# Patient Record
Sex: FEMALE | Race: OTHER | HISPANIC OR LATINO
[De-identification: names, ages, dates, MRNs, and addresses within clinical notes are randomized per-mention and may not be internally consistent; named-entity substitution may affect disease eponyms.]

---

## 2017-01-18 ENCOUNTER — APPOINTMENT (OUTPATIENT)
Dept: INTERNAL MEDICINE | Facility: CLINIC | Age: 67
End: 2017-01-18

## 2017-01-24 ENCOUNTER — APPOINTMENT (OUTPATIENT)
Dept: PULMONOLOGY | Facility: CLINIC | Age: 67
End: 2017-01-24

## 2017-01-25 ENCOUNTER — APPOINTMENT (OUTPATIENT)
Dept: PULMONOLOGY | Facility: CLINIC | Age: 67
End: 2017-01-25

## 2017-01-31 ENCOUNTER — APPOINTMENT (OUTPATIENT)
Dept: INTERNAL MEDICINE | Facility: CLINIC | Age: 67
End: 2017-01-31

## 2017-02-14 ENCOUNTER — LABORATORY RESULT (OUTPATIENT)
Age: 67
End: 2017-02-14

## 2017-02-14 ENCOUNTER — APPOINTMENT (OUTPATIENT)
Dept: INTERNAL MEDICINE | Facility: CLINIC | Age: 67
End: 2017-02-14

## 2017-02-14 VITALS
TEMPERATURE: 97.8 F | DIASTOLIC BLOOD PRESSURE: 80 MMHG | HEART RATE: 79 BPM | OXYGEN SATURATION: 96 % | BODY MASS INDEX: 37.09 KG/M2 | HEIGHT: 59 IN | WEIGHT: 184 LBS | SYSTOLIC BLOOD PRESSURE: 120 MMHG

## 2017-02-15 ENCOUNTER — OTHER (OUTPATIENT)
Age: 67
End: 2017-02-15

## 2017-02-15 LAB — HBA1C MFR BLD HPLC: 7 %

## 2017-02-23 ENCOUNTER — APPOINTMENT (OUTPATIENT)
Dept: INTERNAL MEDICINE | Facility: CLINIC | Age: 67
End: 2017-02-23

## 2017-02-23 VITALS
SYSTOLIC BLOOD PRESSURE: 110 MMHG | HEIGHT: 59 IN | HEART RATE: 98 BPM | TEMPERATURE: 98.7 F | DIASTOLIC BLOOD PRESSURE: 50 MMHG | OXYGEN SATURATION: 98 %

## 2017-03-06 ENCOUNTER — APPOINTMENT (OUTPATIENT)
Dept: PULMONOLOGY | Facility: CLINIC | Age: 67
End: 2017-03-06

## 2017-03-19 LAB — HEMOCCULT STL QL IA: NEGATIVE

## 2017-03-24 ENCOUNTER — APPOINTMENT (OUTPATIENT)
Dept: VASCULAR SURGERY | Facility: CLINIC | Age: 67
End: 2017-03-24

## 2017-03-24 VITALS
HEART RATE: 89 BPM | OXYGEN SATURATION: 94 % | SYSTOLIC BLOOD PRESSURE: 113 MMHG | BODY MASS INDEX: 37.5 KG/M2 | HEIGHT: 59 IN | DIASTOLIC BLOOD PRESSURE: 72 MMHG | WEIGHT: 186 LBS

## 2017-03-29 ENCOUNTER — APPOINTMENT (OUTPATIENT)
Dept: INTERNAL MEDICINE | Facility: CLINIC | Age: 67
End: 2017-03-29

## 2017-03-29 VITALS
DIASTOLIC BLOOD PRESSURE: 70 MMHG | HEART RATE: 89 BPM | TEMPERATURE: 98.4 F | SYSTOLIC BLOOD PRESSURE: 110 MMHG | BODY MASS INDEX: 37.5 KG/M2 | HEIGHT: 59 IN | OXYGEN SATURATION: 95 % | WEIGHT: 186 LBS

## 2017-04-06 ENCOUNTER — APPOINTMENT (OUTPATIENT)
Dept: INTERNAL MEDICINE | Facility: CLINIC | Age: 67
End: 2017-04-06

## 2017-04-20 ENCOUNTER — OTHER (OUTPATIENT)
Age: 67
End: 2017-04-20

## 2017-04-27 ENCOUNTER — APPOINTMENT (OUTPATIENT)
Dept: INTERNAL MEDICINE | Facility: CLINIC | Age: 67
End: 2017-04-27

## 2017-04-27 VITALS
HEART RATE: 94 BPM | HEIGHT: 59 IN | WEIGHT: 178 LBS | OXYGEN SATURATION: 97 % | BODY MASS INDEX: 35.88 KG/M2 | SYSTOLIC BLOOD PRESSURE: 130 MMHG | DIASTOLIC BLOOD PRESSURE: 70 MMHG | TEMPERATURE: 98.2 F

## 2017-04-27 DIAGNOSIS — M19.90 UNSPECIFIED OSTEOARTHRITIS, UNSPECIFIED SITE: ICD-10-CM

## 2017-05-07 ENCOUNTER — FORM ENCOUNTER (OUTPATIENT)
Age: 67
End: 2017-05-07

## 2017-05-08 ENCOUNTER — OUTPATIENT (OUTPATIENT)
Dept: OUTPATIENT SERVICES | Facility: HOSPITAL | Age: 67
LOS: 1 days | End: 2017-05-08
Payer: MEDICARE

## 2017-05-08 PROCEDURE — 77080 DXA BONE DENSITY AXIAL: CPT | Mod: 26

## 2017-05-08 PROCEDURE — 77080 DXA BONE DENSITY AXIAL: CPT

## 2017-06-05 ENCOUNTER — APPOINTMENT (OUTPATIENT)
Dept: PULMONOLOGY | Facility: CLINIC | Age: 67
End: 2017-06-05

## 2017-06-07 ENCOUNTER — APPOINTMENT (OUTPATIENT)
Dept: INTERNAL MEDICINE | Facility: CLINIC | Age: 67
End: 2017-06-07

## 2017-06-07 VITALS
DIASTOLIC BLOOD PRESSURE: 70 MMHG | BODY MASS INDEX: 36.76 KG/M2 | OXYGEN SATURATION: 96 % | HEART RATE: 88 BPM | SYSTOLIC BLOOD PRESSURE: 130 MMHG | TEMPERATURE: 98.7 F | WEIGHT: 182 LBS

## 2017-06-09 ENCOUNTER — RX RENEWAL (OUTPATIENT)
Age: 67
End: 2017-06-09

## 2017-06-15 ENCOUNTER — MEDICATION RENEWAL (OUTPATIENT)
Age: 67
End: 2017-06-15

## 2017-06-15 LAB
CREAT SPEC-SCNC: 212 MG/DL
HBA1C MFR BLD HPLC: 6.6 %
MICROALBUMIN 24H UR DL<=1MG/L-MCNC: 4.6 MG/DL
MICROALBUMIN/CREAT 24H UR-RTO: 22

## 2017-06-23 ENCOUNTER — OUTPATIENT (OUTPATIENT)
Dept: OUTPATIENT SERVICES | Facility: HOSPITAL | Age: 67
LOS: 1 days | End: 2017-06-23
Payer: MEDICARE

## 2017-06-23 PROCEDURE — 71250 CT THORAX DX C-: CPT | Mod: 26

## 2017-06-23 PROCEDURE — 71250 CT THORAX DX C-: CPT

## 2017-07-26 ENCOUNTER — APPOINTMENT (OUTPATIENT)
Dept: INTERNAL MEDICINE | Facility: CLINIC | Age: 67
End: 2017-07-26

## 2017-08-31 ENCOUNTER — OTHER (OUTPATIENT)
Age: 67
End: 2017-08-31

## 2017-09-13 ENCOUNTER — APPOINTMENT (OUTPATIENT)
Dept: INTERNAL MEDICINE | Facility: CLINIC | Age: 67
End: 2017-09-13
Payer: MEDICARE

## 2017-09-13 VITALS
TEMPERATURE: 97.9 F | DIASTOLIC BLOOD PRESSURE: 70 MMHG | SYSTOLIC BLOOD PRESSURE: 130 MMHG | HEART RATE: 73 BPM | BODY MASS INDEX: 35.55 KG/M2 | WEIGHT: 176 LBS | OXYGEN SATURATION: 98 %

## 2017-09-13 PROCEDURE — 99214 OFFICE O/P EST MOD 30 MIN: CPT | Mod: 25,GC

## 2017-09-13 PROCEDURE — 36415 COLL VENOUS BLD VENIPUNCTURE: CPT

## 2017-10-02 LAB
ACE BLD-CCNC: 27 U/L
CCP AB SER IA-ACNC: <8 UNITS
CRP SERPL-MCNC: 1.6 MG/DL
ERYTHROCYTE [SEDIMENTATION RATE] IN BLOOD BY WESTERGREN METHOD: 41 MM/HR
RF+CCP IGG SER-IMP: NEGATIVE
RHEUMATOID FACT SER QL: 27 IU/ML

## 2017-10-10 ENCOUNTER — APPOINTMENT (OUTPATIENT)
Dept: PULMONOLOGY | Facility: CLINIC | Age: 67
End: 2017-10-10
Payer: MEDICARE

## 2017-10-10 PROCEDURE — 99214 OFFICE O/P EST MOD 30 MIN: CPT | Mod: 25

## 2017-10-16 ENCOUNTER — APPOINTMENT (OUTPATIENT)
Dept: PULMONOLOGY | Facility: CLINIC | Age: 67
End: 2017-10-16

## 2017-11-02 ENCOUNTER — APPOINTMENT (OUTPATIENT)
Dept: INTERNAL MEDICINE | Facility: CLINIC | Age: 67
End: 2017-11-02
Payer: MEDICARE

## 2017-11-02 ENCOUNTER — EMERGENCY (EMERGENCY)
Facility: HOSPITAL | Age: 67
LOS: 1 days | Discharge: HOME | End: 2017-11-02
Attending: EMERGENCY MEDICINE | Admitting: EMERGENCY MEDICINE
Payer: MEDICARE

## 2017-11-02 VITALS
DIASTOLIC BLOOD PRESSURE: 81 MMHG | OXYGEN SATURATION: 93 % | TEMPERATURE: 97 F | HEART RATE: 80 BPM | SYSTOLIC BLOOD PRESSURE: 159 MMHG | RESPIRATION RATE: 24 BRPM

## 2017-11-02 VITALS
SYSTOLIC BLOOD PRESSURE: 132 MMHG | OXYGEN SATURATION: 96 % | DIASTOLIC BLOOD PRESSURE: 78 MMHG | RESPIRATION RATE: 20 BRPM | HEART RATE: 84 BPM

## 2017-11-02 VITALS
WEIGHT: 170 LBS | DIASTOLIC BLOOD PRESSURE: 65 MMHG | BODY MASS INDEX: 34.27 KG/M2 | OXYGEN SATURATION: 97 % | HEART RATE: 92 BPM | TEMPERATURE: 97.8 F | HEIGHT: 59 IN | SYSTOLIC BLOOD PRESSURE: 101 MMHG

## 2017-11-02 LAB
ALBUMIN SERPL ELPH-MCNC: 4.3 G/DL — SIGNIFICANT CHANGE UP (ref 3.3–5)
ALP SERPL-CCNC: 81 U/L — SIGNIFICANT CHANGE UP (ref 40–120)
ALT FLD-CCNC: 14 U/L — SIGNIFICANT CHANGE UP (ref 10–45)
ANION GAP SERPL CALC-SCNC: 13 MMOL/L — SIGNIFICANT CHANGE UP (ref 5–17)
AST SERPL-CCNC: 17 U/L — SIGNIFICANT CHANGE UP (ref 10–40)
BASOPHILS NFR BLD AUTO: 0.3 % — SIGNIFICANT CHANGE UP (ref 0–2)
BILIRUB SERPL-MCNC: <0.2 MG/DL — SIGNIFICANT CHANGE UP (ref 0.2–1.2)
BUN SERPL-MCNC: 26 MG/DL — HIGH (ref 7–23)
CALCIUM SERPL-MCNC: 10.3 MG/DL — SIGNIFICANT CHANGE UP (ref 8.4–10.5)
CHLORIDE SERPL-SCNC: 100 MMOL/L — SIGNIFICANT CHANGE UP (ref 96–108)
CO2 SERPL-SCNC: 28 MMOL/L — SIGNIFICANT CHANGE UP (ref 22–31)
CREAT SERPL-MCNC: 0.78 MG/DL — SIGNIFICANT CHANGE UP (ref 0.5–1.3)
EOSINOPHIL NFR BLD AUTO: 1.4 % — SIGNIFICANT CHANGE UP (ref 0–6)
GLUCOSE SERPL-MCNC: 120 MG/DL — HIGH (ref 70–99)
HCT VFR BLD CALC: 38.5 % — SIGNIFICANT CHANGE UP (ref 34.5–45)
HGB BLD-MCNC: 12 G/DL — SIGNIFICANT CHANGE UP (ref 11.5–15.5)
LYMPHOCYTES # BLD AUTO: 13.8 % — SIGNIFICANT CHANGE UP (ref 13–44)
MCHC RBC-ENTMCNC: 28.4 PG — SIGNIFICANT CHANGE UP (ref 27–34)
MCHC RBC-ENTMCNC: 31.2 G/DL — LOW (ref 32–36)
MCV RBC AUTO: 91.2 FL — SIGNIFICANT CHANGE UP (ref 80–100)
MONOCYTES NFR BLD AUTO: 8.8 % — SIGNIFICANT CHANGE UP (ref 2–14)
NEUTROPHILS NFR BLD AUTO: 75.7 % — SIGNIFICANT CHANGE UP (ref 43–77)
PLATELET # BLD AUTO: 392 K/UL — SIGNIFICANT CHANGE UP (ref 150–400)
POTASSIUM SERPL-MCNC: 4.3 MMOL/L — SIGNIFICANT CHANGE UP (ref 3.5–5.3)
POTASSIUM SERPL-SCNC: 4.3 MMOL/L — SIGNIFICANT CHANGE UP (ref 3.5–5.3)
PROT SERPL-MCNC: 7.6 G/DL — SIGNIFICANT CHANGE UP (ref 6–8.3)
RBC # BLD: 4.22 M/UL — SIGNIFICANT CHANGE UP (ref 3.8–5.2)
RBC # FLD: 16 % — SIGNIFICANT CHANGE UP (ref 10.3–16.9)
SODIUM SERPL-SCNC: 141 MMOL/L — SIGNIFICANT CHANGE UP (ref 135–145)
WBC # BLD: 14.8 K/UL — HIGH (ref 3.8–10.5)
WBC # FLD AUTO: 14.8 K/UL — HIGH (ref 3.8–10.5)

## 2017-11-02 PROCEDURE — 80053 COMPREHEN METABOLIC PANEL: CPT

## 2017-11-02 PROCEDURE — 93971 EXTREMITY STUDY: CPT | Mod: 26,RT

## 2017-11-02 PROCEDURE — 99214 OFFICE O/P EST MOD 30 MIN: CPT | Mod: GC

## 2017-11-02 PROCEDURE — 99284 EMERGENCY DEPT VISIT MOD MDM: CPT | Mod: 25

## 2017-11-02 PROCEDURE — 85025 COMPLETE CBC W/AUTO DIFF WBC: CPT

## 2017-11-02 PROCEDURE — 99285 EMERGENCY DEPT VISIT HI MDM: CPT

## 2017-11-02 PROCEDURE — 96374 THER/PROPH/DIAG INJ IV PUSH: CPT

## 2017-11-02 PROCEDURE — 96375 TX/PRO/DX INJ NEW DRUG ADDON: CPT

## 2017-11-02 PROCEDURE — 36415 COLL VENOUS BLD VENIPUNCTURE: CPT

## 2017-11-02 PROCEDURE — 93971 EXTREMITY STUDY: CPT

## 2017-11-02 RX ORDER — FLUCONAZOLE 100 MG/1
100 TABLET ORAL
Qty: 30 | Refills: 0 | Status: DISCONTINUED | COMMUNITY
Start: 2017-09-13 | End: 2017-11-02

## 2017-11-02 RX ORDER — ONDANSETRON 8 MG/1
4 TABLET, FILM COATED ORAL ONCE
Qty: 0 | Refills: 0 | Status: COMPLETED | OUTPATIENT
Start: 2017-11-02 | End: 2017-11-02

## 2017-11-02 RX ORDER — SODIUM CHLORIDE 9 MG/ML
1000 INJECTION INTRAMUSCULAR; INTRAVENOUS; SUBCUTANEOUS ONCE
Qty: 0 | Refills: 0 | Status: COMPLETED | OUTPATIENT
Start: 2017-11-02 | End: 2017-11-02

## 2017-11-02 RX ORDER — KETOROLAC TROMETHAMINE 30 MG/ML
15 SYRINGE (ML) INJECTION ONCE
Qty: 0 | Refills: 0 | Status: DISCONTINUED | OUTPATIENT
Start: 2017-11-02 | End: 2017-11-02

## 2017-11-02 RX ORDER — CLOTRIMAZOLE AND BETAMETHASONE DIPROPIONATE 10; .5 MG/G; MG/G
1-0.05 CREAM TOPICAL TWICE DAILY
Qty: 45 | Refills: 0 | Status: DISCONTINUED | COMMUNITY
Start: 2017-03-29 | End: 2017-11-02

## 2017-11-02 RX ORDER — IBUPROFEN 200 MG
1 TABLET ORAL
Qty: 30 | Refills: 0 | OUTPATIENT
Start: 2017-11-02

## 2017-11-02 RX ORDER — FAMOTIDINE 10 MG/ML
20 INJECTION INTRAVENOUS ONCE
Qty: 0 | Refills: 0 | Status: COMPLETED | OUTPATIENT
Start: 2017-11-02 | End: 2017-11-02

## 2017-11-02 RX ADMIN — Medication 15 MILLIGRAM(S): at 17:06

## 2017-11-02 RX ADMIN — Medication 15 MILLIGRAM(S): at 17:07

## 2017-11-02 RX ADMIN — FAMOTIDINE 20 MILLIGRAM(S): 10 INJECTION INTRAVENOUS at 17:07

## 2017-11-02 RX ADMIN — ONDANSETRON 4 MILLIGRAM(S): 8 TABLET, FILM COATED ORAL at 17:06

## 2017-11-02 RX ADMIN — SODIUM CHLORIDE 2000 MILLILITER(S): 9 INJECTION INTRAMUSCULAR; INTRAVENOUS; SUBCUTANEOUS at 17:03

## 2017-11-02 NOTE — ED PROVIDER NOTE - MUSCULOSKELETAL, MLM
Spine appears normal, range of motion is not limited, mild tenderness right mid thigh laterally, normal appearance. pedal pulse 2+.  no visible swelling

## 2017-11-02 NOTE — ED PROVIDER NOTE - PMH
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  Depressive disorder  Depression  Disease of trachea and bronchus  Tracheomalacia  Emphysema  COPD (chronic obstructive pulmonary disease) with emphysema  Epilepsy  No longer on medications.  Essential hypertension  Hypertension  Hemorrhoids  Hemorrhoids  Hyperlipidemia  Hyperlipidemia  Peptic ulcer  2/2 H. pylori (s/p amoxicillin, clarithromycin, omeprazole)  Tobacco use disorder  Tobacco abuse  Transient cerebral ischemia  TIA (transient ischemic attack)  Type 2 diabetes mellitus  Diabetes

## 2017-11-02 NOTE — ED ADULT NURSE NOTE - CHPI ED SYMPTOMS NEG
no burning urination/no dysuria/no chills/no abdominal distension/no blood in stool/no hematuria/no diarrhea/no fever

## 2017-11-02 NOTE — ED ADULT TRIAGE NOTE - CHIEF COMPLAINT QUOTE
Patient was at MD's Office and had one bout of vomiting and is nauseous, complaining of abdominal pain.

## 2017-11-02 NOTE — ED PROVIDER NOTE - OBJECTIVE STATEMENT
here from clinic with episode of nausea/vomiting.  States she was being seeing for 3 weeks of leg pain.  It was very hot and stuffy in office.  She thinks she overheated and also says she ate a hamburger that may have been bad.  Got hot/ nauseous then vomited.  Mild epigastric abdominal pain.  Denies headache, chest pain, sob, fever/chills.  Now feeling a little better.

## 2017-11-02 NOTE — ED PROVIDER NOTE - MEDICAL DECISION MAKING DETAILS
nausea/vomiting today.  suspect overheating/ possible food poisoning.  abd soft.  labs with mild leukocytosis.  symptoms resolved with pepcid/zofran.    also with leg pain x a few weeks.  normal appearance.  dvt study neg.  suspect musculoskeletal.  plan nsaids/ pmd f/u

## 2017-11-09 DIAGNOSIS — I10 ESSENTIAL (PRIMARY) HYPERTENSION: ICD-10-CM

## 2017-11-09 DIAGNOSIS — J44.9 CHRONIC OBSTRUCTIVE PULMONARY DISEASE, UNSPECIFIED: ICD-10-CM

## 2017-11-09 DIAGNOSIS — Z79.51 LONG TERM (CURRENT) USE OF INHALED STEROIDS: ICD-10-CM

## 2017-11-09 DIAGNOSIS — E11.9 TYPE 2 DIABETES MELLITUS WITHOUT COMPLICATIONS: ICD-10-CM

## 2017-11-09 DIAGNOSIS — Z79.82 LONG TERM (CURRENT) USE OF ASPIRIN: ICD-10-CM

## 2017-11-09 DIAGNOSIS — Z79.899 OTHER LONG TERM (CURRENT) DRUG THERAPY: ICD-10-CM

## 2017-11-09 DIAGNOSIS — E78.5 HYPERLIPIDEMIA, UNSPECIFIED: ICD-10-CM

## 2017-11-09 DIAGNOSIS — R10.13 EPIGASTRIC PAIN: ICD-10-CM

## 2017-11-09 DIAGNOSIS — R11.2 NAUSEA WITH VOMITING, UNSPECIFIED: ICD-10-CM

## 2017-11-09 DIAGNOSIS — M79.604 PAIN IN RIGHT LEG: ICD-10-CM

## 2017-11-09 DIAGNOSIS — F17.200 NICOTINE DEPENDENCE, UNSPECIFIED, UNCOMPLICATED: ICD-10-CM

## 2017-12-27 ENCOUNTER — RX RENEWAL (OUTPATIENT)
Age: 67
End: 2017-12-27

## 2018-01-05 ENCOUNTER — APPOINTMENT (OUTPATIENT)
Dept: PULMONOLOGY | Facility: CLINIC | Age: 68
End: 2018-01-05

## 2018-01-09 ENCOUNTER — CHART COPY (OUTPATIENT)
Age: 68
End: 2018-01-09

## 2018-01-09 ENCOUNTER — OUTPATIENT (OUTPATIENT)
Dept: OUTPATIENT SERVICES | Facility: HOSPITAL | Age: 68
LOS: 1 days | End: 2018-01-09
Payer: MEDICARE

## 2018-01-09 ENCOUNTER — APPOINTMENT (OUTPATIENT)
Dept: INTERNAL MEDICINE | Facility: CLINIC | Age: 68
End: 2018-01-09

## 2018-01-09 PROCEDURE — 73110 X-RAY EXAM OF WRIST: CPT | Mod: 26,50

## 2018-01-09 PROCEDURE — 73110 X-RAY EXAM OF WRIST: CPT

## 2018-01-09 PROCEDURE — 73552 X-RAY EXAM OF FEMUR 2/>: CPT | Mod: 26,RT

## 2018-01-09 PROCEDURE — 73552 X-RAY EXAM OF FEMUR 2/>: CPT

## 2018-01-12 ENCOUNTER — APPOINTMENT (OUTPATIENT)
Dept: INTERNAL MEDICINE | Facility: CLINIC | Age: 68
End: 2018-01-12
Payer: MEDICARE

## 2018-01-12 VITALS
OXYGEN SATURATION: 92 % | TEMPERATURE: 98.4 F | DIASTOLIC BLOOD PRESSURE: 79 MMHG | SYSTOLIC BLOOD PRESSURE: 133 MMHG | WEIGHT: 168 LBS | HEART RATE: 84 BPM | BODY MASS INDEX: 33.93 KG/M2

## 2018-01-12 PROCEDURE — 36415 COLL VENOUS BLD VENIPUNCTURE: CPT

## 2018-01-12 PROCEDURE — 99214 OFFICE O/P EST MOD 30 MIN: CPT | Mod: 25,GC

## 2018-01-19 ENCOUNTER — OTHER (OUTPATIENT)
Age: 68
End: 2018-01-19

## 2018-01-19 LAB
ALBUMIN SERPL ELPH-MCNC: 4 G/DL
ALP BLD-CCNC: 87 U/L
ALT SERPL-CCNC: 10 U/L
ANION GAP SERPL CALC-SCNC: 17 MMOL/L
AST SERPL-CCNC: 17 U/L
BASOPHILS # BLD AUTO: 0.06 K/UL
BASOPHILS NFR BLD AUTO: 0.6 %
BILIRUB SERPL-MCNC: 0.2 MG/DL
BUN SERPL-MCNC: 14 MG/DL
CALCIUM SERPL-MCNC: 10 MG/DL
CHLORIDE SERPL-SCNC: 101 MMOL/L
CHOLEST SERPL-MCNC: 185 MG/DL
CHOLEST/HDLC SERPL: 4.6 RATIO
CO2 SERPL-SCNC: 26 MMOL/L
CREAT SERPL-MCNC: 0.71 MG/DL
EOSINOPHIL # BLD AUTO: 0.28 K/UL
EOSINOPHIL NFR BLD AUTO: 2.7 %
GLUCOSE SERPL-MCNC: 154 MG/DL
HBA1C MFR BLD HPLC: 6.5 %
HCT VFR BLD CALC: 39.6 %
HDLC SERPL-MCNC: 40 MG/DL
HGB BLD-MCNC: 12.1 G/DL
IMM GRANULOCYTES NFR BLD AUTO: 0.8 %
LDLC SERPL CALC-MCNC: 102 MG/DL
LYMPHOCYTES # BLD AUTO: 2.31 K/UL
LYMPHOCYTES NFR BLD AUTO: 22.2 %
MAN DIFF?: NORMAL
MCHC RBC-ENTMCNC: 28.4 PG
MCHC RBC-ENTMCNC: 30.6 GM/DL
MCV RBC AUTO: 93 FL
MONOCYTES # BLD AUTO: 0.79 K/UL
MONOCYTES NFR BLD AUTO: 7.6 %
NEUTROPHILS # BLD AUTO: 6.89 K/UL
NEUTROPHILS NFR BLD AUTO: 66.1 %
PLATELET # BLD AUTO: 413 K/UL
POTASSIUM SERPL-SCNC: 4.8 MMOL/L
PROT SERPL-MCNC: 7.1 G/DL
RBC # BLD: 4.26 M/UL
RBC # FLD: 15.5 %
SODIUM SERPL-SCNC: 144 MMOL/L
TRIGL SERPL-MCNC: 213 MG/DL
WBC # FLD AUTO: 10.41 K/UL

## 2018-01-23 ENCOUNTER — FORM ENCOUNTER (OUTPATIENT)
Age: 68
End: 2018-01-23

## 2018-01-24 ENCOUNTER — APPOINTMENT (OUTPATIENT)
Dept: ULTRASOUND IMAGING | Facility: HOSPITAL | Age: 68
End: 2018-01-24

## 2018-01-24 ENCOUNTER — OUTPATIENT (OUTPATIENT)
Dept: OUTPATIENT SERVICES | Facility: HOSPITAL | Age: 68
LOS: 1 days | End: 2018-01-24
Payer: MEDICARE

## 2018-01-24 PROCEDURE — 76700 US EXAM ABDOM COMPLETE: CPT | Mod: 26

## 2018-01-24 PROCEDURE — 76700 US EXAM ABDOM COMPLETE: CPT

## 2018-01-26 ENCOUNTER — RX RENEWAL (OUTPATIENT)
Age: 68
End: 2018-01-26

## 2018-02-01 ENCOUNTER — APPOINTMENT (OUTPATIENT)
Dept: RHEUMATOLOGY | Facility: CLINIC | Age: 68
End: 2018-02-01
Payer: MEDICARE

## 2018-02-01 VITALS
SYSTOLIC BLOOD PRESSURE: 158 MMHG | HEIGHT: 59 IN | BODY MASS INDEX: 32.46 KG/M2 | DIASTOLIC BLOOD PRESSURE: 84 MMHG | WEIGHT: 161 LBS | OXYGEN SATURATION: 92 % | HEART RATE: 92 BPM

## 2018-02-01 DIAGNOSIS — M25.532 PAIN IN RIGHT WRIST: ICD-10-CM

## 2018-02-01 DIAGNOSIS — M25.531 PAIN IN RIGHT WRIST: ICD-10-CM

## 2018-02-01 DIAGNOSIS — G89.29 PAIN IN RIGHT WRIST: ICD-10-CM

## 2018-02-01 PROCEDURE — 36415 COLL VENOUS BLD VENIPUNCTURE: CPT

## 2018-02-01 PROCEDURE — 99205 OFFICE O/P NEW HI 60 MIN: CPT | Mod: 25

## 2018-02-07 LAB
ALBUMIN MFR SERPL ELPH: 51.2 %
ALBUMIN SERPL-MCNC: 3.6 G/DL
ALBUMIN/GLOB SERPL: 1.1 RATIO
ALBUPE: 40.3 %
ALPHA1 GLOB MFR SERPL ELPH: 6.5 %
ALPHA1 GLOB SERPL ELPH-MCNC: 0.5 G/DL
ALPHA1UPE: 10.5 %
ALPHA2 GLOB MFR SERPL ELPH: 14.5 %
ALPHA2 GLOB SERPL ELPH-MCNC: 1 G/DL
ALPHA2UPE: 14.3 %
B-GLOBULIN MFR SERPL ELPH: 13.8 %
B-GLOBULIN SERPL ELPH-MCNC: 1 G/DL
BETAUPE: 13.8 %
CALCIUM SERPL-MCNC: 9.5 MG/DL
CREAT 24H UR-MCNC: NORMAL G/24 H
CREATININE UR (MAYO): 224 MG/DL
FERRITIN SERPL-MCNC: 181 NG/ML
GAMMA GLOB FLD ELPH-MCNC: 1 G/DL
GAMMA GLOB MFR SERPL ELPH: 14 %
GAMMAUPE: 21.1 %
HBV CORE IGG+IGM SER QL: NONREACTIVE
HBV SURFACE AB SER QL: NONREACTIVE
HBV SURFACE AG SER QL: NONREACTIVE
HCV AB SER QL: NONREACTIVE
HCV S/CO RATIO: 0.05 S/CO
IGA 24H UR QL IFE: NORMAL
INTERPRETATION SERPL IEP-IMP: NORMAL
KAPPA LC 24H UR QL: NORMAL
LDH SERPL-CCNC: 267 U/L
MAGNESIUM SERPL-MCNC: 1.9 MG/DL
PARATHYROID HORMONE INTACT: 31 PG/ML
PROT PATTERN 24H UR ELPH-IMP: NORMAL
PROT SERPL-MCNC: 7 G/DL
PROT SERPL-MCNC: 7 G/DL
PROT UR-MCNC: 88 MG/DL
PROT UR-MCNC: 88 MG/DL
SPECIMEN VOL 24H UR: NORMAL ML
TSH SERPL-ACNC: 1.44 UIU/ML
URATE SERPL-MCNC: 3.5 MG/DL

## 2018-02-15 ENCOUNTER — APPOINTMENT (OUTPATIENT)
Dept: RHEUMATOLOGY | Facility: CLINIC | Age: 68
End: 2018-02-15
Payer: MEDICARE

## 2018-02-15 VITALS
SYSTOLIC BLOOD PRESSURE: 116 MMHG | HEART RATE: 86 BPM | OXYGEN SATURATION: 94 % | BODY MASS INDEX: 33.12 KG/M2 | DIASTOLIC BLOOD PRESSURE: 64 MMHG | WEIGHT: 164 LBS

## 2018-02-15 DIAGNOSIS — R70.0 ELEVATED ERYTHROCYTE SEDIMENTATION RATE: ICD-10-CM

## 2018-02-15 PROCEDURE — 36415 COLL VENOUS BLD VENIPUNCTURE: CPT

## 2018-02-15 PROCEDURE — 99214 OFFICE O/P EST MOD 30 MIN: CPT | Mod: 25

## 2018-02-21 ENCOUNTER — APPOINTMENT (OUTPATIENT)
Dept: PULMONOLOGY | Facility: CLINIC | Age: 68
End: 2018-02-21

## 2018-02-21 LAB
CRP SERPL-MCNC: 1 MG/DL
ENA SS-A AB SER IA-ACNC: <0.2 AL
ENA SS-B AB SER IA-ACNC: <0.2 AL
ERYTHROCYTE [SEDIMENTATION RATE] IN BLOOD BY WESTERGREN METHOD: 48 MM/HR
IGA SER QL IEP: 332 MG/DL
IGG SER QL IEP: 1110 MG/DL
IGM SER QL IEP: 131 MG/DL

## 2018-02-22 ENCOUNTER — APPOINTMENT (OUTPATIENT)
Dept: RHEUMATOLOGY | Facility: CLINIC | Age: 68
End: 2018-02-22

## 2018-03-05 ENCOUNTER — APPOINTMENT (OUTPATIENT)
Dept: RHEUMATOLOGY | Facility: CLINIC | Age: 68
End: 2018-03-05

## 2018-03-15 ENCOUNTER — APPOINTMENT (OUTPATIENT)
Dept: RHEUMATOLOGY | Facility: CLINIC | Age: 68
End: 2018-03-15

## 2018-03-23 ENCOUNTER — RX RENEWAL (OUTPATIENT)
Age: 68
End: 2018-03-23

## 2018-03-23 DIAGNOSIS — R32 UNSPECIFIED URINARY INCONTINENCE: ICD-10-CM

## 2018-03-26 ENCOUNTER — APPOINTMENT (OUTPATIENT)
Dept: INTERNAL MEDICINE | Facility: CLINIC | Age: 68
End: 2018-03-26

## 2018-03-29 ENCOUNTER — RX RENEWAL (OUTPATIENT)
Age: 68
End: 2018-03-29

## 2018-03-30 ENCOUNTER — RX RENEWAL (OUTPATIENT)
Age: 68
End: 2018-03-30

## 2018-04-25 ENCOUNTER — RX RENEWAL (OUTPATIENT)
Age: 68
End: 2018-04-25

## 2018-04-27 ENCOUNTER — APPOINTMENT (OUTPATIENT)
Dept: INTERNAL MEDICINE | Facility: CLINIC | Age: 68
End: 2018-04-27
Payer: MEDICARE

## 2018-04-27 VITALS
HEART RATE: 82 BPM | OXYGEN SATURATION: 94 % | WEIGHT: 170 LBS | BODY MASS INDEX: 34.34 KG/M2 | DIASTOLIC BLOOD PRESSURE: 80 MMHG | TEMPERATURE: 98.1 F | SYSTOLIC BLOOD PRESSURE: 157 MMHG

## 2018-04-27 DIAGNOSIS — M19.90 UNSPECIFIED OSTEOARTHRITIS, UNSPECIFIED SITE: ICD-10-CM

## 2018-04-27 PROCEDURE — 99213 OFFICE O/P EST LOW 20 MIN: CPT | Mod: GE

## 2018-04-28 PROBLEM — M19.90 INFLAMMATORY ARTHRITIS: Status: ACTIVE | Noted: 2018-02-15

## 2018-04-30 LAB
ANION GAP SERPL CALC-SCNC: 14 MMOL/L
BASOPHILS # BLD AUTO: 0.05 K/UL
BASOPHILS NFR BLD AUTO: 0.6 %
BUN SERPL-MCNC: 17 MG/DL
CALCIUM SERPL-MCNC: 9.8 MG/DL
CHLORIDE SERPL-SCNC: 101 MMOL/L
CO2 SERPL-SCNC: 26 MMOL/L
CREAT SERPL-MCNC: 0.68 MG/DL
EOSINOPHIL # BLD AUTO: 0.12 K/UL
EOSINOPHIL NFR BLD AUTO: 1.5 %
GLUCOSE SERPL-MCNC: 97 MG/DL
HBA1C MFR BLD HPLC: 6.8 %
HCT VFR BLD CALC: 39.7 %
HGB BLD-MCNC: 12.1 G/DL
IMM GRANULOCYTES NFR BLD AUTO: 1.3 %
LYMPHOCYTES # BLD AUTO: 2.84 K/UL
LYMPHOCYTES NFR BLD AUTO: 36.4 %
MAN DIFF?: NORMAL
MCHC RBC-ENTMCNC: 28.7 PG
MCHC RBC-ENTMCNC: 30.5 GM/DL
MCV RBC AUTO: 94.3 FL
MONOCYTES # BLD AUTO: 0.64 K/UL
MONOCYTES NFR BLD AUTO: 8.2 %
NEUTROPHILS # BLD AUTO: 4.05 K/UL
NEUTROPHILS NFR BLD AUTO: 52 %
PLATELET # BLD AUTO: 370 K/UL
POTASSIUM SERPL-SCNC: 4.7 MMOL/L
RBC # BLD: 4.21 M/UL
RBC # FLD: 15.1 %
SODIUM SERPL-SCNC: 141 MMOL/L
WBC # FLD AUTO: 7.8 K/UL

## 2018-05-04 ENCOUNTER — APPOINTMENT (OUTPATIENT)
Dept: PULMONOLOGY | Facility: CLINIC | Age: 68
End: 2018-05-04
Payer: MEDICARE

## 2018-05-04 PROCEDURE — 99214 OFFICE O/P EST MOD 30 MIN: CPT | Mod: 25

## 2018-05-11 ENCOUNTER — OUTPATIENT (OUTPATIENT)
Dept: OUTPATIENT SERVICES | Facility: HOSPITAL | Age: 68
LOS: 1 days | End: 2018-05-11
Payer: MEDICARE

## 2018-05-11 DIAGNOSIS — R06.00 DYSPNEA, UNSPECIFIED: ICD-10-CM

## 2018-05-11 PROCEDURE — 93306 TTE W/DOPPLER COMPLETE: CPT | Mod: 26

## 2018-05-11 PROCEDURE — 93306 TTE W/DOPPLER COMPLETE: CPT

## 2018-05-14 ENCOUNTER — APPOINTMENT (OUTPATIENT)
Dept: OPHTHALMOLOGY | Facility: CLINIC | Age: 68
End: 2018-05-14
Payer: MEDICARE

## 2018-05-14 DIAGNOSIS — H25.13 AGE-RELATED NUCLEAR CATARACT, BILATERAL: ICD-10-CM

## 2018-05-14 DIAGNOSIS — H54.7 UNSPECIFIED VISUAL LOSS: ICD-10-CM

## 2018-05-14 PROCEDURE — 92004 COMPRE OPH EXAM NEW PT 1/>: CPT

## 2018-05-23 ENCOUNTER — APPOINTMENT (OUTPATIENT)
Dept: OPHTHALMOLOGY | Facility: CLINIC | Age: 68
End: 2018-05-23
Payer: MEDICARE

## 2018-05-23 DIAGNOSIS — H47.20 UNSPECIFIED OPTIC ATROPHY: ICD-10-CM

## 2018-05-23 PROCEDURE — 92083 EXTENDED VISUAL FIELD XM: CPT

## 2018-05-23 PROCEDURE — 92133 CPTRZD OPH DX IMG PST SGM ON: CPT

## 2018-05-23 PROCEDURE — 92014 COMPRE OPH EXAM EST PT 1/>: CPT

## 2018-06-04 ENCOUNTER — RX RENEWAL (OUTPATIENT)
Age: 68
End: 2018-06-04

## 2018-06-08 ENCOUNTER — OUTPATIENT (OUTPATIENT)
Dept: OUTPATIENT SERVICES | Facility: HOSPITAL | Age: 68
LOS: 1 days | End: 2018-06-08
Payer: MEDICARE

## 2018-06-08 ENCOUNTER — APPOINTMENT (OUTPATIENT)
Dept: MRI IMAGING | Facility: HOSPITAL | Age: 68
End: 2018-06-08
Payer: MEDICARE

## 2018-06-08 PROCEDURE — 70543 MRI ORBT/FAC/NCK W/O &W/DYE: CPT

## 2018-06-08 PROCEDURE — A9585: CPT

## 2018-06-08 PROCEDURE — 70543 MRI ORBT/FAC/NCK W/O &W/DYE: CPT | Mod: 26

## 2018-07-09 ENCOUNTER — RX RENEWAL (OUTPATIENT)
Age: 68
End: 2018-07-09

## 2018-08-03 ENCOUNTER — APPOINTMENT (OUTPATIENT)
Dept: PULMONOLOGY | Facility: CLINIC | Age: 68
End: 2018-08-03

## 2018-08-15 ENCOUNTER — APPOINTMENT (OUTPATIENT)
Dept: INTERNAL MEDICINE | Facility: CLINIC | Age: 68
End: 2018-08-15
Payer: MEDICARE

## 2018-08-15 VITALS
OXYGEN SATURATION: 97 % | HEIGHT: 59 IN | SYSTOLIC BLOOD PRESSURE: 177 MMHG | DIASTOLIC BLOOD PRESSURE: 75 MMHG | TEMPERATURE: 97.8 F | HEART RATE: 62 BPM | BODY MASS INDEX: 33.47 KG/M2 | WEIGHT: 166 LBS

## 2018-08-15 PROCEDURE — 99214 OFFICE O/P EST MOD 30 MIN: CPT | Mod: GC

## 2018-09-03 ENCOUNTER — EMERGENCY (EMERGENCY)
Facility: HOSPITAL | Age: 68
LOS: 1 days | Discharge: HOME | End: 2018-09-03
Attending: EMERGENCY MEDICINE | Admitting: EMERGENCY MEDICINE
Payer: MEDICARE

## 2018-09-03 VITALS
DIASTOLIC BLOOD PRESSURE: 58 MMHG | TEMPERATURE: 99 F | WEIGHT: 259.93 LBS | HEART RATE: 84 BPM | SYSTOLIC BLOOD PRESSURE: 110 MMHG | RESPIRATION RATE: 17 BRPM | OXYGEN SATURATION: 98 %

## 2018-09-03 VITALS
SYSTOLIC BLOOD PRESSURE: 155 MMHG | RESPIRATION RATE: 18 BRPM | DIASTOLIC BLOOD PRESSURE: 76 MMHG | TEMPERATURE: 98 F | HEART RATE: 79 BPM | OXYGEN SATURATION: 98 %

## 2018-09-03 PROCEDURE — 99283 EMERGENCY DEPT VISIT LOW MDM: CPT

## 2018-09-03 RX ORDER — ACETAMINOPHEN 500 MG
325 TABLET ORAL ONCE
Qty: 0 | Refills: 0 | Status: DISCONTINUED | OUTPATIENT
Start: 2018-09-03 | End: 2018-09-07

## 2018-09-03 RX ORDER — ACETAMINOPHEN 500 MG
650 TABLET ORAL ONCE
Qty: 0 | Refills: 0 | Status: COMPLETED | OUTPATIENT
Start: 2018-09-03 | End: 2018-09-03

## 2018-09-03 RX ORDER — IBUPROFEN 200 MG
600 TABLET ORAL ONCE
Qty: 0 | Refills: 0 | Status: COMPLETED | OUTPATIENT
Start: 2018-09-03 | End: 2018-09-03

## 2018-09-03 RX ADMIN — Medication 600 MILLIGRAM(S): at 20:42

## 2018-09-03 RX ADMIN — Medication 650 MILLIGRAM(S): at 20:42

## 2018-09-03 NOTE — ED PROVIDER NOTE - MEDICAL DECISION MAKING DETAILS
67F with multiple medical problems s/p fall from rocking chair no complaints other than whole body pain for which she has had for 1 week, wants tylenol and motrin, declines any other pain medications. No joint pain no bony pain no deformity, from of joints, no c-s spine ttp no head injury no neck injury pt is ambulatory with cane at baseline, remains ambulatory will dc home.

## 2018-09-03 NOTE — ED ADULT NURSE NOTE - CHPI ED NUR SYMPTOMS NEG
no fever/no tingling/no abrasion/no weakness/no confusion/no vomiting/no bleeding/no loss of consciousness/no numbness/no deformity

## 2018-09-03 NOTE — ED PROVIDER NOTE - OBJECTIVE STATEMENT
67F with multiple medical problems home O2 dependent presenting s/p fall from rocking chair onto knees and wrists pt states she walks with cane at baseline, states that her pain started even before her fall from her rocking chair today, states "my whole body hurts" "this is my chronic pain". Denies deformity denies headache/neck injury. Denies head pain LOC .

## 2018-09-03 NOTE — ED ADULT NURSE NOTE - NSIMPLEMENTINTERV_GEN_ALL_ED
Implemented All Fall Risk Interventions:  [place] to call system. Call bell, personal items and telephone within reach. Instruct patient to call for assistance. Room bathroom lighting operational. Non-slip footwear when patient is off stretcher. Physically safe environment: no spills, clutter or unnecessary equipment. Stretcher in lowest position, wheels locked, appropriate side rails in place. Provide visual cue, wrist band, yellow gown, etc. Monitor gait and stability. Monitor for mental status changes and reorient to person, place, and time. Review medications for side effects contributing to fall risk. Reinforce activity limits and safety measures with patient and family.

## 2018-09-03 NOTE — ED PROVIDER NOTE - MUSCULOSKELETAL, MLM
Spine appears normal, range of motion is not limited, no muscle or joint tenderness no c-s spine ttp, no clavicular ttp, no shoulder/elbow/wrist deformity or ttp from of all joints, no knee bruising, from of knees/hips bl pt ambulates with cane

## 2018-09-03 NOTE — ED ADULT TRIAGE NOTE - CHIEF COMPLAINT QUOTE
biba c/o bilateral knee pain and left hand/wrist pain.  Patient was sitting in a rocking chair and fell forward.  Denies hitting head / loc / neck/back pain

## 2018-09-03 NOTE — ED PROVIDER NOTE - CONSTITUTIONAL, MLM
normal... awake, alert, oriented to person, place, time/situation and in no apparent distress. pt appears sob chronically

## 2018-09-03 NOTE — ED ADULT NURSE NOTE - OBJECTIVE STATEMENT
Pt states she was on a rocking chair and fell of and hit her knees. Pt states she has chronic joint pain but states the pain became work because she fell on her knees.

## 2018-09-07 DIAGNOSIS — M25.562 PAIN IN LEFT KNEE: ICD-10-CM

## 2018-09-07 DIAGNOSIS — M25.532 PAIN IN LEFT WRIST: ICD-10-CM

## 2018-09-07 DIAGNOSIS — I10 ESSENTIAL (PRIMARY) HYPERTENSION: ICD-10-CM
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
## 2018-09-07 DIAGNOSIS — M25.561 PAIN IN RIGHT KNEE: ICD-10-CM

## 2018-09-07 DIAGNOSIS — J44.9 CHRONIC OBSTRUCTIVE PULMONARY DISEASE, UNSPECIFIED: ICD-10-CM
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
## 2018-09-07 DIAGNOSIS — E11.9 TYPE 2 DIABETES MELLITUS WITHOUT COMPLICATIONS: ICD-10-CM

## 2018-09-07 DIAGNOSIS — E78.5 HYPERLIPIDEMIA, UNSPECIFIED: ICD-10-CM

## 2018-09-07 DIAGNOSIS — M25.531 PAIN IN RIGHT WRIST: ICD-10-CM
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
## 2018-10-10 ENCOUNTER — APPOINTMENT (OUTPATIENT)
Dept: INTERNAL MEDICINE | Facility: CLINIC | Age: 68
End: 2018-10-10
Payer: MEDICARE

## 2018-10-10 ENCOUNTER — MED ADMIN CHARGE (OUTPATIENT)
Age: 68
End: 2018-10-10

## 2018-10-10 VITALS
OXYGEN SATURATION: 95 % | BODY MASS INDEX: 33.06 KG/M2 | HEIGHT: 59 IN | DIASTOLIC BLOOD PRESSURE: 63 MMHG | SYSTOLIC BLOOD PRESSURE: 154 MMHG | TEMPERATURE: 98 F | HEART RATE: 89 BPM | WEIGHT: 164 LBS

## 2018-10-10 DIAGNOSIS — F17.200 NICOTINE DEPENDENCE, UNSPECIFIED, UNCOMPLICATED: ICD-10-CM

## 2018-10-10 PROCEDURE — 36415 COLL VENOUS BLD VENIPUNCTURE: CPT

## 2018-10-10 PROCEDURE — 93000 ELECTROCARDIOGRAM COMPLETE: CPT

## 2018-10-10 PROCEDURE — 99214 OFFICE O/P EST MOD 30 MIN: CPT | Mod: 25,GC

## 2018-10-10 PROCEDURE — G0008: CPT

## 2018-10-10 PROCEDURE — 90662 IIV NO PRSV INCREASED AG IM: CPT

## 2018-10-11 ENCOUNTER — RESULT REVIEW (OUTPATIENT)
Age: 68
End: 2018-10-11

## 2018-10-11 LAB
ALBUMIN SERPL ELPH-MCNC: 4.5 G/DL
ALP BLD-CCNC: 88 U/L
ALT SERPL-CCNC: 18 U/L
ANION GAP SERPL CALC-SCNC: 15 MMOL/L
AST SERPL-CCNC: 23 U/L
BILIRUB SERPL-MCNC: 0.3 MG/DL
BUN SERPL-MCNC: 14 MG/DL
CALCIUM SERPL-MCNC: 10.5 MG/DL
CHLORIDE SERPL-SCNC: 102 MMOL/L
CO2 SERPL-SCNC: 26 MMOL/L
CREAT SERPL-MCNC: 0.68 MG/DL
GLUCOSE SERPL-MCNC: 113 MG/DL
HBA1C MFR BLD HPLC: 6.7 %
POTASSIUM SERPL-SCNC: 4.5 MMOL/L
PROT SERPL-MCNC: 7.3 G/DL
SODIUM SERPL-SCNC: 143 MMOL/L

## 2018-10-16 ENCOUNTER — APPOINTMENT (OUTPATIENT)
Dept: INTERNAL MEDICINE | Facility: CLINIC | Age: 68
End: 2018-10-16

## 2018-11-19 ENCOUNTER — APPOINTMENT (OUTPATIENT)
Dept: PULMONOLOGY | Facility: CLINIC | Age: 68
End: 2018-11-19
Payer: MEDICARE

## 2018-11-19 VITALS
OXYGEN SATURATION: 95 % | WEIGHT: 160 LBS | HEART RATE: 88 BPM | SYSTOLIC BLOOD PRESSURE: 130 MMHG | BODY MASS INDEX: 32.25 KG/M2 | HEIGHT: 59 IN | DIASTOLIC BLOOD PRESSURE: 85 MMHG | TEMPERATURE: 98.7 F

## 2018-11-19 PROCEDURE — 99214 OFFICE O/P EST MOD 30 MIN: CPT

## 2018-11-27 ENCOUNTER — RX RENEWAL (OUTPATIENT)
Age: 68
End: 2018-11-27

## 2018-11-30 ENCOUNTER — APPOINTMENT (OUTPATIENT)
Dept: INTERNAL MEDICINE | Facility: CLINIC | Age: 68
End: 2018-11-30

## 2018-12-05 ENCOUNTER — OUTPATIENT (OUTPATIENT)
Dept: OUTPATIENT SERVICES | Facility: HOSPITAL | Age: 68
LOS: 1 days | End: 2018-12-05
Payer: MEDICARE

## 2018-12-05 ENCOUNTER — APPOINTMENT (OUTPATIENT)
Dept: CT IMAGING | Facility: HOSPITAL | Age: 68
End: 2018-12-05

## 2018-12-05 PROCEDURE — 71250 CT THORAX DX C-: CPT | Mod: 26

## 2018-12-05 PROCEDURE — 71250 CT THORAX DX C-: CPT

## 2018-12-28 ENCOUNTER — APPOINTMENT (OUTPATIENT)
Dept: PULMONOLOGY | Facility: CLINIC | Age: 68
End: 2018-12-28

## 2019-01-04 ENCOUNTER — EMERGENCY (EMERGENCY)
Facility: HOSPITAL | Age: 69
LOS: 1 days | Discharge: HOME | End: 2019-01-04
Attending: EMERGENCY MEDICINE | Admitting: EMERGENCY MEDICINE
Payer: MEDICARE

## 2019-01-04 VITALS
TEMPERATURE: 98 F | OXYGEN SATURATION: 97 % | SYSTOLIC BLOOD PRESSURE: 130 MMHG | HEIGHT: 59 IN | RESPIRATION RATE: 24 BRPM | HEART RATE: 107 BPM | DIASTOLIC BLOOD PRESSURE: 64 MMHG | WEIGHT: 156.09 LBS

## 2019-01-04 VITALS
SYSTOLIC BLOOD PRESSURE: 146 MMHG | TEMPERATURE: 98 F | HEART RATE: 87 BPM | OXYGEN SATURATION: 96 % | DIASTOLIC BLOOD PRESSURE: 66 MMHG | RESPIRATION RATE: 20 BRPM

## 2019-01-04 LAB
ALBUMIN SERPL ELPH-MCNC: 4 G/DL — SIGNIFICANT CHANGE UP (ref 3.3–5)
ALP SERPL-CCNC: 93 U/L — SIGNIFICANT CHANGE UP (ref 40–120)
ALT FLD-CCNC: 14 U/L — SIGNIFICANT CHANGE UP (ref 10–45)
ANION GAP SERPL CALC-SCNC: 15 MMOL/L — SIGNIFICANT CHANGE UP (ref 5–17)
ANISOCYTOSIS BLD QL: SLIGHT — SIGNIFICANT CHANGE UP
APTT BLD: 30.1 SEC — SIGNIFICANT CHANGE UP (ref 27.5–36.3)
AST SERPL-CCNC: 16 U/L — SIGNIFICANT CHANGE UP (ref 10–40)
BASE EXCESS BLDV CALC-SCNC: 1.8 MMOL/L — SIGNIFICANT CHANGE UP
BILIRUB SERPL-MCNC: 0.5 MG/DL — SIGNIFICANT CHANGE UP (ref 0.2–1.2)
BUN SERPL-MCNC: 12 MG/DL — SIGNIFICANT CHANGE UP (ref 7–23)
CA-I SERPL-SCNC: 1.2 MMOL/L — SIGNIFICANT CHANGE UP (ref 1.12–1.3)
CALCIUM SERPL-MCNC: 9.7 MG/DL — SIGNIFICANT CHANGE UP (ref 8.4–10.5)
CHLORIDE SERPL-SCNC: 99 MMOL/L — SIGNIFICANT CHANGE UP (ref 96–108)
CK MB CFR SERPL CALC: 2.4 NG/ML — SIGNIFICANT CHANGE UP (ref 0–6.7)
CK SERPL-CCNC: 55 U/L — SIGNIFICANT CHANGE UP (ref 25–170)
CO2 SERPL-SCNC: 23 MMOL/L — SIGNIFICANT CHANGE UP (ref 22–31)
CREAT SERPL-MCNC: 0.54 MG/DL — SIGNIFICANT CHANGE UP (ref 0.5–1.3)
GAS PNL BLDV: 134 MMOL/L — LOW (ref 138–146)
GAS PNL BLDV: SIGNIFICANT CHANGE UP
GLUCOSE SERPL-MCNC: 170 MG/DL — HIGH (ref 70–99)
HCO3 BLDV-SCNC: 26 MMOL/L — SIGNIFICANT CHANGE UP (ref 20–27)
HCT VFR BLD CALC: 38.8 % — SIGNIFICANT CHANGE UP (ref 34.5–45)
HGB BLD-MCNC: 12.5 G/DL — SIGNIFICANT CHANGE UP (ref 11.5–15.5)
INR BLD: 1.12 — SIGNIFICANT CHANGE UP (ref 0.88–1.16)
LYMPHOCYTES # BLD AUTO: 7 % — LOW (ref 13–44)
MANUAL DIF COMMENT BLD-IMP: SIGNIFICANT CHANGE UP
MANUAL SMEAR VERIFICATION: SIGNIFICANT CHANGE UP
MCHC RBC-ENTMCNC: 30.3 PG — SIGNIFICANT CHANGE UP (ref 27–34)
MCHC RBC-ENTMCNC: 32.2 G/DL — SIGNIFICANT CHANGE UP (ref 32–36)
MCV RBC AUTO: 93.9 FL — SIGNIFICANT CHANGE UP (ref 80–100)
MONOCYTES NFR BLD AUTO: 7 % — SIGNIFICANT CHANGE UP (ref 2–14)
NEUTROPHILS NFR BLD AUTO: 75 % — SIGNIFICANT CHANGE UP (ref 43–77)
NEUTS BAND # BLD: 11 % — SIGNIFICANT CHANGE UP
NT-PROBNP SERPL-SCNC: 406 PG/ML — HIGH (ref 0–300)
PCO2 BLDV: 38 MMHG — LOW (ref 41–51)
PH BLDV: 7.45 — HIGH (ref 7.32–7.43)
PLAT MORPH BLD: NORMAL — SIGNIFICANT CHANGE UP
PLATELET # BLD AUTO: 308 K/UL — SIGNIFICANT CHANGE UP (ref 150–400)
PO2 BLDV: 54 MMHG — SIGNIFICANT CHANGE UP
POLYCHROMASIA BLD QL SMEAR: SLIGHT — SIGNIFICANT CHANGE UP
POTASSIUM BLDV-SCNC: 3.4 MMOL/L — LOW (ref 3.5–4.9)
POTASSIUM SERPL-MCNC: 3.6 MMOL/L — SIGNIFICANT CHANGE UP (ref 3.5–5.3)
POTASSIUM SERPL-SCNC: 3.6 MMOL/L — SIGNIFICANT CHANGE UP (ref 3.5–5.3)
PROT SERPL-MCNC: 7.3 G/DL — SIGNIFICANT CHANGE UP (ref 6–8.3)
PROTHROM AB SERPL-ACNC: 12.7 SEC — SIGNIFICANT CHANGE UP (ref 10–12.9)
RAPID RVP RESULT: SIGNIFICANT CHANGE UP
RBC # BLD: 4.13 M/UL — SIGNIFICANT CHANGE UP (ref 3.8–5.2)
RBC # FLD: 14.5 % — SIGNIFICANT CHANGE UP (ref 10.3–16.9)
RBC BLD AUTO: ABNORMAL
SAO2 % BLDV: 90 % — SIGNIFICANT CHANGE UP
SODIUM SERPL-SCNC: 137 MMOL/L — SIGNIFICANT CHANGE UP (ref 135–145)
TROPONIN T SERPL-MCNC: <0.01 NG/ML — SIGNIFICANT CHANGE UP (ref 0–0.01)
WBC # BLD: 16.2 K/UL — HIGH (ref 3.8–10.5)
WBC # FLD AUTO: 16.2 K/UL — HIGH (ref 3.8–10.5)

## 2019-01-04 PROCEDURE — 84484 ASSAY OF TROPONIN QUANT: CPT

## 2019-01-04 PROCEDURE — 36415 COLL VENOUS BLD VENIPUNCTURE: CPT

## 2019-01-04 PROCEDURE — 85610 PROTHROMBIN TIME: CPT

## 2019-01-04 PROCEDURE — 85730 THROMBOPLASTIN TIME PARTIAL: CPT

## 2019-01-04 PROCEDURE — 85025 COMPLETE CBC W/AUTO DIFF WBC: CPT

## 2019-01-04 PROCEDURE — 71045 X-RAY EXAM CHEST 1 VIEW: CPT

## 2019-01-04 PROCEDURE — 87633 RESP VIRUS 12-25 TARGETS: CPT

## 2019-01-04 PROCEDURE — 87486 CHLMYD PNEUM DNA AMP PROBE: CPT

## 2019-01-04 PROCEDURE — 80053 COMPREHEN METABOLIC PANEL: CPT

## 2019-01-04 PROCEDURE — 99285 EMERGENCY DEPT VISIT HI MDM: CPT | Mod: 25

## 2019-01-04 PROCEDURE — 71046 X-RAY EXAM CHEST 2 VIEWS: CPT

## 2019-01-04 PROCEDURE — 87798 DETECT AGENT NOS DNA AMP: CPT

## 2019-01-04 PROCEDURE — 84132 ASSAY OF SERUM POTASSIUM: CPT

## 2019-01-04 PROCEDURE — 71045 X-RAY EXAM CHEST 1 VIEW: CPT | Mod: 26

## 2019-01-04 PROCEDURE — 82803 BLOOD GASES ANY COMBINATION: CPT

## 2019-01-04 PROCEDURE — 82553 CREATINE MB FRACTION: CPT

## 2019-01-04 PROCEDURE — 83880 ASSAY OF NATRIURETIC PEPTIDE: CPT

## 2019-01-04 PROCEDURE — 84295 ASSAY OF SERUM SODIUM: CPT

## 2019-01-04 PROCEDURE — 71046 X-RAY EXAM CHEST 2 VIEWS: CPT | Mod: 26

## 2019-01-04 PROCEDURE — 82550 ASSAY OF CK (CPK): CPT

## 2019-01-04 PROCEDURE — 94640 AIRWAY INHALATION TREATMENT: CPT

## 2019-01-04 PROCEDURE — 87581 M.PNEUMON DNA AMP PROBE: CPT

## 2019-01-04 PROCEDURE — 82330 ASSAY OF CALCIUM: CPT

## 2019-01-04 PROCEDURE — 99284 EMERGENCY DEPT VISIT MOD MDM: CPT | Mod: 25

## 2019-01-04 RX ORDER — SODIUM CHLORIDE 9 MG/ML
500 INJECTION INTRAMUSCULAR; INTRAVENOUS; SUBCUTANEOUS ONCE
Qty: 0 | Refills: 0 | Status: COMPLETED | OUTPATIENT
Start: 2019-01-04 | End: 2019-01-04

## 2019-01-04 RX ORDER — ALBUTEROL 90 UG/1
2.5 AEROSOL, METERED ORAL ONCE
Qty: 0 | Refills: 0 | Status: COMPLETED | OUTPATIENT
Start: 2019-01-04 | End: 2019-01-04

## 2019-01-04 RX ORDER — SODIUM CHLORIDE 9 MG/ML
1000 INJECTION INTRAMUSCULAR; INTRAVENOUS; SUBCUTANEOUS
Qty: 0 | Refills: 0 | Status: DISCONTINUED | OUTPATIENT
Start: 2019-01-04 | End: 2019-01-08

## 2019-01-04 RX ORDER — IPRATROPIUM/ALBUTEROL SULFATE 18-103MCG
3 AEROSOL WITH ADAPTER (GRAM) INHALATION ONCE
Qty: 0 | Refills: 0 | Status: COMPLETED | OUTPATIENT
Start: 2019-01-04 | End: 2019-01-04

## 2019-01-04 RX ADMIN — ALBUTEROL 2.5 MILLIGRAM(S): 90 AEROSOL, METERED ORAL at 16:37

## 2019-01-04 RX ADMIN — SODIUM CHLORIDE 1000 MILLILITER(S): 9 INJECTION INTRAMUSCULAR; INTRAVENOUS; SUBCUTANEOUS at 14:10

## 2019-01-04 RX ADMIN — Medication 500 MILLIGRAM(S): at 16:13

## 2019-01-04 RX ADMIN — SODIUM CHLORIDE 125 MILLILITER(S): 9 INJECTION INTRAMUSCULAR; INTRAVENOUS; SUBCUTANEOUS at 15:07

## 2019-01-04 RX ADMIN — Medication 3 MILLILITER(S): at 14:10

## 2019-01-04 RX ADMIN — Medication 100 MILLIGRAM(S): at 16:37

## 2019-01-04 NOTE — ED PROVIDER NOTE - DIAGNOSTIC INTERPRETATION
ER Physician:  Rosalinda Director  CHEST XRAY INTERPRETATION: lungs clear, heart shadow normal, bony structures intact

## 2019-01-04 NOTE — ED ADULT NURSE NOTE - OBJECTIVE STATEMENT
69 y/o F c/o SOB & chest and right lower back discomfort, with productive cough +brown sputum since 1/1. Pt reports subj chills at home, is afrebrile in ER. Pt has hx of COPD with supplemental O2 2L 24hrs/day. Hx DM, HLD, HTN, stroke, denies blood thinners, +aspirin. Pt wheezing and diminished bilaterally to auscultation. #18g placed LAC labs & VBG sent. RR 24.

## 2019-01-04 NOTE — ED PROVIDER NOTE - MEDICAL DECISION MAKING DETAILS
Pt c/o uri sx, cough x several days and sob w/o sig wheezing on exam.  ?viral uri and copd exacerbation, ? pna.  No sig concern for acs, chf.  EKG w/o stemi.  Plan neb, cxr, rvp, labs, reassess.  Discuss w Dr Trevizo.

## 2019-01-04 NOTE — ED PROVIDER NOTE - OBJECTIVE STATEMENT
67 yo female h/o copd (home o2, chronic steroids, last admit last yr), htn, dm, hld, pvd c/o cough w white sputum, sob x 4 days w nasal congestion, no fever, cp, palpitations.  Pt recently quit smoking.  No sick contacts, travel, le pain/swelling.

## 2019-01-04 NOTE — ED PROVIDER NOTE - NSFOLLOWUPINSTRUCTIONS_ED_ALL_ED_FT
Cough  COPD    Please continue your normal medications.  Use your inhalers or nebulizer.  Return for increased pain, difficulty breathing, fever, any other concerns.  Take Tessalon Perles 1 tab every 8 hours as needed for cough.     Chronic Obstructive Pulmonary Disease    Chronic obstructive pulmonary disease (COPD) is a lung condition in which airflow from the lungs is limited. Causes include smoking, secondhand smoke exposure, genetics, or recurrent infections. Take all medicines (inhaled or pills) as directed by your health care provider. Avoid exposure to irritants such as smoke, chemicals, and fumes that aggravate your breathing.    If you are a smoker, the most important thing that you can do is stop smoking. Continuing to smoke will cause further lung damage and breathing trouble. Ask your health care provider for help with quitting smoking.    SEEK IMMEDIATE MEDICAL CARE IF YOU HAVE ANY OF THE FOLLOWING SYMPTOMS: shortness of breath at rest or when talking, bluish discoloration of lips, skin, fever, worsening cough, unexplained chest pain, or lightheadedness/dizziness.

## 2019-01-04 NOTE — ED PROVIDER NOTE - CONSTITUTIONAL, MLM
normal... Well appearing, mildly obese, awake, alert, oriented to person, place, time/situation and in no apparent distress. no resp distress

## 2019-01-04 NOTE — ED ADULT NURSE NOTE - NSIMPLEMENTINTERV_GEN_ALL_ED
Implemented All Universal Safety Interventions:  [place] to call system. Call bell, personal items and telephone within reach. Instruct patient to call for assistance. Room bathroom lighting operational. Non-slip footwear when patient is off stretcher. Physically safe environment: no spills, clutter or unnecessary equipment. Stretcher in lowest position, wheels locked, appropriate side rails in place.

## 2019-01-04 NOTE — ED ADULT NURSE NOTE - CHPI ED NUR SYMPTOMS POS
CHEST CONGESTION/DYSPNEA ON EXERTION/SHORTNESS OF BREATH/COUGH/DIFFICULTY BREATHING/CHEST PAIN/WHEEZING

## 2019-01-04 NOTE — ED ADULT NURSE NOTE - CAS EDN DISCHARGE ASSESSMENT
Awake/Dressing clean and dry/Symptoms improved/No adverse reaction to first time med in ED/Patient baseline mental status/Alert and oriented to person, place and time

## 2019-01-04 NOTE — ED ADULT NURSE NOTE - NSSISCREENINGQ5_ED_A_ED
Pt began having sx's yesterday afternoon sib diagnosed with flu-advised will send in Tamiflu to [facility] in [place];   Mom will also need a note excusing from school-Mom confirmed address and will print out and mail to [facility] No

## 2019-01-04 NOTE — ED PROVIDER NOTE - CARE PROVIDER_API CALL
Trinity Trevizo), Critical Care Medicine; Pulmonary Disease  [address] [place] [address]  Phone: [phone]  Fax: [phone]

## 2019-01-04 NOTE — ED ADULT NURSE NOTE - NS ED NURSE DC INFO COMPLEXITY
Moderate: Comprehensive teaching/Returned Demonstration/Verbalized Understanding/Patient asked questions

## 2019-01-04 NOTE — ED ADULT NURSE NOTE - SPUTUM COLOR
brown
AAOx3, follows complex commands, CNII-XII grossly intact; EOM full, no nystagmus, tongue midline  Decreased sensation to light touch on left UE and LE;   Strength: left UE diffusely atrophied with deformity of fingers- (hyperextended)  handgrip 5/5 on R,  biceps/triceps 5/5 extension/flexion  on R, 4/5 L elbow extension, hip flexion 5/5 b/l LE, leg flexion/extension 5/5 b/l, plantar/dorsiflexion 5/5 b/l; Reflexes: 1+ RUE/RLE, 2+ LUE/LLE;  no clonus, no cogwheel rigidity, FTN intact on R, unable to perform on L due to weakness

## 2019-01-04 NOTE — ED PROVIDER NOTE - PROGRESS NOTE DETAILS
Labs, rvp, cxr nl.  Pt discussed w Dr Trevizo - he will come to ed after 5p to eval pt. Pt c/o cough after receiving naproxen; lung w/o sig wheezing but will give neb for poss bronchospasm and try tessalon perle. Brad reyes and cleared for dc by Dr Trevizo.

## 2019-01-07 ENCOUNTER — INPATIENT (INPATIENT)
Facility: HOSPITAL | Age: 69
LOS: 2 days | Discharge: HOME HEALTH | DRG: 190 | End: 2019-01-10
Payer: MEDICARE

## 2019-01-07 VITALS
SYSTOLIC BLOOD PRESSURE: 100 MMHG | OXYGEN SATURATION: 94 % | WEIGHT: 156.97 LBS | DIASTOLIC BLOOD PRESSURE: 51 MMHG | RESPIRATION RATE: 22 BRPM | TEMPERATURE: 100 F | HEART RATE: 108 BPM

## 2019-01-07 DIAGNOSIS — Z29.9 ENCOUNTER FOR PROPHYLACTIC MEASURES, UNSPECIFIED: ICD-10-CM

## 2019-01-07 DIAGNOSIS — Z91.89 OTHER SPECIFIED PERSONAL RISK FACTORS, NOT ELSEWHERE CLASSIFIED: ICD-10-CM

## 2019-01-07 DIAGNOSIS — E11.9 TYPE 2 DIABETES MELLITUS WITHOUT COMPLICATIONS: ICD-10-CM

## 2019-01-07 DIAGNOSIS — J44.1 CHRONIC OBSTRUCTIVE PULMONARY DISEASE WITH (ACUTE) EXACERBATION: ICD-10-CM

## 2019-01-07 DIAGNOSIS — E78.5 HYPERLIPIDEMIA, UNSPECIFIED: ICD-10-CM

## 2019-01-07 DIAGNOSIS — F17.200 NICOTINE DEPENDENCE, UNSPECIFIED, UNCOMPLICATED: ICD-10-CM

## 2019-01-07 LAB
ALBUMIN SERPL ELPH-MCNC: 3.2 G/DL — LOW (ref 3.3–5)
ALP SERPL-CCNC: 100 U/L — SIGNIFICANT CHANGE UP (ref 40–120)
ALT FLD-CCNC: 22 U/L — SIGNIFICANT CHANGE UP (ref 10–45)
ANION GAP SERPL CALC-SCNC: 13 MMOL/L — SIGNIFICANT CHANGE UP (ref 5–17)
AST SERPL-CCNC: 26 U/L — SIGNIFICANT CHANGE UP (ref 10–40)
BASOPHILS NFR BLD AUTO: 0.4 % — SIGNIFICANT CHANGE UP (ref 0–2)
BILIRUB SERPL-MCNC: 0.2 MG/DL — SIGNIFICANT CHANGE UP (ref 0.2–1.2)
BUN SERPL-MCNC: 11 MG/DL — SIGNIFICANT CHANGE UP (ref 7–23)
CALCIUM SERPL-MCNC: 9.6 MG/DL — SIGNIFICANT CHANGE UP (ref 8.4–10.5)
CHLORIDE SERPL-SCNC: 97 MMOL/L — SIGNIFICANT CHANGE UP (ref 96–108)
CO2 SERPL-SCNC: 27 MMOL/L — SIGNIFICANT CHANGE UP (ref 22–31)
CREAT SERPL-MCNC: 0.67 MG/DL — SIGNIFICANT CHANGE UP (ref 0.5–1.3)
EOSINOPHIL NFR BLD AUTO: 1.3 % — SIGNIFICANT CHANGE UP (ref 0–6)
GLUCOSE SERPL-MCNC: 119 MG/DL — HIGH (ref 70–99)
HCT VFR BLD CALC: 36.5 % — SIGNIFICANT CHANGE UP (ref 34.5–45)
HGB BLD-MCNC: 11.7 G/DL — SIGNIFICANT CHANGE UP (ref 11.5–15.5)
LYMPHOCYTES # BLD AUTO: 25.3 % — SIGNIFICANT CHANGE UP (ref 13–44)
MCHC RBC-ENTMCNC: 30 PG — SIGNIFICANT CHANGE UP (ref 27–34)
MCHC RBC-ENTMCNC: 32.1 G/DL — SIGNIFICANT CHANGE UP (ref 32–36)
MCV RBC AUTO: 93.6 FL — SIGNIFICANT CHANGE UP (ref 80–100)
MONOCYTES NFR BLD AUTO: 18.3 % — HIGH (ref 2–14)
NEUTROPHILS NFR BLD AUTO: 54.7 % — SIGNIFICANT CHANGE UP (ref 43–77)
PLATELET # BLD AUTO: 403 K/UL — HIGH (ref 150–400)
POTASSIUM SERPL-MCNC: 4.2 MMOL/L — SIGNIFICANT CHANGE UP (ref 3.5–5.3)
POTASSIUM SERPL-SCNC: 4.2 MMOL/L — SIGNIFICANT CHANGE UP (ref 3.5–5.3)
PROT SERPL-MCNC: 7 G/DL — SIGNIFICANT CHANGE UP (ref 6–8.3)
RAPID RVP RESULT: SIGNIFICANT CHANGE UP
RBC # BLD: 3.9 M/UL — SIGNIFICANT CHANGE UP (ref 3.8–5.2)
RBC # FLD: 14.1 % — SIGNIFICANT CHANGE UP (ref 10.3–16.9)
SODIUM SERPL-SCNC: 137 MMOL/L — SIGNIFICANT CHANGE UP (ref 135–145)
WBC # BLD: 9.8 K/UL — SIGNIFICANT CHANGE UP (ref 3.8–10.5)
WBC # FLD AUTO: 9.8 K/UL — SIGNIFICANT CHANGE UP (ref 3.8–10.5)

## 2019-01-07 PROCEDURE — 71045 X-RAY EXAM CHEST 1 VIEW: CPT | Mod: 26

## 2019-01-07 PROCEDURE — 99285 EMERGENCY DEPT VISIT HI MDM: CPT

## 2019-01-07 RX ORDER — IPRATROPIUM/ALBUTEROL SULFATE 18-103MCG
3 AEROSOL WITH ADAPTER (GRAM) INHALATION ONCE
Qty: 0 | Refills: 0 | Status: COMPLETED | OUTPATIENT
Start: 2019-01-07 | End: 2019-01-07

## 2019-01-07 RX ORDER — AZITHROMYCIN 500 MG/1
500 TABLET, FILM COATED ORAL ONCE
Qty: 0 | Refills: 0 | Status: COMPLETED | OUTPATIENT
Start: 2019-01-07 | End: 2019-01-07

## 2019-01-07 RX ORDER — ATORVASTATIN CALCIUM 80 MG/1
40 TABLET, FILM COATED ORAL AT BEDTIME
Qty: 0 | Refills: 0 | Status: DISCONTINUED | OUTPATIENT
Start: 2019-01-07 | End: 2019-01-10

## 2019-01-07 RX ORDER — NICOTINE POLACRILEX 2 MG
1 GUM BUCCAL DAILY
Qty: 0 | Refills: 0 | Status: DISCONTINUED | OUTPATIENT
Start: 2019-01-07 | End: 2019-01-10

## 2019-01-07 RX ORDER — ACETAMINOPHEN 500 MG
975 TABLET ORAL ONCE
Qty: 0 | Refills: 0 | Status: COMPLETED | OUTPATIENT
Start: 2019-01-07 | End: 2019-01-07

## 2019-01-07 RX ORDER — IPRATROPIUM/ALBUTEROL SULFATE 18-103MCG
3 AEROSOL WITH ADAPTER (GRAM) INHALATION EVERY 4 HOURS
Qty: 0 | Refills: 0 | Status: DISCONTINUED | OUTPATIENT
Start: 2019-01-07 | End: 2019-01-10

## 2019-01-07 RX ORDER — SODIUM CHLORIDE 9 MG/ML
1000 INJECTION INTRAMUSCULAR; INTRAVENOUS; SUBCUTANEOUS ONCE
Qty: 0 | Refills: 0 | Status: COMPLETED | OUTPATIENT
Start: 2019-01-07 | End: 2019-01-07

## 2019-01-07 RX ORDER — CEFTRIAXONE 500 MG/1
1 INJECTION, POWDER, FOR SOLUTION INTRAMUSCULAR; INTRAVENOUS ONCE
Qty: 0 | Refills: 0 | Status: COMPLETED | OUTPATIENT
Start: 2019-01-07 | End: 2019-01-07

## 2019-01-07 RX ADMIN — SODIUM CHLORIDE 1000 MILLILITER(S): 9 INJECTION INTRAMUSCULAR; INTRAVENOUS; SUBCUTANEOUS at 21:09

## 2019-01-07 RX ADMIN — AZITHROMYCIN 255 MILLIGRAM(S): 500 TABLET, FILM COATED ORAL at 22:49

## 2019-01-07 RX ADMIN — Medication 975 MILLIGRAM(S): at 21:08

## 2019-01-07 RX ADMIN — CEFTRIAXONE 100 GRAM(S): 500 INJECTION, POWDER, FOR SOLUTION INTRAMUSCULAR; INTRAVENOUS at 22:20

## 2019-01-07 RX ADMIN — Medication 3 MILLILITER(S): at 21:43

## 2019-01-07 RX ADMIN — Medication 125 MILLIGRAM(S): at 21:42

## 2019-01-07 RX ADMIN — Medication 3 MILLILITER(S): at 22:21

## 2019-01-07 NOTE — H&P ADULT - PROBLEM SELECTOR PROBLEM 7
Transition of care performed with sharing of clinical summary Depression, unspecified depression type

## 2019-01-07 NOTE — H&P ADULT - NSHPPHYSICALEXAM_GEN_ALL_CORE
.  VITAL SIGNS:  T(C): 37.9 (01-07-19 @ 19:48), Max: 37.9 (01-07-19 @ 19:48)  T(F): 100.3 (01-07-19 @ 19:48), Max: 100.3 (01-07-19 @ 19:48)  HR: 108 (01-07-19 @ 19:48) (108 - 108)  BP: 100/51 (01-07-19 @ 19:48) (100/51 - 100/51)  BP(mean): --  RR: 22 (01-07-19 @ 19:48) (22 - 22)  SpO2: 94% (01-07-19 @ 19:48) (94% - 94%)  Wt(kg): --    PHYSICAL EXAM:    Constitutional: WDWN resting comfortably in bed; NAD  Head: NC/AT  Eyes: PERRL, EOMI, anicteric sclera  ENT: no nasal discharge; uvula midline, no oropharyngeal erythema or exudates; MMM  Neck: supple; no JVD or thyromegaly  Respiratory: CTA B/L; no W/R/R, no retractions  Cardiac: +S1/S2; RRR; no M/R/G; PMI non-displaced  Gastrointestinal: abdomen soft, NT/ND; no rebound or guarding; +BSx4  Genitourinary: normal external genitalia  Back: spine midline, no bony tenderness or step-offs; no CVAT B/L  Extremities: WWP, no clubbing or cyanosis; no peripheral edema  Musculoskeletal: NROM x4; no joint swelling, tenderness or erythema  Vascular: 2+ radial, femoral, DP/PT pulses B/L  Dermatologic: skin warm, dry and intact; no rashes, wounds, or scars  Lymphatic: no submandibular or cervical LAD  Neurologic: AAOx3; CNII-XII grossly intact; no focal deficits  Psychiatric: affect and characteristics of appearance, verbalizations, behaviors are appropriate VITAL SIGNS:  T(C): 37.9 (01-07-19 @ 19:48), Max: 37.9 (01-07-19 @ 19:48)  T(F): 100.3 (01-07-19 @ 19:48), Max: 100.3 (01-07-19 @ 19:48)  HR: 108 (01-07-19 @ 19:48) (108 - 108)  BP: 100/51 (01-07-19 @ 19:48) (100/51 - 100/51)  BP(mean): --  RR: 22 (01-07-19 @ 19:48) (22 - 22)  SpO2: 94% (01-07-19 @ 19:48) (94% - 94%)  Wt(kg): --    PHYSICAL EXAM:    Constitutional: WDWN resting comfortably in bed; NAD  Head: NC/AT  Eyes: PERRL, EOMI, anicteric sclera  ENT: no nasal discharge; uvula midline, no oropharyngeal erythema or exudates; MMM  Neck: supple; no JVD or thyromegaly  Respiratory: CTA B/L; no W/R/R, no retractions  Cardiac: +S1/S2; RRR; no M/R/G; PMI non-displaced  Gastrointestinal: abdomen soft, NT/ND; no rebound or guarding; +BSx4  Genitourinary: normal external genitalia  Back: spine midline, no bony tenderness or step-offs; no CVAT B/L  Extremities: WWP, no clubbing or cyanosis; no peripheral edema  Musculoskeletal: NROM x4; no joint swelling, tenderness or erythema  Vascular: 2+ radial, femoral, DP/PT pulses B/L  Dermatologic: skin warm, dry and intact; no rashes, wounds, or scars  Lymphatic: no submandibular or cervical LAD  Neurologic: AAOx3; CNII-XII grossly intact; no focal deficits  Psychiatric: affect and characteristics of appearance, verbalizations, behaviors are appropriate VITAL SIGNS:  T(C): 37.9 (01-07-19 @ 19:48), Max: 37.9 (01-07-19 @ 19:48)  T(F): 100.3 (01-07-19 @ 19:48), Max: 100.3 (01-07-19 @ 19:48)  HR: 108 (01-07-19 @ 19:48) (108 - 108)  BP: 100/51 (01-07-19 @ 19:48) (100/51 - 100/51)  BP(mean): --  RR: 22 (01-07-19 @ 19:48) (22 - 22)  SpO2: 94% (01-07-19 @ 19:48) (94% - 94%)  Wt(kg): --    PHYSICAL EXAM:    Constitutional: WDWN resting comfortably in bed; NAD  HEENT: NC/AT, PERRL, EOMI, anicteric sclera, Dry mucosa, no JVD  Respiratory: no wheezing, but decreased breath sounds b/l  Cardiac: +S1/S2; RRR; no M/R/G  Gastrointestinal: abdomen soft, NT/ND; no rebound or guarding; +BSx4  Genitourinary: no ceron  Extremities: no peripheral edema  Neurologic: AAOx3; CNII-XII grossly intact; no focal deficits

## 2019-01-07 NOTE — ED PROVIDER NOTE - OBJECTIVE STATEMENT
69 y/o f with PMH of COPD, asthma on home O2 2 L, HTN, HLD, depression former smoker presents to ED with fever, chills, cough, congestion.  Pt states cough now productive of brown sputum and wheezing has worsened.  Pt seen here on 1/4 and had workup including rvp and cxr which were negative.  Treated with nebs and steroids and symptoms improved and discharge home after discussing case with Dr. Trevizo.  Pt returns today stating she is not improving and cough and wheezing have worsening.  Also continues to complain of fever.

## 2019-01-07 NOTE — ED ADULT NURSE NOTE - NSIMPLEMENTINTERV_GEN_ALL_ED
Implemented All Universal Safety Interventions:  [place] to call system. Call bell, personal items and telephone within reach. Instruct patient to call for assistance. Room bathroom lighting operational. Non-slip footwear when patient is off stretcher. Physically safe environment: no spills, clutter or unnecessary equipment. Stretcher in lowest position, wheels locked, appropriate side rails in place.

## 2019-01-07 NOTE — H&P ADULT - HISTORY OF PRESENT ILLNESS
69 y/o female with h/o COPD on home O2 2L NC, DM, HTN  She denies any sick contacts or recent travel.  Pt uses 2L oxygen during the day with CPAP at night.    Patient is on chronic steroid treatment and takes 5mg per day and increased to 15mg on her own.     Pt states cough now productive of brown sputum and wheezing has worsened.  Pt seen here on 1/4 and had workup including rvp and cxr which were negative.  Treated with nebs and steroids and symptoms improved and discharge home after discussing case with Dr. Trevizo.  Pt returns today stating she is not improving and cough and wheezing have worsening.  Also continues to complain of fever.    In the ED, T100.3, P108, 100/51, R22, 94% on supplemental oxygen. RVP negative. No infiltrates on CXR. Received Tylenol 975, azithromycin 500, ceftriaxone 1g, solumedrol 125, NS 1L and Duoneb x2. 67 y/o female with h/o COPD on home O2 2L NC, DM, HTN, Depression for cough presenting with continued increasing sputum production for the past 8 days. Reports having brownish/greenish sputum, persisent cough and wheezing which has progressively worsened. Pt has hx of smoking 1 pack of cigarettes per day for 45 years. Quit smoking on New Years Pinky and went back to smoking. No sick contacts or recent travel. Pt requires 2L oxygen during the day with CPAP at night. Pt is on chronic steroid treatment and takes prednisone 5mg per day. No documentation of flu vaccination in [employer]. Off note, patient had recent ED visit on 1/4/18 for cough and was discharged after symptoms improved with nebulizers.     In the ED, T100.3, P108, 100/51, R22, 94% on supplemental oxygen. RVP negative. No infiltrates on CXR. Received Tylenol 975, azithromycin 500, ceftriaxone 1g, solumedrol 125, NS 1L and Duoneb x2.      ROS: Reports fever, chills, nausea and SOB. No vomiting, constipation or diarrhea.

## 2019-01-07 NOTE — ED PROVIDER NOTE - MEDICAL DECISION MAKING DETAILS
Pt with COPD, asthma presenting with productive cough with brown sputum and worsening wheezing, pe - mild wheeze bilaterally with productive cough, repeat labs and xray and rvp done - no obvious infiltrate on xray but pt clinically may have atypical PNA with fever and productive cough, treated with nebs, steroids, antibiotics and discussed with Santhosh who will admit patient.

## 2019-01-07 NOTE — H&P ADULT - PROBLEM SELECTOR PLAN 7
1) PCP Contacted on Admission: (Y/N) --> Yes. Name & Phone #: Dr. Trevizo  2) Date of Contact with PCP:  3) PCP Contacted at Discharge: (Y/N)  4) Summary of Handoff Given to PCP:   5) Post-Discharge Appointment Date and Location: -c/w Zoloft 50mg qd (home med)

## 2019-01-07 NOTE — H&P ADULT - PROBLEM SELECTOR PLAN 4
-takes metformin 500bid at home  -f/u A1C  -c/w ISS -takes lisinopril 20qd and HCTZ 25mg qd at home  -will hold BP meds given normotension

## 2019-01-07 NOTE — H&P ADULT - PROBLEM SELECTOR PLAN 6
1) PCP Contacted on Admission: (Y/N) --> Name & Phone #:  2) Date of Contact with PCP:  3) PCP Contacted at Discharge: (Y/N)  4) Summary of Handoff Given to PCP:   5) Post-Discharge Appointment Date and Location: DVT: SCD  GI: omeprazole 20  F: NS at 100cc  E: replete as needed  N: DASH -c/w Lipitor 40qd ([facility])

## 2019-01-07 NOTE — H&P ADULT - PROBLEM SELECTOR PLAN 3
-takes lisinopril 20qd and HCTZ 25mg qd at home  -will hold BP meds given normotension -Has hx of lung nodules, largest nodule 5 mm per outpatient records  -need repeat CT chest in 6 months

## 2019-01-07 NOTE — ED ADULT NURSE NOTE - OBJECTIVE STATEMENT
Patient is a 69yo F, BIBA, PMHx of COPD on 2L NC O2 at baseline, tachycardic, slightly febrile, complaining of productive cough with "dark beige" sputum, body aches, dizziness, fevers, chills, abdominal pain and chest pain.  Patient denies any nausea, vomiting, diarrhea or any other complaints at this time.  Patient state she was here last week for same complaints.

## 2019-01-07 NOTE — H&P ADULT - PROBLEM SELECTOR PROBLEM 6
Transition of care performed with sharing of clinical summary Prophylactic measure Hyperlipidemia, unspecified hyperlipidemia type

## 2019-01-07 NOTE — ED ADULT TRIAGE NOTE - OTHER COMPLAINTS
taryna from home c/o of flu like symptoms, body aches, productive cough x 1 week, reports symptoms getting worse  and fevers today

## 2019-01-07 NOTE — H&P ADULT - PROBLEM SELECTOR PLAN 1
67 y/o female with h/o COPD on home O2 2L NC, DM, HTN, Depression for cough presenting with continued increasing sputum production for the past 8 days. 67 y/o female with h/o COPD on home O2 2L NC, DM, HTN, Depression for cough presenting with continued increasing sputum production for the past 8 days.  -Per outpatient record, recent CAT score 33, GOLD category B, takes Advair BID, Ventolin/Albuterol prn and prednisone 5 mg bid  -s/p solumedrol 125 in the ED  -will start prednisone 40qd and duoneb q4  -f/u COPD bundle -Per outpatient record, recent CAT score 33, GOLD category B, takes Advair BID, Ventolin/Albuterol prn and prednisone 5 mg bid  -Pt is oxygen supplement 2L and nightly CPAP at home  -s/p solumedrol 125 in the ED  -will start prednisone 40qd and duoneb q4  -c/w CPAP at night-time  -f/u COPD bundle -likely 2/2 viral URI. Low suspicion for CAP, will hold of antibiotics.  -Per outpatient record, recent CAT score 33, GOLD category B, takes Advair BID, Ventolin/Albuterol prn and prednisone 5 mg bid  -Pt is oxygen supplement 2L and nightly CPAP at home  -s/p solumedrol 125 in the ED  -will start prednisone 40qd and duoneb q4  -c/w CPAP at night-time  -f/u COPD bundle

## 2019-01-07 NOTE — H&P ADULT - PROBLEM SELECTOR PLAN 5
DVT:   GI:   F:  E: replete as needed  N: -c/w Lipitor 40qd -takes metformin 500bid at home  -f/u A1C  -c/w ISS

## 2019-01-08 ENCOUNTER — RX RENEWAL (OUTPATIENT)
Age: 69
End: 2019-01-08

## 2019-01-08 DIAGNOSIS — R09.81 NASAL CONGESTION: ICD-10-CM

## 2019-01-08 DIAGNOSIS — Z79.899 OTHER LONG TERM (CURRENT) DRUG THERAPY: ICD-10-CM

## 2019-01-08 DIAGNOSIS — R06.02 SHORTNESS OF BREATH: ICD-10-CM

## 2019-01-08 DIAGNOSIS — Z79.1 LONG TERM (CURRENT) USE OF NON-STEROIDAL ANTI-INFLAMMATORIES (NSAID): ICD-10-CM

## 2019-01-08 DIAGNOSIS — Z79.82 LONG TERM (CURRENT) USE OF ASPIRIN: ICD-10-CM

## 2019-01-08 DIAGNOSIS — F32.9 MAJOR DEPRESSIVE DISORDER, SINGLE EPISODE, UNSPECIFIED: ICD-10-CM

## 2019-01-08 DIAGNOSIS — R91.1 SOLITARY PULMONARY NODULE: ICD-10-CM

## 2019-01-08 DIAGNOSIS — E11.638 TYPE 2 DIABETES MELLITUS WITH OTHER ORAL COMPLICATIONS: ICD-10-CM

## 2019-01-08 DIAGNOSIS — J18.9 PNEUMONIA, UNSPECIFIED ORGANISM: ICD-10-CM

## 2019-01-08 DIAGNOSIS — J96.21 ACUTE AND CHRONIC RESPIRATORY FAILURE WITH HYPOXIA: ICD-10-CM

## 2019-01-08 DIAGNOSIS — R05 COUGH: ICD-10-CM

## 2019-01-08 DIAGNOSIS — E11.9 TYPE 2 DIABETES MELLITUS WITHOUT COMPLICATIONS: ICD-10-CM

## 2019-01-08 DIAGNOSIS — J44.9 CHRONIC OBSTRUCTIVE PULMONARY DISEASE, UNSPECIFIED: ICD-10-CM

## 2019-01-08 DIAGNOSIS — I10 ESSENTIAL (PRIMARY) HYPERTENSION: ICD-10-CM

## 2019-01-08 DIAGNOSIS — Z87.891 PERSONAL HISTORY OF NICOTINE DEPENDENCE: ICD-10-CM

## 2019-01-08 LAB
ANION GAP SERPL CALC-SCNC: 18 MMOL/L — HIGH (ref 5–17)
BUN SERPL-MCNC: 11 MG/DL — SIGNIFICANT CHANGE UP (ref 7–23)
CALCIUM SERPL-MCNC: 9.4 MG/DL — SIGNIFICANT CHANGE UP (ref 8.4–10.5)
CHLORIDE SERPL-SCNC: 95 MMOL/L — LOW (ref 96–108)
CO2 SERPL-SCNC: 25 MMOL/L — SIGNIFICANT CHANGE UP (ref 22–31)
CREAT SERPL-MCNC: 0.6 MG/DL — SIGNIFICANT CHANGE UP (ref 0.5–1.3)
GLUCOSE BLDC GLUCOMTR-MCNC: 154 MG/DL — HIGH (ref 70–99)
GLUCOSE BLDC GLUCOMTR-MCNC: 156 MG/DL — HIGH (ref 70–99)
GLUCOSE BLDC GLUCOMTR-MCNC: 229 MG/DL — HIGH (ref 70–99)
GLUCOSE BLDC GLUCOMTR-MCNC: 295 MG/DL — HIGH (ref 70–99)
GLUCOSE SERPL-MCNC: 321 MG/DL — HIGH (ref 70–99)
HBA1C BLD-MCNC: 6.3 % — HIGH (ref 4–5.6)
HCT VFR BLD CALC: 35.8 % — SIGNIFICANT CHANGE UP (ref 34.5–45)
HGB BLD-MCNC: 11.2 G/DL — LOW (ref 11.5–15.5)
MAGNESIUM SERPL-MCNC: 2 MG/DL — SIGNIFICANT CHANGE UP (ref 1.6–2.6)
MCHC RBC-ENTMCNC: 29.5 PG — SIGNIFICANT CHANGE UP (ref 27–34)
MCHC RBC-ENTMCNC: 31.3 G/DL — LOW (ref 32–36)
MCV RBC AUTO: 94.2 FL — SIGNIFICANT CHANGE UP (ref 80–100)
PLATELET # BLD AUTO: 376 K/UL — SIGNIFICANT CHANGE UP (ref 150–400)
POTASSIUM SERPL-MCNC: 4.3 MMOL/L — SIGNIFICANT CHANGE UP (ref 3.5–5.3)
POTASSIUM SERPL-SCNC: 4.3 MMOL/L — SIGNIFICANT CHANGE UP (ref 3.5–5.3)
RBC # BLD: 3.8 M/UL — SIGNIFICANT CHANGE UP (ref 3.8–5.2)
RBC # FLD: 14.3 % — SIGNIFICANT CHANGE UP (ref 10.3–16.9)
SODIUM SERPL-SCNC: 138 MMOL/L — SIGNIFICANT CHANGE UP (ref 135–145)
WBC # BLD: 7 K/UL — SIGNIFICANT CHANGE UP (ref 3.8–10.5)
WBC # FLD AUTO: 7 K/UL — SIGNIFICANT CHANGE UP (ref 3.8–10.5)

## 2019-01-08 PROCEDURE — 99232 SBSQ HOSP IP/OBS MODERATE 35: CPT

## 2019-01-08 PROCEDURE — 99222 1ST HOSP IP/OBS MODERATE 55: CPT | Mod: GC

## 2019-01-08 PROCEDURE — 94010 BREATHING CAPACITY TEST: CPT | Mod: 26

## 2019-01-08 RX ORDER — IBUPROFEN 200 MG
400 TABLET ORAL ONCE
Qty: 0 | Refills: 0 | Status: COMPLETED | OUTPATIENT
Start: 2019-01-08 | End: 2019-01-08

## 2019-01-08 RX ORDER — INFLUENZA VIRUS VACCINE 15; 15; 15; 15 UG/.5ML; UG/.5ML; UG/.5ML; UG/.5ML
0.5 SUSPENSION INTRAMUSCULAR ONCE
Qty: 0 | Refills: 0 | Status: COMPLETED | OUTPATIENT
Start: 2019-01-08 | End: 2019-01-08

## 2019-01-08 RX ORDER — PANTOPRAZOLE SODIUM 20 MG/1
40 TABLET, DELAYED RELEASE ORAL
Qty: 0 | Refills: 0 | Status: DISCONTINUED | OUTPATIENT
Start: 2019-01-08 | End: 2019-01-10

## 2019-01-08 RX ORDER — SODIUM CHLORIDE 9 MG/ML
1000 INJECTION INTRAMUSCULAR; INTRAVENOUS; SUBCUTANEOUS
Qty: 0 | Refills: 0 | Status: DISCONTINUED | OUTPATIENT
Start: 2019-01-08 | End: 2019-01-08

## 2019-01-08 RX ORDER — INSULIN LISPRO 100/ML
VIAL (ML) SUBCUTANEOUS
Qty: 0 | Refills: 0 | Status: DISCONTINUED | OUTPATIENT
Start: 2019-01-08 | End: 2019-01-10

## 2019-01-08 RX ORDER — NICOTINE POLACRILEX 2 MG
4 GUM BUCCAL
Qty: 0 | Refills: 0 | Status: DISCONTINUED | OUTPATIENT
Start: 2019-01-08 | End: 2019-01-10

## 2019-01-08 RX ORDER — INSULIN GLARGINE 100 [IU]/ML
10 INJECTION, SOLUTION SUBCUTANEOUS AT BEDTIME
Qty: 0 | Refills: 0 | Status: DISCONTINUED | OUTPATIENT
Start: 2019-01-08 | End: 2019-01-10

## 2019-01-08 RX ORDER — SERTRALINE 25 MG/1
50 TABLET, FILM COATED ORAL DAILY
Qty: 0 | Refills: 0 | Status: DISCONTINUED | OUTPATIENT
Start: 2019-01-08 | End: 2019-01-10

## 2019-01-08 RX ORDER — AZITHROMYCIN 500 MG/1
250 TABLET, FILM COATED ORAL DAILY
Qty: 0 | Refills: 0 | Status: DISCONTINUED | OUTPATIENT
Start: 2019-01-08 | End: 2019-01-10

## 2019-01-08 RX ORDER — CEFTRIAXONE 500 MG/1
1 INJECTION, POWDER, FOR SOLUTION INTRAMUSCULAR; INTRAVENOUS EVERY 24 HOURS
Qty: 0 | Refills: 0 | Status: DISCONTINUED | OUTPATIENT
Start: 2019-01-08 | End: 2019-01-10

## 2019-01-08 RX ADMIN — ATORVASTATIN CALCIUM 40 MILLIGRAM(S): 80 TABLET, FILM COATED ORAL at 21:27

## 2019-01-08 RX ADMIN — Medication 4 MILLIGRAM(S): at 18:07

## 2019-01-08 RX ADMIN — Medication 2 MILLIGRAM(S): at 05:09

## 2019-01-08 RX ADMIN — Medication 1 PATCH: at 18:06

## 2019-01-08 RX ADMIN — SODIUM CHLORIDE 100 MILLILITER(S): 9 INJECTION INTRAMUSCULAR; INTRAVENOUS; SUBCUTANEOUS at 01:30

## 2019-01-08 RX ADMIN — Medication 3 MILLILITER(S): at 05:45

## 2019-01-08 RX ADMIN — PANTOPRAZOLE SODIUM 40 MILLIGRAM(S): 20 TABLET, DELAYED RELEASE ORAL at 05:44

## 2019-01-08 RX ADMIN — CEFTRIAXONE 100 GRAM(S): 500 INJECTION, POWDER, FOR SOLUTION INTRAMUSCULAR; INTRAVENOUS at 21:26

## 2019-01-08 RX ADMIN — Medication 2: at 17:35

## 2019-01-08 RX ADMIN — Medication 3 MILLILITER(S): at 01:25

## 2019-01-08 RX ADMIN — INSULIN GLARGINE 10 UNIT(S): 100 INJECTION, SOLUTION SUBCUTANEOUS at 21:27

## 2019-01-08 RX ADMIN — Medication 3 MILLILITER(S): at 21:26

## 2019-01-08 RX ADMIN — Medication 40 MILLIGRAM(S): at 05:44

## 2019-01-08 RX ADMIN — Medication 600 MILLIGRAM(S): at 21:26

## 2019-01-08 RX ADMIN — Medication 975 MILLIGRAM(S): at 00:36

## 2019-01-08 RX ADMIN — SODIUM CHLORIDE 100 MILLILITER(S): 9 INJECTION INTRAMUSCULAR; INTRAVENOUS; SUBCUTANEOUS at 08:50

## 2019-01-08 RX ADMIN — SERTRALINE 50 MILLIGRAM(S): 25 TABLET, FILM COATED ORAL at 11:42

## 2019-01-08 RX ADMIN — Medication 6: at 08:49

## 2019-01-08 RX ADMIN — Medication 600 MILLIGRAM(S): at 05:10

## 2019-01-08 RX ADMIN — Medication 3 MILLILITER(S): at 08:50

## 2019-01-08 RX ADMIN — Medication 3 MILLILITER(S): at 17:15

## 2019-01-08 RX ADMIN — Medication 400 MILLIGRAM(S): at 18:07

## 2019-01-08 RX ADMIN — Medication 3 MILLILITER(S): at 12:53

## 2019-01-08 RX ADMIN — Medication 400 MILLIGRAM(S): at 19:07

## 2019-01-08 RX ADMIN — Medication 2: at 21:27

## 2019-01-08 RX ADMIN — AZITHROMYCIN 250 MILLIGRAM(S): 500 TABLET, FILM COATED ORAL at 11:42

## 2019-01-08 RX ADMIN — Medication 4: at 12:52

## 2019-01-08 NOTE — PHYSICAL THERAPY INITIAL EVALUATION ADULT - GENERAL OBSERVATIONS, REHAB EVAL
Patient received semi-supine no acute distress +2L O2 NC, home aide present at bedside, cleared for PT by JOAQUÍN Lawson (covering), agreeable to PT. patient left as found all lines intact +call duncan, JOAQUÍN Lawson aware. FIM 0

## 2019-01-08 NOTE — H&P ADULT - NSHPLABSRESULTS_GEN_ALL_CORE
LABS:                         11.7   9.8   )-----------( 403      ( 07 Jan 2019 21:06 )             36.5     01-07    137  |  97  |  11  ----------------------------<  119<H>  4.2   |  27  |  0.67    Ca    9.6      07 Jan 2019 21:06    TPro  7.0  /  Alb  3.2<L>  /  TBili  0.2  /  DBili  x   /  AST  26  /  ALT  22  /  AlkPhos  100  01-07                  RADIOLOGY, EKG & ADDITIONAL TESTS: Reviewed.
.  LABS:                         11.2   7.0   )-----------( 376      ( 08 Jan 2019 06:10 )             35.8     01-07    137  |  97  |  11  ----------------------------<  119<H>  4.2   |  27  |  0.67    Ca    9.6      07 Jan 2019 21:06    TPro  7.0  /  Alb  3.2<L>  /  TBili  0.2  /  DBili  x   /  AST  26  /  ALT  22  /  AlkPhos  100  01-07                  RADIOLOGY, EKG & ADDITIONAL TESTS: Reviewed.

## 2019-01-08 NOTE — H&P ADULT - PROBLEM SELECTOR PLAN 9
1) PCP Contacted on Admission: (Y/N) --> Yes. Name & Phone #: Dr. Trevizo  2) Date of Contact with PCP:  3) PCP Contacted at Discharge: (Y/N)  4) Summary of Handoff Given to PCP:   5) Post-Discharge Appointment Date and Location:
1) PCP Contacted on Admission: (Y/N) --> Yes. Name & Phone #: Dr. Trevizo  2) Date of Contact with PCP:  3) PCP Contacted at Discharge: (Y/N)  4) Summary of Handoff Given to PCP:   5) Post-Discharge Appointment Date and Location:

## 2019-01-08 NOTE — PHYSICAL THERAPY INITIAL EVALUATION ADULT - DIAGNOSIS, PT EVAL
6B: impaired aerobic capacity/endurance associated with deconditioning; 5A: Primary prevention/risk reduction for loss of balance and falling

## 2019-01-08 NOTE — PROGRESS NOTE ADULT - SUBJECTIVE AND OBJECTIVE BOX
INTERVAL HPI/OVERNIGHT EVENTS:  History reviewed and patient examined; During visit patient with a persistent cough; Also shortness of breath      MEDICATIONS  (STANDING):  ALBUTerol/ipratropium for Nebulization 3 milliLiter(s) Nebulizer every 4 hours  atorvastatin 40 milliGRAM(s) Oral at bedtime  azithromycin   Tablet 250 milliGRAM(s) Oral daily  cefTRIAXone   IVPB 1 Gram(s) IV Intermittent every 24 hours  influenza  Vaccine (HIGH DOSE) 0.5 milliLiter(s) IntraMuscular once  insulin lispro (HumaLOG) corrective regimen sliding scale   SubCutaneous Before meals and at bedtime  nicotine - 21 mG/24Hr(s) Patch 1 patch Transdermal daily  pantoprazole    Tablet 40 milliGRAM(s) Oral before breakfast  predniSONE   Tablet 40 milliGRAM(s) Oral every 24 hours  sertraline 50 milliGRAM(s) Oral daily  sodium chloride 0.9%. 1000 milliLiter(s) (100 mL/Hr) IV Continuous <Continuous>    MEDICATIONS  (PRN):  guaiFENesin  milliGRAM(s) Oral every 12 hours PRN Cough  nicotine  Polacrilex Gum 4 milliGRAM(s) Oral every 2 hours PRN Smoking cessation      Allergies    No Known Allergies    Intolerances        Vital Signs Last 24 Hrs  T(C): 36.7 (08 Jan 2019 08:35), Max: 37.9 (07 Jan 2019 19:48)  T(F): 98 (08 Jan 2019 08:35), Max: 100.3 (07 Jan 2019 19:48)  HR: 97 (08 Jan 2019 08:35) (84 - 108)  BP: 120/56 (08 Jan 2019 08:35) (100/51 - 141/60)  BP(mean): --  RR: 18 (08 Jan 2019 08:35) (17 - 22)  SpO2: 94% (08 Jan 2019 08:35) (94% - 98%)          Constitutional:  Awake    Eyes: JUN    ENMT: Negative    Neck: Supple    Back:  no tenderness     Respiratory:  rhonchi     Cardiovascular: S1 S2    Gastrointestinal: soft    Genitourinary:    Extremities:  no edema      Vascular:    Neurological: intact    Skin:    Lymph Nodes:            LABS:                        11.2   7.0   )-----------( 376      ( 08 Jan 2019 06:10 )             35.8     01-08    138  |  95<L>  |  11  ----------------------------<  321<H>  4.3   |  25  |  0.60    Ca    9.4      08 Jan 2019 06:10  Mg     2.0     01-08    TPro  7.0  /  Alb  3.2<L>  /  TBili  0.2  /  DBili  x   /  AST  26  /  ALT  22  /  AlkPhos  100  01-07          RADIOLOGY & ADDITIONAL TESTS:

## 2019-01-08 NOTE — H&P ADULT - NSHPPHYSICALEXAM_GEN_ALL_CORE
.  VITAL SIGNS:  T(C): 36.6 (01-08-19 @ 05:17), Max: 37.9 (01-07-19 @ 19:48)  T(F): 97.9 (01-08-19 @ 05:17), Max: 100.3 (01-07-19 @ 19:48)  HR: 84 (01-08-19 @ 05:17) (84 - 108)  BP: 134/78 (01-08-19 @ 05:17) (100/51 - 141/60)  BP(mean): --  RR: 19 (01-08-19 @ 05:17) (17 - 22)  SpO2: 98% (01-08-19 @ 05:17) (94% - 98%)  Wt(kg): --    PHYSICAL EXAM:  Constitutional: WDWN resting comfortably in bed; NAD  HEENT: NC/AT, PERRL, EOMI, anicteric sclera, Dry mucosa, no JVD  Respiratory: no wheezing, but decreased breath sounds b/l  Cardiac: +S1/S2; RRR; no M/R/G  Gastrointestinal: abdomen soft, NT/ND; no rebound or guarding; +BSx4  Genitourinary: no ceron  Extremities: no peripheral edema  Neurologic: AAOx3; CNII-XII grossly intact; no focal deficits

## 2019-01-08 NOTE — PROGRESS NOTE ADULT - SUBJECTIVE AND OBJECTIVE BOX
Interval Events:  Patient seen and examined at bedside. Patient is well known to the pulmonary service. She has a known diagnosis of severe COPD on home O2. Patient reports that she is only using her Advair and albuterol prn. She is also on chronic PO prednisone 5mg. Patient says that she quit smoking a week ago at the turn of the year and since then she has been feeling very well. She reports that she has been having productive green yellow sputum without any fevers or chills. She received her pneumonia vaccines and her flu shot this year. Denies hemoptysis. Reports compliance with her inhalers.    MEDICATIONS:  Pulmonary:  ALBUTerol/ipratropium for Nebulization 3 milliLiter(s) Nebulizer every 4 hours  guaiFENesin  milliGRAM(s) Oral every 12 hours PRN    Antimicrobials:  azithromycin   Tablet 250 milliGRAM(s) Oral daily  cefTRIAXone   IVPB 1 Gram(s) IV Intermittent every 24 hours    Anticoagulants:    Cardiac:    Endocrine:  atorvastatin 40 milliGRAM(s) Oral at bedtime  insulin lispro (HumaLOG) corrective regimen sliding scale   SubCutaneous Before meals and at bedtime  predniSONE   Tablet 40 milliGRAM(s) Oral every 24 hours    Allergies    No Known Allergies    Intolerances        Vital Signs Last 24 Hrs  T(C): 36.7 (08 Jan 2019 08:35), Max: 37.9 (07 Jan 2019 19:48)  T(F): 98 (08 Jan 2019 08:35), Max: 100.3 (07 Jan 2019 19:48)  HR: 97 (08 Jan 2019 08:35) (84 - 108)  BP: 120/56 (08 Jan 2019 08:35) (100/51 - 141/60)  BP(mean): --  RR: 18 (08 Jan 2019 08:35) (17 - 22)  SpO2: 94% (08 Jan 2019 08:35) (94% - 98%)        Physical Exam:  General: NAD, AAO x3, Obese  HEENT: No icterus,. Moist mucous membranes  Neck: No JVD noted. Supple, no meningismus  Cardio: S1, S2 noted, RRR. No murmurs, rubs or gallops  Resp: No wheezing heard. Clear lung sounds.   Abdo: Soft, NT, bowel sounds present. No organomegaly  Extremities: No edema noted. Pulses present b/l  Neuro: AAO x3, grossly normal motor strength.  Skin: Dry, no rashes    LABS:      CBC Full  -  ( 08 Jan 2019 06:10 )  WBC Count : 7.0 K/uL  Hemoglobin : 11.2 g/dL  Hematocrit : 35.8 %  Platelet Count - Automated : 376 K/uL  Mean Cell Volume : 94.2 fL  Mean Cell Hemoglobin : 29.5 pg  Mean Cell Hemoglobin Concentration : 31.3 g/dL  Auto Neutrophil # : x  Auto Lymphocyte # : x  Auto Monocyte # : x  Auto Eosinophil # : x  Auto Basophil # : x  Auto Neutrophil % : x  Auto Lymphocyte % : x  Auto Monocyte % : x  Auto Eosinophil % : x  Auto Basophil % : x    01-08    138  |  95<L>  |  11  ----------------------------<  321<H>  4.3   |  25  |  0.60    Ca    9.4      08 Jan 2019 06:10  Mg     2.0     01-08    TPro  7.0  /  Alb  3.2<L>  /  TBili  0.2  /  DBili  x   /  AST  26  /  ALT  22  /  AlkPhos  100  01-07                      RADIOLOGY & ADDITIONAL STUDIES (The following images were personally reviewed):

## 2019-01-08 NOTE — H&P ADULT - PROBLEM SELECTOR PLAN 8
DVT: SCD  GI: omeprazole 20  F: NS at 100cc  E: replete as needed  N: DASH.
DVT: SCD  GI: omeprazole 20  F: NS at 100cc  E: replete as needed  N: DASH

## 2019-01-08 NOTE — H&P ADULT - FAMILY HISTORY
Mother  Still living? Unknown  Family history of ovarian cancer, Age at diagnosis: Age Unknown  Family history of diabetes mellitus, Age at diagnosis: Age Unknown     Sibling  Still living? Unknown  Family history of ovarian cancer, Age at diagnosis: Age Unknown  Family history of colon cancer, Age at diagnosis: Age Unknown     Father  Still living? Unknown  Family history of diabetes mellitus, Age at diagnosis: Age Unknown
Mother  Still living? Unknown  Family history of ovarian cancer, Age at diagnosis: Age Unknown  Family history of diabetes mellitus, Age at diagnosis: Age Unknown     Sibling  Still living? Unknown  Family history of ovarian cancer, Age at diagnosis: Age Unknown  Family history of colon cancer, Age at diagnosis: Age Unknown     Father  Still living? Unknown  Family history of diabetes mellitus, Age at diagnosis: Age Unknown

## 2019-01-08 NOTE — PROGRESS NOTE ADULT - PROBLEM SELECTOR PLAN 2
-Has hx of lung nodules, largest nodule 5 mm per outpatient records  -need repeat CT chest in 6 months. Has hx of lung nodules, largest nodule 5 mm per outpatient records  -will need repeat CT chest in 6 months

## 2019-01-08 NOTE — PHYSICAL THERAPY INITIAL EVALUATION ADULT - MANUAL MUSCLE TESTING RESULTS, REHAB EVAL
LLE >4/5 throughout, RLE hip flexion, knee extension, and knee flexion 3+/5, dorsiflexion 3-/5, plantarflexion 4/5

## 2019-01-08 NOTE — PROGRESS NOTE ADULT - ASSESSMENT
69 y/o female with h/o COPD on home O2 2L NC, DM, HTN, Depression for cough presenting with continued increasing sputum production for COPD exacerbation.

## 2019-01-08 NOTE — H&P ADULT - PROBLEM SELECTOR PLAN 1
-likely 2/2 viral URI. Low suspicion for CAP, will hold of antibiotics.  -Per outpatient record, recent CAT score 33, GOLD category B, takes Advair BID, Ventolin/Albuterol prn and prednisone 5 mg bid  -Pt is oxygen supplement 2L and nightly CPAP at home  -s/p solumedrol 125 in the ED  -will start prednisone 40qd and duoneb q4  -c/w CPAP at night-time  -f/u COPD bundle.

## 2019-01-08 NOTE — PHYSICAL THERAPY INITIAL EVALUATION ADULT - ADDITIONAL COMMENTS
patient reports she uses straight cane for indoor ambulation and rollator for outdoor ambulation, has home aide 7 days/wk for 40 hrs/wk total (~5-6 hrs per day)

## 2019-01-08 NOTE — PROGRESS NOTE ADULT - PROBLEM SELECTOR PLAN 9
1) PCP Contacted on Admission: (Y/N) --> Yes. Name & Phone #: Dr. Trevizo  2) Date of Contact with PCP:  3) PCP Contacted at Discharge: (Y/N)  4) Summary of Handoff Given to PCP:   5) Post-Discharge Appointment Date and Location:

## 2019-01-08 NOTE — PROGRESS NOTE ADULT - PROBLEM SELECTOR PLAN 1
Likely due to COPD exacerbation from CAP.  - Patient with elevated WBC, low grade temp and productive sputum consistent with a PNA.  - Patient has a CAT score of 37, 2 exacerbations this year. GOLD D.  - Currently on LABA/ICS as outpatient and chronic prednisone  - 55pack year smoking, Immunizations uptodate, not 30 day readmission.  - On 2L NC at home    Recommend:  - Agree with CAP coverage for now  - Continue with 40mg PO prednisone for 5 days. Will need a gradual taper back to her home dose on DC  - Do not start Symbicort for now. Patient will be part of COPD clinical trial and will be randomized to LAMA/LABA vs triple therapy  - o2 as needed  - OOB and ambulate  - PT BID  - bedside spirometry  - duonebs q4hrs.  - We will continue to follow.

## 2019-01-08 NOTE — PHYSICAL THERAPY INITIAL EVALUATION ADULT - PERTINENT HX OF CURRENT PROBLEM, REHAB EVAL
67 y/o female with h/o COPD on home O2 2L NC, DM, HTN, Depression for cough presenting with continued increasing sputum production for the past 8 days. Reports having brownish/greenish sputum, persisent cough and wheezing which has progressively worsened. Pt has hx of smoking 1 pack of cigarettes per day for 45 years.

## 2019-01-08 NOTE — H&P ADULT - PMH
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  Depressive disorder  Depression  Disease of trachea and bronchus  Tracheomalacia  Emphysema  COPD (chronic obstructive pulmonary disease) with emphysema  Epilepsy  No longer on medications.  Essential hypertension  Hypertension  Hemorrhoids  Hemorrhoids  Hyperlipidemia  Hyperlipidemia  Peptic ulcer  2/2 H. pylori (s/p amoxicillin, clarithromycin, omeprazole)  Tobacco use disorder  Tobacco abuse  Transient cerebral ischemia  TIA (transient ischemic attack)  Type 2 diabetes mellitus  Diabetes
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  Depressive disorder  Depression  Disease of trachea and bronchus  Tracheomalacia  Emphysema  COPD (chronic obstructive pulmonary disease) with emphysema  Epilepsy  No longer on medications.  Essential hypertension  Hypertension  Hemorrhoids  Hemorrhoids  Hyperlipidemia  Hyperlipidemia  Peptic ulcer  2/2 H. pylori (s/p amoxicillin, clarithromycin, omeprazole)  Tobacco use disorder  Tobacco abuse  Transient cerebral ischemia  TIA (transient ischemic attack)  Type 2 diabetes mellitus  Diabetes

## 2019-01-08 NOTE — PHYSICAL THERAPY INITIAL EVALUATION ADULT - CRITERIA FOR SKILLED THERAPEUTIC INTERVENTIONS
impairments found/therapy frequency/functional limitations in following categories/anticipated discharge recommendation

## 2019-01-08 NOTE — H&P ADULT - HISTORY OF PRESENT ILLNESS
69 y/o female with h/o COPD on home O2 2L NC, DM, HTN, Depression for cough presenting with continued increasing sputum production for the past 8 days. Reports having brownish/greenish sputum, persisent cough and wheezing which has progressively worsened. Pt has hx of smoking 1 pack of cigarettes per day for 45 years. Quit smoking on New Years Pinky and went back to smoking. No sick contacts or recent travel. Pt requires 2L oxygen during the day with CPAP at night. Pt is on chronic steroid treatment and takes prednisone 5mg per day. No documentation of flu vaccination in [employer]. Off note, patient had recent ED visit on 1/4/18 for cough and was discharged after symptoms improved with nebulizers.     In the ED, T100.3, P108, 100/51, R22, 94% on supplemental oxygen. RVP negative. No infiltrates on CXR. Received Tylenol 975, azithromycin 500, ceftriaxone 1g, solumedrol 125, NS 1L and Duoneb x2.      ROS: Reports fever, chills, nausea and SOB. No vomiting, constipation or diarrhea.

## 2019-01-08 NOTE — PROGRESS NOTE ADULT - PROBLEM SELECTOR PLAN 8
DVT: SCD  GI: omeprazole 20  F: NS at 100cc  E: replete as needed  N: DASH. DVT: SCD  GI: omeprazole 20  F: none  E: replete as needed  N: DASH.

## 2019-01-08 NOTE — PROGRESS NOTE ADULT - PROBLEM SELECTOR PLAN 6
: -takes metformin 500bid at home  -f/u A1C  -c/w ISS. Pt with history of DM, takes metformin 500bid at home. A1c 6.3  -c/w ISS  - started lantus 10units qhs

## 2019-01-08 NOTE — H&P ADULT - PROBLEM SELECTOR PLAN 3
has hx of smoking, 1pack per day for 45-years  -initially quit smoking on Dec 31, 2018, but   -will continue with Nicotine patch 21 mcg daily and 4 mg of Nicroette gum q2 hrs PRN per outpatient regimen.

## 2019-01-08 NOTE — H&P ADULT - PROBLEM SELECTOR PLAN 2
-Has hx of lung nodules, largest nodule 5 mm per outpatient records  -need repeat CT chest in 6 months.
-has hx of smoking, 1pack per day for 45-years  -initially quit smoking on Dec 31, 2018, but   -will continue with Nicotine patch 21 mcg daily and 4 mg of Nicroette gum q2 hrs PRN per outpatient regimen

## 2019-01-08 NOTE — PROGRESS NOTE ADULT - SUBJECTIVE AND OBJECTIVE BOX
OVERNIGHT EVENTS: NAEO    SUBJECTIVE / INTERVAL HPI: Patient seen and examined at bedside. States the shortness of breath is improving, but continues to report productive cough. Denies any fevers, chills, chest pain, n/v/d, or any other complaints at this time. She states the symptoms started at New years Pinky when she stopped smoking, she does not intend to start again.     VITAL SIGNS:  Vital Signs Last 24 Hrs  T(C): 36.7 (08 Jan 2019 08:35), Max: 37.9 (07 Jan 2019 19:48)  T(F): 98 (08 Jan 2019 08:35), Max: 100.3 (07 Jan 2019 19:48)  HR: 97 (08 Jan 2019 08:35) (84 - 108)  BP: 120/56 (08 Jan 2019 08:35) (100/51 - 141/60)  BP(mean): --  RR: 18 (08 Jan 2019 08:35) (17 - 22)  SpO2: 94% (08 Jan 2019 08:35) (94% - 98%)    PHYSICAL EXAM:    General: WDWN, saturating well on 2L supplemental oxygen, no acute distress   HEENT: NC/AT; PERRL, anicteric sclera; MMM  Neck: supple  Cardiovascular: +S1/S2, RRR  Respiratory: CTA B/L; no W/R/R  Gastrointestinal: soft, NT/ND; +BSx4  Extremities: WWP; no edema, clubbing or cyanosis  Vascular: 2+ radial, DP/PT pulses B/L  Neurological: AAOx3; no focal deficits    MEDICATIONS:  MEDICATIONS  (STANDING):  ALBUTerol/ipratropium for Nebulization 3 milliLiter(s) Nebulizer every 4 hours  atorvastatin 40 milliGRAM(s) Oral at bedtime  azithromycin   Tablet 250 milliGRAM(s) Oral daily  cefTRIAXone   IVPB 1 Gram(s) IV Intermittent every 24 hours  influenza  Vaccine (HIGH DOSE) 0.5 milliLiter(s) IntraMuscular once  insulin lispro (HumaLOG) corrective regimen sliding scale   SubCutaneous Before meals and at bedtime  nicotine - 21 mG/24Hr(s) Patch 1 patch Transdermal daily  pantoprazole    Tablet 40 milliGRAM(s) Oral before breakfast  predniSONE   Tablet 40 milliGRAM(s) Oral every 24 hours  sertraline 50 milliGRAM(s) Oral daily  sodium chloride 0.9%. 1000 milliLiter(s) (100 mL/Hr) IV Continuous <Continuous>    MEDICATIONS  (PRN):  guaiFENesin  milliGRAM(s) Oral every 12 hours PRN Cough  nicotine  Polacrilex Gum 4 milliGRAM(s) Oral every 2 hours PRN Smoking cessation      ALLERGIES:  Allergies    No Known Allergies    Intolerances        LABS:                        11.2   7.0   )-----------( 376      ( 08 Jan 2019 06:10 )             35.8     01-08    138  |  95<L>  |  11  ----------------------------<  321<H>  4.3   |  25  |  0.60    Ca    9.4      08 Jan 2019 06:10  Mg     2.0     01-08    TPro  7.0  /  Alb  3.2<L>  /  TBili  0.2  /  DBili  x   /  AST  26  /  ALT  22  /  AlkPhos  100  01-07        CAPILLARY BLOOD GLUCOSE      POCT Blood Glucose.: 295 mg/dL (08 Jan 2019 08:17)      RADIOLOGY & ADDITIONAL TESTS: Reviewed.

## 2019-01-08 NOTE — H&P ADULT - ASSESSMENT
67 y/o female with h/o COPD on home O2 2L NC, DM, HTN, Depression for cough presenting with continued increasing sputum production for COPD exacerbation.

## 2019-01-08 NOTE — PROGRESS NOTE ADULT - PROBLEM SELECTOR PLAN 1
Pt with chronic COPD on home 2L NC. likely 2/2 viral URI. Per outpatient record, recent CAT score 33, GOLD category B, takes Advair BID, Ventolin/Albuterol prn and prednisone 5 mg bid. s/p solumedrol 125 in the ED  - continue prednisone 40qd for a total of 5 days  - continue duoneb q4  - c/w CPAP at night-time  - f/u COPD bundle. Pt with chronic COPD on home 2L NC. likely 2/2 viral URI. Per outpatient record, recent CAT score 33, GOLD category B, takes Advair BID, Ventolin/Albuterol prn and prednisone 5 mg bid. s/p solumedrol 125 in the ED  - pulm following, enrolled in COPD trial, randomized LAMA/LABA vs. triple   - continue prednisone 40qd for a total of 5 days  - continue duoneb q4  - c/w CPAP at night

## 2019-01-08 NOTE — H&P ADULT - NSHPSOCIALHISTORY_GEN_ALL_CORE
Pt has hx of smoking 1 pack of cigarettes per day for 45 years. No EtOH or recreational drug use.
Pt has hx of smoking 1 pack of cigarettes per day for 45 years. No EtOH or recreational drug use.

## 2019-01-09 LAB
GLUCOSE BLDC GLUCOMTR-MCNC: 143 MG/DL — HIGH (ref 70–99)
GLUCOSE BLDC GLUCOMTR-MCNC: 173 MG/DL — HIGH (ref 70–99)
GLUCOSE BLDC GLUCOMTR-MCNC: 219 MG/DL — HIGH (ref 70–99)
GLUCOSE BLDC GLUCOMTR-MCNC: 226 MG/DL — HIGH (ref 70–99)

## 2019-01-09 PROCEDURE — 99232 SBSQ HOSP IP/OBS MODERATE 35: CPT | Mod: GC

## 2019-01-09 RX ORDER — TIOTROPIUM BROMIDE AND OLODATEROL 3.124; 2.736 UG/1; UG/1
2 SPRAY, METERED RESPIRATORY (INHALATION) DAILY
Qty: 0 | Refills: 0 | Status: DISCONTINUED | OUTPATIENT
Start: 2019-01-09 | End: 2019-01-10

## 2019-01-09 RX ADMIN — TIOTROPIUM BROMIDE AND OLODATEROL 2 PUFF(S): 3.124; 2.736 SPRAY, METERED RESPIRATORY (INHALATION) at 17:10

## 2019-01-09 RX ADMIN — ATORVASTATIN CALCIUM 40 MILLIGRAM(S): 80 TABLET, FILM COATED ORAL at 21:36

## 2019-01-09 RX ADMIN — Medication 3 MILLILITER(S): at 12:22

## 2019-01-09 RX ADMIN — CEFTRIAXONE 100 GRAM(S): 500 INJECTION, POWDER, FOR SOLUTION INTRAMUSCULAR; INTRAVENOUS at 21:37

## 2019-01-09 RX ADMIN — Medication 4: at 22:12

## 2019-01-09 RX ADMIN — Medication 2: at 17:32

## 2019-01-09 RX ADMIN — Medication 1 PATCH: at 19:32

## 2019-01-09 RX ADMIN — Medication 4: at 12:22

## 2019-01-09 RX ADMIN — Medication 1 PATCH: at 05:59

## 2019-01-09 RX ADMIN — INSULIN GLARGINE 10 UNIT(S): 100 INJECTION, SOLUTION SUBCUTANEOUS at 22:12

## 2019-01-09 RX ADMIN — Medication 3 MILLILITER(S): at 06:00

## 2019-01-09 RX ADMIN — Medication 3 MILLILITER(S): at 21:28

## 2019-01-09 RX ADMIN — SERTRALINE 50 MILLIGRAM(S): 25 TABLET, FILM COATED ORAL at 11:25

## 2019-01-09 RX ADMIN — Medication 1 PATCH: at 17:32

## 2019-01-09 RX ADMIN — Medication 1 PATCH: at 17:10

## 2019-01-09 RX ADMIN — Medication 3 MILLILITER(S): at 09:49

## 2019-01-09 RX ADMIN — AZITHROMYCIN 250 MILLIGRAM(S): 500 TABLET, FILM COATED ORAL at 11:25

## 2019-01-09 RX ADMIN — Medication 40 MILLIGRAM(S): at 06:00

## 2019-01-09 RX ADMIN — Medication 3 MILLILITER(S): at 17:10

## 2019-01-09 RX ADMIN — Medication 3 MILLILITER(S): at 01:53

## 2019-01-09 RX ADMIN — PANTOPRAZOLE SODIUM 40 MILLIGRAM(S): 20 TABLET, DELAYED RELEASE ORAL at 06:00

## 2019-01-09 NOTE — PROGRESS NOTE ADULT - SUBJECTIVE AND OBJECTIVE BOX
INTERVAL HPI/OVERNIGHT EVENTS:  Interim reviewed; Looks much better; Respiratory status stable;       MEDICATIONS  (STANDING):  ALBUTerol/ipratropium for Nebulization 3 milliLiter(s) Nebulizer every 4 hours  atorvastatin 40 milliGRAM(s) Oral at bedtime  azithromycin   Tablet 250 milliGRAM(s) Oral daily  cefTRIAXone   IVPB 1 Gram(s) IV Intermittent every 24 hours  insulin glargine Injectable (LANTUS) 10 Unit(s) SubCutaneous at bedtime  insulin lispro (HumaLOG) corrective regimen sliding scale   SubCutaneous Before meals and at bedtime  nicotine - 21 mG/24Hr(s) Patch 1 patch Transdermal daily  pantoprazole    Tablet 40 milliGRAM(s) Oral before breakfast  predniSONE   Tablet 40 milliGRAM(s) Oral every 24 hours  sertraline 50 milliGRAM(s) Oral daily    MEDICATIONS  (PRN):  guaiFENesin  milliGRAM(s) Oral every 12 hours PRN Cough  nicotine  Polacrilex Gum 4 milliGRAM(s) Oral every 2 hours PRN Smoking cessation      Allergies    No Known Allergies    Intolerances        Vital Signs Last 24 Hrs  T(C): 36.5 (09 Jan 2019 08:27), Max: 37 (08 Jan 2019 20:46)  T(F): 97.7 (09 Jan 2019 08:27), Max: 98.6 (08 Jan 2019 20:46)  HR: 93 (09 Jan 2019 09:39) (83 - 99)  BP: 155/76 (09 Jan 2019 08:27) (149/88 - 157/78)  BP(mean): --  RR: 17 (09 Jan 2019 09:39) (17 - 20)  SpO2: 94% (09 Jan 2019 09:39) (93% - 97%)          Constitutional: Awake and alert    Eyes: JUN    ENMT: Negative    Neck: Supple    Back:  no tenderness     Respiratory:  clear no wheezes    Cardiovascular: S1 S2    Gastrointestinal: soft    Genitourinary:    Extremities: no edema    Vascular:    Neurological:    Skin:    Lymph Nodes:            LABS:                        11.2   7.0   )-----------( 376      ( 08 Jan 2019 06:10 )             35.8     01-08    138  |  95<L>  |  11  ----------------------------<  321<H>  4.3   |  25  |  0.60    Ca    9.4      08 Jan 2019 06:10  Mg     2.0     01-08    TPro  7.0  /  Alb  3.2<L>  /  TBili  0.2  /  DBili  x   /  AST  26  /  ALT  22  /  AlkPhos  100  01-07          RADIOLOGY & ADDITIONAL TESTS:

## 2019-01-09 NOTE — PROGRESS NOTE ADULT - PROBLEM SELECTOR PLAN 2
-Has hx of lung nodules, largest nodule 5 mm per outpatient records  -need repeat CT chest in 6 months.

## 2019-01-09 NOTE — CONSULT NOTE ADULT - ASSESSMENT
67 y/o female with h/o COPD on home O2 2L NC, DM, HTN, Depression for cough presenting with continued increasing sputum production for COPD exacerbation.      Problem/Plan - 1:  ·  Problem: COPD exacerbation.  Plan: Pt with chronic COPD on home 2L NC. likely 2/2 viral URI. Per outpatient record, recent CAT score 33, GOLD category B, takes Advair BID, Ventolin/Albuterol prn and prednisone 5 mg bid. s/p solumedrol 125 in the ED  - pulm following, enrolled in COPD trial, randomized LAMA/LABA vs. triple   - continue prednisone 40qd for a total of 5 days  - continue duoneb q4  - c/w CPAP at night.

## 2019-01-09 NOTE — PROGRESS NOTE ADULT - SUBJECTIVE AND OBJECTIVE BOX
ISELA BROWN 68y Female    Interval HPI:  BRIDGET overnight. Patient reports improved breathing hoagn w/ CPAP. Denies SOB, cp, pleurisy.     Patient seen and examined at bedside:    T(C): 36.5 (01-09-19 @ 08:27), Max: 37 (01-08-19 @ 20:46)  HR: 93 (01-09-19 @ 09:39) (83 - 99)  BP: 155/76 (01-09-19 @ 08:27) (149/88 - 157/78)  RR: 17 (01-09-19 @ 09:39) (17 - 20)  SpO2: 94% (01-09-19 @ 09:39) (93% - 97%)    Review of systems negative except as mentioned above    ALBUTerol/ipratropium for Nebulization 3 milliLiter(s) Nebulizer every 4 hours  atorvastatin 40 milliGRAM(s) Oral at bedtime  azithromycin   Tablet 250 milliGRAM(s) Oral daily  cefTRIAXone   IVPB 1 Gram(s) IV Intermittent every 24 hours  guaiFENesin  milliGRAM(s) Oral every 12 hours PRN  insulin glargine Injectable (LANTUS) 10 Unit(s) SubCutaneous at bedtime  insulin lispro (HumaLOG) corrective regimen sliding scale   SubCutaneous Before meals and at bedtime  nicotine  Polacrilex Gum 4 milliGRAM(s) Oral every 2 hours PRN  nicotine - 21 mG/24Hr(s) Patch 1 patch Transdermal daily  pantoprazole    Tablet 40 milliGRAM(s) Oral before breakfast  predniSONE   Tablet 40 milliGRAM(s) Oral every 24 hours  sertraline 50 milliGRAM(s) Oral daily      Physical Exam:    GENERAL:  male, nad, lying in bed  HEENT: NC/AT, MMM, PERRL  NECK: Supple, no JVD, no LAD  RESPIRATORY: CTAB, good effort and depth, symmetric chest expansion  CV: S1S2 appreciated, RRR, no m/r/g  GI: Soft, NT/ND, normal active bowel sounds  EXT: WWP, no cyanosis, no edema  Vasc: 2+ pulses in DP/PT and radial artery B/L  MSK: normal bulk and tone, nrom    Labs:                        11.2   7.0   )-----------( 376      ( 08 Jan 2019 06:10 )             35.8     01-08    138  |  95<L>  |  11  ----------------------------<  321<H>  4.3   |  25  |  0.60    Ca    9.4      08 Jan 2019 06:10  Mg     2.0     01-08    TPro  7.0  /  Alb  3.2<L>  /  TBili  0.2  /  DBili  x   /  AST  26  /  ALT  22  /  AlkPhos  100  01-07          CAPILLARY BLOOD GLUCOSE      POCT Blood Glucose.: 226 mg/dL (09 Jan 2019 11:58)  POCT Blood Glucose.: 143 mg/dL (09 Jan 2019 08:17)  POCT Blood Glucose.: 154 mg/dL (08 Jan 2019 21:18)  POCT Blood Glucose.: 156 mg/dL (08 Jan 2019 17:28)            Imaging:

## 2019-01-09 NOTE — CONSULT NOTE ADULT - SUBJECTIVE AND OBJECTIVE BOX
Patient is a 68y old  Female who presents with a chief complaint of COPD exacerbation (08 Jan 2019 12:43)       HPI:  67 y/o female with h/o COPD on home O2 2L NC, DM, HTN, Depression for cough presenting with continued increasing sputum production for the past 8 days. Reports having brownish/greenish sputum, persisent cough and wheezing which has progressively worsened. Pt has hx of smoking 1 pack of cigarettes per day for 45 years. Quit smoking on New Years Pinky and went back to smoking. No sick contacts or recent travel. Pt requires 2L oxygen during the day with CPAP at night. Pt is on chronic steroid treatment and takes prednisone 5mg per day. No documentation of flu vaccination in [employer]. Off note, patient had recent ED visit on 1/4/18 for cough and was discharged after symptoms improved with nebulizers.     In the ED, T100.3, P108, 100/51, R22, 94% on supplemental oxygen. RVP negative. No infiltrates on CXR. Received Tylenol 975, azithromycin 500, ceftriaxone 1g, solumedrol 125, NS 1L and Duoneb x2.      ROS: Reports fever, chills, nausea and SOB. No vomiting, constipation or diarrhea. 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 pylori (s/p amoxicillin, clarithromycin, omeprazole)  Disease of trachea and bronchus: Tracheomalacia  Epilepsy: No longer on medications.  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  Hemorrhoids: Hemorrhoids  Other postprocedural status: S/P hernia repair      MEDICATIONS  (STANDING):  ALBUTerol/ipratropium for Nebulization 3 milliLiter(s) Nebulizer every 4 hours  atorvastatin 40 milliGRAM(s) Oral at bedtime  azithromycin   Tablet 250 milliGRAM(s) Oral daily  cefTRIAXone   IVPB 1 Gram(s) IV Intermittent every 24 hours  influenza  Vaccine (HIGH DOSE) 0.5 milliLiter(s) IntraMuscular once  insulin glargine Injectable (LANTUS) 10 Unit(s) SubCutaneous at bedtime  insulin lispro (HumaLOG) corrective regimen sliding scale   SubCutaneous Before meals and at bedtime  nicotine - 21 mG/24Hr(s) Patch 1 patch Transdermal daily  pantoprazole    Tablet 40 milliGRAM(s) Oral before breakfast  predniSONE   Tablet 40 milliGRAM(s) Oral every 24 hours  sertraline 50 milliGRAM(s) Oral daily    MEDICATIONS  (PRN):  guaiFENesin  milliGRAM(s) Oral every 12 hours PRN Cough  nicotine  Polacrilex Gum 4 milliGRAM(s) Oral every 2 hours PRN Smoking cessation      Social History:  lives alone in an elevator accessible apartment building with ramp, has home care 6 hrs x 7 days/week    Functional Level Prior to Admission: states she is ADL independent, walks with a cane at home and a rollator in the community    FAMILY HISTORY:  Family history of diabetes mellitus (Mother, Father)  Family history of colon cancer (Sibling): sister  Family history of ovarian cancer (Mother, Sibling)      CBC Full  -  ( 08 Jan 2019 06:10 )  WBC Count : 7.0 K/uL  Hemoglobin : 11.2 g/dL  Hematocrit : 35.8 %  Platelet Count - Automated : 376 K/uL  Mean Cell Volume : 94.2 fL  Mean Cell Hemoglobin : 29.5 pg  Mean Cell Hemoglobin Concentration : 31.3 g/dL  Auto Neutrophil # : x  Auto Lymphocyte # : x  Auto Monocyte # : x  Auto Eosinophil # : x  Auto Basophil # : x  Auto Neutrophil % : x  Auto Lymphocyte % : x  Auto Monocyte % : x  Auto Eosinophil % : x  Auto Basophil % : x      01-08    138  |  95<L>  |  11  ----------------------------<  321<H>  4.3   |  25  |  0.60    Ca    9.4      08 Jan 2019 06:10  Mg     2.0     01-08    TPro  7.0  /  Alb  3.2<L>  /  TBili  0.2  /  DBili  x   /  AST  26  /  ALT  22  /  AlkPhos  100  01-07            Radiology:      < from: Xray Chest 1 View- PORTABLE-Urgent (01.07.19 @ 21:06) >    EXAM:  XR CHEST PORTABLE URGENT 1V                          PROCEDURE DATE:  01/07/2019          INTERPRETATION:  Portable chest    History: Cough    Mild hypoinflation. No infiltrate or pleural effusion. Aortic arch   vascular calcification. Similar appearance to prior exam 1/4/19. No   pneumothorax or bone abnormality.    Impression:    No acute infiltrate.            Vital Signs Last 24 Hrs  T(C): 36.5 (09 Jan 2019 08:27), Max: 37 (08 Jan 2019 20:46)  T(F): 97.7 (09 Jan 2019 08:27), Max: 98.6 (08 Jan 2019 20:46)  HR: 94 (09 Jan 2019 08:27) (83 - 99)  BP: 155/76 (09 Jan 2019 08:27) (149/88 - 157/78)  BP(mean): --  RR: 18 (09 Jan 2019 08:27) (18 - 20)  SpO2: 93% (09 Jan 2019 08:27) (93% - 97%)    REVIEW OF SYSTEMS:    CONSTITUTIONAL: No fever, weight loss, or fatigue  EYES: No eye pain, visual disturbances, or discharge  ENMT:  No difficulty hearing, tinnitus, vertigo; No sinus or throat pain  NECK: No pain or stiffness  BREASTS: No pain, masses, or nipple discharge  RESPIRATORY:  shortness of breath/ cough with green sputum, improved since admission  CARDIOVASCULAR: No chest pain, palpitations, dizziness, or leg swelling  GASTROINTESTINAL: No abdominal or epigastric pain. No nausea, vomiting, or hematemesis; No diarrhea or constipation. No melena or hematochezia.  GENITOURINARY: No dysuria, frequency, hematuria, or incontinence  NEUROLOGICAL: No headaches, memory loss, loss of strength, numbness, or tremors  SKIN: No itching, burning, rashes, or lesions   LYMPH NODES: No enlarged glands  ENDOCRINE: No heat or cold intolerance; No hair loss  MUSCULOSKELETAL: No joint pain or swelling; No muscle, back, or extremity pain  PSYCHIATRIC: No depression, anxiety, mood swings, or difficulty sleeping  HEME/LYMPH: No easy bruising, or bleeding gums  ALLERGY AND IMMUNOLOGIC: No hives or eczema  VASCULAR: no swelling, erythema      Physical Exam: 67 yo  woman lying in semi Rhoades's position, c/o feeling tired but better    Head: normocephalic, atraumatic    Eyes: PERRLA, EOMI, no nystagmus, sclera anicteric    ENT: nasal discharge, uvula midline, no oropharyngeal erythema/exudate    Neck: supple, negative JVD, negative carotid bruits, no thyromegaly    Chest: CTA bilaterally, neg wheeze, rhonchi, rales, crackles, egophany    Cardiovascular: regular rate and rhythm, neg murmurs/rubs/gallops    Abdomen: soft, non distended, non tender, negative rebound/guarding, normal bowel sounds, neg hepatosplenomegaly    Extremities: WWP, neg cyanosis/clubbing/edema, negative calf tenderness to palpation, negative Reshma's sign    :     Neurologic Exam:    Alert and oriented to person, place, date/year, speech fluent w/o dysarthria, recent and remote memory intact, repetition intact, comprehension intact,     Cranial Nerves:     II:                       pupils equal, round and reactive to light, visual fields intact   III/ IV/VI:            extraocular movements intact, neg nystagmus, ptosis  V:                       facial sensation intact, V1-3 normal  VII:                     face symmetric, no droop, normal eye closure and smile  VIII:                    hearing intact to finger rub bilaterally  IX/ X:                 soft palate rise symmetrical  XI:                      head turning, shoulder shrug normal  XII:                     tongue midline    Motor Exam:    Upper Extremities:                                                          Right:     5/5 /intrinsics                                        Left:      5/5 /intrinsics                                                           5/5 deltoid                                                              5/5 deltoid                                                           5/5 biceps                                                               5/5 biceps                                                           5/5 triceps                                                              5/5 triceps                                                           5/5 wrist extensors-flexors                                       5/5 wrist extensors-flexors                                                            negative pronator drift                                           negative pronator drift                                                                                                                         Lower Extremities:             Right:         4-/5 hip flexors/adductors/abductors          Left:       4-/5 hip flexors/adductors/abductors                                                              5/5 quadriceps/hamstrings                                   5/5 quadriceps/hamstrings                                                              5/5 df/pf/invertors/evertors                                  5/5 df/pf/invertors/evertors                                                       Sensory:    intact to LT/PP in all UE/LE dermatomes    DTR:           = biceps/     triceps/     brachioradialis                      = patella/   medial hamstring/ankle                      neg clonus                      neg Babinski                      neg Hoffmans    Finger to Nose:  wnl    Heel to Shin:       wnl    Rapid Alternating movements:   wnl    Joint Position Sense:  intact    Romberg:  not tested    Tandem Walking:  not tested    Gait:  not tested        PM&R Impression:      1) deconditioned  2) no focal weakness  3) COPD exacerbation    Recommendations:    1) Physical therapy focusing on therapeutic exercises, bed mobility/transfer out of bed evaluation, progressive ambulation with assistive devices prn.    2) Anticipated Disposition Plan/Recs:  pending functional progress

## 2019-01-10 ENCOUNTER — TRANSCRIPTION ENCOUNTER (OUTPATIENT)
Age: 69
End: 2019-01-10

## 2019-01-10 VITALS — HEART RATE: 88 BPM | RESPIRATION RATE: 17 BRPM | OXYGEN SATURATION: 95 %

## 2019-01-10 LAB
GLUCOSE BLDC GLUCOMTR-MCNC: 142 MG/DL — HIGH (ref 70–99)
GLUCOSE BLDC GLUCOMTR-MCNC: 232 MG/DL — HIGH (ref 70–99)

## 2019-01-10 PROCEDURE — 82962 GLUCOSE BLOOD TEST: CPT

## 2019-01-10 PROCEDURE — 99232 SBSQ HOSP IP/OBS MODERATE 35: CPT | Mod: GC

## 2019-01-10 PROCEDURE — 87486 CHLMYD PNEUM DNA AMP PROBE: CPT

## 2019-01-10 PROCEDURE — 71045 X-RAY EXAM CHEST 1 VIEW: CPT

## 2019-01-10 PROCEDURE — 36415 COLL VENOUS BLD VENIPUNCTURE: CPT

## 2019-01-10 PROCEDURE — 87040 BLOOD CULTURE FOR BACTERIA: CPT

## 2019-01-10 PROCEDURE — 80053 COMPREHEN METABOLIC PANEL: CPT

## 2019-01-10 PROCEDURE — 87633 RESP VIRUS 12-25 TARGETS: CPT

## 2019-01-10 PROCEDURE — 80048 BASIC METABOLIC PNL TOTAL CA: CPT

## 2019-01-10 PROCEDURE — 97161 PT EVAL LOW COMPLEX 20 MIN: CPT

## 2019-01-10 PROCEDURE — 83036 HEMOGLOBIN GLYCOSYLATED A1C: CPT

## 2019-01-10 PROCEDURE — 96374 THER/PROPH/DIAG INJ IV PUSH: CPT

## 2019-01-10 PROCEDURE — 97110 THERAPEUTIC EXERCISES: CPT

## 2019-01-10 PROCEDURE — 85025 COMPLETE CBC W/AUTO DIFF WBC: CPT

## 2019-01-10 PROCEDURE — 94660 CPAP INITIATION&MGMT: CPT

## 2019-01-10 PROCEDURE — 97116 GAIT TRAINING THERAPY: CPT

## 2019-01-10 PROCEDURE — 87798 DETECT AGENT NOS DNA AMP: CPT

## 2019-01-10 PROCEDURE — 83735 ASSAY OF MAGNESIUM: CPT

## 2019-01-10 PROCEDURE — 94150 VITAL CAPACITY TEST: CPT

## 2019-01-10 PROCEDURE — 87581 M.PNEUMON DNA AMP PROBE: CPT

## 2019-01-10 PROCEDURE — 85027 COMPLETE CBC AUTOMATED: CPT

## 2019-01-10 PROCEDURE — 94640 AIRWAY INHALATION TREATMENT: CPT

## 2019-01-10 PROCEDURE — 99285 EMERGENCY DEPT VISIT HI MDM: CPT | Mod: 25

## 2019-01-10 RX ORDER — FLUTICASONE PROPIONATE AND SALMETEROL 50; 500 UG/1; UG/1
500-50 POWDER RESPIRATORY (INHALATION)
Qty: 60 | Refills: 0 | Status: DISCONTINUED | COMMUNITY
Start: 2017-10-11 | End: 2019-01-10

## 2019-01-10 RX ORDER — BENZOCAINE AND MENTHOL 5; 1 G/100ML; G/100ML
1 LIQUID ORAL ONCE
Qty: 0 | Refills: 0 | Status: DISCONTINUED | OUTPATIENT
Start: 2019-01-10 | End: 2019-01-10

## 2019-01-10 RX ORDER — CEFPODOXIME PROXETIL 100 MG
1 TABLET ORAL
Qty: 8 | Refills: 0
Start: 2019-01-10 | End: 2019-01-13

## 2019-01-10 RX ORDER — TIOTROPIUM BROMIDE AND OLODATEROL 3.124; 2.736 UG/1; UG/1
2 SPRAY, METERED RESPIRATORY (INHALATION)
Qty: 1 | Refills: 0
Start: 2019-01-10 | End: 2019-02-08

## 2019-01-10 RX ORDER — ALBUTEROL 90 UG/1
1 AEROSOL, METERED ORAL
Qty: 1 | Refills: 0
Start: 2019-01-10 | End: 2019-02-08

## 2019-01-10 RX ORDER — AZITHROMYCIN 500 MG/1
1 TABLET, FILM COATED ORAL
Qty: 4 | Refills: 0 | OUTPATIENT
Start: 2019-01-10

## 2019-01-10 RX ORDER — BENZOCAINE AND MENTHOL 5; 1 G/100ML; G/100ML
1 LIQUID ORAL ONCE
Qty: 0 | Refills: 0 | Status: COMPLETED | OUTPATIENT
Start: 2019-01-10 | End: 2019-01-10

## 2019-01-10 RX ORDER — AZITHROMYCIN 500 MG/1
1 TABLET, FILM COATED ORAL
Qty: 4 | Refills: 0
Start: 2019-01-10 | End: 2019-01-13

## 2019-01-10 RX ADMIN — Medication 3 MILLILITER(S): at 09:26

## 2019-01-10 RX ADMIN — SERTRALINE 50 MILLIGRAM(S): 25 TABLET, FILM COATED ORAL at 11:53

## 2019-01-10 RX ADMIN — PANTOPRAZOLE SODIUM 40 MILLIGRAM(S): 20 TABLET, DELAYED RELEASE ORAL at 05:32

## 2019-01-10 RX ADMIN — BENZOCAINE AND MENTHOL 1 LOZENGE: 5; 1 LIQUID ORAL at 10:17

## 2019-01-10 RX ADMIN — Medication 3 MILLILITER(S): at 05:31

## 2019-01-10 RX ADMIN — AZITHROMYCIN 250 MILLIGRAM(S): 500 TABLET, FILM COATED ORAL at 11:53

## 2019-01-10 RX ADMIN — Medication 4: at 13:02

## 2019-01-10 RX ADMIN — Medication 3 MILLILITER(S): at 13:02

## 2019-01-10 RX ADMIN — Medication 3 MILLILITER(S): at 01:00

## 2019-01-10 RX ADMIN — TIOTROPIUM BROMIDE AND OLODATEROL 2 PUFF(S): 3.124; 2.736 SPRAY, METERED RESPIRATORY (INHALATION) at 13:00

## 2019-01-10 RX ADMIN — Medication 40 MILLIGRAM(S): at 05:32

## 2019-01-10 RX ADMIN — Medication 1 PATCH: at 08:22

## 2019-01-10 NOTE — PROGRESS NOTE ADULT - PROBLEM SELECTOR PROBLEM 5
COPD exacerbation
Essential (primary) hypertension

## 2019-01-10 NOTE — PROGRESS NOTE ADULT - PROBLEM SELECTOR PLAN 5
Has persistent cough;  Agree with antibiotics;  Nebulizer treatments; Prednisone
-takes lisinopril 20qd and HCTZ 25mg qd at home  -will hold BP meds given normotension.

## 2019-01-10 NOTE — PROGRESS NOTE ADULT - PROBLEM SELECTOR PLAN 3
BP stable off meds'
has hx of smoking, 1pack per day for 45-years  -initially quit smoking on Dec 31, 2018, but   -will continue with Nicotine patch 21 mcg daily and 4 mg of Nicroette gum q2 hrs PRN per outpatient regimen.

## 2019-01-10 NOTE — DISCHARGE NOTE ADULT - MEDICATION SUMMARY - MEDICATIONS TO CHANGE
I will SWITCH the dose or number of times a day I take the medications listed below when I get home from the hospital:    Deltasone 5 mg oral tablet  -- 1 tab(s) by mouth once a day

## 2019-01-10 NOTE — PROGRESS NOTE ADULT - SUBJECTIVE AND OBJECTIVE BOX
INTERVAL HPI/OVERNIGHT EVENTS:    SUBJECTIVE: Patient seen and examined at bedside.    OBJECTIVE:    VITAL SIGNS:  ICU Vital Signs Last 24 Hrs  T(C): 37 (10 Peterson 2019 04:59), Max: 37 (10 Peterson 2019 04:59)  T(F): 98.6 (10 Peterson 2019 04:59), Max: 98.6 (10 Peterson 2019 04:59)  HR: 88 (10 Peterson 2019 04:59) (78 - 97)  BP: 158/77 (10 Peterson 2019 04:59) (145/83 - 158/77)  BP(mean): --  ABP: --  ABP(mean): --  RR: 22 (10 Peterson 2019 04:59) (17 - 28)  SpO2: 96% (10 Peterson 2019 04:59) (93% - 98%)        01-09 @ 07:01  -  01-10 @ 06:00  --------------------------------------------------------  IN: 50 mL / OUT: 0 mL / NET: 50 mL      CAPILLARY BLOOD GLUCOSE      POCT Blood Glucose.: 219 mg/dL (09 Jan 2019 22:06)      PHYSICAL EXAM:    General: NAD  HEENT: NC/AT; PERRL, clear conjunctiva  Neck: supple  Respiratory: lungs CTA b/l, no wheezes or rhonchi  Cardiovascular: +S1/S2; RRR, no murmurs, rubs, or gallops  Abdomen: soft, non-tender, non-distended; +BS in all 4 quadrants  Extremities: WWP, 2+ peripheral pulses b/l; no LE edema  Skin: normal color and turgor; no rash  Neurological: AOx3, no focal deficits noted    MEDICATIONS:  MEDICATIONS  (STANDING):  ALBUTerol/ipratropium for Nebulization 3 milliLiter(s) Nebulizer every 4 hours  atorvastatin 40 milliGRAM(s) Oral at bedtime  azithromycin   Tablet 250 milliGRAM(s) Oral daily  cefTRIAXone   IVPB 1 Gram(s) IV Intermittent every 24 hours  insulin glargine Injectable (LANTUS) 10 Unit(s) SubCutaneous at bedtime  insulin lispro (HumaLOG) corrective regimen sliding scale   SubCutaneous Before meals and at bedtime  nicotine - 21 mG/24Hr(s) Patch 1 patch Transdermal daily  pantoprazole    Tablet 40 milliGRAM(s) Oral before breakfast  predniSONE   Tablet 40 milliGRAM(s) Oral every 24 hours  sertraline 50 milliGRAM(s) Oral daily  tiotropium 2.5 MICROgram(s)/olodaterol 2.5 MICROgram(s) (STIOLTO) Inhaler 2 Puff(s) Inhalation daily    MEDICATIONS  (PRN):  guaiFENesin  milliGRAM(s) Oral every 12 hours PRN Cough  nicotine  Polacrilex Gum 4 milliGRAM(s) Oral every 2 hours PRN Smoking cessation      ALLERGIES:  Allergies    No Known Allergies    Intolerances        LABS:                        11.2   7.0   )-----------( 376      ( 08 Jan 2019 06:10 )             35.8     01-08    138  |  95<L>  |  11  ----------------------------<  321<H>  4.3   |  25  |  0.60    Ca    9.4      08 Jan 2019 06:10  Mg     2.0     01-08            RADIOLOGY & ADDITIONAL TESTS:

## 2019-01-10 NOTE — DISCHARGE NOTE ADULT - MEDICATION SUMMARY - MEDICATIONS TO TAKE
I will START or STAY ON the medications listed below when I get home from the hospital:    predniSONE 20 mg oral tablet  -- 2 tab(s) by mouth every 24 hours for 5 days  1 tab by mouth every 24 hours fo 5 days  -- Indication: For COPD exacerbation    aspirin 81 mg oral tablet  -- 1 tab(s) by mouth once a day  -- Indication: For Heart    lisinopril 20 mg oral tablet  -- 1 tab(s) by mouth once a day  -- Indication: For Hypertension    Zoloft 50 mg oral tablet  -- 1 tab(s) by mouth once a day  -- Indication: For Depression, unspecified depression type    Lipitor 40 mg oral tablet  -- 1 tab(s) by mouth once a day (at bedtime)  -- Indication: For Heart    albuterol CFC free 90 mcg/inh inhalation aerosol  -- 2 puff(s) inhaled 4 times a day, As Needed - for bronchospasm  -- Indication: For COPD exacerbation    Advair Diskus 100 mcg-50 mcg inhalation powder  -- 1 puff(s) inhaled 2 times a day  -- Indication: For COPD exacerbation    tiotropium-olodaterol 2.5 mcg-2.5 mcg/inh inhalation aerosol  -- 2 puff(s) inhaled once a day   -- Indication: For COPD exacerbation    ProAir HFA 90 mcg/inh inhalation aerosol  -- 1 puff(s) inhaled 2 times a day x 30 days   -- For inhalation only.  It is very important that you take or use this exactly as directed.  Do not skip doses or discontinue unless directed by your doctor.  Obtain medical advice before taking any non-prescription drugs as some may affect the action of this medication.  Shake well before use.    -- Indication: For COPD exacerbation    cefpodoxime 200 mg oral tablet  -- 1 tab(s) by mouth 2 times a day   -- Finish all this medication unless otherwise directed by prescriber.  Take with food or milk.    -- Indication: For COPD exacerbation    hydroCHLOROthiazide 25 mg oral tablet  -- 1 tab(s) by mouth once a day  -- Indication: For Hypertension    azithromycin 250 mg oral tablet  -- 1 tab(s) by mouth once a day  -- Indication: For COPD exacerbation    omeprazole 20 mg oral delayed release capsule  -- 1 cap(s) by mouth once a day  -- Indication: For GERD    nicotine 21 mg/24 hr transdermal film, extended release  -- 1  by transdermal patch once a day  -- Indication: For Nicotine

## 2019-01-10 NOTE — PROGRESS NOTE ADULT - SUBJECTIVE AND OBJECTIVE BOX
INTERVAL HPI/OVERNIGHT EVENTS:  Feels better and wants to go home; Will send home today with follow up with Dr. Trevizo next week; Would place on steroid taper with discharge      MEDICATIONS  (STANDING):  ALBUTerol/ipratropium for Nebulization 3 milliLiter(s) Nebulizer every 4 hours  atorvastatin 40 milliGRAM(s) Oral at bedtime  azithromycin   Tablet 250 milliGRAM(s) Oral daily  cefTRIAXone   IVPB 1 Gram(s) IV Intermittent every 24 hours  insulin glargine Injectable (LANTUS) 10 Unit(s) SubCutaneous at bedtime  insulin lispro (HumaLOG) corrective regimen sliding scale   SubCutaneous Before meals and at bedtime  nicotine - 21 mG/24Hr(s) Patch 1 patch Transdermal daily  pantoprazole    Tablet 40 milliGRAM(s) Oral before breakfast  predniSONE   Tablet 40 milliGRAM(s) Oral every 24 hours  sertraline 50 milliGRAM(s) Oral daily  tiotropium 2.5 MICROgram(s)/olodaterol 2.5 MICROgram(s) (STIOLTO) Inhaler 2 Puff(s) Inhalation daily    MEDICATIONS  (PRN):  guaiFENesin  milliGRAM(s) Oral every 12 hours PRN Cough  nicotine  Polacrilex Gum 4 milliGRAM(s) Oral every 2 hours PRN Smoking cessation      Allergies    No Known Allergies    Intolerances        Vital Signs Last 24 Hrs  T(C): 36.8 (10 Peterson 2019 08:44), Max: 37 (10 Peterson 2019 04:59)  T(F): 98.3 (10 Peterson 2019 08:44), Max: 98.6 (10 Peterson 2019 04:59)  HR: 88 (10 Peterson 2019 09:17) (78 - 97)  BP: 156/70 (10 Peterson 2019 08:44) (145/83 - 158/77)  BP(mean): --  RR: 17 (10 Peterson 2019 09:17) (17 - 28)  SpO2: 95% (10 Peterson 2019 09:17) (94% - 98%)          Constitutional:  Awake    Eyes: JUN    ENMT: Negative    Neck: Supple    Back:  no tenderness     Respiratory:  clear no wheezes    Cardiovascular: S1 S2    Gastrointestinal:  soft    Genitourinary:    Extremities: no edema    Vascular:    Neurological:    Skin:    Lymph Nodes:            01-09 @ 07:01  -  01-10 @ 07:00  --------------------------------------------------------  IN: 50 mL / OUT: 0 mL / NET: 50 mL      LABS:                RADIOLOGY & ADDITIONAL TESTS:

## 2019-01-10 NOTE — DISCHARGE NOTE ADULT - HOSPITAL COURSE
69 y/o female with h/o COPD on home O2 2L NC, DM, HTN, Depression for cough presenting with continued increasing sputum production admitted for COPD exacerbation. Her RVP was negative and there was low suspicion of bacterial pneumonia but she was treated with antibiotics. For COPD she was started on steroids and inhalers and her wheezing and respiratory status improved. She was followed by the pulmonary team. She is improved for discharge on a steroid taper, inhalers, and 4 more days of antibiotics. She is to follow up with Dr. Trevizo for further management(Office will contact patient to establish appointment).

## 2019-01-10 NOTE — PROGRESS NOTE ADULT - PROBLEM SELECTOR PROBLEM 6
Pneumonia
Type 2 diabetes mellitus with other oral complication

## 2019-01-10 NOTE — PROGRESS NOTE ADULT - PROBLEM SELECTOR PROBLEM 2
Depression, unspecified depression type
Lung nodule

## 2019-01-10 NOTE — DISCHARGE NOTE ADULT - SECONDARY DIAGNOSIS.
Depression, unspecified depression type Essential hypertension Type 2 diabetes mellitus Hyperlipidemia, unspecified hyperlipidemia type Lung nodule Tobacco use disorder

## 2019-01-10 NOTE — PROGRESS NOTE ADULT - PROBLEM SELECTOR PROBLEM 4
Tobacco use disorder
Depression, unspecified depression type

## 2019-01-10 NOTE — PROGRESS NOTE ADULT - PROBLEM SELECTOR PROBLEM 1
Type 2 diabetes mellitus with other oral complication
Acute on chronic respiratory failure with hypoxia and hypercapnia
COPD exacerbation

## 2019-01-10 NOTE — DISCHARGE NOTE ADULT - CARE PROVIDER_API CALL
Trinity Trevizo), Critical Care Medicine; Pulmonary Disease  [address] [place] [address]  Phone: [phone]  Fax: [phone]

## 2019-01-10 NOTE — PROGRESS NOTE ADULT - PROBLEM SELECTOR PROBLEM 3
Essential (primary) hypertension
Tobacco use disorder

## 2019-01-10 NOTE — DISCHARGE NOTE ADULT - CARE PLAN
Principal Discharge DX:	COPD exacerbation  Goal:	Improve respiratory status and avoid exacerbation  Assessment and plan of treatment:	You came into the hospital and were found to have a COPD exacerbation likely due to a viral illness. You were treated with antibiotics, steroids, nebulizer treatments, and an inhaler per Dr. Trevizo's COPD study. Your respiratory status improved to a point that the doctors felt safe for you to go home. Please continue with your home medications. Please continue with the new inhaler given to you for the study. Additionally, please complete your steroid taper as written below. Dr. Trevizo's office will call you for a follow-up appointment which you should go to within the next 1-2 weeks.  Secondary Diagnosis:	Lung nodule  Assessment and plan of treatment:	You have a known history of lung nodules and will need a repeat chest CT in 6 months. This can be scheduled when you go to your appointment with Dr. Trevizo.  Secondary Diagnosis:	Tobacco use disorder  Assessment and plan of treatment:	Please continue with nicotine patches. Smoking can worsening your COPD so quitting will lead to less COPD exacerbations.  Secondary Diagnosis:	Depression, unspecified depression type  Assessment and plan of treatment:	Please continue your home dose of Zoloft.  Secondary Diagnosis:	Essential hypertension  Assessment and plan of treatment:	Please continue your home blood pressure medications when you return home and follow-up with your primary care physician about this condition.  Secondary Diagnosis:	Type 2 diabetes mellitus  Assessment and plan of treatment:	Please continue your home dose of Metformin when you are discharged. you were found to have an HbA1c of 6.3 on this condition, which is a marker of good blood sugar control. Please follow-up with your primary care physician for continued care of this condition.  Secondary Diagnosis:	Hyperlipidemia, unspecified hyperlipidemia type  Assessment and plan of treatment:	Please continue your home dose of Lipitor for control of this condition. Please follow-up with your primary care provider regarding this condition. Principal Discharge DX:	COPD exacerbation  Goal:	Improve respiratory status and avoid exacerbation  Assessment and plan of treatment:	You came into the hospital and were found to have a COPD exacerbation likely due to a viral/bacterial illness. You were treated with antibiotics, steroids, nebulizer treatments, and an inhaler per Dr. Trevizo's COPD study. Your respiratory status improved to a point that the doctors felt safe for you to go home. Please continue with your home medications. Please continue with the new inhaler given to you for the study. Additionally, please complete your steroid taper as written below. Dr. Trevizo's office will call you for a follow-up appointment which you should go to within the next 1-2 weeks.  Secondary Diagnosis:	Lung nodule  Assessment and plan of treatment:	You have a known history of lung nodules and will need a repeat chest CT in 6 months. This can be scheduled when you go to your appointment with Dr. Trevizo.  Secondary Diagnosis:	Tobacco use disorder  Assessment and plan of treatment:	Please continue with nicotine patches. Smoking can worsening your COPD so quitting will lead to less COPD exacerbations.  Secondary Diagnosis:	Depression, unspecified depression type  Assessment and plan of treatment:	Please continue your home dose of Zoloft.  Secondary Diagnosis:	Essential hypertension  Assessment and plan of treatment:	Please continue your home blood pressure medications when you return home and follow-up with your primary care physician about this condition.  Secondary Diagnosis:	Type 2 diabetes mellitus  Assessment and plan of treatment:	Please continue your home dose of Metformin when you are discharged. you were found to have an HbA1c of 6.3 on this condition, which is a marker of good blood sugar control. Please follow-up with your primary care physician for continued care of this condition.  Secondary Diagnosis:	Hyperlipidemia, unspecified hyperlipidemia type  Assessment and plan of treatment:	Please continue your home dose of Lipitor for control of this condition. Please follow-up with your primary care provider regarding this condition.

## 2019-01-10 NOTE — DISCHARGE NOTE ADULT - PLAN OF CARE
Please continue with nicotine patches. Smoking can worsening your COPD so quitting will lead to less COPD exacerbations. Please continue your home dose of Zoloft. Please continue your home blood pressure medications when you return home and follow-up with your primary care physician about this condition. Please continue your home dose of Metformin when you are discharged. you were found to have an HbA1c of 6.3 on this condition, which is a marker of good blood sugar control. Please follow-up with your primary care physician for continued care of this condition. Please continue your home dose of Lipitor for control of this condition. Please follow-up with your primary care provider regarding this condition. Improve respiratory status and avoid exacerbation You came into the hospital and were found to have a COPD exacerbation likely due to a viral illness. You were treated with antibiotics, steroids, nebulizer treatments, and an inhaler per Dr. Trevizo's COPD study. Your respiratory status improved to a point that the doctors felt safe for you to go home. Please continue with your home medications. Please continue with the new inhaler given to you for the study. Additionally, please complete your steroid taper as written below. Dr. Tervizo's office will call you for a follow-up appointment which you should go to within the next 1-2 weeks. You have a known history of lung nodules and will need a repeat chest CT in 6 months. This can be scheduled when you go to your appointment with Dr. Trevizo. You came into the hospital and were found to have a COPD exacerbation likely due to a viral/bacterial illness. You were treated with antibiotics, steroids, nebulizer treatments, and an inhaler per Dr. Trevizo's COPD study. Your respiratory status improved to a point that the doctors felt safe for you to go home. Please continue with your home medications. Please continue with the new inhaler given to you for the study. Additionally, please complete your steroid taper as written below. Dr. Trevizo's office will call you for a follow-up appointment which you should go to within the next 1-2 weeks.

## 2019-01-10 NOTE — DISCHARGE NOTE ADULT - PATIENT PORTAL LINK FT
You can access the [facility] Patient Portal, offered by [facility], by registering with the following website: [email or web address]

## 2019-01-10 NOTE — PROGRESS NOTE ADULT - PROBLEM SELECTOR PLAN 4
On a patch;
-c/w Zoloft 50mg qd (home med).

## 2019-01-10 NOTE — DISCHARGE NOTE ADULT - ADDITIONAL INSTRUCTIONS
Please follow-up with Dr. Trevizo within 1-2 weeks. We spoke with his  and she will call to schedule the best time for you. please do not miss this appointment.

## 2019-01-13 LAB
CULTURE RESULTS: SIGNIFICANT CHANGE UP
CULTURE RESULTS: SIGNIFICANT CHANGE UP
SPECIMEN SOURCE: SIGNIFICANT CHANGE UP
SPECIMEN SOURCE: SIGNIFICANT CHANGE UP

## 2019-01-15 DIAGNOSIS — J18.9 PNEUMONIA, UNSPECIFIED ORGANISM: ICD-10-CM

## 2019-01-15 DIAGNOSIS — F32.9 MAJOR DEPRESSIVE DISORDER, SINGLE EPISODE, UNSPECIFIED: ICD-10-CM

## 2019-01-15 DIAGNOSIS — Z99.81 DEPENDENCE ON SUPPLEMENTAL OXYGEN: ICD-10-CM

## 2019-01-15 DIAGNOSIS — J44.0 CHRONIC OBSTRUCTIVE PULMONARY DISEASE WITH (ACUTE) LOWER RESPIRATORY INFECTION: ICD-10-CM

## 2019-01-15 DIAGNOSIS — R91.1 SOLITARY PULMONARY NODULE: ICD-10-CM

## 2019-01-15 DIAGNOSIS — J44.1 CHRONIC OBSTRUCTIVE PULMONARY DISEASE WITH (ACUTE) EXACERBATION: ICD-10-CM

## 2019-01-15 DIAGNOSIS — I10 ESSENTIAL (PRIMARY) HYPERTENSION: ICD-10-CM

## 2019-01-15 DIAGNOSIS — E78.5 HYPERLIPIDEMIA, UNSPECIFIED: ICD-10-CM

## 2019-01-15 DIAGNOSIS — Z72.0 TOBACCO USE: ICD-10-CM

## 2019-01-15 DIAGNOSIS — K21.9 GASTRO-ESOPHAGEAL REFLUX DISEASE WITHOUT ESOPHAGITIS: ICD-10-CM

## 2019-01-15 DIAGNOSIS — J96.01 ACUTE RESPIRATORY FAILURE WITH HYPOXIA: ICD-10-CM

## 2019-01-15 DIAGNOSIS — E11.638 TYPE 2 DIABETES MELLITUS WITH OTHER ORAL COMPLICATIONS: ICD-10-CM

## 2019-01-15 DIAGNOSIS — J96.02 ACUTE RESPIRATORY FAILURE WITH HYPERCAPNIA: ICD-10-CM

## 2019-01-22 ENCOUNTER — APPOINTMENT (OUTPATIENT)
Dept: INTERNAL MEDICINE | Facility: CLINIC | Age: 69
End: 2019-01-22
Payer: MEDICARE

## 2019-01-22 VITALS
SYSTOLIC BLOOD PRESSURE: 118 MMHG | OXYGEN SATURATION: 98 % | HEART RATE: 78 BPM | TEMPERATURE: 98.4 F | DIASTOLIC BLOOD PRESSURE: 77 MMHG

## 2019-01-22 DIAGNOSIS — Z86.39 PERSONAL HISTORY OF OTHER ENDOCRINE, NUTRITIONAL AND METABOLIC DISEASE: ICD-10-CM

## 2019-01-22 DIAGNOSIS — Z82.49 FAMILY HISTORY OF ISCHEMIC HEART DISEASE AND OTHER DISEASES OF THE CIRCULATORY SYSTEM: ICD-10-CM

## 2019-01-22 PROCEDURE — 99214 OFFICE O/P EST MOD 30 MIN: CPT | Mod: GC

## 2019-01-31 ENCOUNTER — APPOINTMENT (OUTPATIENT)
Dept: PULMONOLOGY | Facility: CLINIC | Age: 69
End: 2019-01-31

## 2019-02-01 ENCOUNTER — APPOINTMENT (OUTPATIENT)
Dept: INTERNAL MEDICINE | Facility: CLINIC | Age: 69
End: 2019-02-01

## 2019-02-08 ENCOUNTER — APPOINTMENT (OUTPATIENT)
Dept: PULMONOLOGY | Facility: CLINIC | Age: 69
End: 2019-02-08
Payer: MEDICARE

## 2019-02-08 ENCOUNTER — OUTPATIENT (OUTPATIENT)
Dept: OUTPATIENT SERVICES | Facility: HOSPITAL | Age: 69
LOS: 1 days | End: 2019-02-08
Payer: MEDICARE

## 2019-02-08 VITALS
HEART RATE: 99 BPM | SYSTOLIC BLOOD PRESSURE: 120 MMHG | BODY MASS INDEX: 32.25 KG/M2 | WEIGHT: 160 LBS | TEMPERATURE: 98.3 F | HEIGHT: 59 IN | OXYGEN SATURATION: 93 % | DIASTOLIC BLOOD PRESSURE: 70 MMHG

## 2019-02-08 PROCEDURE — 71046 X-RAY EXAM CHEST 2 VIEWS: CPT

## 2019-02-08 PROCEDURE — 99214 OFFICE O/P EST MOD 30 MIN: CPT

## 2019-02-08 PROCEDURE — 71046 X-RAY EXAM CHEST 2 VIEWS: CPT | Mod: 26

## 2019-02-11 NOTE — HISTORY OF PRESENT ILLNESS
[Difficulty Breathing During Exertion] : stable dyspnea on exertion [Feelings Of Weakness On Exertion] : stable exercise intolerance [Cough] : stable coughing [Wheezing] : stable wheezing [Regional Soft Tissue Swelling Both Lower Extremities] : denies lower extremity edema [Chest Pain Or Discomfort] : denies chest pain [Fever] : denies fever [Oxygen] : the patient uses supplemental oxygen [Current] : is a current smoker [FreeTextEntry1] : 68 year old female with PMH COPD, diabetes, HTN, HLD, current smoker 1 pack a day, started at 13 years of age presents today for a follow up. \par \par She stopped smoking for a while and she went back to smoking yesterday due to family stress.  She had 3 cigarettes yesterday.  She states that without nicotine she has a bad tremor.  \par \par She is taking 20 mg prednisone daily since out of the hospital in January.  She checks her blood sugar 130s.  Denies any high blood sugar 200s. \par \par She has tried to quit with the nicotine patch and the gum, which caused GI upset.  She tried the patch a month ago and says she is not willing to go group for smoking cessation.  She using the nicotine patch at night due to curve her nicotine craving and prevent waking up at night.  \par \par She is using the stiolto daily and needed the Ventolin 2 times a day.  Using nebulizer twice a day. Has oxygen at home 3 liters via nasal continuously throughout the day. Using CPAP at night. SOB with exertion (1 flight of stairs and 1/2 block on flat surface). Coughing same and wheezing stable.  She has had some improvement with the steroids. \par \par She denies any hospitalization since last visit 5/4/2018.   Last hospital stay for COPD exacerbation 12/1/16. \par \par CT chest 6/24/17- mediastinal/hilar lymphadenopathy and multiple micronodules largest 0.5cm. \par Last PFT July 2016- moderate obstruction with no significant response to bronchodilator

## 2019-02-11 NOTE — REVIEW OF SYSTEMS
[As Noted in HPI] : as noted in HPI [Cough] : cough [Sputum] : sputum  [Dyspnea] : dyspnea [Chest Tightness] : chest tightness [Depression] : depression [Negative] : Neurologic [Hemoptysis] : no hemoptysis [Pleuritic Pain] : no pleuritic pain [Frequent URIs] : no frequent upper respiratory infections [Wheezing] : no wheezing [FreeTextEntry2] : poor vision in left eye

## 2019-02-11 NOTE — ASSESSMENT
[FreeTextEntry1] : COPD\par - CAT score 32\par - GOLD category B\par - Repeat PFT\par - Continue on Stiolto\par - Pt is to tamper off steriods 10 mg for 3 days then 5 mg for 3 days then off.\par - Home Pulmonary Rehab Referral. Discussed this in-depth with patient and the various advantages of pulmonary rehab including increasing functional capacity. \par \par Pulmonary micronodules\par - Repeat CT chest, largest nodule 5 mm\par \par Cough\par - Stable.\par \par Smoking\par - Patient will begin using Nicotine patch 21 mcg daily and we have prescribed 4 mg of Nicroette gum to used as needed 1-2 hours throughout the day for cravings. Spent 15 minutes on smoking cessation education, including stress management & triggers, short-term and long-term health consequences of smoking, and health benefits of quitting. \par - pt started on chantix and education provided on chantix print out. \par

## 2019-03-11 ENCOUNTER — MEDICATION RENEWAL (OUTPATIENT)
Age: 69
End: 2019-03-11

## 2019-03-15 ENCOUNTER — APPOINTMENT (OUTPATIENT)
Dept: PULMONOLOGY | Facility: CLINIC | Age: 69
End: 2019-03-15

## 2019-04-12 ENCOUNTER — APPOINTMENT (OUTPATIENT)
Dept: INTERNAL MEDICINE | Facility: CLINIC | Age: 69
End: 2019-04-12

## 2019-04-25 ENCOUNTER — APPOINTMENT (OUTPATIENT)
Dept: INTERNAL MEDICINE | Facility: CLINIC | Age: 69
End: 2019-04-25
Payer: MEDICARE

## 2019-04-25 ENCOUNTER — LABORATORY RESULT (OUTPATIENT)
Age: 69
End: 2019-04-25

## 2019-04-25 VITALS
WEIGHT: 160 LBS | DIASTOLIC BLOOD PRESSURE: 67 MMHG | BODY MASS INDEX: 32.25 KG/M2 | TEMPERATURE: 98 F | HEIGHT: 59 IN | SYSTOLIC BLOOD PRESSURE: 129 MMHG | HEART RATE: 84 BPM | OXYGEN SATURATION: 95 %

## 2019-04-25 PROCEDURE — 36415 COLL VENOUS BLD VENIPUNCTURE: CPT

## 2019-04-25 PROCEDURE — 99214 OFFICE O/P EST MOD 30 MIN: CPT | Mod: GC,25

## 2019-04-29 LAB
ANION GAP SERPL CALC-SCNC: 12 MMOL/L
APPEARANCE: CLEAR
BILIRUBIN URINE: NEGATIVE
BLOOD URINE: NEGATIVE
BUN SERPL-MCNC: 22 MG/DL
CALCIUM SERPL-MCNC: 10.3 MG/DL
CHLORIDE SERPL-SCNC: 103 MMOL/L
CHOLEST SERPL-MCNC: 160 MG/DL
CHOLEST/HDLC SERPL: 3.3 RATIO
CO2 SERPL-SCNC: 27 MMOL/L
COLOR: YELLOW
CREAT SERPL-MCNC: 0.74 MG/DL
ESTIMATED AVERAGE GLUCOSE: 131 MG/DL
GLUCOSE QUALITATIVE U: NEGATIVE
GLUCOSE SERPL-MCNC: 160 MG/DL
HBA1C MFR BLD HPLC: 6.2 %
HDLC SERPL-MCNC: 49 MG/DL
KETONES URINE: NEGATIVE
LDLC SERPL CALC-MCNC: 74 MG/DL
LEUKOCYTE ESTERASE URINE: NEGATIVE
NITRITE URINE: POSITIVE
PH URINE: 6
POTASSIUM SERPL-SCNC: 4.4 MMOL/L
PROTEIN URINE: NORMAL
SODIUM SERPL-SCNC: 142 MMOL/L
SPECIFIC GRAVITY URINE: 1.02
TRIGL SERPL-MCNC: 185 MG/DL
UROBILINOGEN URINE: NORMAL

## 2019-05-03 ENCOUNTER — APPOINTMENT (OUTPATIENT)
Dept: PULMONOLOGY | Facility: CLINIC | Age: 69
End: 2019-05-03

## 2019-05-21 ENCOUNTER — APPOINTMENT (OUTPATIENT)
Dept: INTERNAL MEDICINE | Facility: CLINIC | Age: 69
End: 2019-05-21

## 2019-06-11 ENCOUNTER — RX RENEWAL (OUTPATIENT)
Age: 69
End: 2019-06-11

## 2019-06-18 ENCOUNTER — APPOINTMENT (OUTPATIENT)
Dept: INTERNAL MEDICINE | Facility: CLINIC | Age: 69
End: 2019-06-18
Payer: MEDICARE

## 2019-06-18 ENCOUNTER — RX RENEWAL (OUTPATIENT)
Age: 69
End: 2019-06-18

## 2019-06-18 VITALS
OXYGEN SATURATION: 95 % | WEIGHT: 158 LBS | SYSTOLIC BLOOD PRESSURE: 170 MMHG | DIASTOLIC BLOOD PRESSURE: 78 MMHG | HEIGHT: 59 IN | HEART RATE: 77 BPM | TEMPERATURE: 98.4 F | BODY MASS INDEX: 31.85 KG/M2

## 2019-06-18 VITALS — DIASTOLIC BLOOD PRESSURE: 71 MMHG | HEART RATE: 87 BPM | SYSTOLIC BLOOD PRESSURE: 131 MMHG

## 2019-06-18 PROCEDURE — 99214 OFFICE O/P EST MOD 30 MIN: CPT | Mod: GC

## 2019-07-19 ENCOUNTER — APPOINTMENT (OUTPATIENT)
Dept: PULMONOLOGY | Facility: CLINIC | Age: 69
End: 2019-07-19
Payer: MEDICARE

## 2019-07-19 VITALS
DIASTOLIC BLOOD PRESSURE: 80 MMHG | BODY MASS INDEX: 32.05 KG/M2 | TEMPERATURE: 98.8 F | HEART RATE: 91 BPM | HEIGHT: 59 IN | SYSTOLIC BLOOD PRESSURE: 120 MMHG | OXYGEN SATURATION: 95 % | WEIGHT: 159 LBS | RESPIRATION RATE: 12 BRPM

## 2019-07-19 DIAGNOSIS — Z99.89 OBSTRUCTIVE SLEEP APNEA (ADULT) (PEDIATRIC): ICD-10-CM

## 2019-07-19 DIAGNOSIS — G47.33 OBSTRUCTIVE SLEEP APNEA (ADULT) (PEDIATRIC): ICD-10-CM

## 2019-07-19 PROCEDURE — 94060 EVALUATION OF WHEEZING: CPT

## 2019-07-19 PROCEDURE — 99214 OFFICE O/P EST MOD 30 MIN: CPT | Mod: 25

## 2019-07-19 NOTE — PROCEDURE
[FreeTextEntry1] : Spirometry moderate obstructive lung disease with normal response to bronchodilators.

## 2019-07-19 NOTE — HISTORY OF PRESENT ILLNESS
[Difficulty Breathing During Exertion] : stable dyspnea on exertion [Feelings Of Weakness On Exertion] : stable exercise intolerance [Cough] : stable coughing [Wheezing] : stable wheezing [Regional Soft Tissue Swelling Both Lower Extremities] : denies lower extremity edema [Chest Pain Or Discomfort] : denies chest pain [Fever] : denies fever [Oxygen] : the patient uses supplemental oxygen [4  -  Somewhat severe] : 4, somewhat severe [Class II - Mild Symptoms and Slight Limitations] : II [More Frequent Use Needed Recently] : Patient reports recent increase in frequency of [___ Times a Day] : [unfilled] time(s) a day [Current] : is a current smoker [Obstructive Sleep Apnea] : obstructive sleep apnea [Snoring] : snoring [Witnessed Apneas] : witnessed sleep apnea [Frequent Nocturnal Awakening] : frequent nocturnal awakening [Unintentional Sleep While Inactive] : unintentional sleep while inactive [Awakes Unrefreshed] : awakening unrefreshed [Awakes with Headache] : headache upon awakening [Awakening With Dry Mouth] : awakening with dry mouth [Good Control] : peak flow has been poor [Recent  Weight Gain] : no recent weight gain [FreeTextEntry1] : 68 year old female with PMH COPD, diabetes, HTN, HLD, current smoker 1 pack a day, started at 13 years of age presents today for a follow up. \par \par She is still smoking 5 cig a day  \par \par She is taking 20 mg prednisone daily.  She checks her blood sugar 130s.  Denies any high blood sugar 200s. \par \par Her breathing is better since last visit. She did not have to go to the emergency room. She did not have to have an urgent visit. She still now prednisone and wants to come down on it. She was taking Stiolto bu the last time she went for refill, they wanted lots of money and since the last month she is on Advair.  She is using the albuterol at least twice a day

## 2019-07-23 ENCOUNTER — OUTPATIENT (OUTPATIENT)
Dept: OUTPATIENT SERVICES | Facility: HOSPITAL | Age: 69
LOS: 1 days | End: 2019-07-23
Payer: MEDICARE

## 2019-07-23 ENCOUNTER — APPOINTMENT (OUTPATIENT)
Dept: SLEEP CENTER | Facility: HOSPITAL | Age: 69
End: 2019-07-23

## 2019-07-23 DIAGNOSIS — G47.33 OBSTRUCTIVE SLEEP APNEA (ADULT) (PEDIATRIC): ICD-10-CM

## 2019-07-23 PROCEDURE — 95811 POLYSOM 6/>YRS CPAP 4/> PARM: CPT

## 2019-07-23 PROCEDURE — 95811 POLYSOM 6/>YRS CPAP 4/> PARM: CPT | Mod: 26

## 2019-07-24 ENCOUNTER — RX RENEWAL (OUTPATIENT)
Age: 69
End: 2019-07-24

## 2019-08-04 ENCOUNTER — FORM ENCOUNTER (OUTPATIENT)
Age: 69
End: 2019-08-04

## 2019-08-04 DIAGNOSIS — Z87.898 PERSONAL HISTORY OF OTHER SPECIFIED CONDITIONS: ICD-10-CM

## 2019-08-05 ENCOUNTER — APPOINTMENT (OUTPATIENT)
Dept: CT IMAGING | Facility: HOSPITAL | Age: 69
End: 2019-08-05
Payer: MEDICARE

## 2019-08-05 ENCOUNTER — OUTPATIENT (OUTPATIENT)
Dept: OUTPATIENT SERVICES | Facility: HOSPITAL | Age: 69
LOS: 1 days | End: 2019-08-05
Payer: MEDICARE

## 2019-08-05 PROCEDURE — 71250 CT THORAX DX C-: CPT

## 2019-08-05 PROCEDURE — 71250 CT THORAX DX C-: CPT | Mod: 26

## 2019-08-06 ENCOUNTER — FORM ENCOUNTER (OUTPATIENT)
Age: 69
End: 2019-08-06

## 2019-08-11 PROBLEM — Z87.898 HISTORY OF MULTIPLE PULMONARY NODULES: Status: RESOLVED | Noted: 2018-11-19 | Resolved: 2019-08-11

## 2019-08-12 ENCOUNTER — APPOINTMENT (OUTPATIENT)
Dept: PULMONOLOGY | Facility: CLINIC | Age: 69
End: 2019-08-12
Payer: MEDICARE

## 2019-08-12 VITALS
BODY MASS INDEX: 32.46 KG/M2 | HEIGHT: 59 IN | SYSTOLIC BLOOD PRESSURE: 140 MMHG | HEART RATE: 105 BPM | DIASTOLIC BLOOD PRESSURE: 72 MMHG | TEMPERATURE: 97.9 F | WEIGHT: 161 LBS

## 2019-08-12 DIAGNOSIS — F17.200 NICOTINE DEPENDENCE, UNSPECIFIED, UNCOMPLICATED: ICD-10-CM

## 2019-08-12 PROCEDURE — 99215 OFFICE O/P EST HI 40 MIN: CPT

## 2019-08-12 NOTE — PHYSICAL EXAM
[General Appearance - Well Developed] : well developed [Well Groomed] : well groomed [Normal Appearance] : normal appearance [General Appearance - Well Nourished] : well nourished [No Deformities] : no deformities [General Appearance - In No Acute Distress] : no acute distress [Normal Conjunctiva] : the conjunctiva exhibited no abnormalities [Normal Oropharynx] : normal oropharynx [Eyelids - No Xanthelasma] : the eyelids demonstrated no xanthelasmas [Neck Appearance] : the appearance of the neck was normal [Jugular Venous Distention Increased] : there was no jugular-venous distention [Neck Cervical Mass (___cm)] : no neck mass was observed [Thyroid Nodule] : there were no palpable thyroid nodules [Thyroid Diffuse Enlargement] : the thyroid was not enlarged [Heart Rate And Rhythm] : heart rate and rhythm were normal [Heart Sounds] : normal S1 and S2 [Murmurs] : no murmurs present [Respiration, Rhythm And Depth] : normal respiratory rhythm and effort [Exaggerated Use Of Accessory Muscles For Inspiration] : no accessory muscle use [Auscultation Breath Sounds / Voice Sounds] : lungs were clear to auscultation bilaterally [Cyanosis, Localized] : no localized cyanosis [Nail Clubbing] : no clubbing of the fingernails [Petechial Hemorrhages (___cm)] : no petechial hemorrhages [] : no ischemic changes [Deep Tendon Reflexes (DTR)] : deep tendon reflexes were 2+ and symmetric [Sensation] : the sensory exam was normal to light touch and pinprick [No Focal Deficits] : no focal deficits

## 2019-08-13 NOTE — ASSESSMENT
[FreeTextEntry1] : COPD\par - CAT score 23 improved from 25 \par - GOLD category B\par - Repeat PFT revealed moderate OLD\par - Continue on Stiolto and given one month supply.  Pt demonstrated the correct inhaler use.\par - pt is on 5 mg of prednisone daily.  \par - Home Pulmonary Rehab Referral last visit. Discussed this in-depth with patient and the various advantages of pulmonary rehab including increasing functional capacity. \par - Continue on 5 mg of prednisone daily.\par - Follow up in one month\par \par Pulmonary micronodules\par - Repeat CT chest without any change to the nodules 3 mm to 4 mm.  Repeat in 6 months.  Pt is current smoker and high risk for CA.\par \par Cough\par - Stable.\par \par Smoking\par - Patient will begin using Nicotine patch 21 mcg daily and we have prescribed 4 mg of Nicroette gum to used as needed 1-2 hours throughout the day for cravings. smoking cessation education, including stress management & triggers, short-term and long-term health consequences of smoking, and health benefits of quitting. \par -Pt does not want to start chantix.\par \par JONATHAN\par \par Reviewed the sleep study ordered CPAP.  Discussed JONATHAN and its effect on her breathing.  She is to wear the mask at least 4 hours a night.

## 2019-08-13 NOTE — HISTORY OF PRESENT ILLNESS
[Difficulty Breathing During Exertion] : stable dyspnea on exertion [Feelings Of Weakness On Exertion] : stable exercise intolerance [Cough] : stable coughing [Regional Soft Tissue Swelling Both Lower Extremities] : denies lower extremity edema [Wheezing] : stable wheezing [Fever] : denies fever [Chest Pain Or Discomfort] : denies chest pain [Oxygen] : the patient uses supplemental oxygen [4  -  Somewhat severe] : 4, somewhat severe [Class II - Mild Symptoms and Slight Limitations] : II [More Frequent Use Needed Recently] : Patient reports recent increase in frequency of [___ Times a Day] : [unfilled] time(s) a day [Obstructive Sleep Apnea] : obstructive sleep apnea [Snoring] : snoring [Witnessed Apneas] : witnessed sleep apnea [Frequent Nocturnal Awakening] : frequent nocturnal awakening [Unintentional Sleep While Inactive] : unintentional sleep while inactive [Awakes Unrefreshed] : awakening unrefreshed [Awakes with Headache] : headache upon awakening [Awakening With Dry Mouth] : awakening with dry mouth [Stable] : are stable [Good Control] : peak flow has been poor [Recent  Weight Gain] : no recent weight gain [FreeTextEntry1] : 68 year old female with PMH COPD, diabetes, HTN, HLD, current smoker 1 pack a day, started at 13 years of age presents today for a follow up. \par \par She is still smoking approx 5 cig a day  \par \par She is taking 5 mg prednisone daily.  She checks her blood sugar 120 - 130s. \par \par Her breathing is stable. She did not have to go to the emergency room. She did not have to have an urgent visit. She is taking 5  mg of prednisone.  She was taking Stiolto and rarely using the ventolin She is using the albuterol at least twice a day.  Her elevator was not working and she had to walk up 6 flights of stairs.

## 2019-08-19 ENCOUNTER — APPOINTMENT (OUTPATIENT)
Dept: PULMONOLOGY | Facility: CLINIC | Age: 69
End: 2019-08-19

## 2019-09-10 ENCOUNTER — MEDICATION RENEWAL (OUTPATIENT)
Age: 69
End: 2019-09-10

## 2019-09-10 ENCOUNTER — RX RENEWAL (OUTPATIENT)
Age: 69
End: 2019-09-10

## 2019-09-23 ENCOUNTER — APPOINTMENT (OUTPATIENT)
Dept: PULMONOLOGY | Facility: CLINIC | Age: 69
End: 2019-09-23
Payer: MEDICARE

## 2019-09-23 VITALS
TEMPERATURE: 98 F | DIASTOLIC BLOOD PRESSURE: 78 MMHG | SYSTOLIC BLOOD PRESSURE: 138 MMHG | BODY MASS INDEX: 32.7 KG/M2 | WEIGHT: 162.2 LBS | OXYGEN SATURATION: 95 % | HEIGHT: 59 IN | HEART RATE: 96 BPM

## 2019-09-23 DIAGNOSIS — R09.02 HYPOXEMIA: ICD-10-CM

## 2019-09-23 PROCEDURE — 90662 IIV NO PRSV INCREASED AG IM: CPT

## 2019-09-23 PROCEDURE — 99214 OFFICE O/P EST MOD 30 MIN: CPT | Mod: 25

## 2019-09-23 PROCEDURE — G0008: CPT

## 2019-09-23 NOTE — HISTORY OF PRESENT ILLNESS
[Stable] : are stable [Difficulty Breathing During Exertion] : stable dyspnea on exertion [Feelings Of Weakness On Exertion] : stable exercise intolerance [Cough] : stable coughing [Wheezing] : stable wheezing [Regional Soft Tissue Swelling Both Lower Extremities] : denies lower extremity edema [Fever] : denies fever [Chest Pain Or Discomfort] : denies chest pain [Oxygen] : the patient uses supplemental oxygen [4  -  Somewhat severe] : 4, somewhat severe [Class II - Mild Symptoms and Slight Limitations] : II [___ Times a Day] : [unfilled] time(s) a day [More Frequent Use Needed Recently] : Patient reports recent increase in frequency of [Obstructive Sleep Apnea] : obstructive sleep apnea [Snoring] : snoring [Witnessed Apneas] : witnessed sleep apnea [Frequent Nocturnal Awakening] : frequent nocturnal awakening [Unintentional Sleep While Inactive] : unintentional sleep while inactive [Awakes Unrefreshed] : awakening unrefreshed [Awakes with Headache] : headache upon awakening [Awakening With Dry Mouth] : awakening with dry mouth [Good Control] : peak flow has been poor [Recent  Weight Gain] : no recent weight gain [FreeTextEntry1] : 68 year old female with PMH COPD, diabetes, HTN, HLD, current smoker 1 pack a day, started at 13 years of age presents today for a follow up. \par \par She is still smoking approx 2.5 cig a day. She was having stress with her family.  She is also using the patches during the day  and not smoking with patch on.  States the patch is helping her cut down and the gum was not helping. \par \par She is taking 5 mg prednisone daily since Aug.  It has been helping her breathing.  Her right ankle is in pain that starts in the back and having trouble walking.  She is having sciatica.  She checks her blood sugar 120 - 130s. \par \par Her breathing is stable. She did not have to go to the emergency room. She did not have to have an urgent visit. She is taking 5  mg of prednisone.  She was taking Stiolto and rarely using the ventolin She is using the albuterol once in the morning.  Her elevator was not working and she had to walk up 6 flights of stairs.  Denies fevers, worsening cough or wheezing. \par \par She got a CPAP machine a week ago and using it nightly and tolerating.  She is using  the full face mask and likes her new mask.\par \par CAT score 19.

## 2019-09-23 NOTE — ASSESSMENT
[FreeTextEntry1] : COPD\par - CAT score 19 improved from 23\par - GOLD category B\par - Repeat PFT revealed moderate OLD\par - Continue on Stiolto and given one month supply.  Pt demonstrated the correct inhaler use.\par - pt to uses prednisone 5 mg every other day for a month and then follow up in one month\par - Home Pulmonary Rehab Referral last visit. Discussed this in-depth with patient and the various advantages of pulmonary rehab including increasing functional capacity. \par - Follow up CT scan in Feb 2020 \par - Pt provided number for psych and neurology. \par - JONATHAN pt is compliant with CPAP machine \par - Follow up in one month\par \par Pulmonary micronodules\par - Repeat CT chest without any change to the nodules 3 mm to 4 mm.  Repeat in 6 months.  Pt is current smoker and high risk for CA.\par \par Cough\par - Stable.\par \par Smoking\par - Patient will begin using Nicotine patch 21 mcg daily and we have prescribed 4 mg of Nicroette gum to used as needed 1-2 hours throughout the day for cravings. smoking cessation education, including stress management & triggers, short-term and long-term health consequences of smoking, and health benefits of quitting. \par -Pt does not want to start chantix.\par \par JONATHAN\par \par Reviewed the sleep study ordered CPAP.  Discussed JONATHAN and its effect on her breathing.  She is to wear the mask at least 4 hours a night.

## 2019-09-23 NOTE — PHYSICAL EXAM
[General Appearance - Well Developed] : well developed [Normal Appearance] : normal appearance [Well Groomed] : well groomed [General Appearance - Well Nourished] : well nourished [No Deformities] : no deformities [General Appearance - In No Acute Distress] : no acute distress [Normal Conjunctiva] : the conjunctiva exhibited no abnormalities [Eyelids - No Xanthelasma] : the eyelids demonstrated no xanthelasmas [Normal Oropharynx] : normal oropharynx [Neck Appearance] : the appearance of the neck was normal [Neck Cervical Mass (___cm)] : no neck mass was observed [Jugular Venous Distention Increased] : there was no jugular-venous distention [Thyroid Diffuse Enlargement] : the thyroid was not enlarged [Thyroid Nodule] : there were no palpable thyroid nodules [Heart Rate And Rhythm] : heart rate and rhythm were normal [Heart Sounds] : normal S1 and S2 [Murmurs] : no murmurs present [Respiration, Rhythm And Depth] : normal respiratory rhythm and effort [Auscultation Breath Sounds / Voice Sounds] : lungs were clear to auscultation bilaterally [Exaggerated Use Of Accessory Muscles For Inspiration] : no accessory muscle use [Nail Clubbing] : no clubbing of the fingernails [Cyanosis, Localized] : no localized cyanosis [Petechial Hemorrhages (___cm)] : no petechial hemorrhages [] : no ischemic changes [Deep Tendon Reflexes (DTR)] : deep tendon reflexes were 2+ and symmetric [Sensation] : the sensory exam was normal to light touch and pinprick [No Focal Deficits] : no focal deficits

## 2019-09-24 ENCOUNTER — MED ADMIN CHARGE (OUTPATIENT)
Age: 69
End: 2019-09-24

## 2019-12-03 ENCOUNTER — RX RENEWAL (OUTPATIENT)
Age: 69
End: 2019-12-03

## 2019-12-03 ENCOUNTER — MEDICATION RENEWAL (OUTPATIENT)
Age: 69
End: 2019-12-03

## 2019-12-06 ENCOUNTER — APPOINTMENT (OUTPATIENT)
Dept: INTERNAL MEDICINE | Facility: CLINIC | Age: 69
End: 2019-12-06
Payer: MEDICARE

## 2019-12-06 VITALS
SYSTOLIC BLOOD PRESSURE: 149 MMHG | WEIGHT: 160 LBS | DIASTOLIC BLOOD PRESSURE: 76 MMHG | TEMPERATURE: 97.3 F | BODY MASS INDEX: 32.25 KG/M2 | HEIGHT: 59 IN | OXYGEN SATURATION: 97 % | HEART RATE: 86 BPM

## 2019-12-06 PROCEDURE — 99214 OFFICE O/P EST MOD 30 MIN: CPT

## 2019-12-06 RX ORDER — PHENOL 1.4 %
600 AEROSOL, SPRAY (ML) MUCOUS MEMBRANE DAILY
Qty: 30 | Refills: 5 | Status: DISCONTINUED | COMMUNITY
Start: 2017-02-14 | End: 2019-12-06

## 2019-12-06 RX ORDER — GABAPENTIN 300 MG/1
300 CAPSULE ORAL
Qty: 30 | Refills: 2 | Status: DISCONTINUED | COMMUNITY
Start: 2017-06-07 | End: 2019-12-06

## 2019-12-06 RX ORDER — CLOTRIMAZOLE 10 MG/ML
1 SOLUTION TOPICAL
Qty: 30 | Refills: 0 | Status: DISCONTINUED | COMMUNITY
Start: 2018-01-11 | End: 2019-12-06

## 2019-12-13 ENCOUNTER — APPOINTMENT (OUTPATIENT)
Dept: CT IMAGING | Facility: HOSPITAL | Age: 69
End: 2019-12-13

## 2020-01-13 ENCOUNTER — APPOINTMENT (OUTPATIENT)
Dept: PULMONOLOGY | Facility: CLINIC | Age: 70
End: 2020-01-13
Payer: MEDICARE

## 2020-01-13 VITALS
HEART RATE: 90 BPM | DIASTOLIC BLOOD PRESSURE: 80 MMHG | TEMPERATURE: 97.1 F | OXYGEN SATURATION: 95 % | RESPIRATION RATE: 12 BRPM | SYSTOLIC BLOOD PRESSURE: 120 MMHG

## 2020-01-13 DIAGNOSIS — R49.0 DYSPHONIA: ICD-10-CM

## 2020-01-13 PROCEDURE — 99214 OFFICE O/P EST MOD 30 MIN: CPT

## 2020-01-13 NOTE — HISTORY OF PRESENT ILLNESS
[Difficulty Breathing During Exertion] : stable dyspnea on exertion [Feelings Of Weakness On Exertion] : stable exercise intolerance [Cough] : stable coughing [Regional Soft Tissue Swelling Both Lower Extremities] : denies lower extremity edema [Fever] : denies fever [Chest Pain Or Discomfort] : denies chest pain [Oxygen] : the patient uses supplemental oxygen [Class II - Mild Symptoms and Slight Limitations] : II [More Frequent Use Needed Recently] : Patient reports recent increase in frequency of [Obstructive Sleep Apnea] : obstructive sleep apnea [Snoring] : snoring [Frequent Nocturnal Awakening] : frequent nocturnal awakening [Wheezing] : resolved wheezing [24 hrs] : 24 hours/day [Oxygen Rate  ___ lpm] : Oxygen rate is [unfilled] lpm [2  -  Slight] : 2, slight [___ Times a Day] : [unfilled] time(s) a day [Current] : is a current smoker [Cigarettes ___ /Day] : reports smoking [unfilled] packs of cigarettes per day [None] : None [Adherent] : the patient is adherent with ~his/her~ medication regimen [Goals--Doing Well] : the patient is doing well with ~his/her~ goals [Daytime Somnolence] : daytime somnolence [ESS: ___] : ESS score [unfilled] [To Bed: ___] : ~he/she~ goes to bed at [unfilled] [Arises: ___] : arises at [unfilled] [Wt Gain ___ Lbs] : no recent weight gain [Side Effects] : ~He/She~ denies medication side effects [Good Control] : peak flow has been poor [Unintentional Sleep While Inactive] : no unintentional sleep while inactive [Witnessed Apneas] : no witnessed sleep apnea [Awakes Unrefreshed] : does not awake unrefreshed [Awakening With Dry Mouth] : no dry mouth upon awakening [Awakes with Headache] : no headache upon awakening [FreeTextEntry1] : 68 year old female with PMH COPD, diabetes, HTN, HLD, current smoker 1 pack a day, started at 13 years of age presents today for a follow up. \par \par She is still smoking approx 6 cig a day.  She is off her medication for 2 weeks.  She uses the neb once a day at night.  She is walking with no difficulty except she is complaining of pain in the calfs for the last two days.  her breathing is about the same.  She is going out  [Recent  Weight Gain] : no recent weight gain

## 2020-01-13 NOTE — PHYSICAL EXAM
[General Appearance - Well Developed] : well developed [Normal Appearance] : normal appearance [Well Groomed] : well groomed [General Appearance - Well Nourished] : well nourished [General Appearance - In No Acute Distress] : no acute distress [No Deformities] : no deformities [Eyelids - No Xanthelasma] : the eyelids demonstrated no xanthelasmas [Normal Conjunctiva] : the conjunctiva exhibited no abnormalities [Normal Oropharynx] : normal oropharynx [Neck Cervical Mass (___cm)] : no neck mass was observed [Neck Appearance] : the appearance of the neck was normal [Jugular Venous Distention Increased] : there was no jugular-venous distention [Thyroid Diffuse Enlargement] : the thyroid was not enlarged [Thyroid Nodule] : there were no palpable thyroid nodules [Heart Rate And Rhythm] : heart rate and rhythm were normal [Heart Sounds] : normal S1 and S2 [Murmurs] : no murmurs present [Respiration, Rhythm And Depth] : normal respiratory rhythm and effort [Exaggerated Use Of Accessory Muscles For Inspiration] : no accessory muscle use [Auscultation Breath Sounds / Voice Sounds] : lungs were clear to auscultation bilaterally [Nail Clubbing] : no clubbing of the fingernails [Petechial Hemorrhages (___cm)] : no petechial hemorrhages [Cyanosis, Localized] : no localized cyanosis [Deep Tendon Reflexes (DTR)] : deep tendon reflexes were 2+ and symmetric [Sensation] : the sensory exam was normal to light touch and pinprick [No Focal Deficits] : no focal deficits [Gait - Sufficient For Exercise Testing] : the gait was sufficient for exercise testing [Skin Color & Pigmentation] : normal skin color and pigmentation [Abnormal Walk] : normal gait [No Venous Stasis] : no venous stasis [] : no rash [Skin Lesions] : no skin lesions [No Skin Ulcers] : no skin ulcer [No Xanthoma] : no  xanthoma was observed [Oriented To Time, Place, And Person] : oriented to person, place, and time [Impaired Insight] : insight and judgment were intact [Affect] : the affect was normal

## 2020-01-13 NOTE — ASSESSMENT
[FreeTextEntry1] : COPD\par - CAT score 16 improved from 19\par - GOLD category B\par - Repeat PFT revealed moderate OLD\par - Continue on Stiolto.  Pt demonstrated the correct inhaler use.\par - pt is off steroids for 2 weeks and to hold on systemic steroids for now as she is stable\par - Home Pulmonary Rehab Referral last visit. Discussed this in-depth with patient and the various advantages of pulmonary rehab including increasing functional capacity. \par - Follow up CT scan in Feb 2020 \par - she is up to date with immunization\par - She did not require neither hospitalization, urgent care visits, nor ER visits since last visit\par - JONATHAN pt is compliant with CPAP machine  with [place] scale of 4.  She is compliant with the cpap mask and tolerated well.  As per her son, she is using it at night.  \par - Follow up in one month\par \par Pulmonary micronodules\par - Repeat CT chest without any change to the nodules 3 mm to 4 mm.  Repeat in 6 months.  Pt is current smoker and high risk for CA.\par \par Cough\par - Stable.\par \par Smoking\par - I discussed with her smoke cesation\par \par JONATHAN\par \par as above\par \par Calf pain\par \par venous doppler to rule out DVT

## 2020-01-21 ENCOUNTER — APPOINTMENT (OUTPATIENT)
Dept: INTERNAL MEDICINE | Facility: CLINIC | Age: 70
End: 2020-01-21

## 2020-01-30 ENCOUNTER — APPOINTMENT (OUTPATIENT)
Dept: INTERNAL MEDICINE | Facility: CLINIC | Age: 70
End: 2020-01-30
Payer: MEDICARE

## 2020-01-30 VITALS
TEMPERATURE: 98 F | OXYGEN SATURATION: 98 % | WEIGHT: 152 LBS | HEART RATE: 97 BPM | DIASTOLIC BLOOD PRESSURE: 87 MMHG | HEIGHT: 59 IN | BODY MASS INDEX: 30.64 KG/M2 | SYSTOLIC BLOOD PRESSURE: 147 MMHG

## 2020-01-30 DIAGNOSIS — M25.50 PAIN IN UNSPECIFIED JOINT: ICD-10-CM

## 2020-01-30 DIAGNOSIS — R11.10 VOMITING, UNSPECIFIED: ICD-10-CM

## 2020-01-30 DIAGNOSIS — Z87.09 PERSONAL HISTORY OF OTHER DISEASES OF THE RESPIRATORY SYSTEM: ICD-10-CM

## 2020-01-30 DIAGNOSIS — J44.1 CHRONIC OBSTRUCTIVE PULMONARY DISEASE WITH (ACUTE) EXACERBATION: ICD-10-CM

## 2020-01-30 DIAGNOSIS — J44.9 CHRONIC OBSTRUCTIVE PULMONARY DISEASE, UNSPECIFIED: ICD-10-CM

## 2020-01-30 DIAGNOSIS — M25.551 PAIN IN RIGHT HIP: ICD-10-CM

## 2020-01-30 DIAGNOSIS — Z87.01 PERSONAL HISTORY OF PNEUMONIA (RECURRENT): ICD-10-CM

## 2020-01-30 DIAGNOSIS — J06.0 ACUTE LARYNGOPHARYNGITIS: ICD-10-CM

## 2020-01-30 DIAGNOSIS — N30.00 ACUTE CYSTITIS W/OUT HEMATURIA: ICD-10-CM

## 2020-01-30 PROCEDURE — 99214 OFFICE O/P EST MOD 30 MIN: CPT | Mod: GC

## 2020-01-30 RX ORDER — SERTRALINE HYDROCHLORIDE 50 MG/1
50 TABLET, FILM COATED ORAL DAILY
Qty: 30 | Refills: 2 | Status: DISCONTINUED | COMMUNITY
Start: 2018-08-15 | End: 2020-01-30

## 2020-01-30 RX ORDER — HYDROXYCHLOROQUINE SULFATE 200 MG/1
200 TABLET, FILM COATED ORAL DAILY
Qty: 30 | Refills: 5 | Status: DISCONTINUED | COMMUNITY
Start: 2018-02-15 | End: 2020-01-30

## 2020-01-30 RX ORDER — MULTIVIT-MIN/FOLIC/VIT K/LYCOP 400-300MCG
25 MCG TABLET ORAL
Qty: 30 | Refills: 2 | Status: DISCONTINUED | COMMUNITY
Start: 2017-06-09 | End: 2020-01-30

## 2020-01-30 RX ORDER — NICOTINE POLACRILEX 4 MG/1
4 LOZENGE ORAL
Qty: 1 | Refills: 0 | Status: DISCONTINUED | COMMUNITY
Start: 2018-11-19 | End: 2020-01-30

## 2020-01-30 RX ORDER — GUAIFENESIN 600 MG/1
600 TABLET, EXTENDED RELEASE ORAL TWICE DAILY
Qty: 120 | Refills: 3 | Status: DISCONTINUED | COMMUNITY
Start: 2018-11-19 | End: 2020-01-30

## 2020-01-30 RX ORDER — MELATONIN 5 MG
5 CAPSULE ORAL
Qty: 30 | Refills: 2 | Status: DISCONTINUED | COMMUNITY
Start: 2019-12-06 | End: 2020-01-30

## 2020-01-30 RX ORDER — MONTELUKAST 10 MG/1
10 TABLET, FILM COATED ORAL DAILY
Qty: 90 | Refills: 1 | Status: DISCONTINUED | COMMUNITY
Start: 2018-11-19 | End: 2020-01-30

## 2020-01-30 RX ORDER — IBUPROFEN 600 MG/1
600 TABLET, FILM COATED ORAL
Qty: 30 | Refills: 0 | Status: DISCONTINUED | COMMUNITY
Start: 2017-11-03 | End: 2020-01-30

## 2020-01-30 RX ORDER — NICOTINE 21 MG/24HR
14 PATCH, TRANSDERMAL 24 HOURS TRANSDERMAL DAILY
Qty: 30 | Refills: 2 | Status: DISCONTINUED | COMMUNITY
Start: 2018-08-15 | End: 2020-01-30

## 2020-01-30 RX ORDER — VARENICLINE TARTRATE 0.5 (11)-1
0.5 MG X 11 & KIT ORAL
Qty: 1 | Refills: 0 | Status: DISCONTINUED | COMMUNITY
Start: 2019-02-08 | End: 2020-01-30

## 2020-01-30 RX ORDER — ALBUTEROL SULFATE 90 UG/1
108 (90 BASE) AEROSOL, METERED RESPIRATORY (INHALATION)
Qty: 3 | Refills: 3 | Status: DISCONTINUED | COMMUNITY
Start: 2018-11-19 | End: 2020-01-30

## 2020-01-30 RX ORDER — NICOTINE POLACRILEX 2 MG
2 GUM BUCCAL
Qty: 720 | Refills: 2 | Status: DISCONTINUED | COMMUNITY
Start: 2018-08-15 | End: 2020-01-30

## 2020-03-23 ENCOUNTER — APPOINTMENT (OUTPATIENT)
Dept: INTERNAL MEDICINE | Facility: CLINIC | Age: 70
End: 2020-03-23

## 2020-05-11 ENCOUNTER — RX RENEWAL (OUTPATIENT)
Age: 70
End: 2020-05-11

## 2020-06-01 ENCOUNTER — APPOINTMENT (OUTPATIENT)
Dept: PULMONOLOGY | Facility: CLINIC | Age: 70
End: 2020-06-01

## 2020-07-08 ENCOUNTER — INPATIENT (INPATIENT)
Facility: HOSPITAL | Age: 70
LOS: 0 days | Discharge: LEFT BEFORE DISCHARGE | DRG: 305 | End: 2020-07-09
Payer: MEDICARE

## 2020-07-08 VITALS
WEIGHT: 149.91 LBS | HEART RATE: 95 BPM | RESPIRATION RATE: 24 BRPM | OXYGEN SATURATION: 97 % | TEMPERATURE: 98 F | DIASTOLIC BLOOD PRESSURE: 76 MMHG | SYSTOLIC BLOOD PRESSURE: 180 MMHG

## 2020-07-08 LAB
ALBUMIN SERPL ELPH-MCNC: 4.1 G/DL — SIGNIFICANT CHANGE UP (ref 3.3–5)
ALP SERPL-CCNC: 89 U/L — SIGNIFICANT CHANGE UP (ref 40–120)
ALT FLD-CCNC: 15 U/L — SIGNIFICANT CHANGE UP (ref 10–45)
ANION GAP SERPL CALC-SCNC: 11 MMOL/L — SIGNIFICANT CHANGE UP (ref 5–17)
AST SERPL-CCNC: 18 U/L — SIGNIFICANT CHANGE UP (ref 10–40)
BILIRUB SERPL-MCNC: <0.2 MG/DL — SIGNIFICANT CHANGE UP (ref 0.2–1.2)
BUN SERPL-MCNC: 19 MG/DL — SIGNIFICANT CHANGE UP (ref 7–23)
CALCIUM SERPL-MCNC: 9.6 MG/DL — SIGNIFICANT CHANGE UP (ref 8.4–10.5)
CHLORIDE SERPL-SCNC: 101 MMOL/L — SIGNIFICANT CHANGE UP (ref 96–108)
CO2 SERPL-SCNC: 25 MMOL/L — SIGNIFICANT CHANGE UP (ref 22–31)
CREAT SERPL-MCNC: 0.63 MG/DL — SIGNIFICANT CHANGE UP (ref 0.5–1.3)
ETHANOL SERPL-MCNC: <10 MG/DL — SIGNIFICANT CHANGE UP (ref 0–10)
GLUCOSE SERPL-MCNC: 105 MG/DL — HIGH (ref 70–99)
HCT VFR BLD CALC: 38.7 % — SIGNIFICANT CHANGE UP (ref 34.5–45)
HGB BLD-MCNC: 12.1 G/DL — SIGNIFICANT CHANGE UP (ref 11.5–15.5)
LACTATE SERPL-SCNC: 1.4 MMOL/L — SIGNIFICANT CHANGE UP (ref 0.5–2)
MCHC RBC-ENTMCNC: 29.6 PG — SIGNIFICANT CHANGE UP (ref 27–34)
MCHC RBC-ENTMCNC: 31.3 GM/DL — LOW (ref 32–36)
MCV RBC AUTO: 94.6 FL — SIGNIFICANT CHANGE UP (ref 80–100)
NRBC # BLD: 0 /100 WBCS — SIGNIFICANT CHANGE UP (ref 0–0)
PLATELET # BLD AUTO: 401 K/UL — HIGH (ref 150–400)
POTASSIUM SERPL-MCNC: 4.3 MMOL/L — SIGNIFICANT CHANGE UP (ref 3.5–5.3)
POTASSIUM SERPL-SCNC: 4.3 MMOL/L — SIGNIFICANT CHANGE UP (ref 3.5–5.3)
PROT SERPL-MCNC: 7.3 G/DL — SIGNIFICANT CHANGE UP (ref 6–8.3)
RBC # BLD: 4.09 M/UL — SIGNIFICANT CHANGE UP (ref 3.8–5.2)
RBC # FLD: 14.7 % — HIGH (ref 10.3–14.5)
SODIUM SERPL-SCNC: 137 MMOL/L — SIGNIFICANT CHANGE UP (ref 135–145)
TROPONIN T SERPL-MCNC: <0.01 NG/ML — SIGNIFICANT CHANGE UP (ref 0–0.01)
WBC # BLD: 10.37 K/UL — SIGNIFICANT CHANGE UP (ref 3.8–10.5)
WBC # FLD AUTO: 10.37 K/UL — SIGNIFICANT CHANGE UP (ref 3.8–10.5)

## 2020-07-08 PROCEDURE — 99285 EMERGENCY DEPT VISIT HI MDM: CPT

## 2020-07-08 PROCEDURE — 70496 CT ANGIOGRAPHY HEAD: CPT | Mod: 26

## 2020-07-08 PROCEDURE — 0042T: CPT

## 2020-07-08 PROCEDURE — 70498 CT ANGIOGRAPHY NECK: CPT | Mod: 26

## 2020-07-08 PROCEDURE — 93010 ELECTROCARDIOGRAM REPORT: CPT

## 2020-07-08 PROCEDURE — 72125 CT NECK SPINE W/O DYE: CPT | Mod: 26

## 2020-07-08 PROCEDURE — 71045 X-RAY EXAM CHEST 1 VIEW: CPT | Mod: 26

## 2020-07-08 RX ORDER — ONDANSETRON 8 MG/1
4 TABLET, FILM COATED ORAL ONCE
Refills: 0 | Status: COMPLETED | OUTPATIENT
Start: 2020-07-08 | End: 2020-07-08

## 2020-07-08 RX ORDER — MECLIZINE HCL 12.5 MG
25 TABLET ORAL ONCE
Refills: 0 | Status: COMPLETED | OUTPATIENT
Start: 2020-07-08 | End: 2020-07-08

## 2020-07-08 RX ORDER — ACETAMINOPHEN 500 MG
650 TABLET ORAL ONCE
Refills: 0 | Status: COMPLETED | OUTPATIENT
Start: 2020-07-08 | End: 2020-07-08

## 2020-07-08 RX ADMIN — ONDANSETRON 4 MILLIGRAM(S): 8 TABLET, FILM COATED ORAL at 21:40

## 2020-07-08 RX ADMIN — Medication 25 MILLIGRAM(S): at 22:08

## 2020-07-08 RX ADMIN — Medication 650 MILLIGRAM(S): at 23:44

## 2020-07-08 NOTE — ED PROVIDER NOTE - CLINICAL SUMMARY MEDICAL DECISION MAKING FREE TEXT BOX
69F pmh COPD, DM, HTN, remote stroke per pt, depression p/w sudden onset dizziness, room-spinning, while standing in kitchen, resulting in fall, striking her head. No prior episodes, states vertigo is still present. Sx worse w/ laying flat. Also notes cp, retrosternal, mild, 8/10 this am, now 3-4/10, but denies hx of MI in her, w/ assoc mild sob. Exam noted for nystagmus, htn, mild tachypnea, no focal weakness. Concern dizziness, likely peripheral vs central, consider acs, arrythmia. 69F pmh COPD, DM, HTN, remote stroke per pt, depression p/w sudden onset dizziness, room-spinning, while standing in kitchen, resulting in fall, striking her head. No prior episodes, states vertigo is still present. Sx worse w/ laying flat. Also notes cp, retrosternal, mild, 8/10 this am, now 3-4/10, but denies hx of MI in her, w/ assoc mild sob. Exam noted for nystagmus, htn, mild tachypnea, no focal weakness. Concern dizziness, likely peripheral vs central, consider acs, arrythmia. Stroke w/u neg. admitted regional

## 2020-07-08 NOTE — ED ADULT NURSE NOTE - CHPI ED NUR SYMPTOMS NEG
no nausea/no weakness/no change in level of consciousness/no numbness/no confusion/no loss of consciousness/no fever/no vomiting

## 2020-07-08 NOTE — ED ADULT NURSE NOTE - OBJECTIVE STATEMENT
pt is 69y female, here for complaints of dizziness that started at 3pm today, pt also reports some changes in vision pt is 69y female, here for complaints of dizziness that started at 3pm today, pt also reports some changes in vision, pt also reports she fell down at home and hit the LT side of her head, denies any LOC but reports this precipitated her dizziness, on assessment pt is A&Ox3, continues to reports marked dizziness and changes in vision, nystagmus noted on assessment, no facial droop, slurred speech or arm drift, pupil equal and reactive, equal strengths bilat

## 2020-07-08 NOTE — ED PROVIDER NOTE - PHYSICAL EXAMINATION
VITAL SIGNS: I have reviewed nursing notes and confirm.  CONSTITUTIONAL: Well-developed; well-nourished; in no acute distress.  SKIN: Skin is warm and dry, no acute rash.  HEAD: Normocephalic; atraumatic.  EYES:  EOM intact; conjunctiva and sclera clear.  ENT: No nasal discharge; airway clear.  NECK: Supple; Voluntary FROM  CARD: No rubs appreciated, Regular rate and rhythm.  RESP: No wheezes, no rales. mild dyspnea, no respiratory distress  ABD: Soft; non-distended; non-tender; no rebound or guarding  EXT: Normal ROM. No cyanosis or edema.  NEURO: Alert, oriented. PERRL. 5/5 bue/ble,  equal & strong, no pronator drift, +nystagmus at L gaze, SILT & symmetric, face symmetric, minimal balance instability.   PSYCH: Cooperative, appropriate.

## 2020-07-08 NOTE — ED PROVIDER NOTE - PROGRESS NOTE DETAILS
stroke code activated @ sudden onset persistent dizziness, hx of stroke, htn, Oscar: d/w radiology attending, concern for R MCA calcifications in addition to prelim findings.  Neuro PA notified, will admit. Pt seen by neurology, cleared for admission to [facility] for likely peripheral vertigo.  Recommend improved BP control, trial of diazepam/meclizine.

## 2020-07-08 NOTE — ED PROVIDER NOTE - OBJECTIVE STATEMENT
69F pmh COPD, DM, remote stroke 69F pmh COPD, DM, HTN, remote stroke per pt, depression p/w sudden onset dizziness, room-spinning, while standing in kitchen, resulting in fall, striking her head. No prior episodes, states vertigo is still present. Sx worse w/ laying flat. Also notes cp, retrosternal, mild, 8/10 this am, now 3-4/10, but denies hx of MI in her, w/ assoc mild sob.   No hx fo PE or MI in pt, +family hx MI & stroke.  No f/c, no cough, no abd pain, no n/v/d.

## 2020-07-08 NOTE — ED ADULT TRIAGE NOTE - CHIEF COMPLAINT QUOTE
pt. c/o dizziness, shortness of breath, pt. states she felt dizzy for 10 minutes at home and fell, spent about 10 mins on the floor unable to get up. reports hitting her head, no skin breakdown or discoloration noted, no focal deficits, pt. ambulates with little assistance.

## 2020-07-09 ENCOUNTER — TRANSCRIPTION ENCOUNTER (OUTPATIENT)
Age: 70
End: 2020-07-09

## 2020-07-09 VITALS
SYSTOLIC BLOOD PRESSURE: 140 MMHG | RESPIRATION RATE: 19 BRPM | HEART RATE: 76 BPM | TEMPERATURE: 98 F | OXYGEN SATURATION: 95 % | DIASTOLIC BLOOD PRESSURE: 78 MMHG

## 2020-07-09 DIAGNOSIS — E78.5 HYPERLIPIDEMIA, UNSPECIFIED: ICD-10-CM

## 2020-07-09 DIAGNOSIS — Z29.9 ENCOUNTER FOR PROPHYLACTIC MEASURES, UNSPECIFIED: ICD-10-CM

## 2020-07-09 DIAGNOSIS — J44.9 CHRONIC OBSTRUCTIVE PULMONARY DISEASE, UNSPECIFIED: ICD-10-CM

## 2020-07-09 DIAGNOSIS — E11.9 TYPE 2 DIABETES MELLITUS WITHOUT COMPLICATIONS: ICD-10-CM

## 2020-07-09 DIAGNOSIS — R42 DIZZINESS AND GIDDINESS: ICD-10-CM

## 2020-07-09 DIAGNOSIS — I65.21 OCCLUSION AND STENOSIS OF RIGHT CAROTID ARTERY: ICD-10-CM

## 2020-07-09 DIAGNOSIS — I16.0 HYPERTENSIVE URGENCY: ICD-10-CM

## 2020-07-09 DIAGNOSIS — I10 ESSENTIAL (PRIMARY) HYPERTENSION: ICD-10-CM

## 2020-07-09 LAB
A1C WITH ESTIMATED AVERAGE GLUCOSE RESULT: 6.4 % — HIGH (ref 4–5.6)
ALBUMIN SERPL ELPH-MCNC: 3.8 G/DL — SIGNIFICANT CHANGE UP (ref 3.3–5)
ALP SERPL-CCNC: 73 U/L — SIGNIFICANT CHANGE UP (ref 40–120)
ALT FLD-CCNC: 13 U/L — SIGNIFICANT CHANGE UP (ref 10–45)
ANION GAP SERPL CALC-SCNC: 10 MMOL/L — SIGNIFICANT CHANGE UP (ref 5–17)
AST SERPL-CCNC: 15 U/L — SIGNIFICANT CHANGE UP (ref 10–40)
BASOPHILS # BLD AUTO: 0.08 K/UL — SIGNIFICANT CHANGE UP (ref 0–0.2)
BASOPHILS NFR BLD AUTO: 0.7 % — SIGNIFICANT CHANGE UP (ref 0–2)
BILIRUB SERPL-MCNC: <0.2 MG/DL — SIGNIFICANT CHANGE UP (ref 0.2–1.2)
BUN SERPL-MCNC: 17 MG/DL — SIGNIFICANT CHANGE UP (ref 7–23)
CALCIUM SERPL-MCNC: 9.8 MG/DL — SIGNIFICANT CHANGE UP (ref 8.4–10.5)
CHLORIDE SERPL-SCNC: 101 MMOL/L — SIGNIFICANT CHANGE UP (ref 96–108)
CO2 SERPL-SCNC: 28 MMOL/L — SIGNIFICANT CHANGE UP (ref 22–31)
CREAT SERPL-MCNC: 0.8 MG/DL — SIGNIFICANT CHANGE UP (ref 0.5–1.3)
EOSINOPHIL # BLD AUTO: 0.23 K/UL — SIGNIFICANT CHANGE UP (ref 0–0.5)
EOSINOPHIL NFR BLD AUTO: 2 % — SIGNIFICANT CHANGE UP (ref 0–6)
ESTIMATED AVERAGE GLUCOSE: 137 MG/DL — HIGH (ref 68–114)
GLUCOSE BLDC GLUCOMTR-MCNC: 111 MG/DL — HIGH (ref 70–99)
GLUCOSE BLDC GLUCOMTR-MCNC: 119 MG/DL — HIGH (ref 70–99)
GLUCOSE SERPL-MCNC: 113 MG/DL — HIGH (ref 70–99)
HCT VFR BLD CALC: 38.1 % — SIGNIFICANT CHANGE UP (ref 34.5–45)
HGB BLD-MCNC: 11.7 G/DL — SIGNIFICANT CHANGE UP (ref 11.5–15.5)
IMM GRANULOCYTES NFR BLD AUTO: 0.6 % — SIGNIFICANT CHANGE UP (ref 0–1.5)
LYMPHOCYTES # BLD AUTO: 2.45 K/UL — SIGNIFICANT CHANGE UP (ref 1–3.3)
LYMPHOCYTES # BLD AUTO: 21.2 % — SIGNIFICANT CHANGE UP (ref 13–44)
MAGNESIUM SERPL-MCNC: 2.2 MG/DL — SIGNIFICANT CHANGE UP (ref 1.6–2.6)
MCHC RBC-ENTMCNC: 29.4 PG — SIGNIFICANT CHANGE UP (ref 27–34)
MCHC RBC-ENTMCNC: 30.7 GM/DL — LOW (ref 32–36)
MCV RBC AUTO: 95.7 FL — SIGNIFICANT CHANGE UP (ref 80–100)
MONOCYTES # BLD AUTO: 0.98 K/UL — HIGH (ref 0–0.9)
MONOCYTES NFR BLD AUTO: 8.5 % — SIGNIFICANT CHANGE UP (ref 2–14)
NEUTROPHILS # BLD AUTO: 7.76 K/UL — HIGH (ref 1.8–7.4)
NEUTROPHILS NFR BLD AUTO: 67 % — SIGNIFICANT CHANGE UP (ref 43–77)
NRBC # BLD: 0 /100 WBCS — SIGNIFICANT CHANGE UP (ref 0–0)
PHOSPHATE SERPL-MCNC: 3.8 MG/DL — SIGNIFICANT CHANGE UP (ref 2.5–4.5)
PLATELET # BLD AUTO: 361 K/UL — SIGNIFICANT CHANGE UP (ref 150–400)
POTASSIUM SERPL-MCNC: 4.3 MMOL/L — SIGNIFICANT CHANGE UP (ref 3.5–5.3)
POTASSIUM SERPL-SCNC: 4.3 MMOL/L — SIGNIFICANT CHANGE UP (ref 3.5–5.3)
PROT SERPL-MCNC: 6.6 G/DL — SIGNIFICANT CHANGE UP (ref 6–8.3)
RBC # BLD: 3.98 M/UL — SIGNIFICANT CHANGE UP (ref 3.8–5.2)
RBC # FLD: 14.7 % — HIGH (ref 10.3–14.5)
SARS-COV-2 RNA SPEC QL NAA+PROBE: SIGNIFICANT CHANGE UP
SODIUM SERPL-SCNC: 139 MMOL/L — SIGNIFICANT CHANGE UP (ref 135–145)
WBC # BLD: 11.57 K/UL — HIGH (ref 3.8–10.5)
WBC # FLD AUTO: 11.57 K/UL — HIGH (ref 3.8–10.5)

## 2020-07-09 PROCEDURE — 94640 AIRWAY INHALATION TREATMENT: CPT

## 2020-07-09 PROCEDURE — 0042T: CPT

## 2020-07-09 PROCEDURE — 80053 COMPREHEN METABOLIC PANEL: CPT

## 2020-07-09 PROCEDURE — 85025 COMPLETE CBC W/AUTO DIFF WBC: CPT

## 2020-07-09 PROCEDURE — 99285 EMERGENCY DEPT VISIT HI MDM: CPT | Mod: 25

## 2020-07-09 PROCEDURE — 70450 CT HEAD/BRAIN W/O DYE: CPT

## 2020-07-09 PROCEDURE — U0003: CPT

## 2020-07-09 PROCEDURE — 85730 THROMBOPLASTIN TIME PARTIAL: CPT

## 2020-07-09 PROCEDURE — 82962 GLUCOSE BLOOD TEST: CPT

## 2020-07-09 PROCEDURE — 85610 PROTHROMBIN TIME: CPT

## 2020-07-09 PROCEDURE — 93005 ELECTROCARDIOGRAM TRACING: CPT | Mod: 76

## 2020-07-09 PROCEDURE — 85027 COMPLETE CBC AUTOMATED: CPT

## 2020-07-09 PROCEDURE — 96374 THER/PROPH/DIAG INJ IV PUSH: CPT | Mod: XU

## 2020-07-09 PROCEDURE — 36415 COLL VENOUS BLD VENIPUNCTURE: CPT

## 2020-07-09 PROCEDURE — 97161 PT EVAL LOW COMPLEX 20 MIN: CPT

## 2020-07-09 PROCEDURE — 70498 CT ANGIOGRAPHY NECK: CPT

## 2020-07-09 PROCEDURE — 83036 HEMOGLOBIN GLYCOSYLATED A1C: CPT

## 2020-07-09 PROCEDURE — 80307 DRUG TEST PRSMV CHEM ANLYZR: CPT

## 2020-07-09 PROCEDURE — 72125 CT NECK SPINE W/O DYE: CPT

## 2020-07-09 PROCEDURE — 70496 CT ANGIOGRAPHY HEAD: CPT

## 2020-07-09 PROCEDURE — 71045 X-RAY EXAM CHEST 1 VIEW: CPT

## 2020-07-09 PROCEDURE — 84484 ASSAY OF TROPONIN QUANT: CPT

## 2020-07-09 PROCEDURE — 83605 ASSAY OF LACTIC ACID: CPT

## 2020-07-09 PROCEDURE — 99239 HOSP IP/OBS DSCHRG MGMT >30: CPT | Mod: GC

## 2020-07-09 PROCEDURE — 83735 ASSAY OF MAGNESIUM: CPT

## 2020-07-09 PROCEDURE — 84100 ASSAY OF PHOSPHORUS: CPT

## 2020-07-09 RX ORDER — IBUPROFEN 200 MG
200 TABLET ORAL ONCE
Refills: 0 | Status: COMPLETED | OUTPATIENT
Start: 2020-07-09 | End: 2020-07-09

## 2020-07-09 RX ORDER — ASPIRIN/CALCIUM CARB/MAGNESIUM 324 MG
81 TABLET ORAL DAILY
Refills: 0 | Status: DISCONTINUED | OUTPATIENT
Start: 2020-07-09 | End: 2020-07-09

## 2020-07-09 RX ORDER — HYDROCHLOROTHIAZIDE 25 MG
25 TABLET ORAL DAILY
Refills: 0 | Status: DISCONTINUED | OUTPATIENT
Start: 2020-07-09 | End: 2020-07-09

## 2020-07-09 RX ORDER — DEXTROSE 50 % IN WATER 50 %
15 SYRINGE (ML) INTRAVENOUS ONCE
Refills: 0 | Status: DISCONTINUED | OUTPATIENT
Start: 2020-07-09 | End: 2020-07-09

## 2020-07-09 RX ORDER — INSULIN LISPRO 100/ML
VIAL (ML) SUBCUTANEOUS EVERY 6 HOURS
Refills: 0 | Status: DISCONTINUED | OUTPATIENT
Start: 2020-07-09 | End: 2020-07-09

## 2020-07-09 RX ORDER — DEXTROSE 50 % IN WATER 50 %
12.5 SYRINGE (ML) INTRAVENOUS ONCE
Refills: 0 | Status: DISCONTINUED | OUTPATIENT
Start: 2020-07-09 | End: 2020-07-09

## 2020-07-09 RX ORDER — SERTRALINE 25 MG/1
50 TABLET, FILM COATED ORAL DAILY
Refills: 0 | Status: DISCONTINUED | OUTPATIENT
Start: 2020-07-09 | End: 2020-07-09

## 2020-07-09 RX ORDER — FLUTICASONE PROPIONATE AND SALMETEROL 50; 250 UG/1; UG/1
1 POWDER ORAL; RESPIRATORY (INHALATION)
Qty: 0 | Refills: 0 | DISCHARGE

## 2020-07-09 RX ORDER — TIOTROPIUM BROMIDE AND OLODATEROL 3.124; 2.736 UG/1; UG/1
2 SPRAY, METERED RESPIRATORY (INHALATION) DAILY
Refills: 0 | Status: DISCONTINUED | OUTPATIENT
Start: 2020-07-09 | End: 2020-07-09

## 2020-07-09 RX ORDER — MONTELUKAST 4 MG/1
10 TABLET, CHEWABLE ORAL DAILY
Refills: 0 | Status: DISCONTINUED | OUTPATIENT
Start: 2020-07-09 | End: 2020-07-09

## 2020-07-09 RX ORDER — IPRATROPIUM/ALBUTEROL SULFATE 18-103MCG
3 AEROSOL WITH ADAPTER (GRAM) INHALATION EVERY 6 HOURS
Refills: 0 | Status: DISCONTINUED | OUTPATIENT
Start: 2020-07-09 | End: 2020-07-09

## 2020-07-09 RX ORDER — DEXTROSE 50 % IN WATER 50 %
25 SYRINGE (ML) INTRAVENOUS ONCE
Refills: 0 | Status: DISCONTINUED | OUTPATIENT
Start: 2020-07-09 | End: 2020-07-09

## 2020-07-09 RX ORDER — ATORVASTATIN CALCIUM 80 MG/1
40 TABLET, FILM COATED ORAL AT BEDTIME
Refills: 0 | Status: DISCONTINUED | OUTPATIENT
Start: 2020-07-09 | End: 2020-07-09

## 2020-07-09 RX ORDER — LABETALOL HCL 100 MG
10 TABLET ORAL ONCE
Refills: 0 | Status: COMPLETED | OUTPATIENT
Start: 2020-07-09 | End: 2020-07-09

## 2020-07-09 RX ORDER — SODIUM CHLORIDE 9 MG/ML
1000 INJECTION, SOLUTION INTRAVENOUS
Refills: 0 | Status: DISCONTINUED | OUTPATIENT
Start: 2020-07-09 | End: 2020-07-09

## 2020-07-09 RX ORDER — ENOXAPARIN SODIUM 100 MG/ML
40 INJECTION SUBCUTANEOUS EVERY 24 HOURS
Refills: 0 | Status: DISCONTINUED | OUTPATIENT
Start: 2020-07-09 | End: 2020-07-09

## 2020-07-09 RX ORDER — LISINOPRIL 2.5 MG/1
1 TABLET ORAL
Qty: 0 | Refills: 0 | DISCHARGE

## 2020-07-09 RX ORDER — GLUCAGON INJECTION, SOLUTION 0.5 MG/.1ML
1 INJECTION, SOLUTION SUBCUTANEOUS ONCE
Refills: 0 | Status: DISCONTINUED | OUTPATIENT
Start: 2020-07-09 | End: 2020-07-09

## 2020-07-09 RX ORDER — LISINOPRIL 2.5 MG/1
20 TABLET ORAL DAILY
Refills: 0 | Status: DISCONTINUED | OUTPATIENT
Start: 2020-07-09 | End: 2020-07-09

## 2020-07-09 RX ADMIN — MONTELUKAST 10 MILLIGRAM(S): 4 TABLET, CHEWABLE ORAL at 11:25

## 2020-07-09 RX ADMIN — Medication 10 MILLIGRAM(S): at 00:46

## 2020-07-09 RX ADMIN — LISINOPRIL 20 MILLIGRAM(S): 2.5 TABLET ORAL at 05:52

## 2020-07-09 RX ADMIN — Medication 200 MILLIGRAM(S): at 11:25

## 2020-07-09 RX ADMIN — Medication 3 MILLILITER(S): at 11:26

## 2020-07-09 RX ADMIN — Medication 25 MILLIGRAM(S): at 05:52

## 2020-07-09 RX ADMIN — SERTRALINE 50 MILLIGRAM(S): 25 TABLET, FILM COATED ORAL at 11:25

## 2020-07-09 RX ADMIN — Medication 81 MILLIGRAM(S): at 11:25

## 2020-07-09 RX ADMIN — Medication 3 MILLILITER(S): at 05:52

## 2020-07-09 RX ADMIN — TIOTROPIUM BROMIDE AND OLODATEROL 2 PUFF(S): 3.124; 2.736 SPRAY, METERED RESPIRATORY (INHALATION) at 11:24

## 2020-07-09 NOTE — PATIENT PROFILE ADULT - DISASTER - NSTOBACCOQUITATTEMPT_GEN_A_CORE_SD
I used to take the patches and the gum before but Its not easy to quit/nicotine replacement therapy/quit on own

## 2020-07-09 NOTE — H&P ADULT - PROBLEM SELECTOR PLAN 6
history of hyperlipidemia  - continue home Lipitor 40mg daily history of hypertension  - continue with home lisinopril 20mg daily and hydrochlorothiazide 25mg daily

## 2020-07-09 NOTE — CONSULT NOTE ADULT - CONSULT REQUESTED BY NAME
ED Xenograft Text: The defect edges were debeveled with a #15 scalpel blade.  Given the location of the defect, shape of the defect and the proximity to free margins a xenograft was deemed most appropriate.  The graft was then trimmed to fit the size of the defect.  The graft was then placed in the primary defect and oriented appropriately.

## 2020-07-09 NOTE — H&P ADULT - NSHPPHYSICALEXAM_GEN_ALL_CORE
Vital Signs Last 12 Hrs  T(F): 97.5 (07-08-20 @ 23:59), Max: 98 (07-08-20 @ 20:41)  HR: 60 (07-09-20 @ 00:52) (60 - 95)  BP: 165/70 (07-09-20 @ 00:52) (134/98 - 198/79)  BP(mean): --  RR: 22 (07-09-20 @ 00:52) (20 - 24)  SpO2: 100% (07-09-20 @ 00:52) (96% - 100%)    PHYSICAL EXAM:  Constitutional: NAD, comfortable in bed.  HEENT: NC/AT, PERRLA, EOMI, no conjunctival pallor or scleral icterus, MMM  Neck: Supple, no JVD  Respiratory: CTA B/L. No w/r/r.   Cardiovascular: RRR, normal S1 and S2, no m/r/g.   Gastrointestinal: +BS, soft NTND, no guarding or rebound tenderness, no palpable masses   Extremities: wwp; no cyanosis, clubbing or edema.   Vascular: Pulses equal and strong throughout.   Neurological: AAOx3, no CN deficits, strength and sensation intact throughout.   Skin: No gross skin abnormalities or rashes Vital Signs Last 12 Hrs  T(F): 97.5 (07-08-20 @ 23:59), Max: 98 (07-08-20 @ 20:41)  HR: 60 (07-09-20 @ 00:52) (60 - 95)  BP: 165/70 (07-09-20 @ 00:52) (134/98 - 198/79)  BP(mean): --  RR: 22 (07-09-20 @ 00:52) (20 - 24)  SpO2: 100% (07-09-20 @ 00:52) (96% - 100%)    PHYSICAL EXAM:  Constitutional: NAD, comfortable in bed, breathing well on 3L NC  HEENT: NC/AT, PERRLA, EOMI, no conjunctival pallor or scleral icterus, dry mucus membranes  Neck: Supple, no JVD  Respiratory: CTA B/L. No w/r/r.   Cardiovascular: RRR, normal S1 and S2, no m/r/g.   Gastrointestinal: +BS, soft NTND, no guarding or rebound tenderness, no palpable masses   Extremities: varicose veins in lower extremities bilaterally, wwp; no cyanosis, clubbing or edema.   Vascular: Pulses equal and strong throughout.   Neurological: AAOx3, no CN deficits, strength and sensation intact throughout.   Skin: No gross skin abnormalities or rashes

## 2020-07-09 NOTE — H&P ADULT - PROBLEM SELECTOR PLAN 5
history of hypertension  - continue with home lisinopril 20mg daily and hydrochlorothiazide 25mg daily history of diabetes, on Metformin 500mg BID  - mISS

## 2020-07-09 NOTE — DISCHARGE NOTE PROVIDER - NSDCFUSCHEDAPPT_GEN_ALL_CORE_FT
ISELA BROWN ; 07/17/2020 ; NPP Med [facility] [address]  ISELA BROWN ; 07/27/2020 ; NPP Neuro [address]

## 2020-07-09 NOTE — PROGRESS NOTE ADULT - SUBJECTIVE AND OBJECTIVE BOX
O/N Events: Patient came to [facility] ED last night  Subjective/ROS: Patient seen and examined at bedside. In no acute distress this morning. States that she does not feel dizzy and denies any headaches, weakness, blurry vision, chest pain, SOB, abdominal pain, dysuria, hematochezia, lower extremity swelling.  12pt ROS otherwise negative.    VITALS  Vital Signs Last 24 Hrs  T(C): 36.4 (09 Jul 2020 15:44), Max: 36.7 (08 Jul 2020 20:41)  T(F): 97.5 (09 Jul 2020 15:44), Max: 98 (08 Jul 2020 20:41)  HR: 76 (09 Jul 2020 15:44) (60 - 95)  BP: 140/78 (09 Jul 2020 15:44) (134/98 - 198/79)  BP(mean): --  RR: 19 (09 Jul 2020 15:44) (17 - 24)  SpO2: 95% (09 Jul 2020 15:44) (95% - 100%)    CAPILLARY BLOOD GLUCOSE      POCT Blood Glucose.: 111 mg/dL (09 Jul 2020 11:51)  POCT Blood Glucose.: 119 mg/dL (09 Jul 2020 06:02)  POCT Blood Glucose.: 106 mg/dL (08 Jul 2020 20:47)      PHYSICAL EXAM  General: A&Ox3, NAD, sitting upright in bed with 3L NC  HEENT: NC/AT, PERRL/ EOMI, no scleral icterus, MMM  Neck: Supple; no JVD  Respiratory: diminished breath sounds throughout, no wheezing/rales/rhonchi  Cardiovascular: Regular rhythm/rate; +S1 +S2  Gastrointestinal: Soft, NTND, normoactive BS, no rebound, no guarding  Extremities: No cyanosis, no clubbing, no edema, pulses equal  Neurological:  CNII-XII grossly intact, no obvious focal deficits, follows commands    MEDICATIONS  (STANDING):  albuterol/ipratropium for Nebulization 3 milliLiter(s) Nebulizer every 6 hours  aspirin  chewable 81 milliGRAM(s) Oral daily  atorvastatin 40 milliGRAM(s) Oral at bedtime  dextrose 5%. 1000 milliLiter(s) (50 mL/Hr) IV Continuous <Continuous>  dextrose 50% Injectable 12.5 Gram(s) IV Push once  dextrose 50% Injectable 25 Gram(s) IV Push once  dextrose 50% Injectable 25 Gram(s) IV Push once  enoxaparin Injectable 40 milliGRAM(s) SubCutaneous every 24 hours  hydrochlorothiazide 25 milliGRAM(s) Oral daily  insulin lispro (HumaLOG) corrective regimen sliding scale   SubCutaneous every 6 hours  lisinopril 20 milliGRAM(s) Oral daily  montelukast 10 milliGRAM(s) Oral daily  sertraline 50 milliGRAM(s) Oral daily  tiotropium 2.5 MICROgram(s)/olodaterol 2.5 MICROgram(s) (STIOLTO) Inhaler 2 Puff(s) Inhalation daily    MEDICATIONS  (PRN):  dextrose 40% Gel 15 Gram(s) Oral once PRN Blood Glucose LESS THAN 70 milliGRAM(s)/deciliter  glucagon  Injectable 1 milliGRAM(s) IntraMuscular once PRN Glucose LESS THAN 70 milligrams/deciliter      No Known Allergies      LABS                        11.7   11.57 )-----------( 361      ( 09 Jul 2020 10:47 )             38.1     07-09    139  |  101  |  17  ----------------------------<  113<H>  4.3   |  28  |  0.80    Ca    9.8      09 Jul 2020 10:47  Phos  3.8     07-09  Mg     2.2     07-09    TPro  6.6  /  Alb  3.8  /  TBili  <0.2  /  DBili  x   /  AST  15  /  ALT  13  /  AlkPhos  73  07-09    PT/INR - ( 08 Jul 2020 20:53 )   PT: 10.8 sec;   INR: 0.90          PTT - ( 08 Jul 2020 20:53 )  PTT:29.6 sec    CARDIAC MARKERS ( 08 Jul 2020 20:52 )  x     / <0.01 ng/mL / x     / x     / x            IMAGING/EKG/ETC  EKG:  Xray:  CT:  MRI:

## 2020-07-09 NOTE — H&P ADULT - HISTORY OF PRESENT ILLNESS
70 yo Female w/ PMHx HTN, HLD, DM, COPD, smoking hx, depression, remote TIA's, coming in with sudden onset room-spinning dizziness. According to pt, she woke up this morning feeling dizzy while she was making her coffee, had to lay down for a little. She got up and went to the kitchen around 3 pm, the dizziness was much worse, she wasn't able to reach for anything in time and she fell and hit her head. Did not notice leaning to one side or the other. Also noted chest pain this morning 8/10 now 3-4/10. Pt brought to [facility] by EMS accompanied by daughter-in-law. Pt very dizzy, vomited x1.  /76, . On eval, pt with horizontal nystagmus, generalized blurry vision, otherwise nonfocal neurologic exam. Denies numbness or weakness.     ED Course:  Vitals: T 98F HR 95 /76 RR 24 spO2 97% RA  Labs: cbc and cmp unremarkable, negative lactate, no troponins  Stroke code called for dizziness. CTH noncontrast negative for acute infarct or acute hemorrhage, shows chronic small vessel disease. CT C-spine with no acute fracture or deformity. CTP normal. CTA head/neck with moderate to severe R ICA stenosis 2/2 calcified atherosclerotic plaque, possible calcific emboli in R MCA, no large vessel occlusion.  Received Labetalol IV 10mg x1, Meclizine 25mg PO x1, Tylenol 650mg, Zofran IV 4mg 69F, former smoker, PMH HTN, HLD, DM, COPD, depression, remote TIA's, coming in with sudden onset room-spinning dizziness and collapse. She said she woke up this morning feeling fine but got up and went to the kitchen around 3pm and suddenly start to feel as if the room is spinning around her. She wasn't able to reach for anything in time and she fell and hit her head. She does not remember losing consciousness. She measured her sugars after the fall and said it was in the 200s. She also says that recently her blood pressure has been running high in the 170s. She takes her medications as prescribed and as directed. She said she never experienced these symptoms before. For the past few days she says she has been sleeping less and only gets a few hours of sleep a night. She also says she was eating less as well and unsure of the cause. Of note, she noticed pressure like chest pain this morning before the fall, 8/10 in severity which has now resolved. She denies any sick contacts, recent travel, or recent change in medications. Currently, she denies any fevers, chills, chest pain, shortness of breath, abdominal pain, n/v/d/c, edema.    ED Course:  Vitals: T 98F HR 95 /76 RR 24 spO2 97% RA  Labs: cbc and cmp unremarkable, negative lactate, no troponins, EKG: NSR no acute ischemic changes  Stroke code called for dizziness. CTH noncontrast negative for acute infarct or acute hemorrhage, shows chronic small vessel disease. CT C-spine with no acute fracture or deformity. CTP normal. CTA head/neck with moderate to severe R ICA stenosis 2/2 calcified atherosclerotic plaque, possible calcific emboli in R MCA, no large vessel occlusion.   Received Labetalol IV 10mg x1, Meclizine 25mg PO x1, Tylenol 650mg, Zofran IV 4mg 69F, former smoker, PMH HTN, HLD, DM, COPD, depression, remote TIA's, coming in with sudden onset room-spinning dizziness and collapse. She said she woke up this morning feeling fine but got up and went to the kitchen around 3pm and suddenly start to feel as if the room is spinning around her. She wasn't able to reach for anything in time and she fell and hit her head. She does not remember losing consciousness. She measured her sugars after the fall and said it was in the 200s. She also says that recently her blood pressure has been running high in the 170s. She takes her medications as prescribed and as directed. She said she never experienced these symptoms before. For the past few days she says she has been sleeping less and only gets a few hours of sleep a night. She also says she was eating less as well and unsure of the cause. Of note, she noticed pressure like chest pain this morning before the fall, 8/10 in severity which has now resolved. She denies any sick contacts, recent travel, or recent change in medications. Currently, she denies any fevers, chills, chest pain, shortness of breath, abdominal pain, n/v/d/c, edema.    ED Course:  Vitals: T 98F HR 95 /76 RR 24 spO2 97% RA  Labs: cbc and cmp unremarkable, negative lactate, no troponins, EKG: NSR no acute ischemic changes, CXR showing patchy left basilar opacity   Stroke code called for dizziness. CTH noncontrast negative for acute infarct or acute hemorrhage, shows chronic small vessel disease. CT C-spine with no acute fracture or deformity. CTP normal. CTA head/neck with moderate to severe R ICA stenosis 2/2 calcified atherosclerotic plaque, possible calcific emboli in R MCA, no large vessel occlusion.   Received Labetalol IV 10mg x1, Meclizine 25mg PO x1, Tylenol 650mg, Zofran IV 4mg

## 2020-07-09 NOTE — CONSULT NOTE ADULT - ASSESSMENT
per Internal Medicine    69y yo Female w/ PMH HTN, HLD, DM, COPD, smoking hx, depression, remote TIA's, coming in with sudden onset room-spinning dizziness. Stroke code called, CTH noncontrast negative for acute infarct or acute hemorrhage, shows chronic small vessel disease. CT C-spine with no acute fracture or deformity. CTP normal. CTA head/neck with moderate to severe R ICA stenosis 2/2 calcified atherosclerotic plaque, possible calcific emboli in R MCA, no large vessel occlusion.   Pt's finding of ICA stenosis and MCA calcifications does not correlate with clinical presentation, as dizziness if central would be from posterior circulation. Furthermore, pt's description of room-spinning dizziness and nonfocal neurologic exam is more consistent with peripheral etiology. Pt's presentation also significant for elevated blood pressure to systolic 180s-190s, consider symptomatic hypertensive urgency in the differential.  - Recommend symptomatic treatment of dizziness with Meclizine 25 mg PO, 5 mg Valium, and IV Fluids  - Recommend lowering blood pressure ie with IV push of labetalol 5-10 mg  - continue home medication of ASA 81 and Lipitor 40  - recommend outpatient follow up with neurology for R ICA and MCA stenosis and atherosclerosis noted on CTA - call [phone] to make appointment

## 2020-07-09 NOTE — H&P ADULT - NSHPSOCIALHISTORY_GEN_ALL_CORE
1PPD for 45-years, quit 3 months ago, no alcohol use. Lives alone at home. Ambulates using cane/walker. Has home health aid that comes in every day.

## 2020-07-09 NOTE — H&P ADULT - NSHPLABSRESULTS_GEN_ALL_CORE
LABS:                         12.1   10.37 )-----------( 401      ( 08 Jul 2020 20:52 )             38.7     07-08    137  |  101  |  19  ----------------------------<  105<H>  4.3   |  25  |  0.63    Ca    9.6      08 Jul 2020 20:52    TPro  7.3  /  Alb  4.1  /  TBili  <0.2  /  DBili  x   /  AST  18  /  ALT  15  /  AlkPhos  89  07-08    PT/INR - ( 08 Jul 2020 20:53 )   PT: 10.8 sec;   INR: 0.90          PTT - ( 08 Jul 2020 20:53 )  PTT:29.6 sec    CARDIAC MARKERS ( 08 Jul 2020 20:52 )  x     / <0.01 ng/mL / x     / x     / x            Lactate, Blood: 1.4 mmol/L (07-08 @ 21:29)      RADIOLOGY, EKG & ADDITIONAL TESTS:

## 2020-07-09 NOTE — H&P ADULT - NSICDXFAMILYHX_GEN_ALL_CORE_FT
FAMILY HISTORY:  Father  Still living? Unknown  Family history of diabetes mellitus, Age at diagnosis: Age Unknown    Mother  Still living? Unknown  Family history of diabetes mellitus, Age at diagnosis: Age Unknown  Family history of ovarian cancer, Age at diagnosis: Age Unknown    Sibling  Still living? Unknown  Family history of colon cancer, Age at diagnosis: Age Unknown  Family history of ovarian cancer, Age at diagnosis: Age Unknown

## 2020-07-09 NOTE — H&P ADULT - PROBLEM SELECTOR PLAN 8
F: none  E: Replete K<4, Mg<2  N: DASH/TLC Diet  DVT ppx: Lovenox 40mg daily  Full Code  Dispo: [facility]

## 2020-07-09 NOTE — H&P ADULT - PROBLEM SELECTOR PLAN 2
pt's presentation also significant for elevated blood pressure to systolic 180s-190s with complaints of dizziness. Denies any headaches, tinnitus, chest pain, or shortness of breath.  - s/p Labetalol IV 10mg x1 with improvement  - continue with home lisinopril 20mg daily and hydrochlorothiazide 25mg daily

## 2020-07-09 NOTE — H&P ADULT - ATTENDING COMMENTS
Patient seen and examined with house-staff during bedside rounds.  Resident note read, including vitals, physical findings, laboratory data, and radiological reports.   Revisions included below.  Direct personal management at bed side and extensive interpretation of the data.  Plan was outlined and discussed in details with the housestaff.  Decision making of high complexity  Action taken for acute disease activity to reflect the level of care provided:  - medication reconciliation  - review laboratory data  pulmonary status stable  CT scans reviewed  neuro status   stable for dc    EKG stable

## 2020-07-09 NOTE — H&P ADULT - PROBLEM SELECTOR PLAN 7
F: none  E: Replete K<4, Mg<2  N: DASH/TLC Diet  DVT ppx: Lovenox 40mg daily  Full Code  Dispo: [facility] history of hyperlipidemia  - continue home Lipitor 40mg daily

## 2020-07-09 NOTE — DISCHARGE NOTE PROVIDER - NSDCCAREPROVSEEN_GEN_ALL_CORE_FT
ADM, User Override ADM, User Override  Patient seen and examined with house-staff during bedside rounds.  Resident note read, including vitals, physical findings, laboratory data, and radiological reports.   Revisions included below.  Direct personal management at bed side and extensive interpretation of the data.  Plan was outlined and discussed in details with the housestaff.  Decision making of high complexity  Action taken for acute disease activity to reflect the level of care provided:  - medication reconciliation  - review laboratory data

## 2020-07-09 NOTE — DISCHARGE NOTE PROVIDER - HOSPITAL COURSE
#Discharge: do not delete        Patient is a 68 yo F with past medical history of HTN, HLD, DM, COPD, smoking history, depression, remote TIAs    Presented with sudden onset dizziness, found to have hypertensive urgency and dizziness.    Problem List/Main Diagnoses (system-based):     1. Dizziness: Likely peripheral etiology given room spinning dizziness. Given Meclizine 25 mg PO    2. Hypertensive Urgency: Blood pressure 180/76 in the ED. Received one dose labetalol 10 mg IV. Continued with lisinopril 20 mg daily and HCTZ 25 mg daily.         Inpatient treatment course:     -CTH noncontrast negative for acute infarct or acute hemorrhage    -CTA head/neck with moderate to severe R ICA stenosis 2/2 calcified atherosclerotic plaque, possible calcific emboli in R MCA    -Per neurology, findings not consistent with clinical picture, likely peripheral etiology to dizziness. Recommends c/w Meclizine 25 mg PO.    -Hypertension controlled s/p IV labetaolol 10 mg x1. BP currently managed with lisinopril 20 mg daily and HCTZ 25 mg daily.            Patient left against medical advice.

## 2020-07-09 NOTE — CONSULT NOTE ADULT - SUBJECTIVE AND OBJECTIVE BOX
**STROKE CODE CONSULT NOTE**    Last known well time/Time of onset of symptoms:    HPI: 70 yo Female w/ PMHx HTN, HLD, DM, COPD, smoking hx, depression, remote TIA's, coming in with sudden onset room-spinning dizziness. According to pt, she woke up this morning feeling dizzy while she was making her coffee, had to lay down for a little. She got up and went to the kitchen around 3 pm, the dizziness was much worse, she wasn't able to reach for anything in time and she fell and hit her head. Did not notice leaning to one side or the other. Also noted chest pain this morning 8/10 now 3-4/10. Pt brought to [facility] by EMS accompanied by daughter-in-law. Stroke code called for dizziness, pt brought to CT. CTH noncontrast negative for acute infarct or acute hemorrhage, shows chronic small vessel disease.      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 pylori (s/p amoxicillin, clarithromycin, omeprazole)  Disease of trachea and bronchus: Tracheomalacia  Epilepsy: No longer on medications.  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  Hemorrhoids: Hemorrhoids  Other postprocedural status: S/P hernia repair      FAMILY HISTORY:  Family history of diabetes mellitus (Mother, Father)  Family history of colon cancer (Sibling): sister  Family history of ovarian cancer (Mother, Sibling)      SOCIAL HISTORY:  Denies smoking, drinking, or drug use    ROS:  Constitutional: No fever, weight loss or fatigue  Eyes: No eye pain, visual disturbances, or discharge  ENMT:  No difficulty hearing, tinnitus, vertigo; No sinus or throat pain  Neck: No pain or stiffness  Respiratory: No cough, wheezing, chills or hemoptysis  Cardiovascular: No chest pain, palpitations, shortness of breath, dizziness or leg swelling  Gastrointestinal: No abdominal pain. No nausea, vomiting or hematemesis; No diarrhea or constipation. No hematochezia.  Genitourinary: No dysuria, frequency, hematuria or incontinence  Neurological: As per HPI  Skin: No itching, burning, rashes or lesions   Endocrine: No heat or cold intolerance; No hair loss  Musculoskeletal: No joint pain or swelling; No muscle, back or extremity pain  Psychiatric: No depression, anxiety, mood swings or difficulty sleeping  Heme/Lymph: No easy bruising or bleeding gums    MEDICATIONS  (STANDING):  labetalol Injectable 10 milliGRAM(s) IV Push once    MEDICATIONS  (PRN):      Allergies    No Known Allergies    Intolerances        Vital Signs Last 24 Hrs  T(C): 36.4 (08 Jul 2020 23:59), Max: 36.7 (08 Jul 2020 20:41)  T(F): 97.5 (08 Jul 2020 23:59), Max: 98 (08 Jul 2020 20:41)  HR: 66 (08 Jul 2020 23:59) (66 - 95)  BP: 198/79 (08 Jul 2020 23:59) (134/98 - 198/79)  BP(mean): --  RR: 20 (08 Jul 2020 23:59) (20 - 24)  SpO2: 98% (08 Jul 2020 23:59) (96% - 100%)    PHYSICAL EXAM:  Constitutional: WDWN; NAD  Cardiovascular: RRR, no appreciable murmurs; no carotid bruits  Neurologic:  -Mental status: Awake, alert and oriented to person, place, and time. Speech is fluent with intact naming, repetition, and comprehension.  Recent and remote memory intact.  Attention/concentration intact.  No dysarthria, no aphasia.  Fund of knowledge appropriate.    -Cranial nerves:  II: Visual fields full to confrontation. Pupils PERRL  III, IV, VI: extraocular movements are intact without nystagmus  V: Facial sensation intact V1 through V3 intact bilaterally  VII: Face is symmetric with normal eye closure and smile, no facial droop.  VIII: hearing is intact to finger rub  IX, X: uvula is midline and soft palate rises symmetrically  XI: Head turning and shoulder shrug intact  XII: Tongue protrudes in the midline    -Motor:  Normal bulk and tone, strength 5/5 in bilateral upper and lower extremities.   strength 5/5.  Rapid alternating movements intact and symmetric. No pronator drift.   -Sensation: Intact to light touch, proprioception, and pinprick.  Intact pain and temperature sense. No neglect. Romberg negative.  -Coordination: No dysmetria on finger-to-nose and heel-to-shin.  No clumsiness.  -Reflexes: 2+ in upper and lower extremities, downgoing toes bilaterally  -Gait: Narrow and steady. No ataxia. Able to perform tandem and heel to toe walk.    NIHSS:  ICH:    Fingerstick Blood Glucose: CAPILLARY BLOOD GLUCOSE      POCT Blood Glucose.: 106 mg/dL (08 Jul 2020 20:47)       LABS:                        12.1   10.37 )-----------( 401      ( 08 Jul 2020 20:52 )             38.7     07-08    137  |  101  |  19  ----------------------------<  105<H>  4.3   |  25  |  0.63    Ca    9.6      08 Jul 2020 20:52    TPro  7.3  /  Alb  4.1  /  TBili  <0.2  /  DBili  x   /  AST  18  /  ALT  15  /  AlkPhos  89  07-08    PT/INR - ( 08 Jul 2020 20:53 )   PT: 10.8 sec;   INR: 0.90          PTT - ( 08 Jul 2020 20:53 )  PTT:29.6 sec  CARDIAC MARKERS ( 08 Jul 2020 20:52 )  x     / <0.01 ng/mL / x     / x     / x              RADIOLOGY & ADDITIONAL STUDIES:    IV-tPA (Y/N):                                   Bolus time:  Reason IV-tPA not given:    ASSESSMENT/PLAN:    69y yo Female w/ PMH coming in with  Pt's finding of ICA stenosis and MCA calcifications does not correlate with clinical presentation, as dizziness if central would be from posterior circulation. Furthermore, pt's description of room-spinning dizziness and nonfocal neurologic exam is more consistent with peripheral etiology. Pt's presentation also significant for elevated blood pressure to systolic 180s-190s, consider symptomatic hypertensive urgency in the differential.  - Recommend symptomatic treatment of dizziness with Meclizine 25 mg PO, 5 mg Valium, and IV Fluids  - Recommend lowering blood pressure ie with IV push of labetalol 5-10 mg  - continue home medication of ASA 81 and Lipitor 40  - recommend outpatient follow up with neurology for R ICA and MCA stenosis noted on CTA - call [phone] to make appointment  - further care per ED **STROKE CODE CONSULT NOTE**    Last known well time/Time of onset of symptoms:    HPI: 68 yo Female w/ PMHx HTN, HLD, DM, COPD, smoking hx, depression, remote TIA's, coming in with sudden onset room-spinning dizziness. According to pt, she woke up this morning feeling dizzy while she was making her coffee, had to lay down for a little. She got up and went to the kitchen around 3 pm, the dizziness was much worse, she wasn't able to reach for anything in time and she fell and hit her head. Did not notice leaning to one side or the other. Also noted chest pain this morning 8/10 now 3-4/10. Pt brought to [facility] by EMS accompanied by daughter-in-law. Stroke code called for dizziness, pt brought to CT. CTH noncontrast negative for acute infarct or acute hemorrhage, shows chronic small vessel disease. CTP normal. CTA head/neck with moderate to severe R ICA stenosis 2/2 calcified atherosclerotic plaque, possible calcific emboli in R MCA, no large vessel occlusion. Pt very dizzy, vomited x1.  /76, . On eval, pt with horizontal nystagmus, generalized blurry vision, otherwise nonfocal neurologic exam. Denies numbness or weakness.      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 pylori (s/p amoxicillin, clarithromycin, omeprazole)  Disease of trachea and bronchus: Tracheomalacia  Epilepsy: No longer on medications.  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  Hemorrhoids: Hemorrhoids  Other postprocedural status: S/P hernia repair      FAMILY HISTORY:  Family history of diabetes mellitus (Mother, Father)  Family history of colon cancer (Sibling): sister  Family history of ovarian cancer (Mother, Sibling)      SOCIAL HISTORY:  Denies smoking, drinking, or drug use    ROS:  Constitutional: No fever, weight loss or fatigue  Eyes: No eye pain, visual disturbances, or discharge  ENMT:  No difficulty hearing, tinnitus, vertigo; No sinus or throat pain  Neck: No pain or stiffness  Respiratory: No cough, wheezing, chills or hemoptysis  Cardiovascular: No chest pain, palpitations, shortness of breath, dizziness or leg swelling  Gastrointestinal: No abdominal pain. No nausea, vomiting or hematemesis; No diarrhea or constipation. No hematochezia.  Genitourinary: No dysuria, frequency, hematuria or incontinence  Neurological: As per HPI  Skin: No itching, burning, rashes or lesions   Endocrine: No heat or cold intolerance; No hair loss  Musculoskeletal: No joint pain or swelling; No muscle, back or extremity pain  Psychiatric: No depression, anxiety, mood swings or difficulty sleeping  Heme/Lymph: No easy bruising or bleeding gums    MEDICATIONS  (STANDING):  labetalol Injectable 10 milliGRAM(s) IV Push once    MEDICATIONS  (PRN):      Allergies    No Known Allergies    Intolerances        Vital Signs Last 24 Hrs  T(C): 36.4 (08 Jul 2020 23:59), Max: 36.7 (08 Jul 2020 20:41)  T(F): 97.5 (08 Jul 2020 23:59), Max: 98 (08 Jul 2020 20:41)  HR: 66 (08 Jul 2020 23:59) (66 - 95)  BP: 198/79 (08 Jul 2020 23:59) (134/98 - 198/79)  BP(mean): --  RR: 20 (08 Jul 2020 23:59) (20 - 24)  SpO2: 98% (08 Jul 2020 23:59) (96% - 100%)    PHYSICAL EXAM:  Constitutional: WDWN; NAD  Cardiovascular: RRR, no appreciable murmurs; no carotid bruits  Neurologic:  -Mental status: Awake, alert and oriented to person, place, and time. Speech is fluent with intact naming, repetition, and comprehension.  Recent and remote memory intact.  Attention/concentration intact.  No dysarthria, no aphasia.  Fund of knowledge appropriate.    -Cranial nerves:  II: Visual fields full to confrontation. Pupils PERRL  III, IV, VI: extraocular movements are intact without nystagmus  V: Facial sensation intact V1 through V3 intact bilaterally  VII: Face is symmetric with normal eye closure and smile, no facial droop.  VIII: hearing is intact to finger rub  IX, X: uvula is midline and soft palate rises symmetrically  XI: Head turning and shoulder shrug intact  XII: Tongue protrudes in the midline    -Motor:  Normal bulk and tone, strength 5/5 in bilateral upper and lower extremities.   strength 5/5.  Rapid alternating movements intact and symmetric. No pronator drift.   -Sensation: Intact to light touch, proprioception, and pinprick.  Intact pain and temperature sense. No neglect. Romberg negative.  -Coordination: No dysmetria on finger-to-nose and heel-to-shin.  No clumsiness.  -Reflexes: 2+ in upper and lower extremities, downgoing toes bilaterally  -Gait: Narrow and steady. No ataxia. Able to perform tandem and heel to toe walk.    NIHSS:  ICH:    Fingerstick Blood Glucose: CAPILLARY BLOOD GLUCOSE      POCT Blood Glucose.: 106 mg/dL (08 Jul 2020 20:47)       LABS:                        12.1   10.37 )-----------( 401      ( 08 Jul 2020 20:52 )             38.7     07-08    137  |  101  |  19  ----------------------------<  105<H>  4.3   |  25  |  0.63    Ca    9.6      08 Jul 2020 20:52    TPro  7.3  /  Alb  4.1  /  TBili  <0.2  /  DBili  x   /  AST  18  /  ALT  15  /  AlkPhos  89  07-08    PT/INR - ( 08 Jul 2020 20:53 )   PT: 10.8 sec;   INR: 0.90          PTT - ( 08 Jul 2020 20:53 )  PTT:29.6 sec  CARDIAC MARKERS ( 08 Jul 2020 20:52 )  x     / <0.01 ng/mL / x     / x     / x              RADIOLOGY & ADDITIONAL STUDIES:    IV-tPA (Y/N):                                   Bolus time:  Reason IV-tPA not given:    ASSESSMENT/PLAN:    69y yo Female w/ PMH coming in with  Pt's finding of ICA stenosis and MCA calcifications does not correlate with clinical presentation, as dizziness if central would be from posterior circulation. Furthermore, pt's description of room-spinning dizziness and nonfocal neurologic exam is more consistent with peripheral etiology. Pt's presentation also significant for elevated blood pressure to systolic 180s-190s, consider symptomatic hypertensive urgency in the differential.  - Recommend symptomatic treatment of dizziness with Meclizine 25 mg PO, 5 mg Valium, and IV Fluids  - Recommend lowering blood pressure ie with IV push of labetalol 5-10 mg  - continue home medication of ASA 81 and Lipitor 40  - recommend outpatient follow up with neurology for R ICA and MCA stenosis noted on CTA - call [phone] to make appointment  - further care per ED **STROKE CODE CONSULT NOTE**    Last known well time/Time of onset of symptoms: 15:00 7/8/2020    HPI: 70 yo Female w/ PMHx HTN, HLD, DM, COPD, smoking hx, depression, remote TIA's, coming in with sudden onset room-spinning dizziness. According to pt, she woke up this morning feeling dizzy while she was making her coffee, had to lay down for a little. She got up and went to the kitchen around 3 pm, the dizziness was much worse, she wasn't able to reach for anything in time and she fell and hit her head. Did not notice leaning to one side or the other. Also noted chest pain this morning 8/10 now 3-4/10. Pt brought to [facility] by EMS accompanied by daughter-in-law. Stroke code called for dizziness, pt brought to CT. CTH noncontrast negative for acute infarct or acute hemorrhage, shows chronic small vessel disease. CT C-spine with no acute fracture or deformity. CTP normal. CTA head/neck with moderate to severe R ICA stenosis 2/2 calcified atherosclerotic plaque, possible calcific emboli in R MCA, no large vessel occlusion. Pt very dizzy, vomited x1.  /76, . On eval, pt with horizontal nystagmus, generalized blurry vision, otherwise nonfocal neurologic exam. Denies numbness or weakness.      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 pylori (s/p amoxicillin, clarithromycin, omeprazole)  Disease of trachea and bronchus: Tracheomalacia  Epilepsy: No longer on medications.  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  Hemorrhoids: Hemorrhoids  Other postprocedural status: S/P hernia repair      FAMILY HISTORY:  Family history of diabetes mellitus (Mother, Father)  Family history of colon cancer (Sibling): sister  Family history of ovarian cancer (Mother, Sibling)      SOCIAL HISTORY:  Denies smoking, drinking, or drug use    ROS:  Constitutional: No fever, weight loss or fatigue  Eyes: No eye pain, visual disturbances, or discharge  ENMT:  No difficulty hearing, tinnitus, vertigo; No sinus or throat pain  Neck: No pain or stiffness  Respiratory: No cough, wheezing, chills or hemoptysis  Cardiovascular: No chest pain, palpitations, shortness of breath, dizziness or leg swelling  Gastrointestinal: No abdominal pain. No nausea, vomiting or hematemesis; No diarrhea or constipation. No hematochezia.  Genitourinary: No dysuria, frequency, hematuria or incontinence  Neurological: As per HPI  Skin: No itching, burning, rashes or lesions   Endocrine: No heat or cold intolerance; No hair loss  Musculoskeletal: No joint pain or swelling; No muscle, back or extremity pain  Psychiatric: No depression, anxiety, mood swings or difficulty sleeping  Heme/Lymph: No easy bruising or bleeding gums    MEDICATIONS  (STANDING):  labetalol Injectable 10 milliGRAM(s) IV Push once    MEDICATIONS  (PRN):      Allergies    No Known Allergies    Intolerances        Vital Signs Last 24 Hrs  T(C): 36.4 (08 Jul 2020 23:59), Max: 36.7 (08 Jul 2020 20:41)  T(F): 97.5 (08 Jul 2020 23:59), Max: 98 (08 Jul 2020 20:41)  HR: 66 (08 Jul 2020 23:59) (66 - 95)  BP: 198/79 (08 Jul 2020 23:59) (134/98 - 198/79)  BP(mean): --  RR: 20 (08 Jul 2020 23:59) (20 - 24)  SpO2: 98% (08 Jul 2020 23:59) (96% - 100%)    PHYSICAL EXAM:  Constitutional: WDWN; NAD  Cardiovascular: RRR,  Neurologic:  -Mental status: Awake, alert and oriented to person, place, and time. Speech is fluent with intact naming, repetition, and comprehension.  Recent and remote memory intact.  Attention/concentration intact.  No dysarthria, no aphasia.  Fund of knowledge appropriate.    -Cranial nerves:  II: Visual fields full to confrontation. Pupils PERRL  III, IV, VI: extraocular movements are intact + horizontal nystagmus when looking to left  V: Facial sensation intact V1 through V3 intact bilaterally  VII: Face is symmetric with normal eye closure and smile, no facial droop.  VIII: hearing is intact  -Motor:  Normal bulk and tone, strength 5/5 in bilateral upper and lower extremities.   strength 5/5.  No pronator drift.   -Sensation: Intact to light touch. No neglect.   -Coordination: No dysmetria on finger-to-nose and heel-to-shin.  No clumsiness.    NIHSS: 0  ICH: n/a    Fingerstick Blood Glucose: CAPILLARY BLOOD GLUCOSE      POCT Blood Glucose.: 106 mg/dL (08 Jul 2020 20:47)       LABS:                        12.1   10.37 )-----------( 401      ( 08 Jul 2020 20:52 )             38.7     07-08    137  |  101  |  19  ----------------------------<  105<H>  4.3   |  25  |  0.63    Ca    9.6      08 Jul 2020 20:52    TPro  7.3  /  Alb  4.1  /  TBili  <0.2  /  DBili  x   /  AST  18  /  ALT  15  /  AlkPhos  89  07-08    PT/INR - ( 08 Jul 2020 20:53 )   PT: 10.8 sec;   INR: 0.90          PTT - ( 08 Jul 2020 20:53 )  PTT:29.6 sec  CARDIAC MARKERS ( 08 Jul 2020 20:52 )  x     / <0.01 ng/mL / x     / x     / x        RADIOLOGY & ADDITIONAL STUDIES:  < from: CT Brain Stroke Protocol (07.08.20 @ 21:14) >  IMPRESSION:     No acute intracranial hemorrhage or large territorial infarction.    Age-appropriate volume loss with chronic small vessel ischemic disease.    < end of copied text >  < from: CT Cervical Spine No Cont (07.08.20 @ 21:15) >  IMPRESSION:   No CT evidence of cervical spine fracture or traumatic malalignment.    < end of copied text >  < from: CT Perfusion w/ Maps w/ IV Cont (07.08.20 @ 21:15) >  IMPRESSION: Normal CT perfusion study.    < end of copied text >  < from: CT Angio Head w/ IV Cont (07.08.20 @ 21:16) >  IMPRESSION: No evidence of hemodynamically significant stenosis, major vessel occlusion or intracranial aneurysm.      < end of copied text >  < from: CT Angio Neck w/ IV Cont (07.08.20 @ 21:16) >  IMPRESSION: Moderate severe stenosis of the proximal right internal carotid artery secondary to calcified atherosclerotic plaque.    I, Raj Strickland MD, have reviewed the images and the report and agree with the findings, with the following modification: Also noted are few small calcifications seen within theright MCA branch vessels in the region of the sylvian fissure, concerning for calcific emboli.    < end of copied text >    ASSESSMENT/PLAN:  69y yo Female w/ PMH HTN, HLD, DM, COPD, smoking hx, depression, remote TIA's, coming in with sudden onset room-spinning dizziness. Stroke code called, CTH noncontrast negative for acute infarct or acute hemorrhage, shows chronic small vessel disease. CT C-spine with no acute fracture or deformity. CTP normal. CTA head/neck with moderate to severe R ICA stenosis 2/2 calcified atherosclerotic plaque, possible calcific emboli in R MCA, no large vessel occlusion.   Pt's finding of ICA stenosis and MCA calcifications does not correlate with clinical presentation, as dizziness if central would be from posterior circulation. Furthermore, pt's description of room-spinning dizziness and nonfocal neurologic exam is more consistent with peripheral etiology. Pt's presentation also significant for elevated blood pressure to systolic 180s-190s, consider symptomatic hypertensive urgency in the differential.  - Recommend symptomatic treatment of dizziness with Meclizine 25 mg PO, 5 mg Valium, and IV Fluids  - Recommend lowering blood pressure ie with IV push of labetalol 5-10 mg  - continue home medication of ASA 81 and Lipitor 40  - recommend outpatient follow up with neurology for R ICA and MCA stenosis and atherosclerosis noted on CTA - call [phone] to make appointment  - further care per ED

## 2020-07-09 NOTE — H&P ADULT - NSICDXPASTMEDICALHX_GEN_ALL_CORE_FT
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    Depressive disorder Depression    Disease of trachea and bronchus Tracheomalacia    Emphysema COPD (chronic obstructive pulmonary disease) with emphysema    Epilepsy No longer on medications.    Essential hypertension Hypertension    Hemorrhoids Hemorrhoids    Hyperlipidemia Hyperlipidemia    Peptic ulcer 2/2 H. pylori (s/p amoxicillin, clarithromycin, omeprazole)    Tobacco use disorder Tobacco abuse    Transient cerebral ischemia TIA (transient ischemic attack)    Type 2 diabetes mellitus Diabetes

## 2020-07-09 NOTE — PHYSICAL THERAPY INITIAL EVALUATION ADULT - PATIENT/FAMILY/SIGNIFICANT OTHER GOALS STATEMENT, PT EVAL
69F sudden onset room-spinning dizziness and collapse. She said she woke up this morning feeling fine but got up and went to the kitchen around 3pm and suddenly start to feel as if the room is spinning around her. She wasn't able to reach for anything in time and she fell and hit her head. She does not remember losing consciousness. She measured her sugars after the fall and said it was in the 200s. She also says that recently her blood pressure has been running high in the 170s.

## 2020-07-09 NOTE — H&P ADULT - ASSESSMENT
69y yo Female w/ PMH HTN, HLD, DM, COPD, smoking hx, depression, remote TIA's, coming in with sudden onset room-spinning dizziness. Stroke code called, CTH noncontrast negative for acute infarct or acute hemorrhage, shows chronic small vessel disease. CT C-spine with no acute fracture or deformity. CTP normal. CTA head/neck with moderate to severe R ICA stenosis 2/2 calcified atherosclerotic plaque, possible calcific emboli in R MCA, no large vessel occlusion. 69F, former smoker, PMH HTN, HLD, DM, COPD, smoking hx, depression, remote TIA's, coming in with sudden onset room-spinning dizziness. Stroke code called, head and spine imaging negative for stroke. R ICA stenosis 2/2 calcified atherosclerotic plaque, possible calcific emboli in R MCA with no large vessel occlusion. Admitted for management of dizziness and hypertensive urgency.

## 2020-07-09 NOTE — DISCHARGE NOTE PROVIDER - NSDCMRMEDTOKEN_GEN_ALL_CORE_FT
Advair Diskus 100 mcg-50 mcg inhalation powder: 1 puff(s) inhaled 2 times a day  albuterol CFC free 90 mcg/inh inhalation aerosol: 2 puff(s) inhaled 4 times a day, As Needed - for bronchospasm  aspirin 81 mg oral tablet: 1 tab(s) orally once a day  hydroCHLOROthiazide 25 mg oral tablet: 1 tab(s) orally once a day  Lipitor 40 mg oral tablet: 1 tab(s) orally once a day (at bedtime)  lisinopril 20 mg oral tablet: 1 tab(s) orally once a day  nicotine 21 mg/24 hr transdermal film, extended release: 1  transdermal once a day  omeprazole 20 mg oral delayed release capsule: 1 cap(s) orally once a day  ProAir HFA 90 mcg/inh inhalation aerosol: 1 puff(s) inhaled 2 times a day x 30 days   tiotropium-olodaterol 2.5 mcg-2.5 mcg/inh inhalation aerosol: 2 puff(s) inhaled once a day   Zoloft 50 mg oral tablet: 1 tab(s) orally once a day

## 2020-07-09 NOTE — CONSULT NOTE ADULT - REASON FOR ADMISSION
"Requested Prescriptions   Pending Prescriptions Disp Refills     atenolol (TENORMIN) 50 MG tablet [Pharmacy Med Name: Atenolol Oral Tablet 50 MG]    Last Written Prescription Date:  5/8/2017  Last Fill Quantity: 90,  # refills: 3   Last office visit: 1/17/2018 with prescribing provider:  Manoj Armenta     Future Office Visit:   Next 5 appointments (look out 90 days)     May 18, 2018 10:40 AM CDT   MyChart Physical Adult with Manoj Armenta MD   [facility] ([facility])    [address] [place] [address]  [address]  [facility] [address]   [phone]                  90 tablet 2     Sig: TAKE ONE TABLET BY MOUTH ONE TIME DAILY    Beta-Blockers Protocol Passed    4/29/2018  7:02 AM       Passed - Blood pressure under 140/90 in past 12 months    BP Readings from Last 3 Encounters:   04/02/18 130/72   01/17/18 106/62   06/06/17 118/78                Passed - Patient is age 6 or older       Passed - Recent (12 mo) or future (30 days) visit within the authorizing provider's specialty    Patient had office visit in the last 12 months or has a visit in the next 30 days with authorizing provider or within the authorizing provider's specialty.  See \"Patient Info\" tab in inbasket, or \"Choose Columns\" in Meds & Orders section of the refill encounter.              " dizziness, hypertension

## 2020-07-09 NOTE — DISCHARGE NOTE PROVIDER - NSDCCPCAREPLAN_GEN_ALL_CORE_FT
PRINCIPAL DISCHARGE DIAGNOSIS  Diagnosis: Left against medical advice  Assessment and Plan of Treatment: The patient wishes to leave against medical advice.  I have discussed the risks, benefits and alternatives (including the possibility of worsening of disease, pain, permanent disability, and/or death) with the patient and his/her family (if available).  The patient voices understanding of these risks, benefits, and alternatives and still wishes to sign out against medical advice.  The patient is awake, alert, oriented  x 3 and has demonstrated capacity to refuse/direct care.  I have advised the patient that they can and should return immediately should they develop any worse/different/additional symptoms, or if they change their mind and want to continue their care.

## 2020-07-09 NOTE — H&P ADULT - PROBLEM SELECTOR PLAN 3
history of COPD (3L home O2), follows with Dr. Trevizo  - takes Advair BID, Ventolin/Albuterol prn and prednisone 5 mg BID at home  - duonebs PRN CTA head/neck with moderate to severe R ICA stenosis 2/2 calcified atherosclerotic plaque, possible calcific emboli in R MCA, no large vessel occlusion.   - neurology recommending outpatient follow up with neurology for R ICA and MCA stenosis and atherosclerosis noted on CTA   - call [phone] to make appointment

## 2020-07-09 NOTE — H&P ADULT - PROBLEM SELECTOR PLAN 4
history of diabetes, on Metformin 500mg BID  - mISS history of COPD (3L home O2), follows with Dr. Trevizo  - takes Advair BID, Ventolin/Albuterol prn and prednisone 5 mg BID at home  - duonebs PRN

## 2020-07-09 NOTE — H&P ADULT - PROBLEM SELECTOR PLAN 1
Presenting with sudden onset room-spinning dizziness. Stroke code called, CTH noncontrast negative for acute infarct or acute hemorrhage with chronic small vessel disease. CT C-spine with no acute fracture or deformity. CTP normal. CTA head/neck with moderate to severe R ICA stenosis 2/2 calcified atherosclerotic plaque, possible calcific emboli in R MCA, no large vessel occlusion.   As per neuro Pt's finding of ICA stenosis and MCA calcifications does not correlate with clinical presentation, as dizziness if central would be from posterior circulation. - Patient's etiology of dizziness more likely consistent with peripheral etiology given room-spinning dizziness and nonfocal neurologic exam   - s/p meclizine 25mg PO x1 Presenting with sudden onset room-spinning dizziness. Stroke code called, CTH noncontrast negative for acute infarct or acute hemorrhage with chronic small vessel disease. CT C-spine with no acute fracture or deformity. CTP normal. CTA head/neck with moderate to severe R ICA stenosis 2/2 calcified atherosclerotic plaque, possible calcific emboli in R MCA, no large vessel occlusion.   As per neuro Pt's finding of ICA stenosis and MCA calcifications does not correlate with clinical presentation, as dizziness if central would be from posterior circulation.  - Patient's etiology of dizziness more likely consistent with peripheral etiology given room-spinning dizziness and nonfocal neurologic exam   - s/p meclizine 25mg PO x1 Presenting with sudden onset room-spinning dizziness. Stroke code called, CTH noncontrast negative for acute infarct or acute hemorrhage with chronic small vessel disease. CT C-spine with no acute fracture or deformity. CTP normal. CTA head/neck with moderate to severe R ICA stenosis 2/2 calcified atherosclerotic plaque, possible calcific emboli in R MCA, no large vessel occlusion.   As per neuro Pt's finding of ICA stenosis and MCA calcifications does not correlate with clinical presentation, as dizziness if central would be from posterior circulation.  - Patient's etiology of dizziness more likely consistent with peripheral etiology given room-spinning dizziness and nonfocal neurologic exam   - s/p meclizine 25mg PO x1  - neuro recommending symptomatic treatment of dizziness with Meclizine 25 mg PO and valium 5mg Presenting with sudden onset room-spinning dizziness. Stroke code called, CTH noncontrast negative for acute infarct or acute hemorrhage with chronic small vessel disease. CT C-spine with no acute fracture or deformity. CTP normal. CTA head/neck with moderate to severe R ICA stenosis 2/2 calcified atherosclerotic plaque, possible calcific emboli in R MCA, no large vessel occlusion.   As per neuro Pt's finding of ICA stenosis and MCA calcifications does not correlate with clinical presentation, as dizziness if central would be from posterior circulation.  - Patient's etiology of dizziness more likely consistent with peripheral etiology given room-spinning dizziness and nonfocal neurologic exam.   - s/p meclizine 25mg PO x1  - neuro recommending symptomatic treatment of dizziness with Meclizine 25 mg PO and valium 5mg

## 2020-07-09 NOTE — CONSULT NOTE ADULT - SUBJECTIVE AND OBJECTIVE BOX
Patient is a 69y old  Female who presents with a chief complaint of dizziness, hypertension (09 Jul 2020 07:37)       HPI:  69F, former smoker, PMH HTN, HLD, DM, COPD, depression, remote TIA's, coming in with sudden onset room-spinning dizziness and collapse. She said she woke up this morning feeling fine but got up and went to the kitchen around 3pm and suddenly start to feel as if the room is spinning around her. She wasn't able to reach for anything in time and she fell and hit her head. She does not remember losing consciousness. She measured her sugars after the fall and said it was in the 200s. She also says that recently her blood pressure has been running high in the 170s. She takes her medications as prescribed and as directed. She said she never experienced these symptoms before. For the past few days she says she has been sleeping less and only gets a few hours of sleep a night. She also says she was eating less as well and unsure of the cause. Of note, she noticed pressure like chest pain this morning before the fall, 8/10 in severity which has now resolved. She denies any sick contacts, recent travel, or recent change in medications. Currently, she denies any fevers, chills, chest pain, shortness of breath, abdominal pain, n/v/d/c, edema.    ED Course:  Vitals: T 98F HR 95 /76 RR 24 spO2 97% RA  Labs: cbc and cmp unremarkable, negative lactate, no troponins, EKG: NSR no acute ischemic changes, CXR showing patchy left basilar opacity   Stroke code called for dizziness. CTH noncontrast negative for acute infarct or acute hemorrhage, shows chronic small vessel disease. CT C-spine with no acute fracture or deformity. CTP normal. CTA head/neck with moderate to severe R ICA stenosis 2/2 calcified atherosclerotic plaque, possible calcific emboli in R MCA, no large vessel occlusion.   Received Labetalol IV 10mg x1, Meclizine 25mg PO x1, Tylenol 650mg, Zofran IV 4mg (09 Jul 2020 01:42)      PAST MEDICAL & SURGICAL HISTORY:  Type 2 diabetes mellitus: Diabetes  Hyperlipidemia: Hyperlipidemia  Essential hypertension: Hypertension  Tobacco use disorder: Tobacco abuse  Emphysema: COPD (chronic obstructive pulmonary disease) with emphysema  Transient cerebral ischemia: TIA (transient ischemic attack)  Peptic ulcer: 2/2 H. pylori (s/p amoxicillin, clarithromycin, omeprazole)  Disease of trachea and bronchus: Tracheomalacia  Epilepsy: No longer on medications.  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  Hemorrhoids: Hemorrhoids  Other postprocedural status: S/P hernia repair      MEDICATIONS  (STANDING):  albuterol/ipratropium for Nebulization 3 milliLiter(s) Nebulizer every 6 hours  aspirin  chewable 81 milliGRAM(s) Oral daily  atorvastatin 40 milliGRAM(s) Oral at bedtime  dextrose 5%. 1000 milliLiter(s) (50 mL/Hr) IV Continuous <Continuous>  dextrose 50% Injectable 12.5 Gram(s) IV Push once  dextrose 50% Injectable 25 Gram(s) IV Push once  dextrose 50% Injectable 25 Gram(s) IV Push once  enoxaparin Injectable 40 milliGRAM(s) SubCutaneous every 24 hours  hydrochlorothiazide 25 milliGRAM(s) Oral daily  insulin lispro (HumaLOG) corrective regimen sliding scale   SubCutaneous every 6 hours  lisinopril 20 milliGRAM(s) Oral daily  montelukast 10 milliGRAM(s) Oral daily  sertraline 50 milliGRAM(s) Oral daily  tiotropium 2.5 MICROgram(s)/olodaterol 2.5 MICROgram(s) (STIOLTO) Inhaler 2 Puff(s) Inhalation daily    MEDICATIONS  (PRN):  dextrose 40% Gel 15 Gram(s) Oral once PRN Blood Glucose LESS THAN 70 milliGRAM(s)/deciliter  glucagon  Injectable 1 milliGRAM(s) IntraMuscular once PRN Glucose LESS THAN 70 milligrams/deciliter      FAMILY HISTORY:  Family history of diabetes mellitus (Mother, Father)  Family history of colon cancer (Sibling): sister  Family history of ovarian cancer (Mother, Sibling)      CBC Full  -  ( 08 Jul 2020 20:52 )  WBC Count : 10.37 K/uL  RBC Count : 4.09 M/uL  Hemoglobin : 12.1 g/dL  Hematocrit : 38.7 %  Platelet Count - Automated : 401 K/uL  Mean Cell Volume : 94.6 fl  Mean Cell Hemoglobin : 29.6 pg  Mean Cell Hemoglobin Concentration : 31.3 gm/dL  Auto Neutrophil # : x  Auto Lymphocyte # : x  Auto Monocyte # : x  Auto Eosinophil # : x  Auto Basophil # : x  Auto Neutrophil % : x  Auto Lymphocyte % : x  Auto Monocyte % : x  Auto Eosinophil % : x  Auto Basophil % : x      07-08    137  |  101  |  19  ----------------------------<  105<H>  4.3   |  25  |  0.63    Ca    9.6      08 Jul 2020 20:52    TPro  7.3  /  Alb  4.1  /  TBili  <0.2  /  DBili  x   /  AST  18  /  ALT  15  /  AlkPhos  89  07-08            Radiology:    < from: CT Brain Stroke Protocol (07.08.20 @ 21:14) >    EXAM:  CT BRAIN STROKE PROTOCOL                          PROCEDURE DATE:  07/08/2020          INTERPRETATION:  PROCEDURE: CT head without intravenous contrast    INDICATIONS: Dizziness and left eye nystagmus. Stroke code.    TECHNIQUE:  Serial axial images were obtained from the skull base to the vertex without the use of intravenous contrast. Coronal and sagittal reconstructions were created.    COMPARISON EXAMINATION: None.    FINDINGS:    VENTRICLES AND SULCI: There is mild age-appropriate parenchymal volume loss. No hydrocephalus.   INTRA-AXIAL: No intracranial mass, acute hemorrhage, or midline shift is present. No acute transcortical infarction. There are patchy hypodensities throughout the periventricular and subcortical white matter, likely the sequela of chronic small vessel ischemic disease.  EXTRA-AXIAL: No extra-axial fluid collection is present.   VISUALIZED SINUSES: No air-fluid levels are identified. There is nasal septal perforation.  VISUALIZED MASTOIDS: Well aerated.  CALVARIUM: No fracture.    IMPRESSION:     No acute intracranial hemorrhage or large territorial infarction.    Age-appropriate volume loss with chronic small vessel ischemic disease.        < from: CT Perfusion w/ Maps w/ IV Cont (07.08.20 @ 21:15) >  EXAM:  CT PERFUSION W MAPS IC                          PROCEDURE DATE:  07/08/2020          INTERPRETATION:  PROCEDURE: CT Perfusion with intravenous contrast.    INDICATION: Dizziness and left eye nystagmus. Stroke code.    TECHNIQUE: Following the intravenous administration of 40 ml of Optiray 350, serial axial images were obtained through the brain. The CT perfusion data set was post processed per [facility] protocol generating color maps of CBF, CBV, MTT, and Tmax.    COMPARISON: None    FINDINGS: The CT perfusion study demonstrates no perfusion abnormality. There is no mismatch volume.    IMPRESSION: Normal CT perfusion study.        < from: CT Angio Head w/ IV Cont (07.08.20 @ 21:16) >  EXAM:  CT ANGIO NECK (W)AW IC                          EXAM:  CT ANGIO BRAIN (W)AW IC                          PROCEDURE DATE:  07/08/2020          INTERPRETATION:  PROCEDURE: CTA brain with intravenous contrast.    INDICATION: Dizziness and left eye nystagmus. Stroke code.    TECHNIQUE: Multiple axial thin section were obtained through the [place] following the intravenous bolus injection of Optiray-350. Multiple 3-D reformatted images were generated from the axial cuts. Post-processing was performed on a independent workstation.    COMPARISON: None.    FINDINGS: The CTA examination of the [place] demonstrates the internal carotid arteries to be normal in caliber. There is a normal bifurcation into the A1 and M1 segments.The visualized vertebral arteries are normal in caliber. The basilar artery is normal in caliber. There is a normal bifurcation into the posterior cerebral arteries. There are no areas of stenosis, dilatation or aneurysm.    IMPRESSION: No evidence of hemodynamically significant stenosis, major vessel occlusion or intracranial aneurysm.      PROCEDURE: CTA neck with intravenous contrast.    INDICATION: Dizziness and left adnexa fragments. Stroke code.    TECHNIQUE: Multiple axial thin section were obtained through the neck following the intravenous bolus injection of Optiray-350. Multiple 3-D reformatted images were generated from the axial cuts. Post-processing was performed on a independent workstation.    COMPARISON: None.    FINDINGS: TheCTA examination demonstrates the right common carotid artery to be normal in caliber. There is moderate to severe stenosis of the proximal right internal carotid artery secondary to calcified plaque, approximately 1.3 cm in length.     The left common carotid artery is normal in caliber. There is a normal bifurcation into the left internal and external carotid arteries. There is no hemodynamically significant stenosis.     The visualized vertebral arteries are normal in caliber.    Calcified plaque aorta without significant narrowing at the origin of the great vessels.    Centrilobular emphysema in the visualized lung apices. Mediastinal and bilateral hilar lymphadenopathy noted.    IMPRESSION: Moderate severe stenosis of the proximal right internal carotid artery secondary to calcified atherosclerotic plaque.        < from: CT Cervical Spine No Cont (07.08.20 @ 21:15) >  EXAM:  CT CERVICAL SPINE                          PROCEDURE DATE:  07/08/2020          INTERPRETATION:  EXAM: CT CERVICAL SPINE WITHOUT CONTRAST    CLINICAL INFORMATION: fall    TECHNIQUE: Noncontrast axial CT images were acquired from the skull base through the upper thoracic spine. Two-dimensional sagittal coronal reformats were generated.     COMPARISON STUDY: None.    FINDINGS:     Loss of cervical lordosis which may be positional. Mild to moderate degenerative changes most significant at C5/C6.No suspicious osseous lesions or acute fractures.      No large herniated discs or significant spinal canal stenosis. Foraminal stenosis at a few levels. No prevertebral soft tissue swelling.Visualized lung apices are clear. Bilateral emphysematous changes noted.      IMPRESSION:   No CT evidence of cervical spine fracture or traumatic malalignment.                  Vital Signs Last 24 Hrs  T(C): 36.6 (09 Jul 2020 05:24), Max: 36.7 (08 Jul 2020 20:41)  T(F): 97.9 (09 Jul 2020 05:24), Max: 98 (08 Jul 2020 20:41)  HR: 65 (09 Jul 2020 05:24) (60 - 95)  BP: 139/83 (09 Jul 2020 05:24) (134/98 - 198/79)  BP(mean): --  RR: 17 (09 Jul 2020 05:24) (17 - 24)  SpO2: 99% (09 Jul 2020 05:24) (96% - 100%)    REVIEW OF SYSTEMS:    CONSTITUTIONAL: No fever, weight loss, or fatigue  EYES: No eye pain, visual disturbances, or discharge  ENMT:  No difficulty hearing, tinnitus, vertigo; No sinus or throat pain  NECK: No pain or stiffness  BREASTS: No pain, masses, or nipple discharge  RESPIRATORY: No cough, wheezing, chills or hemoptysis; No shortness of breath  CARDIOVASCULAR: No chest pain, palpitations, dizziness, or leg swelling  GASTROINTESTINAL: No abdominal or epigastric pain. No nausea, vomiting, or hematemesis; No diarrhea or constipation. No melena or hematochezia.  GENITOURINARY: No dysuria, frequency, hematuria, or incontinence  NEUROLOGICAL: dizziness  SKIN: No itching, burning, rashes, or lesions   LYMPH NODES: No enlarged glands  ENDOCRINE: No heat or cold intolerance; No hair loss  MUSCULOSKELETAL: No joint pain or swelling; No muscle, back, or extremity pain  PSYCHIATRIC: No depression, anxiety, mood swings, or difficulty sleeping  HEME/LYMPH: No easy bruising, or bleeding gums  ALLERGY AND IMMUNOLOGIC: No hives or eczema  VASCULAR: no swelling, erythema,   : no dysuria, hematuria, frequency        Physical Exam:  70 yo  woman lying in semi Rhoades's position, c/o dizziness    Head: normocephalic, atraumatic    Eyes: PERRLA, EOMI, no nystagmus, sclera anicteric    ENT: nasal discharge, uvula midline, no oropharyngeal erythema/exudate    Neck: supple, negative JVD, pos R carotid bruits, no thyromegaly    Chest: CTA bilaterally, neg wheeze/ rhonchi/ rales/ crackles/ egophany    Cardiovascular: regular rate and rhythm, neg murmurs/rubs/gallops    Abdomen: soft, non distended, non tender to palpation in all 4 quadrants, negative rebound/guarding, normal bowel sounds    Extremities: WWP, neg cyanosis/clubbing/edema, negative calf tenderness to palpation, negative Reshma's sign    Musculoskeletal:      :     Neurologic Exam:    Alert and oriented to person, place, date/year, speech fluent w/o dysarthria, recent and remote memory intact, repetition intact, comprehension intact    Cranial Nerves:     II:                       pupils equal, round and reactive to light, visual fields intact   III/ IV/VI:            extraocular movements intact, + horizontal nystagmus, neg ptosis  V:                       facial sensation intact, V1-3 normal  VII:                     face symmetric, no droop, normal eye closure and smile  VIII:                    hearing intact to finger rub bilaterally  IX/ X:                 soft palate rise symmetrical  XI:                      head turning, shoulder shrug normal  XII:                     tongue midline    Motor Exam:    Upper Extremities:     Right:   : 5/5              wrist extensors/ flexors: 5/5              biceps:  5/5              triceps: 5/5              deltoid:  5/5              pronator drift: neg    Left:    :  5/5             wrist extensors/ flexors:  5/5             biceps:  5/5             triceps:  5/5             deltoid:  5/5             pronator drift: neg      Lower Extremities:    Right:   dorsiflexors:  5/5               plantar flexors:  5/5               quadriceps:  5/5               hamstrings:  5/5               hip flexors:  5/5    Left :   dorsiflexors:  5/5              plantar flexors: 5/5              quadriceps:  5/5              hamstrings:  5/5              hip flexors:  5/5                 Sensory:    intact to LT/PP in all UE/LE dermatomes                     DTR:            = biceps/     triceps/     brachioradialis                      = patella/   medial hamstring/ankle                      neg clonus                      neg Babinski                        Finger to Nose:  wnl    Heel to Shin:  wnl    Rapid Alternating movements:  wnl    Joint Position Sense:  intact    Romberg:  not tested    Tandem Walking:  not tested    Gait:  not tested        PM&R Impression:    1) dizziness/ r/o acute stroke  2) no focal weakness    Plan:    1) Physical therapy focusing on therapeutic exercises, bed mobility/transfer out of bed evaluation, progressive ambulation with assistive devices prn.    2) Anticipated Disposition Plan/Recs:    pending functional progress

## 2020-07-09 NOTE — PHYSICAL THERAPY INITIAL EVALUATION ADULT - ADDITIONAL COMMENTS
Pt states her son is her aide, 9-3:30. Pt lives in a n elevator building, uses a cane in home, rollator in comm

## 2020-07-10 ENCOUNTER — RX RENEWAL (OUTPATIENT)
Age: 70
End: 2020-07-10

## 2020-07-13 DIAGNOSIS — I16.0 HYPERTENSIVE URGENCY: ICD-10-CM

## 2020-07-13 DIAGNOSIS — E78.5 HYPERLIPIDEMIA, UNSPECIFIED: ICD-10-CM

## 2020-07-13 DIAGNOSIS — F32.9 MAJOR DEPRESSIVE DISORDER, SINGLE EPISODE, UNSPECIFIED: ICD-10-CM

## 2020-07-13 DIAGNOSIS — Z86.73 PERSONAL HISTORY OF TRANSIENT ISCHEMIC ATTACK (TIA), AND CEREBRAL INFARCTION WITHOUT RESIDUAL DEFICITS: ICD-10-CM

## 2020-07-13 DIAGNOSIS — Z87.891 PERSONAL HISTORY OF NICOTINE DEPENDENCE: ICD-10-CM

## 2020-07-13 DIAGNOSIS — I65.21 OCCLUSION AND STENOSIS OF RIGHT CAROTID ARTERY: ICD-10-CM

## 2020-07-13 DIAGNOSIS — Z91.81 HISTORY OF FALLING: ICD-10-CM

## 2020-07-13 DIAGNOSIS — J44.9 CHRONIC OBSTRUCTIVE PULMONARY DISEASE, UNSPECIFIED: ICD-10-CM

## 2020-07-13 DIAGNOSIS — R42 DIZZINESS AND GIDDINESS: ICD-10-CM

## 2020-07-13 DIAGNOSIS — E11.9 TYPE 2 DIABETES MELLITUS WITHOUT COMPLICATIONS: ICD-10-CM

## 2020-07-17 ENCOUNTER — APPOINTMENT (OUTPATIENT)
Dept: INTERNAL MEDICINE | Facility: CLINIC | Age: 70
End: 2020-07-17

## 2020-07-20 ENCOUNTER — APPOINTMENT (OUTPATIENT)
Dept: INTERNAL MEDICINE | Facility: CLINIC | Age: 70
End: 2020-07-20

## 2020-07-24 ENCOUNTER — APPOINTMENT (OUTPATIENT)
Dept: INTERNAL MEDICINE | Facility: CLINIC | Age: 70
End: 2020-07-24
Payer: MEDICARE

## 2020-07-24 VITALS
SYSTOLIC BLOOD PRESSURE: 128 MMHG | OXYGEN SATURATION: 97 % | DIASTOLIC BLOOD PRESSURE: 70 MMHG | TEMPERATURE: 98.2 F | HEART RATE: 79 BPM

## 2020-07-24 DIAGNOSIS — R55 SYNCOPE AND COLLAPSE: ICD-10-CM

## 2020-07-24 DIAGNOSIS — R29.898 OTHER SYMPTOMS AND SIGNS INVOLVING THE MUSCULOSKELETAL SYSTEM: ICD-10-CM

## 2020-07-24 PROCEDURE — 99214 OFFICE O/P EST MOD 30 MIN: CPT | Mod: GC

## 2020-07-27 ENCOUNTER — APPOINTMENT (OUTPATIENT)
Dept: NEUROLOGY | Facility: CLINIC | Age: 70
End: 2020-07-27

## 2020-08-10 ENCOUNTER — APPOINTMENT (OUTPATIENT)
Dept: PULMONOLOGY | Facility: CLINIC | Age: 70
End: 2020-08-10

## 2020-08-27 ENCOUNTER — APPOINTMENT (OUTPATIENT)
Dept: INTERNAL MEDICINE | Facility: CLINIC | Age: 70
End: 2020-08-27

## 2020-09-30 ENCOUNTER — APPOINTMENT (OUTPATIENT)
Dept: PULMONOLOGY | Facility: CLINIC | Age: 70
End: 2020-09-30
Payer: MEDICARE

## 2020-09-30 VITALS
DIASTOLIC BLOOD PRESSURE: 78 MMHG | SYSTOLIC BLOOD PRESSURE: 120 MMHG | BODY MASS INDEX: 33.47 KG/M2 | TEMPERATURE: 97.6 F | HEIGHT: 59 IN | RESPIRATION RATE: 12 BRPM | OXYGEN SATURATION: 96 % | WEIGHT: 166 LBS | HEART RATE: 111 BPM

## 2020-09-30 DIAGNOSIS — R53.1 WEAKNESS: ICD-10-CM

## 2020-09-30 PROCEDURE — 99214 OFFICE O/P EST MOD 30 MIN: CPT

## 2020-09-30 NOTE — HISTORY OF PRESENT ILLNESS
[Difficulty Breathing During Exertion] : stable dyspnea on exertion [Feelings Of Weakness On Exertion] : stable exercise intolerance [Cough] : stable coughing [Regional Soft Tissue Swelling Both Lower Extremities] : denies lower extremity edema [Chest Pain Or Discomfort] : denies chest pain [Fever] : denies fever [Oxygen] : the patient uses supplemental oxygen [Oxygen Rate  ___ lpm] : Oxygen rate is [unfilled] lpm [24 hrs] : 24 hours/day [2  -  Slight] : 2, slight [Class II - Mild Symptoms and Slight Limitations] : II [More Frequent Use Needed Recently] : Patient reports recent increase in frequency of [___ Times a Day] : [unfilled] time(s) a day [Current] : is a current smoker [Cigarettes ___ /Day] : reports smoking [unfilled] packs of cigarettes per day [None] : None [Adherent] : the patient is adherent with ~his/her~ medication regimen [Goals--Doing Well] : the patient is doing well with ~his/her~ goals [Obstructive Sleep Apnea] : obstructive sleep apnea [Snoring] : snoring [Frequent Nocturnal Awakening] : frequent nocturnal awakening [Daytime Somnolence] : daytime somnolence [ESS: ___] : ESS score [unfilled] [To Bed: ___] : ~he/she~ goes to bed at [unfilled] [Arises: ___] : arises at [unfilled] [Wheezing] : denies wheezing [TextBox_4] : 68 year old female with PMH COPD, diabetes, HTN, HLD, current smoker 1 pack a day, started at 13 years of age presents today for a follow up. \par \par She feel a month ago and hit her head.  She called the ambulance and had a CT scan of the head.  She had something on the CT scan and she was told she needs a MRI but has not gone for it. \par \par She is SOB with coughing with flem is yellow.  She is smoking  8 - 10 cigs a day.  She feels she is training water for the last 2 weeks.  Feels she has swelling in the hands, thighs and neck.  She gets pain in her bilateral neck when she lies down.  She gets headache on the right side.\par \par She walking less than 1 block with SOB.  Denies fevers.   She using oxygen 3 L continue and used cpap/bipap at night.  She is using the Stiolto and using the albuterol nebulizer 3 times a day. She is taking the prednisone 5 mg a day and Singulair.  \par \par 7/8/2020 CXR showed patchy left basilar opacity.\par  [Wt Gain ___ Lbs] : no recent weight gain [Good Control] : peak flow has been poor [Side Effects] : ~He/She~ denies medication side effects [Witnessed Apneas] : no witnessed sleep apnea [Unintentional Sleep While Inactive] : no unintentional sleep while inactive [Awakes Unrefreshed] : does not awake unrefreshed [Awakes with Headache] : no headache upon awakening [Awakening With Dry Mouth] : no dry mouth upon awakening [Recent  Weight Gain] : no recent weight gain

## 2020-09-30 NOTE — ASSESSMENT
[FreeTextEntry1] : COPD\par - CAT score 35 last visit 19\par - GOLD category B\par - Repeat PFT 7/2019 revealed moderate OLD\par - Continue on Stiolto and albuterol as needed\par - Pt is on 5 mg of prednisone\par - Pt is to increase her exercise\par - Follow up CT scan at this time.  Awaiting auth from Aug CT scan that was ordered\par - she is up to date with immunization.  She got her flu vaccine this year\par - She did not require neither hospitalization, urgent care visits, nor ER visits since last visit\par - JONATHAN pt is compliant with CPAP machine.  She is compliant with the cpap mask and tolerated well.  As per her son, she is using it at night.  \par - Pt for repeat PFT instructed to go for COVID swab 72 hrs prior.\par - Follow up in 3 months\par \par Pulmonary micronodules\par - Repeat CT chest without any change to the nodules 3 mm to 4 mm.  Repeat CT scan at this time.  Pt is current smoker and high risk for CA.\par \par Cough\par - Stable.\par \par Smoking\par - I discussed with her smoke cessation\par \par JONATHAN\par \par as above\par \par Pt given referral to derm, neurosurgery for carotid stenosis, and PCP \par Pt given script for motorized wheelchair.

## 2020-10-13 ENCOUNTER — APPOINTMENT (OUTPATIENT)
Dept: NEUROSURGERY | Facility: CLINIC | Age: 70
End: 2020-10-13
Payer: MEDICARE

## 2020-10-13 VITALS
HEIGHT: 59 IN | WEIGHT: 146 LBS | OXYGEN SATURATION: 96 % | RESPIRATION RATE: 16 BRPM | SYSTOLIC BLOOD PRESSURE: 166 MMHG | DIASTOLIC BLOOD PRESSURE: 78 MMHG | TEMPERATURE: 98 F | BODY MASS INDEX: 29.43 KG/M2 | HEART RATE: 89 BPM

## 2020-10-13 DIAGNOSIS — Z79.02 LONG TERM (CURRENT) USE OF ANTITHROMBOTICS/ANTIPLATELETS: ICD-10-CM

## 2020-10-13 LAB — PLATELET RESPONSE ASPIRIN: 605 ARU

## 2020-10-13 PROCEDURE — 99205 OFFICE O/P NEW HI 60 MIN: CPT

## 2020-10-13 NOTE — REVIEW OF SYSTEMS
[As Noted in HPI] : as noted in HPI [Shortness Of Breath] : shortness of breath [Cough] : cough [SOB on Exertion] : shortness of breath during exertion [Negative] : Genitourinary

## 2020-10-14 NOTE — PHYSICAL EXAM
[General Appearance - Alert] : alert [General Appearance - In No Acute Distress] : in no acute distress [General Appearance - Well Nourished] : well nourished [Oriented To Time, Place, And Person] : oriented to person, place, and time [Impaired Insight] : insight and judgment were intact [Affect] : the affect was normal [Person] : oriented to person [Place] : oriented to place [Time] : oriented to time [Cranial Nerves Optic (II)] : visual acuity intact bilaterally,  pupils equal round and reactive to light [Cranial Nerves Oculomotor (III)] : extraocular motion intact [Cranial Nerves Trigeminal (V)] : facial sensation intact symmetrically [Cranial Nerves Facial (VII)] : face symmetrical [Cranial Nerves Vestibulocochlear (VIII)] : hearing was intact bilaterally [Cranial Nerves Glossopharyngeal (IX)] : tongue and palate midline [Cranial Nerves Accessory (XI - Cranial And Spinal)] : head turning and shoulder shrug symmetric [Cranial Nerves Hypoglossal (XII)] : there was no tongue deviation with protrusion [Motor Tone] : muscle tone was normal in all four extremities [Motor Strength] : muscle strength was normal in all four extremities [Abnormal Walk] : normal gait [Neck Appearance] : the appearance of the neck was normal [] : no respiratory distress [Apical Impulse] : the apical impulse was normal [Heart Rate And Rhythm] : heart rate was normal and rhythm regular [FreeTextEntry6] : 5/5 on all four extremeties but right UE and LE weaker than left. No pronator drift or facial asymmetry. [FreeTextEntry1] : MUNSON, + dry cough

## 2020-10-14 NOTE — ADDENDUM
[FreeTextEntry1] : 2020\par \par Trinity Trevizo MD\par [address]\[place] [address]\par \par Patient’s Name:  Skye Rodriges\par : 1950\par \par Dear Dr. Trevizo:\par \par We met Mrs. Rodriges in our office at [facility].  She was accompanied by her son.  As you know, she is a 69 years-old right handed woman with history of cigarette smoking, hypertension, diabetes, hyperlipidemia, seizures, COPD, palindromic rheumatism and obstructive sleep apnea who suffered a transient syncope event in 2020.  At that time she underwent neuro-imaging work up that revealed calcified stenosis at the origin of the right internal carotid artery.  There was no evidence of large ischemic stroke, hemorrhage or occlusion.\par \par Today we discussed the importance of smoking cessation.  We also discussed the findings of calcified carotid stenosis.  She appears to be asymptomatic from the stenotic lesion.  We will obtain an MRA Nova of the brain to determine if there are silent infarcts and to assess the intracranial blood flow.  She will continue aspirin and statin.  We will check platelet aggregometry testing.  She will be evaluated by the epilepsy team given the history of seizures and recent syncope.  She will return to my office after the work up is completed.  If flow is normal and there are no silent infarcts then we will continue medical management.  If there are silent infarcts and/or she develops symptoms referable to the right carotid artery then she will be a candidate for revascularization surgery.  \par \par Thank you for allowing us to participate in Mrs. Rodriges’s care.  I will keep you updated at all times.\par \par Sincerely,\par \par \par Meng Watt MD\par Chief\par Neuro-Endovascular Surgery and \par Interventional Neuroradiology \par [facility]\par [facility]\par [address]\par [address] Floor\[place] [address]\par T:  [phone]\par F:  [phone]\par \par cc:	Michael Vail MD\par 	[address]\par 	[address] Floor\[place] 	[place] [address]\par \par 	Skye Rodriges\par 	 [address]\par 	[address]\[place] 	[place] [address]\par \par \par

## 2020-10-14 NOTE — ASSESSMENT
[FreeTextEntry1] : 69 years-old right handed woman with history of cigarette smoking, hypertension, diabetes, hyperlipidemia, seizures, COPD, palindromic rheumatism and obstructive sleep apnea who suffered a transient syncope event in July 2020.  At that time she underwent neuro-imaging work up that revealed calcified stenosis at the origin of the right internal carotid artery.  There was no evidence of large ischemic stroke, hemorrhage or occlusion.\par \par Today we discussed the importance of smoking cessation.  We also discussed the findings of calcified carotid stenosis.  She appears to be asymptomatic from the stenotic lesion.  We will obtain an MRA Nova of the brain to determine if there are silent infarcts and to assess the intracranial blood flow.  She will continue aspirin and statin.  We will check platelet aggregometry testing.  She will be evaluated by the epilepsy team given the history of seizures and recent syncope.  She will return to my office after the work up is completed.  If flow is normal and there are no silent infarcts then we will continue medical management.  If there are silent infarcts and/or she develops symptoms referable to the right carotid artery then she will be a candidate for revascularization surgery.  \par \par \par \par

## 2020-10-14 NOTE — HISTORY OF PRESENT ILLNESS
[de-identified] : RIGHT-HANDED 68 y/o female current smoker (1/2 ppd) with past medical history of HTN, DM (metformin), HLD (lipitor), seizures (not on AEDs), COPD, SOB on 3L O2, JONATHAN (CPAP), palindromic rheumatism (last follow up with Dr. Dlegado 2 years ago) presenting for neuroendovascular assessment of R ICA  stenosis. She was referred by Dr. Trinity Trevizo.\par \par Patient with recent D/C from [facility] in early July 2020 for fall under unclear circumstances but no LOC was a stroke code and left against medical advice---work up not completed. CTA neck found ~moderate calcified stenosis of the right internal carotid artery without signs of acute ischemia, and report of vertigo so the fall was believed to be due to this. \par \par She reports that last seizure activity was 20 years ago. She does not take AEDs.\par \par Pt on ASA 81 mg, Atorvastatin 40 mg and is compliant with these medications.\par \par She complains of right sided pain. She denies left focal weakness or right eye vision change. \par \par \par \par \par Hanedness: RIGHT\par \par Patient Address:\par [address] [facility] [address] [facility] [address]\[place] [address]\par \par Referring MD:\par Dr. Trinity Trevizo\par [address]\[place] [address]\par \par PCP:\par Dr. Michael Main\par [address]\[place] [address]\par [phone]\par

## 2020-10-14 NOTE — DATA REVIEWED
[de-identified] : [facility]\par    [facility] Department of Radiology\par   Radiology Report\par \par \par Patient Name: KEVIN WEISS   Report Date: 08-Jul-2020 21:34.00 \par Patient ID: [id number] ([id number]), [id number] (EPI)  Accession No.: [id number] \par Patient Birth Date: 1950  Report Status: F \par Referring Physician: [id number] XOCHILT PETTIT   Reason For Study: Stroke Code  \par \par \par \par \par \par \par EXAM: CT BRAIN STROKE PROTOCOL \par \par PROCEDURE DATE: 07/08/2020 \par \par \par \par INTERPRETATION: PROCEDURE: CT head without intravenous contrast \par \par INDICATIONS: Dizziness and left eye nystagmus. Stroke code. \par \par TECHNIQUE: Serial axial images were obtained from the skull base to the \par vertex without the use of intravenous contrast. Coronal and sagittal \par reconstructions were created. \par \par COMPARISON EXAMINATION: None. \par \par FINDINGS: \par VENTRICLES AND SULCI: There is mild age-appropriate parenchymal volume loss. \par No hydrocephalus. \par INTRA-AXIAL: No intracranial mass, acute hemorrhage, or midline shift is \par present. No acute transcortical infarction. There are patchy hypodensities \par throughout the periventricular and subcortical white matter, likely the \par sequela of chronic small vessel ischemic disease. \par EXTRA-AXIAL: No extra-axial fluid collection is present. \par VISUALIZED SINUSES: No air-fluid levels are identified. There is nasal \par septal perforation. \par VISUALIZED MASTOIDS: Well aerated. \par CALVARIUM: No fracture. \par \par IMPRESSION: \par \par No acute intracranial hemorrhage or large territorial infarction. \par \par Age-appropriate volume loss with chronic small vessel ischemic disease. \par \par The above results were discussed with NERY Dao on 7/8/2020 9:02 PM. Last \par image acquired at 8:59 PM. \par \par \par \par \par \par \par \par Thank you for the opportunity to participate in the care of this patient. \par \par DELLA LIN M.D., RADIOLOGY RESIDENT \par This document has been electronically signed. \par ALEXX IYER M.D., ATTENDING RADIOLOGIST \par This document has been electronically signed. Jul 8 2020 9:34PM \par \par \par \par \par  \par  [de-identified] : [facility]\par    [facility] Department of Radiology\par   Radiology Report\par \par \par Patient Name: KEVIN WEISS   Report Date: 08-Jul-2020 23:08.00 \par Patient ID: [id number] ([id number]), [id number] (EPI)  Accession No.: [id number] \par Patient Birth Date: 1950  Report Status: F \par Referring Physician: [id number] XOCHILT PETTIT   Reason For Study: Stroke Code  \par \par \par \par \par \par \par EXAM: CT ANGIO NECK (W)AW IC \par \par EXAM: CT ANGIO BRAIN (W)AW IC \par \par PROCEDURE DATE: 07/08/2020 \par \par \par \par INTERPRETATION: PROCEDURE: CTA brain with intravenous contrast. \par \par INDICATION: Dizziness and left eye nystagmus. Stroke code. \par \par TECHNIQUE: Multiple axial thin section were obtained through the [place] of \par [place] following the intravenous bolus injection of Optiray-350. Multiple \par 3-D reformatted images were generated from the axial cuts. Post-processing \par was performed on a independent workstation. \par \par COMPARISON: None. \par \par FINDINGS: The CTA examination of the [place] of [place] demonstrates the \par internal carotid arteries to be normal in caliber. There is a normal \par bifurcation into the A1 and M1 segments. The visualized vertebral arteries \par are normal in caliber. The basilar artery is normal in caliber. There is a \par normal bifurcation into the posterior cerebral arteries. There are no areas \par of stenosis, dilatation or aneurysm. \par \par IMPRESSION: No evidence of hemodynamically significant stenosis, major \par vessel occlusion or intracranial aneurysm. \par \par \par PROCEDURE: CTA neck with intravenous contrast. \par \par INDICATION: Dizziness and left adnexa fragments. Stroke code. \par \par TECHNIQUE: Multiple axial thin section were obtained through the neck \par following the intravenous bolus injection of Optiray-350. Multiple 3-D \par reformatted images were generated from the axial cuts. Post-processing was \par performed on a independent workstation. \par \par COMPARISON: None. \par \par FINDINGS: The CTA examination demonstrates the right common carotid artery \par to be normal in caliber. There is moderate to severe stenosis of the \par proximal right internal carotid artery secondary to calcified plaque, \par approximately 1.3 cm in length. \par \par The left common carotid artery is normal in caliber. There is a normal \par bifurcation into the left internal and external carotid arteries. There is \par no hemodynamically significant stenosis. \par \par The visualized vertebral arteries are normal in caliber. \par \par Calcified plaque aorta without significant narrowing at the origin of the \par great vessels. \par \par Centrilobular emphysema in the visualized lung apices. Mediastinal and \par bilateral hilar lymphadenopathy noted. \par \par IMPRESSION: Moderate severe stenosis of the proximal right internal carotid \par artery secondary to calcified atherosclerotic plaque. \par \par I, Alexx yIer MD, have reviewed the images and the report and agree with \par the findings, with the following modification: Also noted are few small \par calcifications seen within the right MCA branch vessels in the region of the \par sylvian fissure, concerning for calcific emboli. \par \par Communication: I discussed the finding of this report with NERY Moore at \par 11:03 PM on 7/8/2020. Critical value policy of the hospital was followed. \par Read back and confirmation of receipt of this communication was performed. \par This verbal communication supplements the text report of this document. \par \par \par \par \par \par \par Thank you for the opportunity to participate in the care of this patient. \par \par DELLA LIN M.D., RADIOLOGY RESIDENT \par This document has been electronically signed. \par ALEXX IYER M.D., ATTENDING RADIOLOGIST \par This document has been electronically signed. Jul 8 2020 11:08PM  \par  [de-identified] : [facility]\par    [facility] Department of Radiology\par   Radiology Report\par \par \par Patient Name: KEVIN WEISS   Report Date: 08-Jul-2020 21:35.00 \par Patient ID: [id number] ([id number]), [id number] (EPI)  Accession No.: [id number] \par Patient Birth Date: 1950  Report Status: F \par Referring Physician: [id number] XOCHILT PETTIT   Reason For Study: Stroke Code  \par \par \par \par \par \par \par EXAM: CT PERFUSION W MAPS IC \par \par PROCEDURE DATE: 07/08/2020 \par \par \par \par INTERPRETATION: PROCEDURE: CT Perfusion with intravenous contrast. \par \par INDICATION: Dizziness and left eye nystagmus. Stroke code. \par \par TECHNIQUE: Following the intravenous administration of 40 ml of Optiray 350, \par serial axial images were obtained through the brain. The CT perfusion data \par set was post processed per [facility] protocol generating color maps \par of CBF, CBV, MTT, and Tmax. \par \par COMPARISON: None \par \par FINDINGS: The CT perfusion study demonstrates no perfusion abnormality. \par There is no mismatch volume. \par \par IMPRESSION: Normal CT perfusion study. \par \par \par \par \par \par Thank you for the opportunity to participate in the care of this patient. \par \par DELLA LIN M.D., RADIOLOGY RESIDENT \par This document has been electronically signed. \par ALEXX IYER M.D., ATTENDING RADIOLOGIST \par This document has been electronically signed. Jul 8 2020 9:35PM \par \par \par \par  \par

## 2020-10-14 NOTE — PLAN
[FreeTextEntry1] : Plan:\par 1. MRI brain to rule out silent infarcts.\par 2. MRA NOVA to assess intracranial blood flow.\par 3. Check Accumetrics level of ASA--script given to son. Will get it done today.\par 4. Refer to Dr. Jodee Tello, Epilepsy, for EEG to rule out seizures as cause of syncopal episode in July. Pt with longstanding hx of epilepsy. Currently not on AEDs--scheduled for 10/20/2020.

## 2020-10-14 NOTE — REASON FOR VISIT
[New Patient Visit] : a new patient visit [Referred By: _________] : Patient was referred by GILLIAN [Other: _____] : [unfilled] [FreeTextEntry1] : for neuroendovascular evaluation of carotid stenosis.

## 2020-10-20 ENCOUNTER — APPOINTMENT (OUTPATIENT)
Dept: PULMONOLOGY | Facility: CLINIC | Age: 70
End: 2020-10-20

## 2020-10-20 ENCOUNTER — APPOINTMENT (OUTPATIENT)
Dept: NEUROLOGY | Facility: CLINIC | Age: 70
End: 2020-10-20
Payer: MEDICARE

## 2020-10-20 VITALS
SYSTOLIC BLOOD PRESSURE: 167 MMHG | WEIGHT: 166 LBS | HEART RATE: 87 BPM | DIASTOLIC BLOOD PRESSURE: 68 MMHG | BODY MASS INDEX: 33.47 KG/M2 | OXYGEN SATURATION: 98 % | HEIGHT: 59 IN | TEMPERATURE: 95.6 F

## 2020-10-20 PROCEDURE — 99204 OFFICE O/P NEW MOD 45 MIN: CPT

## 2020-10-20 PROCEDURE — 99072 ADDL SUPL MATRL&STAF TM PHE: CPT

## 2020-10-20 NOTE — REVIEW OF SYSTEMS
[As Noted in HPI] : as noted in HPI [Negative] : Heme/Lymph [Shortness Of Breath] : shortness of breath

## 2020-10-20 NOTE — PHYSICAL EXAM
[FreeTextEntry1] : Physical Exam\par Constitutional: no apparent distress\par Psychiatric: normal affect, euthymic, alert and oriented x 3\par HEENT: moist mucous membranes, oropharynx clear\par Neck: no bruits\par Respiratory: +scattered wheezes, crackles at R base\par Cardiovascular: regular rate and rhythm, normal S1/S2, no murmurs, no edema\par GI: soft, non-tender, non-distended, bowel sounds present; no hepatosplenomegaly on palpation\par Musculoskeletal: extremities warm, well-perfused, normal range of motion\par Skin: no rashes, bruises, or lesions\par \par Neurologic Exam:\par Mental Status: awake and alert, speech fluent and prosodic with no paraphasic errors\par Cranial Nerves: I: deferred; II: pupils equal round and reactive, visual fields full to confrontation III, IV, VI: extraocular movements full with no nystagmus; VII: facial power symmetric; VIII: hearing intact to voice; IX/X: no dysarthria; XI: shoulder shrug symmetric; XII: tongue protrudes midline\par Motor: normal bulk and tone, no orbiting or pronator drift, power 5/5 to confrontation throughout including shoulder abduction, elbow flexion and extension, wrist flexion and extension, hip flexion, knee flexion and extension, no abnormal movements\par Sensation: intact to light touch in distal upper and lower extremities bilaterally\par Coordination: finger-nose-finger intact bilaterally\par Reflexes: 2+ biceps, triceps, brachioradialis, L patella, 1+ R patella\par Gait: narrow base, normal stance and stride, normal arm swing\par

## 2020-10-20 NOTE — CONSULT LETTER
[Consult Letter:] : I had the pleasure of evaluating your patient, [unfilled]. [Dear  ___] : Dear  [unfilled], [Please see my note below.] : Please see my note below. [Consult Closing:] : Thank you very much for allowing me to participate in the care of this patient.  If you have any questions, please do not hesitate to contact me. [FreeTextEntry2] : Meng Watt MD [Sincerely,] : Sincerely, [FreeTextEntry3] : Olivia Tello MD

## 2020-10-20 NOTE — ASSESSMENT
[FreeTextEntry1] : 1) Remote history of seizures: seizures have been in remission for many years and her recent fall does not sound similar to her prior episodes, so I doubt this was a seizure.  Will obtain routine and ambulatory EEG to rule out interictal epileptiform activity.  No AEDs for now; if EEG unremarkable (as I suspect it will be) she will not need AED treatment.\par \par 2) Shortness of breath -- patient uses O2 at home but did not bring to today's visit as her portable machine is not working.  Endorses shortness of breath right now.  O2 sat 91%.  Patient prefers to travel home and resume use of her home O2 rather than present to ED.\par \par RTC after EEG completed

## 2020-10-20 NOTE — HISTORY OF PRESENT ILLNESS
[FreeTextEntry1] : 69-year-old woman with COPD on 3L O2, JONATHAN on CPAP, remote history of seizures, recently diagnosed right carotid stenosis, referred by Dr. Watt for seizure evaluation due to an episode of lost consciousness in July 2020.\par \par She does not remember exactly when her seizures started but knows that she was already an adult (20s-30s).  Seizures consisted of "not knowing who people are" followed by loss of consciousness and generalized shaking.  She was treated with Dilantin.  She thinks that seizures were present for about a year.  She stopped Dilantin and seizures have not recurred since then. She also reports that she was under a lot of stress at that time.\par \par In July of this year she was at home, walking into the kitchen, and fell down.  She is unsure if she lost consciousness.  She was found by a family member and was awake and alert, not stiff or shaking, no tongue biting, no incontinence.  She was admitted to [facility] but left AMA prior to completing work up.

## 2020-10-22 ENCOUNTER — LABORATORY RESULT (OUTPATIENT)
Age: 70
End: 2020-10-22

## 2020-10-22 ENCOUNTER — APPOINTMENT (OUTPATIENT)
Dept: INTERNAL MEDICINE | Facility: CLINIC | Age: 70
End: 2020-10-22
Payer: MEDICARE

## 2020-10-22 VITALS
HEIGHT: 59 IN | WEIGHT: 165 LBS | HEART RATE: 92 BPM | TEMPERATURE: 98.3 F | DIASTOLIC BLOOD PRESSURE: 77 MMHG | OXYGEN SATURATION: 96 % | SYSTOLIC BLOOD PRESSURE: 160 MMHG | BODY MASS INDEX: 33.26 KG/M2

## 2020-10-22 DIAGNOSIS — Z87.898 PERSONAL HISTORY OF OTHER SPECIFIED CONDITIONS: ICD-10-CM

## 2020-10-22 DIAGNOSIS — Z12.11 ENCOUNTER FOR SCREENING FOR MALIGNANT NEOPLASM OF COLON: ICD-10-CM

## 2020-10-22 DIAGNOSIS — Z13.9 ENCOUNTER FOR SCREENING, UNSPECIFIED: ICD-10-CM

## 2020-10-22 DIAGNOSIS — Z71.6 TOBACCO ABUSE COUNSELING: ICD-10-CM

## 2020-10-22 DIAGNOSIS — F32.9 MAJOR DEPRESSIVE DISORDER, SINGLE EPISODE, UNSPECIFIED: Chronic | ICD-10-CM

## 2020-10-22 DIAGNOSIS — Z92.29 PERSONAL HISTORY OF OTHER DRUG THERAPY: ICD-10-CM

## 2020-10-22 DIAGNOSIS — Z86.19 PERSONAL HISTORY OF OTHER INFECTIOUS AND PARASITIC DISEASES: ICD-10-CM

## 2020-10-22 DIAGNOSIS — Z23 ENCOUNTER FOR IMMUNIZATION: ICD-10-CM

## 2020-10-22 DIAGNOSIS — Z12.73 ENCOUNTER FOR SCREENING FOR MALIGNANT NEOPLASM OF OVARY: ICD-10-CM

## 2020-10-22 DIAGNOSIS — Z12.39 ENCOUNTER FOR OTHER SCREENING FOR MALIGNANT NEOPLASM OF BREAST: ICD-10-CM

## 2020-10-22 DIAGNOSIS — Z86.79 PERSONAL HISTORY OF OTHER DISEASES OF THE CIRCULATORY SYSTEM: ICD-10-CM

## 2020-10-22 DIAGNOSIS — B49 UNSPECIFIED MYCOSIS: ICD-10-CM

## 2020-10-22 DIAGNOSIS — Z13.820 ENCOUNTER FOR SCREENING FOR OSTEOPOROSIS: ICD-10-CM

## 2020-10-22 DIAGNOSIS — K59.00 CONSTIPATION, UNSPECIFIED: ICD-10-CM

## 2020-10-22 DIAGNOSIS — L98.9 DISORDER OF THE SKIN AND SUBCUTANEOUS TISSUE, UNSPECIFIED: ICD-10-CM

## 2020-10-22 DIAGNOSIS — Z92.89 PERSONAL HISTORY OF OTHER MEDICAL TREATMENT: ICD-10-CM

## 2020-10-22 PROCEDURE — 99072 ADDL SUPL MATRL&STAF TM PHE: CPT

## 2020-10-22 PROCEDURE — 36415 COLL VENOUS BLD VENIPUNCTURE: CPT

## 2020-10-22 PROCEDURE — G0438: CPT

## 2020-10-22 PROCEDURE — 90662 IIV NO PRSV INCREASED AG IM: CPT

## 2020-10-22 PROCEDURE — G0008: CPT

## 2020-10-22 RX ORDER — NYSTATIN 100000 1/G
100000 POWDER TOPICAL TWICE DAILY
Qty: 1 | Refills: 0 | Status: DISCONTINUED | COMMUNITY
Start: 2017-03-29 | End: 2020-10-22

## 2020-10-22 RX ORDER — ACETAMINOPHEN AND CODEINE 300; 30 MG/1; MG/1
300-30 TABLET ORAL
Qty: 28 | Refills: 0 | Status: COMPLETED | COMMUNITY
Start: 2020-08-31

## 2020-10-22 NOTE — HISTORY OF PRESENT ILLNESS
[Other: _____] : [unfilled] [FreeTextEntry1] : est care [de-identified] : fall 6/2020 possible siezure\par - h/o seizure d/o\par - evaluated by neuro, awaiting EEG set up\par \par RIGHT carotid stenosis\par - follows w/ cardio\par \par COPD\par - on home O2, 3L nc\par - needs portable canasters fixed\par - uses stiolto daily w/o complications\par \par HTN/HLD\par - compliant w/ medications\par - no CP/SOB\par \par Constipation\par - chronic issues, uses stool softener prn\par \par smoker\par - uses patches "as needed"\par \par HCM\par - neesd mammo\par - if cleared by pulm should have colonoscopy, if not will get Cologuard

## 2020-10-22 NOTE — PHYSICAL EXAM
[No Acute Distress] : no acute distress [Well Nourished] : well nourished [Well Developed] : well developed [Well-Appearing] : well-appearing [Normal Sclera/Conjunctiva] : normal sclera/conjunctiva [EOMI] : extraocular movements intact [Normal Outer Ear/Nose] : the outer ears and nose were normal in appearance [No Lymphadenopathy] : no lymphadenopathy [Supple] : supple [Thyroid Normal, No Nodules] : the thyroid was normal and there were no nodules present [No Accessory Muscle Use] : no accessory muscle use [Clear to Auscultation] : lungs were clear to auscultation bilaterally [Normal Rate] : normal rate  [Regular Rhythm] : with a regular rhythm [Normal S1, S2] : normal S1 and S2 [No Murmur] : no murmur heard [No Varicosities] : no varicosities [No Edema] : there was no peripheral edema [No Palpable Aorta] : no palpable aorta [Soft] : abdomen soft [Non Tender] : non-tender [Non-distended] : non-distended [No Masses] : no abdominal mass palpated [No HSM] : no HSM [No Joint Swelling] : no joint swelling [Grossly Normal Strength/Tone] : grossly normal strength/tone [No Rash] : no rash [Coordination Grossly Intact] : coordination grossly intact [No Focal Deficits] : no focal deficits [Normal Gait] : normal gait [Normal Affect] : the affect was normal [Alert and Oriented x3] : oriented to person, place, and time [Normal Mood] : the mood was normal [Normal Insight/Judgement] : insight and judgment were intact [de-identified] : b/l crackles. R>L

## 2020-11-02 ENCOUNTER — TRANSCRIPTION ENCOUNTER (OUTPATIENT)
Age: 70
End: 2020-11-02

## 2020-11-02 DIAGNOSIS — E55.9 VITAMIN D DEFICIENCY, UNSPECIFIED: ICD-10-CM

## 2020-11-02 LAB
25(OH)D3 SERPL-MCNC: 26.6 NG/ML
ALBUMIN SERPL ELPH-MCNC: 4.7 G/DL
ALP BLD-CCNC: 91 U/L
ALT SERPL-CCNC: 13 U/L
ANION GAP SERPL CALC-SCNC: 16 MMOL/L
APPEARANCE: ABNORMAL
AST SERPL-CCNC: 19 U/L
BASOPHILS # BLD AUTO: 0.09 K/UL
BASOPHILS NFR BLD AUTO: 0.7 %
BILIRUB SERPL-MCNC: 0.2 MG/DL
BILIRUBIN URINE: NEGATIVE
BLOOD URINE: NEGATIVE
BUN SERPL-MCNC: 25 MG/DL
CALCIUM SERPL-MCNC: 10.5 MG/DL
CHLORIDE SERPL-SCNC: 98 MMOL/L
CHOLEST SERPL-MCNC: 180 MG/DL
CO2 SERPL-SCNC: 25 MMOL/L
COLOR: YELLOW
CREAT SERPL-MCNC: 0.88 MG/DL
EOSINOPHIL # BLD AUTO: 0.29 K/UL
EOSINOPHIL NFR BLD AUTO: 2.2 %
ESTIMATED AVERAGE GLUCOSE: 143 MG/DL
GLUCOSE QUALITATIVE U: NEGATIVE
GLUCOSE SERPL-MCNC: 118 MG/DL
HBA1C MFR BLD HPLC: 6.6 %
HCT VFR BLD CALC: 42.9 %
HCV AB SER QL: NONREACTIVE
HCV S/CO RATIO: 0.08 S/CO
HDLC SERPL-MCNC: 53 MG/DL
HGB BLD-MCNC: 12.8 G/DL
IMM GRANULOCYTES NFR BLD AUTO: 0.5 %
KETONES URINE: NEGATIVE
LDLC SERPL CALC-MCNC: 94 MG/DL
LEUKOCYTE ESTERASE URINE: ABNORMAL
LYMPHOCYTES # BLD AUTO: 3 K/UL
LYMPHOCYTES NFR BLD AUTO: 23.1 %
MAN DIFF?: NORMAL
MCHC RBC-ENTMCNC: 29.2 PG
MCHC RBC-ENTMCNC: 29.8 GM/DL
MCV RBC AUTO: 97.9 FL
MONOCYTES # BLD AUTO: 1.04 K/UL
MONOCYTES NFR BLD AUTO: 8 %
NEUTROPHILS # BLD AUTO: 8.51 K/UL
NEUTROPHILS NFR BLD AUTO: 65.5 %
NITRITE URINE: POSITIVE
NONHDLC SERPL-MCNC: 126 MG/DL
PH URINE: 5.5
PLATELET # BLD AUTO: 483 K/UL
POTASSIUM SERPL-SCNC: 5.1 MMOL/L
PROT SERPL-MCNC: 7.3 G/DL
PROTEIN URINE: NEGATIVE
RBC # BLD: 4.38 M/UL
RBC # FLD: 14.6 %
SODIUM SERPL-SCNC: 140 MMOL/L
SPECIFIC GRAVITY URINE: 1.02
T4 FREE SERPL-MCNC: 1.2 NG/DL
TRIGL SERPL-MCNC: 164 MG/DL
TSH SERPL-ACNC: 1.54 UIU/ML
UROBILINOGEN URINE: NORMAL
VIT B12 SERPL-MCNC: 378 PG/ML
WBC # FLD AUTO: 12.99 K/UL

## 2020-11-02 RX ORDER — UBIDECARENONE/VIT E ACET 100MG-5
50 MCG CAPSULE ORAL
Qty: 30 | Refills: 3 | Status: ACTIVE | COMMUNITY
Start: 2020-11-02 | End: 1900-01-01

## 2020-11-23 ENCOUNTER — RESULT REVIEW (OUTPATIENT)
Age: 70
End: 2020-11-23

## 2020-11-23 ENCOUNTER — APPOINTMENT (OUTPATIENT)
Dept: MRI IMAGING | Facility: HOSPITAL | Age: 70
End: 2020-11-23
Payer: MEDICARE

## 2020-11-23 ENCOUNTER — OUTPATIENT (OUTPATIENT)
Dept: OUTPATIENT SERVICES | Facility: HOSPITAL | Age: 70
LOS: 1 days | End: 2020-11-23
Payer: MEDICARE

## 2020-11-23 PROCEDURE — 70551 MRI BRAIN STEM W/O DYE: CPT | Mod: 26

## 2020-11-23 PROCEDURE — 70544 MR ANGIOGRAPHY HEAD W/O DYE: CPT

## 2020-11-23 PROCEDURE — 70544 MR ANGIOGRAPHY HEAD W/O DYE: CPT | Mod: 26,59

## 2020-11-23 PROCEDURE — 70551 MRI BRAIN STEM W/O DYE: CPT

## 2020-11-24 ENCOUNTER — APPOINTMENT (OUTPATIENT)
Dept: NEUROSURGERY | Facility: CLINIC | Age: 70
End: 2020-11-24
Payer: MEDICARE

## 2020-11-24 DIAGNOSIS — I63.9 CEREBRAL INFARCTION, UNSPECIFIED: ICD-10-CM

## 2020-11-24 PROCEDURE — 99214 OFFICE O/P EST MOD 30 MIN: CPT

## 2020-11-24 NOTE — ADDENDUM
[FreeTextEntry1] : 2020\par \par Trinity Trevizo MD\par [address]\[place] [address]\par \par Patient’s Name:  Skye Rodriges\par : 1950\par \par Dear Dr. Trevizo:\par \par We spoke with Mrs. Rodriges in tele health consultation at [facility].  She was accompanied by her son.  As you know, she is a 70 years-old right handed woman with history of cigarette smoking, hypertension, diabetes, hyperlipidemia, seizures, COPD, palindromic rheumatism and obstructive sleep apnea who suffered a transient syncope event in 2020.  At that time she underwent neuro-imaging work up that revealed calcified stenosis at the origin of the right internal carotid artery.  There was no evidence of large ischemic stroke, hemorrhage or occlusion.\par \par Today we reviewed an MRA Nova of the brain from yesterday that showed normal blood flow to bilateral cerebral hemispheres.  There is evidence of small chronic right frontal infarcts that could be related to the carotid stenosis or to any of her other comorbidities.\par \par We had obtain an Accumetrics test that showed non-responsiveness to baby aspirin, we have increased the dose of aspirin to 325 mg daily since then.  We will recheck Accumetrics.  If she is subtherapeutic again then we will switch to another antiplatelet agent.  She will continue daily statin.  Because of the severe calcification in the carotid artery she is not a good candidate for stenting.  The case will be discussed in cerebrovascular conference to assess the need and benefit from endarterectomy.  \par \par Thank you for allowing us to participate in Mrs. Rodriges’s care.  I will keep you updated at all times.\par \par Sincerely,\par \par \par Meng Watt MD\par Chief\par Neuro-Endovascular Surgery and \par Interventional Neuroradiology \par [facility]\par [facility]\par [address]\par 3rd Floor\[place] [address]\par T:  [phone]\par F:  [phone]\par \par cc:	Michael Vail MD\par 	[address]\par 	10th Floor\[place] [address]\par \par 	Skye Rodriges\par 	 [address]\par 	[address]\[place] [address]\par \par \par

## 2020-11-24 NOTE — HISTORY OF PRESENT ILLNESS
[Home] : at home, [unfilled] , at the time of the visit. [Medical Office: ([facility])___] : at the medical office located in  [Other:____] : [unfilled] [Verbal consent obtained from patient] : the patient, [unfilled] [FreeTextEntry1] : \par RIGHT-HANDED 70 y/o female current smoker (1/2 ppd) with past medical history of HTN, DM (metformin), HLD (lipitor), seizures (not on AEDs), COPD, SOB on 3L O2, JONATHAN (CPAP), palindromic rheumatism (last follow up with Dr. Delgado 2 years ago) presenting for neuroendovascular assessment of R ICA stenosis. She was referred by Dr. Trinity Trevizo.\par \par Patient with recent D/C from [facility] in early July 2020 for fall under unclear circumstances but no LOC was a stroke code and left against medical advice---work up not completed. CTA neck found ~moderate calcified stenosis of the right internal carotid artery without signs of acute ischemia, and report of vertigo so the fall was believed to be due to this. \par \par She reports that last seizure activity was 20 years ago. She does not take AEDs.\par \par Pt on ASA 81 mg, Atorvastatin 40 mg and is compliant with these medications.\par \par She complains of right sided pain. She denies left focal weakness or right eye vision change. \par \par \par \par \par Hanedness: RIGHT\par \par Patient Address:\par [address] [facility] [facility] [address]\[place] [address]\par \par Referring MD:\par Dr. Trinity Trevizo\par [address]\[place] [address]\par \par PCP:\par Dr. Michael Main\par [address]\[place] [address]\par [phone]\par \par

## 2020-11-24 NOTE — DATA REVIEWED
[de-identified] : [facility]\par    [facility] Department of Radiology\par   Radiology Report\par \par \par Patient Name: KEVIN WEISS   Report Date: 24-Nov-2020 08:23.00 \par Patient ID: [id number] ([id number]), [id number] (EPI)  Accession No.: [id number] \par Patient Birth Date: 1950  Report Status: F \par Referring Physician: [id number] AGAPITO WHITE   Reason For Study: EPISODE OF SYNCOPE IN JULY W/HX OF RT CALCIFIED CAROTID STENOSIS .R/O SILENT INF  \par \par \par \par \par \par EXAM: MR BRAIN\par \par PROCEDURE DATE: 11/23/2020\par \par \par \par INTERPRETATION: PROCEDURE: MRI Brain without contrast\par \par INDICATION: Syncope, right carotid stenosis, evaluate for infarction\par \par TECHNIQUE: Sagittal T1-w SPGR volumetric with axial and coronal reformations, axial T2 GRASE, T2-FLAIR, T2-FFE and diffusion weighted imaging of the brain is obtained.\par \par COMPARISON: CT head 7/8/2020\par \par FINDINGS: The MRI examination of the brain demonstrates the ventricles, cisternal spaces, and cortical sulci to be appropriate prominent consistent with parenchymal volume loss. There is no midline shift or extra-axial collection.\par \par There are scattered foci of T2/FLAIR hyperintensity within the periventricular subcortical white matter and basis of chronic microvascular ischemia. There is a chronic lacunar infarction in the right thalamus. There is a small chronic infarction in the right frontal lobe.. The diffusion-weighted images demonstrate no evidence of recent ischemic injury. There are preserved vascular flow-voids. There is no susceptibility related signal loss on GRE imaging.\par \par Again noted is a nasal septal defect. There is mucosal thickening.\par \par IMPRESSION:\par \par No acute infarction or intracranial hemorrhage. Chronic microvascular ischemia with chronic infarctions in the right thalamus and right frontal lobe.\par \par \par \par \par Thank you for the opportunity to participate in the care of this patient.\par \par \par ART COLLINS MD; Attending Radiologist\par This document has been electronically signed. Nov 24 2020 8:23AM \par  [de-identified] : [facility]\par    [facility] Department of Radiology\par   Radiology Report\par \par \par Patient Name: KEVIN WEISS   Report Date: 24-Nov-2020 08:22.00 \par Patient ID: [id number] ([id number]), [id number] (EPI)  Accession No.: [id number] \par Patient Birth Date: 1950  Report Status: F \par Referring Physician: [id number] AGAPITO WHITE   Reason For Study: NOVA PROTOCOL. HX OF RT CAROTID STENOSIS N SYNCOPE  \par \par \par \par \par \par EXAM: MR ANGIO BRAIN\par \par PROCEDURE DATE: 11/23/2020\par \par \par \par INTERPRETATION: PROCEDURE: MRA brain without contrast\par \par INDICATION: Syncope, right internal carotid artery stenosis\par \par TECHNIQUE: The MRA of the [place] was performed utilizing 3D TOF technique. MIP rotational series are provided. Intravascular flow quantification was performed on the intracranial vessels using gated 2-D phase contrast technique as part of Non-invasive Optimal Vessel analysis (NOVA).\par \par COMPARISON: CTA head and neck 7/8/2020\par \par FINDINGS:\par \par There is flow-related signal within the distal bilateral vertebral arteries without high-grade stenosis. There is flow-related signal within the basilar artery, bilateral superior cerebellar and bilateral posterior cerebral arteries without high-grade stenosis. The right posterior communicating artery is not visualized. The left posterior communicating artery is present.\par \par Again demonstrated is moderate stenosis of the proximal left internal carotid artery. There is flow-related signal within the distal bilateral internal carotid arteries without high-grade stenosis. There is flow-related signal within the bilateral anterior cerebral arteries without high-grade stenosis. There is flow-related signal within the proximal bilateral middle cerebral arteries without high-grade stenosis.\par \par There is no evidence of aneurysm.\par \par NOVA report is published in PACS. Reported flow rates are listed in units of mL/min:\par \par LEFT:\par \par MCA M1 107\par PCA P2 44\par \par RIGHT:\par \par MCA M1 105\par KYRA A1 75\par PCA P2 48\par \par \par BASILAR: 141\par \par \par IMPRESSION:\par \par No large vessel occlusion or high-grade stenosis of the intracranial arterial circulation. Again demonstrated is moderate stenosis of the proximal right internal carotid artery. Relatively symmetric NOVA flow rates within the bilateral ICA and MCA.\par \par \par \par \par Thank you for the opportunity to participate in the care of this patient.\par \par \par ART COLLINS MD; Attending Radiologist\par This document has been electronically signed. Nov 24 2020 8:22AM \par

## 2020-11-24 NOTE — REASON FOR VISIT
[Follow-Up: _____] : a [unfilled] follow-up visit [FreeTextEntry1] : LAST VISIT on 10/13/2020:\par +calcified stenosis at the origin of R ICA without evidence of large ischemic stroke/hemorrhage/occlusion.\par -patient asymptomatic from stenosis\par -advised to get MRI brain to assess for silent infarcts and NOVA to check intracranial blood flow\par -referred to Dr. Tello, Epilepsy, to rule out seizures given hx of seizures and recent syncope\par -check Platelet Response Aspirin\par \par TODAY'S VISIT:\par -Patient's Platelet Response ASA was subtherapeutic (605) on ASA 81 mg daily---dose increased to 325 mg daily on 10/14/2020. She also remains on statin.\par -She denies left sided weakness or acute vision change\par -Saw Epilepsy on 10/20, does not suspect seizure activities, but patient is pending routine and ambulatory EEG to rule out interictal epileptiform activity.

## 2020-11-25 ENCOUNTER — NON-APPOINTMENT (OUTPATIENT)
Age: 70
End: 2020-11-25

## 2020-11-25 LAB — PLATELET RESPONSE ASPIRIN: 433 ARU

## 2020-12-08 ENCOUNTER — APPOINTMENT (OUTPATIENT)
Dept: PULMONOLOGY | Facility: CLINIC | Age: 70
End: 2020-12-08

## 2020-12-10 ENCOUNTER — NON-APPOINTMENT (OUTPATIENT)
Age: 70
End: 2020-12-10

## 2020-12-18 ENCOUNTER — APPOINTMENT (OUTPATIENT)
Dept: NEUROSURGERY | Facility: CLINIC | Age: 70
End: 2020-12-18

## 2020-12-29 ENCOUNTER — APPOINTMENT (OUTPATIENT)
Dept: NEUROLOGY | Facility: CLINIC | Age: 70
End: 2020-12-29
Payer: MEDICARE

## 2020-12-29 PROCEDURE — 99072 ADDL SUPL MATRL&STAF TM PHE: CPT

## 2020-12-29 PROCEDURE — 95816 EEG AWAKE AND DROWSY: CPT

## 2020-12-30 ENCOUNTER — APPOINTMENT (OUTPATIENT)
Dept: NEUROLOGY | Facility: CLINIC | Age: 70
End: 2020-12-30

## 2020-12-30 PROCEDURE — 95719 EEG PHYS/QHP EA INCR W/O VID: CPT

## 2020-12-30 PROCEDURE — 99072 ADDL SUPL MATRL&STAF TM PHE: CPT

## 2020-12-30 PROCEDURE — 95700 EEG CONT REC W/VID EEG TECH: CPT

## 2020-12-30 PROCEDURE — 95708 EEG WO VID EA 12-26HR UNMNTR: CPT

## 2021-01-08 ENCOUNTER — APPOINTMENT (OUTPATIENT)
Dept: NEUROSURGERY | Facility: CLINIC | Age: 71
End: 2021-01-08
Payer: MEDICARE

## 2021-01-08 VITALS
TEMPERATURE: 97.7 F | DIASTOLIC BLOOD PRESSURE: 77 MMHG | BODY MASS INDEX: 33.26 KG/M2 | SYSTOLIC BLOOD PRESSURE: 152 MMHG | HEIGHT: 59 IN | WEIGHT: 165 LBS | RESPIRATION RATE: 18 BRPM | HEART RATE: 85 BPM | OXYGEN SATURATION: 96 %

## 2021-01-08 DIAGNOSIS — I65.29 OCCLUSION AND STENOSIS OF UNSPECIFIED CAROTID ARTERY: ICD-10-CM

## 2021-01-08 PROCEDURE — 99072 ADDL SUPL MATRL&STAF TM PHE: CPT

## 2021-01-08 PROCEDURE — 99215 OFFICE O/P EST HI 40 MIN: CPT

## 2021-01-09 PROBLEM — I65.29 CAROTID STENOSIS: Status: ACTIVE | Noted: 2020-07-24

## 2021-01-09 NOTE — ASSESSMENT
[FreeTextEntry1] : MRI brain without contrast done on 11/23/2020 reviewed by Dr. Germain with patient,  demonstrates no acute infarction but small chronic infarctions in right thalamus and right frontal lobe.\par CTA and MRA NOVA reviewed by Dr. Germain with patient and patient's daughter in law, demonstrates moderate to severe stenosis of the proximal right internal carotid artery secondary to plaque.\par \par Discussed possible treatment recommendations which include medical management/smoking cessation vs carotid endarterectomy.\par Reviewed risks, benefits and alternatives to treatment options.\par Given patient's significant comorbidities, she would be a higher risk surgical candidate. Favor medical management and smoking cessation.\par Recommend repeat MRI brain without contrast in 6 months to evaluate stability of chronic vessel ischemia (May 2021). If increased with medical management, will discuss surgical intervention.\par Educated regarding s/s stroke including focal weakness, slurred speech, facial asymmetry, changes in vision, report to ED.\par Encouraged smoking cessation - she is willing to try nicotine patches.\par Multiple questions answered to patient's satisfaction.\par \par Patient and patient's family verbalize agreement and understanding with plan of care.\par

## 2021-01-09 NOTE — PHYSICAL EXAM
[General Appearance - Alert] : alert [General Appearance - In No Acute Distress] : in no acute distress [Oriented To Time, Place, And Person] : oriented to person, place, and time [Cranial Nerves Optic (II)] : visual acuity intact bilaterally,  pupils equal round and reactive to light [Cranial Nerves Trigeminal (V)] : facial sensation intact symmetrically [Cranial Nerves Oculomotor (III)] : extraocular motion intact [Cranial Nerves Facial (VII)] : face symmetrical [Cranial Nerves Vestibulocochlear (VIII)] : hearing was intact bilaterally [Cranial Nerves Glossopharyngeal (IX)] : tongue and palate midline [Cranial Nerves Accessory (XI - Cranial And Spinal)] : head turning and shoulder shrug symmetric [Cranial Nerves Hypoglossal (XII)] : there was no tongue deviation with protrusion [Motor Tone] : muscle tone was normal in all four extremities [Motor Strength] : muscle strength was normal in all four extremities [Outer Ear] : the ears and nose were normal in appearance [Sclera] : the sclera and conjunctiva were normal [] : no respiratory distress [Abnormal Walk] : normal gait [Skin Color & Pigmentation] : normal skin color and pigmentation

## 2021-01-09 NOTE — HISTORY OF PRESENT ILLNESS
[de-identified] : 70 year old woman with past medical history current smoker (1/2 PPD),  HTN, DM, HLD, seizures, COPD (on 3L O2 at home), palindromic rheumatism referred by Dr. Meng Yun for evaluation of RIGHT ICA stenosis. \par \par She was hospitalized at [facility] in July 2020 due to fall. She began stroke workup but signed out AMA and did not complete full workup. CTA neck demonstrated moderate calcified stenosis of the right internal carotid artery without signs of acute ischemia.\par \par She has a history of seizures - reports last seizure 20 years ago. She does not take AEDs.\par \par She is on ASA 81 mg, atorvastatin 40 mg. Accumetrics demonstrated non responsiveness to baby ASA and she was advised to increased ASA to 325 mg daily.\par \par She had MRA NOVA done on 11/23/2020 which demonstrated normal blood flow to bilateral cerebral hemispheres and evidence of small chronic right frontal infarcts that could be related to carotid stenosis or other comorbidities.\par \par Due to extent of calcification in RIGHT ICA, it was determined that Ms. Rodriges was not a good candidate for stenting. \par She presents today to discuss carotid endarterectomy. She comes to the visit with her daughter-in-law, Claudia.\par \par At today's visit, she denies focal weakness, headaches, changes in vision/speech.  \par \par She does report RIGHT shoulder pain, which has been worsening over the last few weeks.\par

## 2021-01-09 NOTE — DATA REVIEWED
[de-identified] : \par  MR Head No Cont             Final\par \par No Documents Attached\par \par \par \par \par   EXAM:  MR BRAIN\par \par PROCEDURE DATE:  11/23/2020\par \par \par \par INTERPRETATION:  PROCEDURE: MRI Brain without contrast\par \par INDICATION: Syncope, right carotid stenosis, evaluate for infarction\par \par TECHNIQUE: Sagittal T1-w SPGR volumetric with axial and coronal reformations, axial T2 GRASE, T2-FLAIR, T2-FFE and diffusion weighted imaging of the brain is obtained.\par \par COMPARISON: CT head 7/8/2020\par \par FINDINGS: The MRI examination of the brain demonstrates the ventricles, cisternal spaces, and cortical sulci to be appropriate prominent consistent with parenchymal volume loss. There is no midline shift or extra-axial collection.\par \par There are scattered foci of T2/FLAIR hyperintensity within the periventricular subcortical white matter and basis of chronic microvascular ischemia. There is a chronic lacunar infarction in the right thalamus. There is a small chronic infarction in the right frontal lobe.. The diffusion-weighted images demonstrate no evidence of recent ischemic injury.  There are preserved vascular flow-voids. There is no susceptibility related signal loss on GRE imaging.\par \par Again noted is a nasal septal defect. There is mucosal thickening.\par \par IMPRESSION:\par \par No acute infarction or intracranial hemorrhage. Chronic microvascular ischemia with chronic infarctions in the right thalamus and right frontal lobe.\par \par \par \par \par Thank you for the opportunity to participate in the care of this patient.\par \par \par ART COLLINS MD; Attending Radiologist\par This document has been electronically signed. Nov 24 2020  8:23AM\par \par  \par \par  Ordered by: CHRISTOPHER ALTMAN       Collected/Examined: 23Nov2020 01:38PM       \par Verification Required       Stage: Final       \par  Performed at: [facility]       Resulted: 24Nov2020 08:26AM       Last Updated: 24Nov2020 08:27AM       Accession: [id number]        [de-identified] : \par  MR Angio Head No Cont             Final\par \par No Documents Attached\par \par \par \par \par   EXAM:  MR ANGIO BRAIN\par \par PROCEDURE DATE:  11/23/2020\par \par \par \par INTERPRETATION:  PROCEDURE: MRA brain without contrast\par \par INDICATION: Syncope, right internal carotid artery stenosis\par \par TECHNIQUE: The MRA of the [place] was performed utilizing 3D TOF technique. MIP rotational series are provided. Intravascular flow quantification was performed on the intracranial vessels using gated 2-D phase contrast technique as part of Non-invasive Optimal Vessel analysis (NOVA).\par \par COMPARISON: CTA head and neck 7/8/2020\par \par FINDINGS:\par \par There is flow-related signal within the distal bilateral vertebral arteries without high-grade stenosis. There is flow-related signal within the basilar artery, bilateral superior cerebellar and bilateral posterior cerebral arteries without high-grade stenosis. The right posterior communicating artery is not visualized. The left posterior communicating artery is present.\par \par Again demonstrated is moderate stenosis of the proximal left internal carotid artery. There is flow-related signal within the distal bilateral internal carotid arteries without high-grade stenosis. There is flow-related signal within the bilateral anterior cerebral arteries without high-grade stenosis. There is flow-related signal within the proximal bilateral middle cerebral arteries without high-grade stenosis.\par \par There is no evidence of aneurysm.\par \par NOVA report is published in PACS. Reported flow rates are listed in units of mL/min:\par \par LEFT:\par ICA           231\par MCA M1   107\par PCA P2    44\par \par RIGHT:\par ICA           225\par MCA M1   105\par KYRA A1    75\par PCA P2    48\par \par \par BASILAR:  141\par \par \par IMPRESSION:\par \par No large vessel occlusion or high-grade stenosis of the intracranial arterial circulation. Again demonstrated is moderate stenosis of the proximal right internal carotid artery. Relatively symmetric NOVA flow rates within the bilateral ICA and MCA.\par \par \par \par \par Thank you for the opportunity to participate in the care of this patient.\par \par \par ART COLLINS MD; Attending Radiologist\par This document has been electronically signed. Nov 24 2020  8:22AM\par \par  \par \par  Ordered by: CHRISTOPHER ALTMAN       Collected/Examined: 23Nov2020 04:05PM       \par Verification Required       Stage: Final       \par  Performed at: [facility]       Resulted: 24Nov2020 08:26AM       Last Updated: 24Nov2020 08:27AM       Accession: [id number]

## 2021-01-15 NOTE — ED ADULT NURSE NOTE - OBJECTIVE STATEMENT
Patient refused biba for lower abdominal cramping, dizziness and nausea while sitting at a chair at her MD office today.  Also c/o RLE pain.  Denies falling, chest pain, fever, chills.

## 2021-01-20 ENCOUNTER — TRANSCRIPTION ENCOUNTER (OUTPATIENT)
Age: 71
End: 2021-01-20

## 2021-01-20 RX ORDER — BLOOD SUGAR DIAGNOSTIC
STRIP MISCELLANEOUS
Qty: 1 | Refills: 3 | Status: ACTIVE | COMMUNITY
Start: 2020-11-03 | End: 1900-01-01

## 2021-02-13 ENCOUNTER — EMERGENCY (EMERGENCY)
Facility: HOSPITAL | Age: 71
LOS: 1 days | Discharge: HOME | End: 2021-02-13
Attending: EMERGENCY MEDICINE | Admitting: EMERGENCY MEDICINE
Payer: MEDICARE

## 2021-02-13 VITALS
HEIGHT: 61 IN | WEIGHT: 167.99 LBS | TEMPERATURE: 98 F | DIASTOLIC BLOOD PRESSURE: 54 MMHG | RESPIRATION RATE: 18 BRPM | HEART RATE: 98 BPM | SYSTOLIC BLOOD PRESSURE: 148 MMHG | OXYGEN SATURATION: 97 %

## 2021-02-13 DIAGNOSIS — R07.89 OTHER CHEST PAIN: ICD-10-CM

## 2021-02-13 DIAGNOSIS — R06.02 SHORTNESS OF BREATH: ICD-10-CM

## 2021-02-13 DIAGNOSIS — Z20.822 CONTACT WITH AND (SUSPECTED) EXPOSURE TO COVID-19: ICD-10-CM

## 2021-02-13 LAB
ALBUMIN SERPL ELPH-MCNC: 4.1 G/DL — SIGNIFICANT CHANGE UP (ref 3.3–5)
ALP SERPL-CCNC: 82 U/L — SIGNIFICANT CHANGE UP (ref 40–120)
ALT FLD-CCNC: 13 U/L — SIGNIFICANT CHANGE UP (ref 10–45)
ANION GAP SERPL CALC-SCNC: 12 MMOL/L — SIGNIFICANT CHANGE UP (ref 5–17)
AST SERPL-CCNC: 21 U/L — SIGNIFICANT CHANGE UP (ref 10–40)
BASE EXCESS BLDV CALC-SCNC: -0.2 MMOL/L — SIGNIFICANT CHANGE UP
BASOPHILS # BLD AUTO: 0.1 K/UL — SIGNIFICANT CHANGE UP (ref 0–0.2)
BASOPHILS NFR BLD AUTO: 0.8 % — SIGNIFICANT CHANGE UP (ref 0–2)
BILIRUB SERPL-MCNC: 0.3 MG/DL — SIGNIFICANT CHANGE UP (ref 0.2–1.2)
BUN SERPL-MCNC: 24 MG/DL — HIGH (ref 7–23)
CA-I SERPL-SCNC: 1.21 MMOL/L — SIGNIFICANT CHANGE UP (ref 1.12–1.3)
CALCIUM SERPL-MCNC: 10 MG/DL — SIGNIFICANT CHANGE UP (ref 8.4–10.5)
CHLORIDE SERPL-SCNC: 100 MMOL/L — SIGNIFICANT CHANGE UP (ref 96–108)
CO2 SERPL-SCNC: 25 MMOL/L — SIGNIFICANT CHANGE UP (ref 22–31)
CREAT SERPL-MCNC: 1.01 MG/DL — SIGNIFICANT CHANGE UP (ref 0.5–1.3)
CRP SERPL-MCNC: 5.59 MG/DL — HIGH (ref 0–0.4)
D DIMER BLD IA.RAPID-MCNC: 331 NG/ML DDU — HIGH
EOSINOPHIL # BLD AUTO: 0.58 K/UL — HIGH (ref 0–0.5)
EOSINOPHIL NFR BLD AUTO: 4.4 % — SIGNIFICANT CHANGE UP (ref 0–6)
FERRITIN SERPL-MCNC: 96 NG/ML — SIGNIFICANT CHANGE UP (ref 15–150)
GAS PNL BLDV: 127 MMOL/L — LOW (ref 138–146)
GAS PNL BLDV: SIGNIFICANT CHANGE UP
GLUCOSE SERPL-MCNC: 113 MG/DL — HIGH (ref 70–99)
HCO3 BLDV-SCNC: 26 MMOL/L — SIGNIFICANT CHANGE UP (ref 20–27)
HCT VFR BLD CALC: 36 % — SIGNIFICANT CHANGE UP (ref 34.5–45)
HGB BLD-MCNC: 11.4 G/DL — LOW (ref 11.5–15.5)
IMM GRANULOCYTES NFR BLD AUTO: 0.8 % — SIGNIFICANT CHANGE UP (ref 0–1.5)
LACTATE SERPL-SCNC: 2 MMOL/L — SIGNIFICANT CHANGE UP (ref 0.5–2)
LYMPHOCYTES # BLD AUTO: 17 % — SIGNIFICANT CHANGE UP (ref 13–44)
LYMPHOCYTES # BLD AUTO: 2.26 K/UL — SIGNIFICANT CHANGE UP (ref 1–3.3)
MCHC RBC-ENTMCNC: 28.9 PG — SIGNIFICANT CHANGE UP (ref 27–34)
MCHC RBC-ENTMCNC: 31.7 GM/DL — LOW (ref 32–36)
MCV RBC AUTO: 91.1 FL — SIGNIFICANT CHANGE UP (ref 80–100)
MONOCYTES # BLD AUTO: 1.3 K/UL — HIGH (ref 0–0.9)
MONOCYTES NFR BLD AUTO: 9.8 % — SIGNIFICANT CHANGE UP (ref 2–14)
NEUTROPHILS # BLD AUTO: 8.92 K/UL — HIGH (ref 1.8–7.4)
NEUTROPHILS NFR BLD AUTO: 67.2 % — SIGNIFICANT CHANGE UP (ref 43–77)
NRBC # BLD: 0 /100 WBCS — SIGNIFICANT CHANGE UP (ref 0–0)
NT-PROBNP SERPL-SCNC: 135 PG/ML — SIGNIFICANT CHANGE UP (ref 0–300)
PCO2 BLDV: 48 MMHG — SIGNIFICANT CHANGE UP (ref 41–51)
PH BLDV: 7.35 — SIGNIFICANT CHANGE UP (ref 7.32–7.43)
PLATELET # BLD AUTO: 368 K/UL — SIGNIFICANT CHANGE UP (ref 150–400)
PO2 BLDV: 35 MMHG — SIGNIFICANT CHANGE UP
POTASSIUM BLDV-SCNC: 4.6 MMOL/L — SIGNIFICANT CHANGE UP (ref 3.5–4.9)
POTASSIUM SERPL-MCNC: 4.9 MMOL/L — SIGNIFICANT CHANGE UP (ref 3.5–5.3)
POTASSIUM SERPL-SCNC: 4.9 MMOL/L — SIGNIFICANT CHANGE UP (ref 3.5–5.3)
PROT SERPL-MCNC: 7.4 G/DL — SIGNIFICANT CHANGE UP (ref 6–8.3)
RBC # BLD: 3.95 M/UL — SIGNIFICANT CHANGE UP (ref 3.8–5.2)
RBC # FLD: 15.4 % — HIGH (ref 10.3–14.5)
SAO2 % BLDV: 69 % — SIGNIFICANT CHANGE UP
SARS-COV-2 RNA SPEC QL NAA+PROBE: SIGNIFICANT CHANGE UP
SODIUM SERPL-SCNC: 137 MMOL/L — SIGNIFICANT CHANGE UP (ref 135–145)
TROPONIN T SERPL-MCNC: <0.01 NG/ML — SIGNIFICANT CHANGE UP (ref 0–0.01)
WBC # BLD: 13.26 K/UL — HIGH (ref 3.8–10.5)
WBC # FLD AUTO: 13.26 K/UL — HIGH (ref 3.8–10.5)

## 2021-02-13 PROCEDURE — 93010 ELECTROCARDIOGRAM REPORT: CPT

## 2021-02-13 PROCEDURE — 96361 HYDRATE IV INFUSION ADD-ON: CPT

## 2021-02-13 PROCEDURE — 84132 ASSAY OF SERUM POTASSIUM: CPT

## 2021-02-13 PROCEDURE — 99284 EMERGENCY DEPT VISIT MOD MDM: CPT | Mod: 25

## 2021-02-13 PROCEDURE — 84145 PROCALCITONIN (PCT): CPT

## 2021-02-13 PROCEDURE — U0005: CPT

## 2021-02-13 PROCEDURE — 80053 COMPREHEN METABOLIC PANEL: CPT

## 2021-02-13 PROCEDURE — 82803 BLOOD GASES ANY COMBINATION: CPT

## 2021-02-13 PROCEDURE — 82330 ASSAY OF CALCIUM: CPT

## 2021-02-13 PROCEDURE — 83605 ASSAY OF LACTIC ACID: CPT

## 2021-02-13 PROCEDURE — 84295 ASSAY OF SERUM SODIUM: CPT

## 2021-02-13 PROCEDURE — 36415 COLL VENOUS BLD VENIPUNCTURE: CPT

## 2021-02-13 PROCEDURE — 96374 THER/PROPH/DIAG INJ IV PUSH: CPT

## 2021-02-13 PROCEDURE — 99285 EMERGENCY DEPT VISIT HI MDM: CPT

## 2021-02-13 PROCEDURE — 82728 ASSAY OF FERRITIN: CPT

## 2021-02-13 PROCEDURE — 86140 C-REACTIVE PROTEIN: CPT

## 2021-02-13 PROCEDURE — U0003: CPT

## 2021-02-13 PROCEDURE — 84484 ASSAY OF TROPONIN QUANT: CPT

## 2021-02-13 PROCEDURE — 71045 X-RAY EXAM CHEST 1 VIEW: CPT | Mod: 26

## 2021-02-13 PROCEDURE — 87040 BLOOD CULTURE FOR BACTERIA: CPT

## 2021-02-13 PROCEDURE — 93005 ELECTROCARDIOGRAM TRACING: CPT

## 2021-02-13 PROCEDURE — 83880 ASSAY OF NATRIURETIC PEPTIDE: CPT

## 2021-02-13 PROCEDURE — 71045 X-RAY EXAM CHEST 1 VIEW: CPT

## 2021-02-13 PROCEDURE — 85379 FIBRIN DEGRADATION QUANT: CPT

## 2021-02-13 PROCEDURE — 85025 COMPLETE CBC W/AUTO DIFF WBC: CPT

## 2021-02-13 RX ORDER — MORPHINE SULFATE 50 MG/1
4 CAPSULE, EXTENDED RELEASE ORAL ONCE
Refills: 0 | Status: DISCONTINUED | OUTPATIENT
Start: 2021-02-13 | End: 2021-02-13

## 2021-02-13 RX ORDER — KETOROLAC TROMETHAMINE 30 MG/ML
15 SYRINGE (ML) INJECTION ONCE
Refills: 0 | Status: DISCONTINUED | OUTPATIENT
Start: 2021-02-13 | End: 2021-02-13

## 2021-02-13 RX ORDER — SODIUM CHLORIDE 9 MG/ML
500 INJECTION INTRAMUSCULAR; INTRAVENOUS; SUBCUTANEOUS ONCE
Refills: 0 | Status: COMPLETED | OUTPATIENT
Start: 2021-02-13 | End: 2021-02-13

## 2021-02-13 RX ORDER — ACETAMINOPHEN 500 MG
650 TABLET ORAL ONCE
Refills: 0 | Status: COMPLETED | OUTPATIENT
Start: 2021-02-13 | End: 2021-02-13

## 2021-02-13 RX ADMIN — Medication 15 MILLIGRAM(S): at 20:05

## 2021-02-13 RX ADMIN — SODIUM CHLORIDE 500 MILLILITER(S): 9 INJECTION INTRAMUSCULAR; INTRAVENOUS; SUBCUTANEOUS at 20:57

## 2021-02-13 RX ADMIN — SODIUM CHLORIDE 1000 MILLILITER(S): 9 INJECTION INTRAMUSCULAR; INTRAVENOUS; SUBCUTANEOUS at 20:05

## 2021-02-13 NOTE — ED ADULT NURSE NOTE - NSIMPLEMENTINTERV_GEN_ALL_ED
Implemented All Fall Risk Interventions:  [place] to call system. Call bell, personal items and telephone within reach. Instruct patient to call for assistance. Room bathroom lighting operational. Non-slip footwear when patient is off stretcher. Physically safe environment: no spills, clutter or unnecessary equipment. Stretcher in lowest position, wheels locked, appropriate side rails in place. Provide visual cue, wrist band, yellow gown, etc. Monitor gait and stability. Monitor for mental status changes and reorient to person, place, and time. Review medications for side effects contributing to fall risk. Reinforce activity limits and safety measures with patient and family.

## 2021-02-13 NOTE — ED PROVIDER NOTE - GASTROINTESTINAL NEGATIVE STATEMENT, MLM
Detail Level: Detailed Add 52 Modifier (Optional): no Medical Necessity Information: It is in your best interest to select a reason for this procedure from the list below. All of these items fulfill various CMS LCD requirements except the new and changing color options. Consent: The patient's consent was obtained including but not limited to risks of crusting, scabbing, blistering, scarring, darker or lighter pigmentary change, recurrence, incomplete removal and infection. Medical Necessity Clause: This procedure was medically necessary because the lesions that were treated were: Post-Care Instructions: I reviewed with the patient in detail post-care instructions. Patient is to wear sunprotection, and avoid picking at any of the treated lesions. Pt may apply Vaseline to crusted or scabbing areas. no abdominal pain, no bloating, no constipation, no diarrhea, no nausea and no vomiting.

## 2021-02-13 NOTE — ED ADULT TRIAGE NOTE - CHIEF COMPLAINT QUOTE
patient c/o intermitent shortness of breath and chest pain since monday . Denies any fever nor chills . History of COPD and heart problem ./

## 2021-02-13 NOTE — ED PROVIDER NOTE - PATIENT PORTAL LINK FT
You can access the FollowMyHealth Patient Portal offered by [facility] by registering at the following website: [email or web address]. By joining [employer]’s FollowMyHealth portal, you will also be able to view your health information using other applications (apps) compatible with our system.

## 2021-02-13 NOTE — ED PROVIDER NOTE - PHYSICAL EXAMINATION
CONSTITUTIONAL: Awake, alert and in no apparent distress.  HEENT: Head is atraumatic. Eyes clear bilaterally, normal EOMI. Airway patent.  CARDIAC: Normal rate, regular rhythm.  Heart sounds S1, S2.   RESPIRATORY: Breath sounds clear and equal bilaterally. no tachypnea, respiratory distress.   GASTROINTESTINAL: Abdomen soft, non-tender, no guarding, distension.  MUSCULOSKELETAL: Spine appears normal, no midline spinal tenderness, range of motion is not limited, no muscle or joint tenderness. no bony tenderness. tender to palp under L breast.  NEUROLOGICAL: Alert, no focal deficits, no motor or sensory deficits.  SKIN: Skin normal color for race, warm, dry and intact. No evidence of rash.  PSYCHIATRIC: Normal mood and affect. no apparent risk to self or others.

## 2021-02-13 NOTE — ED ADULT NURSE REASSESSMENT NOTE - NS ED NURSE REASSESS COMMENT FT1
Pt asking to leave, pt becoming very anxious/angry. Pt pacing back and forth. Calling doctor aloud. MD Sanders aware. IV access removed.

## 2021-02-13 NOTE — ED ADULT NURSE NOTE - CHPI ED NUR SYMPTOMS POS
L sided CP, pleuritic, sharp in nature, intermittent x5 days/CHEST DISCOMFORT/CHEST PAIN/SHORTNESS OF BREATH

## 2021-02-13 NOTE — ED PROVIDER NOTE - OBJECTIVE STATEMENT
69 yo hx of HTN, HLD, DM, COPD, smoking history, depression, remote TIAs presenting with chest pain for one week, intermittent in nature, sharp w increased pain on palpation under L breast. stated occurred after having bowel movement, and intermittently when get scares. No stroke symptoms, no known prior cardiac work up. on baseline 3L oxygen. 69 yo hx of HTN, HLD, DM, COPD, smoking history, depression, remote TIAs presenting with chest pain for one week, intermittent in nature, sharp w increased pain on palpation under L breast. Stated occurred after having bowel movement today, and intermittently when gets scared. No stroke symptoms-  no speech difficulty, focal weakness, numbness and no known prior cardiac work up. On baseline 3L oxygen.

## 2021-02-13 NOTE — ED ADULT NURSE REASSESSMENT NOTE - NS ED NURSE REASSESS COMMENT FT1
pt refusing pain medications and repeat troponin lab - pt requesting to leave. Made aware she would need to sign an AMA form. Dr. Sanders made aware

## 2021-02-13 NOTE — ED PROVIDER NOTE - CLINICAL SUMMARY MEDICAL DECISION MAKING FREE TEXT BOX
Pt w intermittent ttp chest pain, no acute worsening shortness of breath, baseline 3L  consider ACS, msk, less likely PE Pt w intermittent chest pain ttp, no acute worsening shortness of breath, baseline 3L  consider ACS, msk, less likely PE/pna/dissection, given clinical presentation.  Given age, risk factors, plan for rpt trop.  Pt not wanting for rpt trop to  be drawn, preferring to leave AMA. Rest of work up wnl. Pt to follow up with PCP for close reevaluation.    Patient desired to leave emergency department against medical advice, prior to completion of my desired evaluation and treatment plan.  Patient's mental status is normal, patient is fully alert and oriented x person, place and time.  Patient demonstrates clear reasoning capabilities and capacity to make this decision.  Patient fully understands the explained risks involved with this decision including worsening of medical condition, missed and or delayed diagnosis, permanent disability and death.  All alternative options given to patient and still desires discharge against medical advice from ED and will follow up as an outpatient.  Patient given the option to return to ED at any time to have further evaluation and treatment.

## 2021-02-13 NOTE — ED ADULT NURSE NOTE - OBJECTIVE STATEMENT
71 y/o female w/ PMH COPD, 69 y/o female w/ PMH COPD, DMII, HTN, HLD, epilepsy, peptic ulcers, presents to ED w/ c/o intermittent worsening L sided CP, sharp in nature, since Monday. pt also reports associated worsening SOB. denies any known sick contacts, denies fevers/chills/n/v/d.

## 2021-03-16 ENCOUNTER — APPOINTMENT (OUTPATIENT)
Dept: PULMONOLOGY | Facility: CLINIC | Age: 71
End: 2021-03-16
Payer: MEDICARE

## 2021-03-16 VITALS
DIASTOLIC BLOOD PRESSURE: 82 MMHG | HEIGHT: 59 IN | BODY MASS INDEX: 32.46 KG/M2 | HEART RATE: 94 BPM | TEMPERATURE: 98.2 F | SYSTOLIC BLOOD PRESSURE: 147 MMHG | OXYGEN SATURATION: 96 % | WEIGHT: 161 LBS

## 2021-03-16 DIAGNOSIS — Z23 ENCOUNTER FOR IMMUNIZATION: ICD-10-CM

## 2021-03-16 PROCEDURE — 99215 OFFICE O/P EST HI 40 MIN: CPT

## 2021-03-16 PROCEDURE — 99072 ADDL SUPL MATRL&STAF TM PHE: CPT

## 2021-03-16 NOTE — HISTORY OF PRESENT ILLNESS
[TextBox_4] : she is coughing a lot and she is bring up lots of sputum.  The color is white.  The sputum is mopre at the nigth time.  She is wheezing sometimes.  She would take the nebulizer but her portable does not charge anymore.  She is more sob.  She is doing much less than before.  She wants to stop smoking.

## 2021-03-16 NOTE — REVIEW OF SYSTEMS
[Cough] : cough [Hemoptysis] : no hemoptysis [Chest Tightness] : no chest tightness [Frequent URIs] : no frequent URIs [Sputum] : sputum [Dyspnea] : no dyspnea [Pleuritic Pain] : no pleuritic pain [Wheezing] : wheezing [A.M. Dry Mouth] : no a.m. dry mouth [SOB on Exertion] : sob on exertion [Negative] : Endocrine

## 2021-03-16 NOTE — ASSESSMENT
[FreeTextEntry1] : COPD\par \par Discussed the condition in details with the patient.  The patient is still smoking but she is cutting down and willing to stop smoking.  I informed the patient that her COPD will progress over the course of time if she continues to smoke.  Patient was in the emergency room and had a chest x-ray last month was unremarkable.  Patient is on LABA LAMA but her condition is uncontrolled.  I will increase to a triple therapy.  That added Flovent to her regimen and she is to continue on 10 mg of prednisone daily.  We will follow-up in 1 month.  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

## 2021-04-28 ENCOUNTER — TRANSCRIPTION ENCOUNTER (OUTPATIENT)
Age: 71
End: 2021-04-28

## 2021-04-29 ENCOUNTER — TRANSCRIPTION ENCOUNTER (OUTPATIENT)
Age: 71
End: 2021-04-29

## 2021-05-03 ENCOUNTER — APPOINTMENT (OUTPATIENT)
Dept: PULMONOLOGY | Facility: CLINIC | Age: 71
End: 2021-05-03
Payer: MEDICARE

## 2021-05-03 VITALS
HEART RATE: 93 BPM | BODY MASS INDEX: 32.25 KG/M2 | OXYGEN SATURATION: 99 % | DIASTOLIC BLOOD PRESSURE: 75 MMHG | WEIGHT: 160 LBS | HEIGHT: 59 IN | SYSTOLIC BLOOD PRESSURE: 127 MMHG | TEMPERATURE: 97.3 F

## 2021-05-03 PROCEDURE — 99214 OFFICE O/P EST MOD 30 MIN: CPT | Mod: 25

## 2021-05-03 PROCEDURE — 99072 ADDL SUPL MATRL&STAF TM PHE: CPT

## 2021-05-03 PROCEDURE — 71046 X-RAY EXAM CHEST 2 VIEWS: CPT

## 2021-05-03 NOTE — PROCEDURE
[FreeTextEntry1] : Chest x-ray PA and lateral\par \par No infiltrate, no pleural effusion, prominent vascular marking, normal cardiac silhouette

## 2021-05-03 NOTE — HISTORY OF PRESENT ILLNESS
[TextBox_4] : Especially in the morning she is just stop smoking yesterday her cough is yellowish.  She has no fever.  She is wheezing.  She using the nebulizer every now and then.  She is taking inhalers on daily basis.

## 2021-05-03 NOTE — PHYSICAL EXAM
[No Acute Distress] : no acute distress [Normal Oropharynx] : normal oropharynx [Normal Appearance] : normal appearance [No Neck Mass] : no neck mass [Normal Rate/Rhythm] : normal rate/rhythm [Normal S1, S2] : normal s1, s2 [No Murmurs] : no murmurs [No Resp Distress] : no resp distress [No Abnormalities] : no abnormalities [Benign] : benign [Normal Gait] : normal gait [No Clubbing] : no clubbing [No Cyanosis] : no cyanosis [No Edema] : no edema [FROM] : FROM [Normal Color/ Pigmentation] : normal color/ pigmentation [No Focal Deficits] : no focal deficits [Oriented x3] : oriented x3 [Normal Affect] : normal affect [TextBox_68] :  [id number]

## 2021-05-03 NOTE — ASSESSMENT
[FreeTextEntry1] : COPD\par \par - GOLD category B\par - Repeat PFT 7/2019 revealed moderate OLD\par - Continue on Stiolto, Flovent,  and albuterol as needed\par - Pt is to increase her exercise\par - she is up to date with immunization.  She got her flu vaccine this year\par - She did not require neither hospitalization, urgent care visits, nor ER visits since last visit\par - JONATHAN pt is compliant with CPAP machine.  She is compliant with the cpap mask and tolerated well.  As per her son, she is using it at night.  \par - Pt for repeat PFT instructed to go for COVID swab 72 hrs prior.\par - Follow up in 1 months\par \par Pulmonary micronodules\par - Repeat CT chest without any change to the nodules 3 mm to 4 mm.  Repeat CT scan at this time.  Pt is current smoker and high risk for CA.\par \par Chronic bronchitis\par \par I discussed the case in details with the patient is no evidence of underlying infectious process but her condition is compatible with the definition of chronic bronchitis related to her smoking history.  I discussed with her the importance of smoke cessation as her condition is deteriorating.  I started the patient on Centrex and also azithromycin as an anti-inflammatory.  We will follow-up in 1 month\par - I discussed with her smoke cessationStarted the patient on Chantix.\par \par JONATHAN\par \par as above\par \par Pt given referral to derm, neurosurgery for carotid stenosis, and PCP \par Pt given script for motorized wheelchair.

## 2021-05-28 VITALS
SYSTOLIC BLOOD PRESSURE: 123 MMHG | DIASTOLIC BLOOD PRESSURE: 69 MMHG | HEART RATE: 92 BPM | HEIGHT: 61 IN | RESPIRATION RATE: 22 BRPM | OXYGEN SATURATION: 98 % | TEMPERATURE: 99 F | WEIGHT: 166.01 LBS

## 2021-05-28 PROCEDURE — 99285 EMERGENCY DEPT VISIT HI MDM: CPT | Mod: 25

## 2021-05-28 PROCEDURE — 93010 ELECTROCARDIOGRAM REPORT: CPT

## 2021-05-28 PROCEDURE — 71046 X-RAY EXAM CHEST 2 VIEWS: CPT | Mod: 26

## 2021-05-28 NOTE — ED ADULT NURSE NOTE - BREATHING, MLM
[facility]  [address] [address]  Phone: [phone]                                    After Visit Summary   12/11/2020    Theresa Pardo    MRN: [id number]           After Visit Summary Signature Page    I have received my discharge instructions, and my questions have been answered. I have discussed any challenges I see with this plan with the nurse or doctor.    ..........................................................................................................................................  Patient/Patient Representative Signature      ..........................................................................................................................................  Patient Representative Print Name and Relationship to Patient    ..................................................               ................................................  Date                                   Time    ..........................................................................................................................................  Reviewed by Signature/Title    ...................................................              ..............................................  Date                                               Time          22EPIC Rev 08/18       
Labored

## 2021-05-28 NOTE — ED ADULT NURSE NOTE - OBJECTIVE STATEMENT
69 yo F pmhx COPD on 2L NC 2 baseline presents to ED co SOB worsening today. Pt describes she has not had her portable 02 tank for 2 weeks so has been traveling and doing activities out of home without 02, SOB feeling became worse today without 02 which prompted her to present to ED tonight. Pt breathing is labored, nasal cannula in place, patient positioned in an upright position and encouraged to take deep breaths, pt is maintaining 02 saturation >94%. Pt A&Ox4, ambulatory with steady gait, speaking in clear/full sentences. Denies CP, other complaints at this time. CCM initiated.

## 2021-05-28 NOTE — ED ADULT NURSE NOTE - NSIMPLEMENTINTERV_GEN_ALL_ED
Implemented All Fall Risk Interventions:  Dennis to call system. Call bell, personal items and telephone within reach. Instruct patient to call for assistance. Room bathroom lighting operational. Non-slip footwear when patient is off stretcher. Physically safe environment: no spills, clutter or unnecessary equipment. Stretcher in lowest position, wheels locked, appropriate side rails in place. Provide visual cue, wrist band, yellow gown, etc. Monitor gait and stability. Monitor for mental status changes and reorient to person, place, and time. Review medications for side effects contributing to fall risk. Reinforce activity limits and safety measures with patient and family.

## 2021-05-29 ENCOUNTER — INPATIENT (INPATIENT)
Facility: HOSPITAL | Age: 71
LOS: 3 days | Discharge: HOME | DRG: 190 | End: 2021-06-02
Payer: MEDICARE

## 2021-05-29 DIAGNOSIS — D64.9 ANEMIA, UNSPECIFIED: ICD-10-CM

## 2021-05-29 DIAGNOSIS — R63.8 OTHER SYMPTOMS AND SIGNS CONCERNING FOOD AND FLUID INTAKE: ICD-10-CM

## 2021-05-29 DIAGNOSIS — I65.29 OCCLUSION AND STENOSIS OF UNSPECIFIED CAROTID ARTERY: ICD-10-CM

## 2021-05-29 DIAGNOSIS — J44.1 CHRONIC OBSTRUCTIVE PULMONARY DISEASE WITH (ACUTE) EXACERBATION: ICD-10-CM

## 2021-05-29 DIAGNOSIS — Z29.9 ENCOUNTER FOR PROPHYLACTIC MEASURES, UNSPECIFIED: ICD-10-CM

## 2021-05-29 DIAGNOSIS — F17.200 NICOTINE DEPENDENCE, UNSPECIFIED, UNCOMPLICATED: ICD-10-CM

## 2021-05-29 DIAGNOSIS — E11.9 TYPE 2 DIABETES MELLITUS WITHOUT COMPLICATIONS: ICD-10-CM

## 2021-05-29 DIAGNOSIS — I10 ESSENTIAL (PRIMARY) HYPERTENSION: ICD-10-CM

## 2021-05-29 LAB
A1C WITH ESTIMATED AVERAGE GLUCOSE RESULT: 6.4 % — HIGH (ref 4–5.6)
ALBUMIN SERPL ELPH-MCNC: 3.4 G/DL — SIGNIFICANT CHANGE UP (ref 3.3–5)
ALBUMIN SERPL ELPH-MCNC: 3.7 G/DL — SIGNIFICANT CHANGE UP (ref 3.3–5)
ALP SERPL-CCNC: 77 U/L — SIGNIFICANT CHANGE UP (ref 40–120)
ALP SERPL-CCNC: 85 U/L — SIGNIFICANT CHANGE UP (ref 40–120)
ALT FLD-CCNC: 12 U/L — SIGNIFICANT CHANGE UP (ref 10–45)
ALT FLD-CCNC: 13 U/L — SIGNIFICANT CHANGE UP (ref 10–45)
ANION GAP SERPL CALC-SCNC: 13 MMOL/L — SIGNIFICANT CHANGE UP (ref 5–17)
ANION GAP SERPL CALC-SCNC: 7 MMOL/L — SIGNIFICANT CHANGE UP (ref 5–17)
APTT BLD: 30 SEC — SIGNIFICANT CHANGE UP (ref 27.5–35.5)
AST SERPL-CCNC: 18 U/L — SIGNIFICANT CHANGE UP (ref 10–40)
AST SERPL-CCNC: 20 U/L — SIGNIFICANT CHANGE UP (ref 10–40)
BASE EXCESS BLDV CALC-SCNC: 2 MMOL/L — SIGNIFICANT CHANGE UP (ref -2–3)
BASOPHILS # BLD AUTO: 0.05 K/UL — SIGNIFICANT CHANGE UP (ref 0–0.2)
BASOPHILS NFR BLD AUTO: 0.4 % — SIGNIFICANT CHANGE UP (ref 0–2)
BILIRUB SERPL-MCNC: 0.2 MG/DL — SIGNIFICANT CHANGE UP (ref 0.2–1.2)
BILIRUB SERPL-MCNC: <0.2 MG/DL — SIGNIFICANT CHANGE UP (ref 0.2–1.2)
BLD GP AB SCN SERPL QL: NEGATIVE — SIGNIFICANT CHANGE UP
BUN SERPL-MCNC: 17 MG/DL — SIGNIFICANT CHANGE UP (ref 7–23)
BUN SERPL-MCNC: 21 MG/DL — SIGNIFICANT CHANGE UP (ref 7–23)
CA-I SERPL-SCNC: 1.23 MMOL/L — SIGNIFICANT CHANGE UP (ref 1.15–1.33)
CALCIUM SERPL-MCNC: 8.6 MG/DL — SIGNIFICANT CHANGE UP (ref 8.4–10.5)
CALCIUM SERPL-MCNC: 9.6 MG/DL — SIGNIFICANT CHANGE UP (ref 8.4–10.5)
CHLORIDE SERPL-SCNC: 102 MMOL/L — SIGNIFICANT CHANGE UP (ref 96–108)
CHLORIDE SERPL-SCNC: 102 MMOL/L — SIGNIFICANT CHANGE UP (ref 96–108)
CO2 BLDV-SCNC: 29.2 MMOL/L — HIGH (ref 22–26)
CO2 SERPL-SCNC: 22 MMOL/L — SIGNIFICANT CHANGE UP (ref 22–31)
CO2 SERPL-SCNC: 29 MMOL/L — SIGNIFICANT CHANGE UP (ref 22–31)
CREAT SERPL-MCNC: 0.61 MG/DL — SIGNIFICANT CHANGE UP (ref 0.5–1.3)
CREAT SERPL-MCNC: 0.74 MG/DL — SIGNIFICANT CHANGE UP (ref 0.5–1.3)
EOSINOPHIL # BLD AUTO: 0.14 K/UL — SIGNIFICANT CHANGE UP (ref 0–0.5)
EOSINOPHIL NFR BLD AUTO: 1.1 % — SIGNIFICANT CHANGE UP (ref 0–6)
ESTIMATED AVERAGE GLUCOSE: 137 MG/DL — HIGH (ref 68–114)
FERRITIN SERPL-MCNC: 40 NG/ML — SIGNIFICANT CHANGE UP (ref 15–150)
FOLATE SERPL-MCNC: 18.2 NG/ML — SIGNIFICANT CHANGE UP
GAS PNL BLDV: 135 MMOL/L — LOW (ref 136–145)
GAS PNL BLDV: SIGNIFICANT CHANGE UP
GAS PNL BLDV: SIGNIFICANT CHANGE UP
GLUCOSE BLDC GLUCOMTR-MCNC: 217 MG/DL — HIGH (ref 70–99)
GLUCOSE BLDC GLUCOMTR-MCNC: 222 MG/DL — HIGH (ref 70–99)
GLUCOSE BLDC GLUCOMTR-MCNC: 255 MG/DL — HIGH (ref 70–99)
GLUCOSE BLDC GLUCOMTR-MCNC: 264 MG/DL — HIGH (ref 70–99)
GLUCOSE BLDC GLUCOMTR-MCNC: 280 MG/DL — HIGH (ref 70–99)
GLUCOSE SERPL-MCNC: 121 MG/DL — HIGH (ref 70–99)
GLUCOSE SERPL-MCNC: 217 MG/DL — HIGH (ref 70–99)
HCO3 BLDV-SCNC: 28 MMOL/L — SIGNIFICANT CHANGE UP (ref 22–29)
HCT VFR BLD CALC: 28.6 % — LOW (ref 34.5–45)
HCT VFR BLD CALC: 29.2 % — LOW (ref 34.5–45)
HGB BLD-MCNC: 8.9 G/DL — LOW (ref 11.5–15.5)
HGB BLD-MCNC: 8.9 G/DL — LOW (ref 11.5–15.5)
IMM GRANULOCYTES NFR BLD AUTO: 0.6 % — SIGNIFICANT CHANGE UP (ref 0–1.5)
INR BLD: 0.99 — SIGNIFICANT CHANGE UP (ref 0.88–1.16)
IRON SATN MFR SERPL: 17 UG/DL — LOW (ref 30–160)
IRON SATN MFR SERPL: 6 % — LOW (ref 14–50)
LYMPHOCYTES # BLD AUTO: 1.98 K/UL — SIGNIFICANT CHANGE UP (ref 1–3.3)
LYMPHOCYTES # BLD AUTO: 14.9 % — SIGNIFICANT CHANGE UP (ref 13–44)
MAGNESIUM SERPL-MCNC: 2.1 MG/DL — SIGNIFICANT CHANGE UP (ref 1.6–2.6)
MAGNESIUM SERPL-MCNC: 2.1 MG/DL — SIGNIFICANT CHANGE UP (ref 1.6–2.6)
MCHC RBC-ENTMCNC: 29.2 PG — SIGNIFICANT CHANGE UP (ref 27–34)
MCHC RBC-ENTMCNC: 29.5 PG — SIGNIFICANT CHANGE UP (ref 27–34)
MCHC RBC-ENTMCNC: 30.5 GM/DL — LOW (ref 32–36)
MCHC RBC-ENTMCNC: 31.1 GM/DL — LOW (ref 32–36)
MCV RBC AUTO: 93.8 FL — SIGNIFICANT CHANGE UP (ref 80–100)
MCV RBC AUTO: 96.7 FL — SIGNIFICANT CHANGE UP (ref 80–100)
MONOCYTES # BLD AUTO: 1.3 K/UL — HIGH (ref 0–0.9)
MONOCYTES NFR BLD AUTO: 9.8 % — SIGNIFICANT CHANGE UP (ref 2–14)
NEUTROPHILS # BLD AUTO: 9.7 K/UL — HIGH (ref 1.8–7.4)
NEUTROPHILS NFR BLD AUTO: 73.2 % — SIGNIFICANT CHANGE UP (ref 43–77)
NRBC # BLD: 0 /100 WBCS — SIGNIFICANT CHANGE UP (ref 0–0)
NRBC # BLD: 0 /100 WBCS — SIGNIFICANT CHANGE UP (ref 0–0)
NT-PROBNP SERPL-SCNC: 428 PG/ML — HIGH (ref 0–300)
PCO2 BLDV: 47 MMHG — HIGH (ref 39–42)
PH BLDV: 7.38 — SIGNIFICANT CHANGE UP (ref 7.32–7.43)
PHOSPHATE SERPL-MCNC: 2.9 MG/DL — SIGNIFICANT CHANGE UP (ref 2.5–4.5)
PLATELET # BLD AUTO: 349 K/UL — SIGNIFICANT CHANGE UP (ref 150–400)
PLATELET # BLD AUTO: 393 K/UL — SIGNIFICANT CHANGE UP (ref 150–400)
PO2 BLDV: 51 MMHG — SIGNIFICANT CHANGE UP
POTASSIUM BLDV-SCNC: 3.9 MMOL/L — SIGNIFICANT CHANGE UP (ref 3.5–5.1)
POTASSIUM SERPL-MCNC: 4.5 MMOL/L — SIGNIFICANT CHANGE UP (ref 3.5–5.3)
POTASSIUM SERPL-MCNC: 4.9 MMOL/L — SIGNIFICANT CHANGE UP (ref 3.5–5.3)
POTASSIUM SERPL-SCNC: 4.5 MMOL/L — SIGNIFICANT CHANGE UP (ref 3.5–5.3)
POTASSIUM SERPL-SCNC: 4.9 MMOL/L — SIGNIFICANT CHANGE UP (ref 3.5–5.3)
PROCALCITONIN SERPL-MCNC: 0.09 NG/ML — SIGNIFICANT CHANGE UP (ref 0.02–0.1)
PROCALCITONIN SERPL-MCNC: 0.1 NG/ML — SIGNIFICANT CHANGE UP (ref 0.02–0.1)
PROT SERPL-MCNC: 6.6 G/DL — SIGNIFICANT CHANGE UP (ref 6–8.3)
PROT SERPL-MCNC: 6.8 G/DL — SIGNIFICANT CHANGE UP (ref 6–8.3)
PROTHROM AB SERPL-ACNC: 11.9 SEC — SIGNIFICANT CHANGE UP (ref 10.6–13.6)
RAPID RVP RESULT: SIGNIFICANT CHANGE UP
RBC # BLD: 3.02 M/UL — LOW (ref 3.8–5.2)
RBC # BLD: 3.05 M/UL — LOW (ref 3.8–5.2)
RBC # FLD: 15.1 % — HIGH (ref 10.3–14.5)
RBC # FLD: 15.2 % — HIGH (ref 10.3–14.5)
RH IG SCN BLD-IMP: POSITIVE — SIGNIFICANT CHANGE UP
SAO2 % BLDV: 81.9 % — SIGNIFICANT CHANGE UP
SARS-COV-2 RNA SPEC QL NAA+PROBE: NEGATIVE — SIGNIFICANT CHANGE UP
SARS-COV-2 RNA SPEC QL NAA+PROBE: SIGNIFICANT CHANGE UP
SODIUM SERPL-SCNC: 137 MMOL/L — SIGNIFICANT CHANGE UP (ref 135–145)
SODIUM SERPL-SCNC: 138 MMOL/L — SIGNIFICANT CHANGE UP (ref 135–145)
TIBC SERPL-MCNC: 304 UG/DL — SIGNIFICANT CHANGE UP (ref 220–430)
TSH SERPL-MCNC: 0.38 UIU/ML — SIGNIFICANT CHANGE UP (ref 0.27–4.2)
UIBC SERPL-MCNC: 287 UG/DL — SIGNIFICANT CHANGE UP (ref 110–370)
VIT B12 SERPL-MCNC: 367 PG/ML — SIGNIFICANT CHANGE UP (ref 232–1245)
WBC # BLD: 11.43 K/UL — HIGH (ref 3.8–10.5)
WBC # BLD: 13.25 K/UL — HIGH (ref 3.8–10.5)
WBC # FLD AUTO: 11.43 K/UL — HIGH (ref 3.8–10.5)
WBC # FLD AUTO: 13.25 K/UL — HIGH (ref 3.8–10.5)

## 2021-05-29 PROCEDURE — 99232 SBSQ HOSP IP/OBS MODERATE 35: CPT

## 2021-05-29 PROCEDURE — 71046 X-RAY EXAM CHEST 2 VIEWS: CPT | Mod: 26

## 2021-05-29 RX ORDER — INSULIN LISPRO 100/ML
VIAL (ML) SUBCUTANEOUS
Refills: 0 | Status: DISCONTINUED | OUTPATIENT
Start: 2021-05-29 | End: 2021-06-01

## 2021-05-29 RX ORDER — DEXTROSE 50 % IN WATER 50 %
12.5 SYRINGE (ML) INTRAVENOUS ONCE
Refills: 0 | Status: DISCONTINUED | OUTPATIENT
Start: 2021-05-29 | End: 2021-06-02

## 2021-05-29 RX ORDER — BUDESONIDE AND FORMOTEROL FUMARATE DIHYDRATE 160; 4.5 UG/1; UG/1
2 AEROSOL RESPIRATORY (INHALATION)
Refills: 0 | Status: DISCONTINUED | OUTPATIENT
Start: 2021-05-29 | End: 2021-06-02

## 2021-05-29 RX ORDER — IPRATROPIUM/ALBUTEROL SULFATE 18-103MCG
3 AEROSOL WITH ADAPTER (GRAM) INHALATION ONCE
Refills: 0 | Status: COMPLETED | OUTPATIENT
Start: 2021-05-29 | End: 2021-05-29

## 2021-05-29 RX ORDER — NICOTINE POLACRILEX 2 MG
1 GUM BUCCAL DAILY
Refills: 0 | Status: DISCONTINUED | OUTPATIENT
Start: 2021-05-29 | End: 2021-06-02

## 2021-05-29 RX ORDER — SODIUM CHLORIDE 9 MG/ML
1000 INJECTION INTRAMUSCULAR; INTRAVENOUS; SUBCUTANEOUS ONCE
Refills: 0 | Status: COMPLETED | OUTPATIENT
Start: 2021-05-29 | End: 2021-05-29

## 2021-05-29 RX ORDER — DEXTROSE 50 % IN WATER 50 %
25 SYRINGE (ML) INTRAVENOUS ONCE
Refills: 0 | Status: DISCONTINUED | OUTPATIENT
Start: 2021-05-29 | End: 2021-06-02

## 2021-05-29 RX ORDER — ATORVASTATIN CALCIUM 80 MG/1
40 TABLET, FILM COATED ORAL AT BEDTIME
Refills: 0 | Status: DISCONTINUED | OUTPATIENT
Start: 2021-05-29 | End: 2021-06-02

## 2021-05-29 RX ORDER — GLUCAGON INJECTION, SOLUTION 0.5 MG/.1ML
1 INJECTION, SOLUTION SUBCUTANEOUS ONCE
Refills: 0 | Status: DISCONTINUED | OUTPATIENT
Start: 2021-05-29 | End: 2021-06-02

## 2021-05-29 RX ORDER — LISINOPRIL 2.5 MG/1
20 TABLET ORAL DAILY
Refills: 0 | Status: DISCONTINUED | OUTPATIENT
Start: 2021-05-29 | End: 2021-06-02

## 2021-05-29 RX ORDER — AZITHROMYCIN 500 MG/1
500 TABLET, FILM COATED ORAL ONCE
Refills: 0 | Status: COMPLETED | OUTPATIENT
Start: 2021-05-29 | End: 2021-05-29

## 2021-05-29 RX ORDER — IPRATROPIUM/ALBUTEROL SULFATE 18-103MCG
3 AEROSOL WITH ADAPTER (GRAM) INHALATION EVERY 4 HOURS
Refills: 0 | Status: DISCONTINUED | OUTPATIENT
Start: 2021-05-29 | End: 2021-06-02

## 2021-05-29 RX ORDER — PANTOPRAZOLE SODIUM 20 MG/1
40 TABLET, DELAYED RELEASE ORAL
Refills: 0 | Status: DISCONTINUED | OUTPATIENT
Start: 2021-05-29 | End: 2021-06-02

## 2021-05-29 RX ORDER — ENOXAPARIN SODIUM 100 MG/ML
40 INJECTION SUBCUTANEOUS EVERY 24 HOURS
Refills: 0 | Status: DISCONTINUED | OUTPATIENT
Start: 2021-05-29 | End: 2021-06-02

## 2021-05-29 RX ORDER — HYDROCHLOROTHIAZIDE 25 MG
25 TABLET ORAL EVERY 24 HOURS
Refills: 0 | Status: DISCONTINUED | OUTPATIENT
Start: 2021-05-29 | End: 2021-06-02

## 2021-05-29 RX ORDER — SODIUM CHLORIDE 9 MG/ML
1000 INJECTION, SOLUTION INTRAVENOUS
Refills: 0 | Status: DISCONTINUED | OUTPATIENT
Start: 2021-05-29 | End: 2021-06-02

## 2021-05-29 RX ORDER — SERTRALINE 25 MG/1
50 TABLET, FILM COATED ORAL DAILY
Refills: 0 | Status: DISCONTINUED | OUTPATIENT
Start: 2021-05-29 | End: 2021-06-02

## 2021-05-29 RX ORDER — ASPIRIN/CALCIUM CARB/MAGNESIUM 324 MG
81 TABLET ORAL DAILY
Refills: 0 | Status: DISCONTINUED | OUTPATIENT
Start: 2021-05-29 | End: 2021-06-02

## 2021-05-29 RX ORDER — AZITHROMYCIN 500 MG/1
500 TABLET, FILM COATED ORAL EVERY 24 HOURS
Refills: 0 | Status: COMPLETED | OUTPATIENT
Start: 2021-05-30 | End: 2021-05-31

## 2021-05-29 RX ORDER — CEFTRIAXONE 500 MG/1
1000 INJECTION, POWDER, FOR SOLUTION INTRAMUSCULAR; INTRAVENOUS EVERY 24 HOURS
Refills: 0 | Status: COMPLETED | OUTPATIENT
Start: 2021-05-29 | End: 2021-06-02

## 2021-05-29 RX ORDER — DEXTROSE 50 % IN WATER 50 %
15 SYRINGE (ML) INTRAVENOUS ONCE
Refills: 0 | Status: DISCONTINUED | OUTPATIENT
Start: 2021-05-29 | End: 2021-06-02

## 2021-05-29 RX ADMIN — Medication 3 MILLILITER(S): at 01:53

## 2021-05-29 RX ADMIN — Medication 3 MILLILITER(S): at 10:37

## 2021-05-29 RX ADMIN — CEFTRIAXONE 100 MILLIGRAM(S): 500 INJECTION, POWDER, FOR SOLUTION INTRAMUSCULAR; INTRAVENOUS at 05:49

## 2021-05-29 RX ADMIN — Medication 3 MILLILITER(S): at 23:15

## 2021-05-29 RX ADMIN — AZITHROMYCIN 255 MILLIGRAM(S): 500 TABLET, FILM COATED ORAL at 01:53

## 2021-05-29 RX ADMIN — ATORVASTATIN CALCIUM 40 MILLIGRAM(S): 80 TABLET, FILM COATED ORAL at 21:07

## 2021-05-29 RX ADMIN — PANTOPRAZOLE SODIUM 40 MILLIGRAM(S): 20 TABLET, DELAYED RELEASE ORAL at 05:52

## 2021-05-29 RX ADMIN — BUDESONIDE AND FORMOTEROL FUMARATE DIHYDRATE 2 PUFF(S): 160; 4.5 AEROSOL RESPIRATORY (INHALATION) at 11:34

## 2021-05-29 RX ADMIN — Medication 3: at 12:17

## 2021-05-29 RX ADMIN — Medication 3 MILLILITER(S): at 15:22

## 2021-05-29 RX ADMIN — Medication 3 MILLILITER(S): at 03:09

## 2021-05-29 RX ADMIN — Medication 3 MILLILITER(S): at 19:02

## 2021-05-29 RX ADMIN — Medication 2: at 08:35

## 2021-05-29 RX ADMIN — BUDESONIDE AND FORMOTEROL FUMARATE DIHYDRATE 2 PUFF(S): 160; 4.5 AEROSOL RESPIRATORY (INHALATION) at 23:19

## 2021-05-29 RX ADMIN — Medication 40 MILLIGRAM(S): at 17:00

## 2021-05-29 RX ADMIN — Medication 40 MILLIGRAM(S): at 08:40

## 2021-05-29 RX ADMIN — Medication 1 PATCH: at 16:41

## 2021-05-29 RX ADMIN — Medication 81 MILLIGRAM(S): at 11:28

## 2021-05-29 RX ADMIN — Medication 3 MILLILITER(S): at 03:15

## 2021-05-29 RX ADMIN — SERTRALINE 50 MILLIGRAM(S): 25 TABLET, FILM COATED ORAL at 11:28

## 2021-05-29 RX ADMIN — Medication 80 MILLIGRAM(S): at 01:53

## 2021-05-29 RX ADMIN — Medication 3: at 16:48

## 2021-05-29 RX ADMIN — Medication 1 PATCH: at 11:28

## 2021-05-29 RX ADMIN — Medication 3 MILLILITER(S): at 06:09

## 2021-05-29 RX ADMIN — Medication 25 MILLIGRAM(S): at 17:00

## 2021-05-29 RX ADMIN — LISINOPRIL 20 MILLIGRAM(S): 2.5 TABLET ORAL at 05:52

## 2021-05-29 RX ADMIN — SODIUM CHLORIDE 1000 MILLILITER(S): 9 INJECTION INTRAMUSCULAR; INTRAVENOUS; SUBCUTANEOUS at 01:53

## 2021-05-29 NOTE — H&P ADULT - NSHPPHYSICALEXAM_GEN_ALL_CORE
.  VITAL SIGNS:  T(C): 36.4 (05-29-21 @ 02:47), Max: 37.1 (05-28-21 @ 23:12)  T(F): 97.6 (05-29-21 @ 02:47), Max: 98.7 (05-28-21 @ 23:12)  HR: 104 (05-29-21 @ 02:47) (90 - 104)  BP: 145/68 (05-29-21 @ 02:47) (123/69 - 145/68)  BP(mean): --  RR: 24 (05-29-21 @ 02:47) (16 - 24)  SpO2: 97% (05-29-21 @ 02:47) (94% - 98%)  Wt(kg): --    PHYSICAL EXAM:    Constitutional: Resting in bed, tachypneic 22-25, no accessory muscle use. AOx3  Eyes: PERRL, EOMI, anicteric sclera  ENT: no nasal discharge; uvula midline, no oropharyngeal erythema or exudates; MMM  Neck: supple; no JVD or thyromegaly  Respiratory: +RLL wheezing; NO crackles   Cardiac: +S1/S2; RRR; no M/R/G; PMI non-displaced  Gastrointestinal: soft, NT/ND; no rebound or guarding; +BSx4  Extremities: WWP, no clubbing or cyanosis; no peripheral edema  Neurologic: AAOx3; CNII-XII grossly intact; no focal deficits .  VITAL SIGNS:  T(C): 36.4 (05-29-21 @ 02:47), Max: 37.1 (05-28-21 @ 23:12)  T(F): 97.6 (05-29-21 @ 02:47), Max: 98.7 (05-28-21 @ 23:12)  HR: 104 (05-29-21 @ 02:47) (90 - 104)  BP: 145/68 (05-29-21 @ 02:47) (123/69 - 145/68)  BP(mean): --  RR: 24 (05-29-21 @ 02:47) (16 - 24)  SpO2: 97% (05-29-21 @ 02:47) (94% - 98%)  Wt(kg): --    PHYSICAL EXAM:    Constitutional: Resting in bed, tachypneic 20-22, no accessory muscle use. AOx3  Eyes: PERRL, EOMI, anicteric sclera  ENT: no nasal discharge; uvula midline, no oropharyngeal erythema or exudates; MMM  Neck: supple; no JVD or thyromegaly  Respiratory: +RLL wheezing; NO crackles   Cardiac: +S1/S2; RRR; no M/R/G; PMI non-displaced  Gastrointestinal: soft, NT/ND; no rebound or guarding; +BSx4  Extremities: WWP, no clubbing or cyanosis; no peripheral edema  Neurologic: AAOx3; CNII-XII grossly intact; no focal deficits

## 2021-05-29 NOTE — H&P ADULT - NSHPLABSRESULTS_GEN_ALL_CORE
.  LABS:                         8.9    13.25 )-----------( 393      ( 29 May 2021 00:17 )             28.6     05-29    138  |  102  |  21  ----------------------------<  121<H>  4.5   |  29  |  0.74    Ca    9.6      29 May 2021 00:17    TPro  6.8  /  Alb  3.7  /  TBili  0.2  /  DBili  x   /  AST  18  /  ALT  12  /  AlkPhos  85  05-29                  RADIOLOGY, EKG & ADDITIONAL TESTS: Reviewed.

## 2021-05-29 NOTE — ED PROVIDER NOTE - CLINICAL SUMMARY MEDICAL DECISION MAKING FREE TEXT BOX
will plan for nebs, IV steroids, and IV azithromycin for COPD exacerbation.  will neeed admission for further monitoring and workup, and eval situation for her home oxygen

## 2021-05-29 NOTE — ED PROVIDER NOTE - EKG #1 DATE/TIME
Immediate Brief Procedure Note    Patient: Gabriel Albright    Pre-op Dx: left foot recurrent hallux valgus    Post-op Dx: same    Procedure: Left first mtp joint fusion    Surgeon:  Rogers Sequeira MD    Assistants: jennifer briggs    Anesthesia Staff: Anesthesiologist: Feliciano Gomez MD  Anesthesia Assistant: Feliciano Vazquez    Anesthesia Type: general    Findings: deformity    Estimated Blood Loss: 2cc    Complications: none    Specimens Removed: none  
28-May-2021 23:40

## 2021-05-29 NOTE — ED PROVIDER NOTE - PHYSICAL EXAMINATION
CONSTITUTIONAL: Well-appearing; well-nourished; in mild resp distress  HEAD: Normocephalic; atraumatic.   EYES:  conjunctiva and sclera clear  ENT: normal nose; no rhinorrhea; normal pharynx with no erythema or lesions.   NECK: Supple; non-tender;   CARDIOVASCULAR: Normal S1, S2; no murmurs, rubs, or gallops. Regular rate and rhythm.   RESPIRATORY: increased rate and effort, +Diffuse wheezing, able to speak sentences but has to stop  GI: Soft; non-distended; non-tender; no palpable organomegaly.   EXT: No cyanosis or edema; N/V intact  SKIN: Normal for age and race; warm; dry; good turgor; no apparent lesions or rash.   NEURO: A & O x 3; face symmetric; grossly unremarkable.   PSYCHOLOGICAL: The patient’s mood and manner are appropriate.

## 2021-05-29 NOTE — H&P ADULT - PROBLEM SELECTOR PLAN 4
CTA head/neck with moderate to severe R ICA stenosis 2/2 calcified atherosclerotic plaque, possible calcific emboli in R MCA, no large vessel occlusion. Follows neuro outpt however unable to see since pandemic   - c/w asa 81  - obtain collateral   - cont outpt f/up

## 2021-05-29 NOTE — PROGRESS NOTE ADULT - SUBJECTIVE AND OBJECTIVE BOX
Interval Events: Reviewed  Patient seen and examined at bedside.    Patient is a 70y old  Female who presents with a chief complaint of worsening shortness of breath.   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    Disease of trachea and bronchus  Tracheomalacia    Epilepsy  No longer on medications.    Depressive disorder  Depression    Transient cerebral ischemia  TIA (transient ischemic attack)    Peptic ulcer  2/2 H. pylori (s/p amoxicillin, clarithromycin, omeprazole)    Tobacco use disorder  Tobacco abuse    Emphysema  COPD (chronic obstructive pulmonary disease) with emphysema    Hyperlipidemia  Hyperlipidemia    Essential hypertension  Hypertension    Type 2 diabetes mellitus  Diabetes    Other postprocedural status  S/P hernia repair        MEDICATIONS:  Pulmonary:  albuterol/ipratropium for Nebulization 3 milliLiter(s) Nebulizer every 4 hours  budesonide 160 MICROgram(s)/formoterol 4.5 MICROgram(s) Inhaler 2 Puff(s) Inhalation two times a day    Antimicrobials:  cefTRIAXone   IVPB 1000 milliGRAM(s) IV Intermittent every 24 hours    Anticoagulants:  aspirin enteric coated 81 milliGRAM(s) Oral daily  enoxaparin Injectable 40 milliGRAM(s) SubCutaneous every 24 hours    Cardiac:  hydrochlorothiazide 25 milliGRAM(s) Oral every 24 hours  lisinopril 20 milliGRAM(s) Oral daily      Allergies    No Known Allergies    Intolerances        Vital Signs Last 24 Hrs  T(C): 36.7 (29 May 2021 06:47), Max: 37.1 (28 May 2021 23:12)  T(F): 98.1 (29 May 2021 06:47), Max: 98.7 (28 May 2021 23:12)  HR: 80 (29 May 2021 06:47) (80 - 104)  BP: 125/56 (29 May 2021 06:47) (123/69 - 145/68)  BP(mean): --  RR: 19 (29 May 2021 06:47) (16 - 24)  SpO2: 100% (29 May 2021 06:47) (94% - 100%)        Review of Systems:   •	General: negative  •	Skin/Breast: negative  •	Ophthalmologic: negative  •	ENMT: negative  •	Respiratory and Thorax: negative  •	Cardiovascular: negative  •	Gastrointestinal: negative  •	Genitourinary: negative  •	Musculoskeletal: negative  •	Neurological: negative  •	Psychiatric: negative  •	Hematology/Lymphatics: negative  •	Endocrine: negative  •	Allergic/Immunologic: negative    Physical Exam:   • Constitutional:	Well-developed, well nourished  • Eyes:	EOMI; PERRL; no drainage or redness  • ENMT:	No oral lesions; no gross abnormalities  • Neck	no thyromegaly or nodules  • Breasts:	not examined  • Back:	No deformity or limitation of movement  • Respiratory:	Breath Sounds equal & clear to auscultation, no accessory muscle use, RLL wheezes  • Cardiovascular:	Regular rate & rhythm, normal S1, S2; no murmurs, gallops or rubs; no S3, S4  • Gastrointestinal:	Soft, non-tender, no hepatosplenomegaly, normal bowel sounds  • Genitourinary:	not examined  • Rectal: not examined  • Extremities:	No cyanosis, clubbing or edema  • Vascular:	Equal and normal pulses (dorsalis pedis)  • Neurologica:l	not examined  • Skin:	No lesions; no rash  • Lymph Nodes:	No lymphadedenopathy  • Musculoskeletal:	No joint pain, swelling or deformity; no limitation of movement        LABS:      CBC Full  -  ( 29 May 2021 08:38 )  WBC Count : 11.43 K/uL  RBC Count : 3.02 M/uL  Hemoglobin : 8.9 g/dL  Hematocrit : 29.2 %  Platelet Count - Automated : 349 K/uL  Mean Cell Volume : 96.7 fl  Mean Cell Hemoglobin : 29.5 pg  Mean Cell Hemoglobin Concentration : 30.5 gm/dL  Auto Neutrophil # : x  Auto Lymphocyte # : x  Auto Monocyte # : x  Auto Eosinophil # : x  Auto Basophil # : x  Auto Neutrophil % : x  Auto Lymphocyte % : x  Auto Monocyte % : x  Auto Eosinophil % : x  Auto Basophil % : x    05-29    137  |  102  |  17  ----------------------------<  217<H>  4.9   |  22  |  0.61    Ca    8.6      29 May 2021 08:38  Phos  2.9     05-29  Mg     2.1     05-29    TPro  6.6  /  Alb  3.4  /  TBili  <0.2  /  DBili  x   /  AST  20  /  ALT  13  /  AlkPhos  77  05-29    PT/INR - ( 29 May 2021 08:38 )   PT: 11.9 sec;   INR: 0.99          PTT - ( 29 May 2021 08:38 )  PTT:30.0 sec                    RADIOLOGY & ADDITIONAL STUDIES (The following images were personally reviewed):  Tello:                                     No  Urine output:                       adequate  DVT prophylaxis:                 Yes  Flattus:                                  Yes  Bowel movement:              No

## 2021-05-29 NOTE — H&P ADULT - PROBLEM SELECTOR PLAN 1
Pt with hx of COPD on home O2 (3L) and CPAP presents with worsening SOB/MUNSON, increased sputum production for one week in setting of home O2 noncomplaince; in addition with nasal congestion throat pain with mild leukocytosis (2/2 steroids?). CXR with RLL atelectasis vs infiltrate. COVID swab neg. Diffusely wheezing in ED. Pt is euvolemic, no increased O2 requirements from baseline 3LNC. Likely COPD exacerbation 2/2 O2 noncomplaince vs viral URI vs atypical pna/CAP.   - s/p Solumedrol 80mg and douneb x2 in ED with improvement of symptoms   - still tachypneic 22-25; per pt is close to baseline- c/w BIPAP overnight. No retention on vbg, pt refused ABG  - c/w solumedrol 40mg BID, taper as tolerated   - c/w doubneb q4hrs  - c/w Ceft/Azithro for possible CAP  - f/up RVP swab, Procalcitonin    - c/w symbicort BID   - Pulm to see in AM  - PT consult Pt with hx of COPD on home O2 (3L) and CPAP presents with worsening SOB/MUNSON, increased sputum production for one week in setting of home O2 noncomplaince; in addition with nasal congestion throat pain with mild leukocytosis (2/2 steroids?). CXR with RLL atelectasis vs infiltrate. COVID swab neg. Diffusely wheezing in ED. Pt is euvolemic, no increased O2 requirements from baseline 3LNC. Likely COPD exacerbation 2/2 O2 noncomplaince vs viral URI vs less likely atypical pna/CAP.   - s/p Solumedrol 80mg and douneb x2 in ED with improvement of symptoms   - still tachypneic 22-25; per pt is close to baseline- c/w BIPAP overnight. No retention on vbg, pt refused ABG  - c/w solumedrol 40mg BID, taper as tolerated   - c/w doubneb q4hrs  - c/w Ceft/Azithro for possible CAP  - f/up RVP swab, Procalcitonin    - c/w symbicort BID   - Pulm to see in AM  - PT consult Pt with hx of COPD on home O2 (3L) and CPAP presents with worsening SOB/MUNSON, increased sputum production for one week in setting of home O2 noncomplaince; in addition with nasal congestion throat pain with mild leukocytosis (2/2 steroids?). CXR with RLL atelectasis vs infiltrate. COVID swab neg. Diffusely wheezing in ED. Pt is euvolemic, no increased O2 requirements from baseline 3LNC. Likely COPD exacerbation 2/2 O2 noncomplaince vs viral URI vs less likely atypical pna/CAP.   - s/p Solumedrol 80mg and douneb x2 in ED with improvement of symptoms   - still tachypneic 22-25; per pt is close to baseline- c/w BIPAP overnight. No retention on vbg, pt refused ABG  - c/w solumedrol 40mg BID, taper as tolerated   - c/w doubneb q4hrs  - c/w Ceft/Azithro for possible CAP/atypical pna  - f/up RVP swab, Procalcitonin, strep AG, legionella  - c/w symbicort BID   - Pulm to see in AM  - PT consult Pt with hx of COPD on home O2 (3L) and CPAP presents with worsening SOB/MUNSON, increased sputum production for one week in setting of home O2 noncomplaince; in addition with nasal congestion throat pain with mild leukocytosis (2/2 steroids?). CXR with RLL atelectasis vs infiltrate. COVID swab neg. Diffusely wheezing in ED. Pt is euvolemic, no increased O2 requirements from baseline 3LNC. Likely COPD exacerbation 2/2 O2 noncomplaince vs viral URI vs less likely atypical pna/CAP.   - s/p Solumedrol 80mg and douneb x2 in ED with improvement of symptoms and tachypnea  - still slightly tachypneic; per pt is close to baseline- c/w BIPAP overnight. No retention on vbg, pt refused ABG  - if tachynpnea not improved/worsens, low threshold for ICU consult  - c/w solumedrol 40mg BID, taper as tolerated   - c/w doubneb q4hrs  - c/w Ceft/Azithro for possible CAP/atypical pna  - f/up RVP swab, Procalcitonin, strep AG, legionella  - c/w symbicort BID   - Pulm to see in AM  - PT consult Pt with hx of COPD on home O2 (3L) and CPAP presents with worsening SOB/MUNSON, increased sputum production for one week in setting of home O2 noncomplaince; in addition with nasal congestion throat pain with mild leukocytosis (2/2 steroids?). CXR with RLL atelectasis vs infiltrate. COVID swab neg. Diffusely wheezing in ED. Pt is euvolemic, no increased O2 requirements from baseline 3LNC. Likely COPD exacerbation 2/2 O2 noncomplaince vs viral URI vs less likely atypical pna/CAP.   - s/p Solumedrol 80mg and douneb x2 in ED with improvement of symptoms and tachypnea  - still slightly tachypneic; per pt is close to baseline- c/w BIPAP overnight. No retention on vbg, pt refused ABG  - if tachynpnea not improved/worsens, low threshold for ICU consult  - c/w solumedrol 40mg q8hrs, taper as tolerated   - c/w doubneb q4hrs  - c/w Ceft/Azithro for possible CAP/atypical pna  - f/up RVP swab, Procalcitonin, strep AG, legionella  - c/w symbicort BID   - Pulm to see in AM  - PT consult Pt with hx of COPD on home O2 (3L) and CPAP presents with worsening SOB/MUNSON, increased sputum production for one week in setting of home O2 noncomplaince; in addition with nasal congestion throat pain with mild leukocytosis (2/2 steroids?). CXR with RLL atelectasis vs infiltrate. COVID swab neg. Diffusely wheezing in ED. Pt is euvolemic, no increased O2 requirements from baseline 3LNC. Likely COPD exacerbation 2/2 O2 noncomplaince vs viral URI vs less likely atypical pna/CAP.   - s/p Solumedrol 80mg and douneb x2 in ED with improvement of symptoms and tachypnea  - still slightly tachypneic; per pt is close to baseline- c/w BIPAP overnight. No retention on vbg, pt refused ABG  - if tachynpnea not improved/worsens, low threshold for ICU consult  - c/w solumedrol 40mg q8hrs, taper as tolerated   - c/w doubneb q4hrs  - c/w Ceft/Azithro for possible CAP/atypical pna  - f/up RVP swab, Procalcitonin, strep AG, legionella  - c/w symbicort BID   - Pulm to see in AM  - f/up echo w/ bubble to asses pulm htn   - PT consult

## 2021-05-29 NOTE — PROGRESS NOTE ADULT - PROBLEM SELECTOR PLAN 1
Pt with hx of COPD on home O2 (3L) and CPAP presents with worsening SOB/MUNSON, increased sputum production for one week in setting of home O2 noncomplaince; in addition with nasal congestion throat pain with mild leukocytosis (2/2 steroids?). CXR with RLL atelectasis vs infiltrate. COVID swab neg. Diffusely wheezing in ED. Pt is euvolemic, no increased O2 requirements from baseline 3LNC. Likely COPD exacerbation 2/2 O2 noncomplaince vs viral URI vs less likely atypical pna/CAP.   - s/p Solumedrol 80mg and douneb x2 in ED with improvement of symptoms and tachypnea  - still slightly tachypneic; per pt is close to baseline- c/w BIPAP overnight. No retention on vbg, pt refused ABG  - if tachynpnea not improved/worsens, low threshold for ICU consult  - c/w solumedrol 40mg q8hrs, taper as tolerated   - c/w doubneb q4hrs  - c/w Ceft/Azithro for possible CAP/atypical pna  - f/up RVP swab, Procalcitonin, strep AG, legionella  - c/w symbicort BID   - Pulm to see in AM  - f/up echo w/ bubble to asses pulm htn   - PT consult

## 2021-05-29 NOTE — ED PROVIDER NOTE - OBJECTIVE STATEMENT
71 yo hx of HTN, HLD, DM, COPD, smoking (just stopped 7 days ago), depression, remote TIAs presenting today w/ worsening SOB and cough x 1 week, and bilateral lymphadenopathy under her mandible with associated sore throat. Also states that her oxygen concentrators stopped worked, new ones ordered but not yet delivered, and worsening symptoms started when the oxygen stopped. pt states can walk a few steps with her cane, but now too short of breath to walk around in general. no fevers/chills. compliant w/ her meds and inhalers, using rescule albuterol without improvement    pt has completed her covid-19 vaccination series

## 2021-05-29 NOTE — H&P ADULT - PROBLEM SELECTOR PLAN 2
Hgb 8.9 on admission, baseline 11. No s/s of bleeding. Likely AOCD  - f/up iron studies, b12, folate  - continue to monitor   - T/S

## 2021-05-29 NOTE — H&P ADULT - ASSESSMENT
A 71yo F with PMH of COPD on home O2 (3L), TIAs, right carotid stenosis, HTN, HLD, T2DM, Depression, smoker (quit 7days ago) presents to ED with symptoms of worsening SOB; likely 2/2 COPD exacerbation 2/2 home O2 noncompliance vs viral URI vs atypical pna  A 69yo F with PMH of COPD on home O2 (3L) and CPAP, TIAs, right carotid stenosis, HTN, HLD, T2DM, Depression, smoker (quit 7days ago) presents to ED with symptoms of worsening SOB; likely 2/2 COPD exacerbation 2/2 home O2 noncompliance vs viral URI vs atypical pna

## 2021-05-29 NOTE — H&P ADULT - HISTORY OF PRESENT ILLNESS
A 69yo F with PMH of COPD on home O2 (3L), TIAs, right carotid stenosis, HTN, HLD, T2DM, Depression, smoker (quit 7days ago) presents to ED with symptoms of worsening SOB.     In ED VS: 98.7, 92, 123/69, RR 22 and SpO2 94% on RA. Labs s/f WBC 13, Hgb 8.9. PT given Azithro 500mg, Solumedrol 80mg IVP, NS bolus 1L. And douneb.  A 71yo F with PMH of COPD on home O2 (3L), TIAs, right carotid stenosis, HTN, HLD, T2DM, Depression, smoker (quit 7days ago) presents to ED with symptoms of worsening SOB. Patient says her oxygen machine stopped working 2 weeks ago and since then has had worsening SOB with increased sputum production and cough. She denies any fevers/chills, nasal congestion, abdominal pain, nausea/vomiting, diarhea, LE edema, CP, palpitations. Pt shes she is normally tachypneic and dyspneic with minor activities however now unable to walk due to SOB prompting visit to ED. She is currently on prednisone which was increased from 5mg to 10mg QD one week ago by PCP.    In ED VS: 98.7, 92, 123/69, RR 22 and SpO2 94% on RA. Labs s/f WBC 13, Hgb 8.9. PT given Azithro 500mg, Solumedrol 80mg IVP, NS bolus 1L. And douneb x2 with improvment of symptoms.  A 71yo F with PMH of COPD on home O2 (3L), TIAs, right carotid stenosis, HTN, HLD, T2DM, Depression, smoker (quit 7days ago) presents to ED with symptoms of worsening SOB. Patient says her oxygen machine stopped working 2 weeks ago and since then has had worsening SOB with increased sputum production and cough. She denies any fevers/chills, abdominal pain, nausea/vomiting, diarhea, LE edema, CP, palpitations. Pt shes she is normally tachypneic and dyspneic with minor activities however now unable to walk due to SOB prompting visit to ED. She is currently on prednisone which was increased from 5mg to 10mg QD one week ago by PCP.    In ED VS: 98.7, 92, 123/69, RR 22 and SpO2 94% on RA. Labs s/f WBC 13, Hgb 8.9. PT given Azithro 500mg, Solumedrol 80mg IVP, NS bolus 1L. And douneb x2 with improvment of symptoms.

## 2021-05-29 NOTE — H&P ADULT - NSHPSOCIALHISTORY_GEN_ALL_CORE
heavy smoker 40+years; tried quitting many times, especially now on oxygen however patient again restarted smoking, quit 7 days ago.   No alcohol use, no illicit drug use.

## 2021-05-29 NOTE — H&P ADULT - PROBLEM SELECTOR PLAN 5
heavy smoker 40+years; tried quitting many times, especially now on oxygen however patient again restarted smoking, quit 7 days ago  - nicotene patch   - discuss risks with home O2 and smoking

## 2021-05-30 LAB
ANION GAP SERPL CALC-SCNC: 12 MMOL/L — SIGNIFICANT CHANGE UP (ref 5–17)
BUN SERPL-MCNC: 26 MG/DL — HIGH (ref 7–23)
CALCIUM SERPL-MCNC: 9.4 MG/DL — SIGNIFICANT CHANGE UP (ref 8.4–10.5)
CHLORIDE SERPL-SCNC: 102 MMOL/L — SIGNIFICANT CHANGE UP (ref 96–108)
CO2 SERPL-SCNC: 25 MMOL/L — SIGNIFICANT CHANGE UP (ref 22–31)
COVID-19 SPIKE DOMAIN AB INTERP: POSITIVE
COVID-19 SPIKE DOMAIN ANTIBODY RESULT: 40.4 U/ML — HIGH
CREAT SERPL-MCNC: 0.62 MG/DL — SIGNIFICANT CHANGE UP (ref 0.5–1.3)
GLUCOSE BLDC GLUCOMTR-MCNC: 183 MG/DL — HIGH (ref 70–99)
GLUCOSE BLDC GLUCOMTR-MCNC: 253 MG/DL — HIGH (ref 70–99)
GLUCOSE BLDC GLUCOMTR-MCNC: 255 MG/DL — HIGH (ref 70–99)
GLUCOSE SERPL-MCNC: 180 MG/DL — HIGH (ref 70–99)
HCT VFR BLD CALC: 29.9 % — LOW (ref 34.5–45)
HGB BLD-MCNC: 9.1 G/DL — LOW (ref 11.5–15.5)
MAGNESIUM SERPL-MCNC: 2.3 MG/DL — SIGNIFICANT CHANGE UP (ref 1.6–2.6)
MCHC RBC-ENTMCNC: 28.9 PG — SIGNIFICANT CHANGE UP (ref 27–34)
MCHC RBC-ENTMCNC: 30.4 GM/DL — LOW (ref 32–36)
MCV RBC AUTO: 94.9 FL — SIGNIFICANT CHANGE UP (ref 80–100)
NRBC # BLD: 0 /100 WBCS — SIGNIFICANT CHANGE UP (ref 0–0)
PHOSPHATE SERPL-MCNC: 3 MG/DL — SIGNIFICANT CHANGE UP (ref 2.5–4.5)
PLATELET # BLD AUTO: 379 K/UL — SIGNIFICANT CHANGE UP (ref 150–400)
POTASSIUM SERPL-MCNC: 4.6 MMOL/L — SIGNIFICANT CHANGE UP (ref 3.5–5.3)
POTASSIUM SERPL-SCNC: 4.6 MMOL/L — SIGNIFICANT CHANGE UP (ref 3.5–5.3)
PROCALCITONIN SERPL-MCNC: 0.08 NG/ML — SIGNIFICANT CHANGE UP (ref 0.02–0.1)
RBC # BLD: 3.15 M/UL — LOW (ref 3.8–5.2)
RBC # FLD: 15.3 % — HIGH (ref 10.3–14.5)
SARS-COV-2 IGG+IGM SERPL QL IA: 40.4 U/ML — HIGH
SARS-COV-2 IGG+IGM SERPL QL IA: POSITIVE
SODIUM SERPL-SCNC: 139 MMOL/L — SIGNIFICANT CHANGE UP (ref 135–145)
WBC # BLD: 22.36 K/UL — HIGH (ref 3.8–10.5)
WBC # FLD AUTO: 22.36 K/UL — HIGH (ref 3.8–10.5)

## 2021-05-30 PROCEDURE — 99232 SBSQ HOSP IP/OBS MODERATE 35: CPT

## 2021-05-30 RX ORDER — POLYETHYLENE GLYCOL 3350 17 G/17G
17 POWDER, FOR SOLUTION ORAL DAILY
Refills: 0 | Status: DISCONTINUED | OUTPATIENT
Start: 2021-05-30 | End: 2021-06-02

## 2021-05-30 RX ORDER — ACETAMINOPHEN 500 MG
650 TABLET ORAL ONCE
Refills: 0 | Status: DISCONTINUED | OUTPATIENT
Start: 2021-05-30 | End: 2021-05-30

## 2021-05-30 RX ORDER — ACETAMINOPHEN 500 MG
650 TABLET ORAL ONCE
Refills: 0 | Status: COMPLETED | OUTPATIENT
Start: 2021-05-30 | End: 2021-05-30

## 2021-05-30 RX ORDER — SENNA PLUS 8.6 MG/1
2 TABLET ORAL AT BEDTIME
Refills: 0 | Status: DISCONTINUED | OUTPATIENT
Start: 2021-05-30 | End: 2021-06-02

## 2021-05-30 RX ADMIN — Medication 25 MILLIGRAM(S): at 07:02

## 2021-05-30 RX ADMIN — Medication 3: at 12:03

## 2021-05-30 RX ADMIN — Medication 3 MILLILITER(S): at 06:10

## 2021-05-30 RX ADMIN — Medication 1: at 08:39

## 2021-05-30 RX ADMIN — ATORVASTATIN CALCIUM 40 MILLIGRAM(S): 80 TABLET, FILM COATED ORAL at 21:59

## 2021-05-30 RX ADMIN — Medication 1 PATCH: at 06:11

## 2021-05-30 RX ADMIN — Medication 1 PATCH: at 16:51

## 2021-05-30 RX ADMIN — LISINOPRIL 20 MILLIGRAM(S): 2.5 TABLET ORAL at 06:09

## 2021-05-30 RX ADMIN — Medication 650 MILLIGRAM(S): at 23:23

## 2021-05-30 RX ADMIN — Medication 3 MILLILITER(S): at 03:00

## 2021-05-30 RX ADMIN — POLYETHYLENE GLYCOL 3350 17 GRAM(S): 17 POWDER, FOR SOLUTION ORAL at 11:28

## 2021-05-30 RX ADMIN — Medication 81 MILLIGRAM(S): at 10:41

## 2021-05-30 RX ADMIN — BUDESONIDE AND FORMOTEROL FUMARATE DIHYDRATE 2 PUFF(S): 160; 4.5 AEROSOL RESPIRATORY (INHALATION) at 18:09

## 2021-05-30 RX ADMIN — SERTRALINE 50 MILLIGRAM(S): 25 TABLET, FILM COATED ORAL at 10:42

## 2021-05-30 RX ADMIN — PANTOPRAZOLE SODIUM 40 MILLIGRAM(S): 20 TABLET, DELAYED RELEASE ORAL at 06:13

## 2021-05-30 RX ADMIN — CEFTRIAXONE 100 MILLIGRAM(S): 500 INJECTION, POWDER, FOR SOLUTION INTRAMUSCULAR; INTRAVENOUS at 06:09

## 2021-05-30 RX ADMIN — Medication 650 MILLIGRAM(S): at 23:08

## 2021-05-30 RX ADMIN — Medication 3 MILLILITER(S): at 10:41

## 2021-05-30 RX ADMIN — BUDESONIDE AND FORMOTEROL FUMARATE DIHYDRATE 2 PUFF(S): 160; 4.5 AEROSOL RESPIRATORY (INHALATION) at 06:10

## 2021-05-30 RX ADMIN — Medication 40 MILLIGRAM(S): at 18:09

## 2021-05-30 RX ADMIN — ENOXAPARIN SODIUM 40 MILLIGRAM(S): 100 INJECTION SUBCUTANEOUS at 06:09

## 2021-05-30 RX ADMIN — AZITHROMYCIN 255 MILLIGRAM(S): 500 TABLET, FILM COATED ORAL at 00:34

## 2021-05-30 RX ADMIN — Medication 3 MILLILITER(S): at 14:02

## 2021-05-30 RX ADMIN — Medication 3 MILLILITER(S): at 23:39

## 2021-05-30 RX ADMIN — Medication 3 MILLILITER(S): at 18:09

## 2021-05-30 RX ADMIN — Medication 40 MILLIGRAM(S): at 09:01

## 2021-05-30 RX ADMIN — Medication 40 MILLIGRAM(S): at 00:34

## 2021-05-30 RX ADMIN — Medication 1 PATCH: at 10:43

## 2021-05-30 RX ADMIN — Medication 1 PATCH: at 10:42

## 2021-05-30 RX ADMIN — Medication 3: at 18:02

## 2021-05-30 NOTE — PROGRESS NOTE ADULT - SUBJECTIVE AND OBJECTIVE BOX
*** INCOMPLETE ***    OVERNIGHT EVENTS: As per overnight staff, there were no acute events overnight    INTERVAL EVENTS:    SUBJECTIVE HPI: Patient seen and examined at bedside. Patient resting comfortably, no complaints at this time. Patient denies: fever, chills, weakness, dizziness, headaches, changes in vision, chest pain, palpitations, shortness of breath, cough, N/V, diarrhea or constipation, dysuria, LE edema. ROS otherwise negative.      VITAL SIGNS:  Vital Signs Last 24 Hrs  T(C): 36.7 (30 May 2021 05:49), Max: 36.9 (29 May 2021 12:50)  T(F): 98 (30 May 2021 05:49), Max: 98.5 (29 May 2021 12:50)  HR: 89 (30 May 2021 05:49) (71 - 93)  BP: 165/81 (30 May 2021 05:49) (123/68 - 165/81)  BP(mean): --  RR: 18 (30 May 2021 05:49) (18 - 21)  SpO2: 98% (30 May 2021 05:49) (92% - 98%)        PHYSICAL EXAM:  General: Comfortable, pleasant/anxious/agitated, Ill-appearing, well-nourished/frail/cachectic, comfortable / in distress  Neurological: AAOx3, no focal deficits  HEENT: NC/AT; EOMI, PERRL, clear conjunctiva, no nasal or oropharyngeal discharge or exudates, MMM  Neck: supple, no cervical or post-auricular lymphadenopathy  Cardiovascular: +S1/S2, no murmurs/rubs/gallops, RRR  Respiratory: CTA B/L, no diminished breath sounds, no wheezes/rales/rhonchi, no increased work of breathing or accessory muscle use  Gastrointestinal: soft, NT/ND; active BSx4 quadrants  Genitourinary: no suprapubic tenderness, no CVA tenderness  Extremities: WWP; no edema, clubbing or cyanosis  Vascular: 2+ radial, DP/PT pulses B/L  Skin: no rashes  Lines/Drains:       MEDICATIONS:  MEDICATIONS  (STANDING):  albuterol/ipratropium for Nebulization 3 milliLiter(s) Nebulizer every 4 hours  aspirin enteric coated 81 milliGRAM(s) Oral daily  atorvastatin 40 milliGRAM(s) Oral at bedtime  azithromycin  IVPB 500 milliGRAM(s) IV Intermittent every 24 hours  budesonide 160 MICROgram(s)/formoterol 4.5 MICROgram(s) Inhaler 2 Puff(s) Inhalation two times a day  cefTRIAXone   IVPB 1000 milliGRAM(s) IV Intermittent every 24 hours  dextrose 40% Gel 15 Gram(s) Oral once  dextrose 5%. 1000 milliLiter(s) (50 mL/Hr) IV Continuous <Continuous>  dextrose 5%. 1000 milliLiter(s) (100 mL/Hr) IV Continuous <Continuous>  dextrose 50% Injectable 25 Gram(s) IV Push once  dextrose 50% Injectable 12.5 Gram(s) IV Push once  dextrose 50% Injectable 25 Gram(s) IV Push once  enoxaparin Injectable 40 milliGRAM(s) SubCutaneous every 24 hours  glucagon  Injectable 1 milliGRAM(s) IntraMuscular once  hydrochlorothiazide 25 milliGRAM(s) Oral every 24 hours  insulin lispro (ADMELOG) corrective regimen sliding scale   SubCutaneous three times a day before meals  lisinopril 20 milliGRAM(s) Oral daily  methylPREDNISolone sodium succinate Injectable 40 milliGRAM(s) IV Push every 8 hours  nicotine - 21 mG/24Hr(s) Patch 1 patch Transdermal daily  pantoprazole    Tablet 40 milliGRAM(s) Oral before breakfast  sertraline 50 milliGRAM(s) Oral daily    MEDICATIONS  (PRN):      ALLERGIES/INTOLERANCES:  Allergies    No Known Allergies    Intolerances        LABS:                        9.1    22.36 )-----------( 379      ( 30 May 2021 07:06 )             29.9     05-30    139  |  102  |  26<H>  ----------------------------<  180<H>  4.6   |  25  |  0.62    Ca    9.4      30 May 2021 07:06  Phos  3.0     05-30  Mg     2.3     05-30    TPro  6.6  /  Alb  3.4  /  TBili  <0.2  /  DBili  x   /  AST  20  /  ALT  13  /  AlkPhos  77  05-29    PT/INR - ( 29 May 2021 08:38 )   PT: 11.9 sec;   INR: 0.99          PTT - ( 29 May 2021 08:38 )  PTT:30.0 sec  CAPILLARY BLOOD GLUCOSE      POCT Blood Glucose.: 255 mg/dL (29 May 2021 22:04)                    Microbiology:      RADIOLOGY, EKG AND ADDITIONAL TESTS: Reviewed. OVERNIGHT EVENTS: on BiPAP, clear BS, no accessory muscle use    INTERVAL EVENTS:    SUBJECTIVE HPI: Patient seen and examined at bedside. Patient resting comfortably, no complaints at this time. Patient denies: fever, chills, weakness, dizziness, headaches, changes in vision, chest pain, palpitations, shortness of breath, cough, N/V, diarrhea or constipation, dysuria, LE edema. ROS otherwise negative.      VITAL SIGNS:  Vital Signs Last 24 Hrs  T(C): 36.7 (30 May 2021 05:49), Max: 36.9 (29 May 2021 12:50)  T(F): 98 (30 May 2021 05:49), Max: 98.5 (29 May 2021 12:50)  HR: 89 (30 May 2021 05:49) (71 - 93)  BP: 165/81 (30 May 2021 05:49) (123/68 - 165/81)  BP(mean): --  RR: 18 (30 May 2021 05:49) (18 - 21)  SpO2: 98% (30 May 2021 05:49) (92% - 98%)    PHYSICAL EXAM:  General: Comfortable, pleasant, well-nourished in NAD  Neurological: AAOx3, no focal deficits  HEENT: NC/AT; EOMI, PERRL, clear conjunctiva, no nasal or oropharyngeal discharge or exudates, MMM  Neck: supple, no cervical or post-auricular lymphadenopathy  Cardiovascular: +S1/S2, no murmurs/rubs/gallops, RRR  Respiratory: slight wheeze in right lung base, no increased work of breathing or accessory muscle use  Gastrointestinal: soft, NT/ND; active BSx4 quadrants  Genitourinary: no suprapubic tenderness, no CVA tenderness  Extremities: WWP; no edema, clubbing or cyanosis  Vascular: 2+ radial, DP/PT pulses B/L  Skin: no rashes  Lines/Drains:     MEDICATIONS:  MEDICATIONS  (STANDING):  albuterol/ipratropium for Nebulization 3 milliLiter(s) Nebulizer every 4 hours  aspirin enteric coated 81 milliGRAM(s) Oral daily  atorvastatin 40 milliGRAM(s) Oral at bedtime  azithromycin  IVPB 500 milliGRAM(s) IV Intermittent every 24 hours  budesonide 160 MICROgram(s)/formoterol 4.5 MICROgram(s) Inhaler 2 Puff(s) Inhalation two times a day  cefTRIAXone   IVPB 1000 milliGRAM(s) IV Intermittent every 24 hours  dextrose 40% Gel 15 Gram(s) Oral once  dextrose 5%. 1000 milliLiter(s) (50 mL/Hr) IV Continuous <Continuous>  dextrose 5%. 1000 milliLiter(s) (100 mL/Hr) IV Continuous <Continuous>  dextrose 50% Injectable 25 Gram(s) IV Push once  dextrose 50% Injectable 12.5 Gram(s) IV Push once  dextrose 50% Injectable 25 Gram(s) IV Push once  enoxaparin Injectable 40 milliGRAM(s) SubCutaneous every 24 hours  glucagon  Injectable 1 milliGRAM(s) IntraMuscular once  hydrochlorothiazide 25 milliGRAM(s) Oral every 24 hours  insulin lispro (ADMELOG) corrective regimen sliding scale   SubCutaneous three times a day before meals  lisinopril 20 milliGRAM(s) Oral daily  methylPREDNISolone sodium succinate Injectable 40 milliGRAM(s) IV Push every 8 hours  nicotine - 21 mG/24Hr(s) Patch 1 patch Transdermal daily  pantoprazole    Tablet 40 milliGRAM(s) Oral before breakfast  sertraline 50 milliGRAM(s) Oral daily    MEDICATIONS  (PRN):      ALLERGIES/INTOLERANCES:  Allergies    No Known Allergies    Intolerances        LABS:                        9.1    22.36 )-----------( 379      ( 30 May 2021 07:06 )             29.9     05-30    139  |  102  |  26<H>  ----------------------------<  180<H>  4.6   |  25  |  0.62    Ca    9.4      30 May 2021 07:06  Phos  3.0     05-30  Mg     2.3     05-30    TPro  6.6  /  Alb  3.4  /  TBili  <0.2  /  DBili  x   /  AST  20  /  ALT  13  /  AlkPhos  77  05-29    PT/INR - ( 29 May 2021 08:38 )   PT: 11.9 sec;   INR: 0.99          PTT - ( 29 May 2021 08:38 )  PTT:30.0 sec  CAPILLARY BLOOD GLUCOSE      POCT Blood Glucose.: 255 mg/dL (29 May 2021 22:04)                    Microbiology:      RADIOLOGY, EKG AND ADDITIONAL TESTS: Reviewed.

## 2021-05-30 NOTE — PROGRESS NOTE ADULT - PROBLEM SELECTOR PLAN 2
Hgb 8.9 on admission, baseline 11. No s/s of bleeding. Likely AOCD  - f/up iron studies, b12, folate  - continue to monitor   - T/S Hgb 8.9 on admission, baseline 11. No s/s of bleeding. Likely AOCD  - iron studies: total Fe, % Fe low; all else wnl  - b12, folate WNL   - continue to monitor   - T/S

## 2021-05-30 NOTE — PROGRESS NOTE ADULT - PROBLEM SELECTOR PLAN 1
Pt with hx of COPD on home O2 (3L) and CPAP presents with worsening SOB/MUNSON, increased sputum production for one week in setting of home O2 noncomplaince; in addition with nasal congestion throat pain with mild leukocytosis (2/2 steroids?). CXR with RLL atelectasis vs infiltrate. COVID swab neg. Diffusely wheezing in ED. Pt is euvolemic, no increased O2 requirements from baseline 3LNC. Likely COPD exacerbation 2/2 O2 noncomplaince vs viral URI vs less likely atypical pna/CAP.   - s/p Solumedrol 80mg and douneb x2 in ED with improvement of symptoms and tachypnea  - still slightly tachypneic; per pt is close to baseline- c/w BIPAP overnight. No retention on vbg, pt refused ABG  - if tachynpnea not improved/worsens, low threshold for ICU consult  - c/w solumedrol 40mg q8hrs, taper as tolerated   - c/w doubneb q4hrs  - c/w Ceft/Azithro for possible CAP/atypical pna  - f/up RVP swab, Procalcitonin, strep AG, legionella  - c/w symbicort BID   - Pulm to see in AM  - f/up echo w/ bubble to asses pulm htn   - PT consult Pt with hx of COPD on home O2 (3L) and CPAP presents with worsening SOB/MUNSON, increased sputum production for one week in setting of home O2 noncomplaince; in addition with nasal congestion throat pain with mild leukocytosis (2/2 steroids?). CXR with RLL atelectasis vs infiltrate. COVID swab neg. Diffusely wheezing in ED. Pt is euvolemic, no increased O2 requirements from baseline 3LNC. Likely COPD exacerbation 2/2 O2 noncomplaince vs viral URI vs less likely atypical pna/CAP.   - s/p Solumedrol 80mg and douneb x2 in ED with improvement of symptoms and tachypnea  - still slightly tachypneic; per pt is close to baseline- c/w BIPAP overnight. No retention on vbg, pt refused ABG  - if tachynpnea not improved/worsens, low threshold for ICU consult  - c/w solumedrol 40mg q8hrs, taper as tolerated   - c/w doubneb q4hrs  - c/w Ceft/Azithro for possible CAP/atypical pna  - RVP negative  - Procalcitonin negative   - f/u strep AG, legionella  - c/w symbicort BID   - Pulm to see in AM  - f/up echo w/ bubble to asses pulm htn   - PT consult

## 2021-05-30 NOTE — PROGRESS NOTE ADULT - PROBLEM SELECTOR PLAN 5
heavy smoker 40+years; tried quitting many times, especially now on oxygen however patient again restarted smoking, quit 7 days ago  - nicotine patch   - discuss risks with home O2 and smoking

## 2021-05-30 NOTE — PROGRESS NOTE ADULT - SUBJECTIVE AND OBJECTIVE BOX
Interval Events: Reviewed  Patient seen and examined at bedside.    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    Disease of trachea and bronchus  Tracheomalacia    Epilepsy  No longer on medications.    Depressive disorder  Depression    Transient cerebral ischemia  TIA (transient ischemic attack)    Peptic ulcer  2/2 H. pylori (s/p amoxicillin, clarithromycin, omeprazole)    Tobacco use disorder  Tobacco abuse    Emphysema  COPD (chronic obstructive pulmonary disease) with emphysema    Hyperlipidemia  Hyperlipidemia    Essential hypertension  Hypertension    Type 2 diabetes mellitus  Diabetes    Other postprocedural status  S/P hernia repair        MEDICATIONS:  Pulmonary:  albuterol/ipratropium for Nebulization 3 milliLiter(s) Nebulizer every 4 hours  budesonide 160 MICROgram(s)/formoterol 4.5 MICROgram(s) Inhaler 2 Puff(s) Inhalation two times a day    Antimicrobials:  azithromycin  IVPB 500 milliGRAM(s) IV Intermittent every 24 hours  cefTRIAXone   IVPB 1000 milliGRAM(s) IV Intermittent every 24 hours    Anticoagulants:  aspirin enteric coated 81 milliGRAM(s) Oral daily  enoxaparin Injectable 40 milliGRAM(s) SubCutaneous every 24 hours    Cardiac:  hydrochlorothiazide 25 milliGRAM(s) Oral every 24 hours  lisinopril 20 milliGRAM(s) Oral daily      Allergies    No Known Allergies    Intolerances        Vital Signs Last 24 Hrs  T(C): 36.7 (30 May 2021 05:49), Max: 36.9 (29 May 2021 12:50)  T(F): 98 (30 May 2021 05:49), Max: 98.5 (29 May 2021 12:50)  HR: 89 (30 May 2021 05:49) (71 - 93)  BP: 165/81 (30 May 2021 05:49) (123/68 - 165/81)  BP(mean): --  RR: 18 (30 May 2021 05:49) (18 - 21)  SpO2: 98% (30 May 2021 05:49) (92% - 98%)        Review of Systems:   •	General: negative  •	Skin/Breast: negative  •	Ophthalmologic: negative  •	ENMT: negative  •	Respiratory and Thorax: negative  •	Cardiovascular: negative  •	Gastrointestinal: negative  •	Genitourinary: negative  •	Musculoskeletal: negative  •	Neurological: negative  •	Psychiatric: negative  •	Hematology/Lymphatics: negative  •	Endocrine: negative  •	Allergic/Immunologic: negative    Physical Exam:   • Constitutional:	Well-developed, well nourished  • Eyes:	EOMI; PERRL; no drainage or redness  • ENMT:	No oral lesions; no gross abnormalities  • Neck	no thyromegaly or nodules  • Breasts:	not examined  • Back:	No deformity or limitation of movement  • Respiratory:	Breath Sounds equal & clear to auscultation, no accessory muscle use  • Cardiovascular:	Regular rate & rhythm, normal S1, S2; no murmurs, gallops or rubs; no S3, S4  • Gastrointestinal:	Soft, non-tender, no hepatosplenomegaly, normal bowel sounds  • Genitourinary:	not examined  • Rectal: not examined  • Extremities:	No cyanosis, clubbing or edema  • Vascular:	Equal and normal pulses (dorsalis pedis)  • Neurologica:l	not examined  • Skin:	No lesions; no rash  • Lymph Nodes:	No lymphadedenopathy  • Musculoskeletal:	No joint pain, swelling or deformity; no limitation of movement        LABS:      CBC Full  -  ( 29 May 2021 08:38 )  WBC Count : 11.43 K/uL  RBC Count : 3.02 M/uL  Hemoglobin : 8.9 g/dL  Hematocrit : 29.2 %  Platelet Count - Automated : 349 K/uL  Mean Cell Volume : 96.7 fl  Mean Cell Hemoglobin : 29.5 pg  Mean Cell Hemoglobin Concentration : 30.5 gm/dL  Auto Neutrophil # : x  Auto Lymphocyte # : x  Auto Monocyte # : x  Auto Eosinophil # : x  Auto Basophil # : x  Auto Neutrophil % : x  Auto Lymphocyte % : x  Auto Monocyte % : x  Auto Eosinophil % : x  Auto Basophil % : x    05-29    137  |  102  |  17  ----------------------------<  217<H>  4.9   |  22  |  0.61    Ca    8.6      29 May 2021 08:38  Phos  2.9     05-29  Mg     2.1     05-29    TPro  6.6  /  Alb  3.4  /  TBili  <0.2  /  DBili  x   /  AST  20  /  ALT  13  /  AlkPhos  77  05-29    PT/INR - ( 29 May 2021 08:38 )   PT: 11.9 sec;   INR: 0.99          PTT - ( 29 May 2021 08:38 )  PTT:30.0 sec                    RADIOLOGY & ADDITIONAL STUDIES (The following images were personally reviewed):  Tello:                                     No  Urine output:                       adequate  DVT prophylaxis:                 Yes  Flattus:                                  Yes  Bowel movement:              No

## 2021-05-31 LAB
ANION GAP SERPL CALC-SCNC: 12 MMOL/L — SIGNIFICANT CHANGE UP (ref 5–17)
BUN SERPL-MCNC: 33 MG/DL — HIGH (ref 7–23)
CALCIUM SERPL-MCNC: 9 MG/DL — SIGNIFICANT CHANGE UP (ref 8.4–10.5)
CHLORIDE SERPL-SCNC: 101 MMOL/L — SIGNIFICANT CHANGE UP (ref 96–108)
CO2 SERPL-SCNC: 25 MMOL/L — SIGNIFICANT CHANGE UP (ref 22–31)
CREAT SERPL-MCNC: 0.61 MG/DL — SIGNIFICANT CHANGE UP (ref 0.5–1.3)
GLUCOSE BLDC GLUCOMTR-MCNC: 159 MG/DL — HIGH (ref 70–99)
GLUCOSE BLDC GLUCOMTR-MCNC: 166 MG/DL — HIGH (ref 70–99)
GLUCOSE BLDC GLUCOMTR-MCNC: 174 MG/DL — HIGH (ref 70–99)
GLUCOSE BLDC GLUCOMTR-MCNC: 202 MG/DL — HIGH (ref 70–99)
GLUCOSE BLDC GLUCOMTR-MCNC: 220 MG/DL — HIGH (ref 70–99)
GLUCOSE SERPL-MCNC: 175 MG/DL — HIGH (ref 70–99)
HCT VFR BLD CALC: 30.7 % — LOW (ref 34.5–45)
HGB BLD-MCNC: 9.4 G/DL — LOW (ref 11.5–15.5)
MAGNESIUM SERPL-MCNC: 2.1 MG/DL — SIGNIFICANT CHANGE UP (ref 1.6–2.6)
MCHC RBC-ENTMCNC: 28.9 PG — SIGNIFICANT CHANGE UP (ref 27–34)
MCHC RBC-ENTMCNC: 30.6 GM/DL — LOW (ref 32–36)
MCV RBC AUTO: 94.5 FL — SIGNIFICANT CHANGE UP (ref 80–100)
NRBC # BLD: 0 /100 WBCS — SIGNIFICANT CHANGE UP (ref 0–0)
PHOSPHATE SERPL-MCNC: 3.1 MG/DL — SIGNIFICANT CHANGE UP (ref 2.5–4.5)
PLATELET # BLD AUTO: 424 K/UL — HIGH (ref 150–400)
POTASSIUM SERPL-MCNC: 4.6 MMOL/L — SIGNIFICANT CHANGE UP (ref 3.5–5.3)
POTASSIUM SERPL-SCNC: 4.6 MMOL/L — SIGNIFICANT CHANGE UP (ref 3.5–5.3)
RBC # BLD: 3.25 M/UL — LOW (ref 3.8–5.2)
RBC # FLD: 15.5 % — HIGH (ref 10.3–14.5)
SODIUM SERPL-SCNC: 138 MMOL/L — SIGNIFICANT CHANGE UP (ref 135–145)
WBC # BLD: 19.09 K/UL — HIGH (ref 3.8–10.5)
WBC # FLD AUTO: 19.09 K/UL — HIGH (ref 3.8–10.5)

## 2021-05-31 PROCEDURE — 99232 SBSQ HOSP IP/OBS MODERATE 35: CPT | Mod: GC

## 2021-05-31 PROCEDURE — 76937 US GUIDE VASCULAR ACCESS: CPT | Mod: 26

## 2021-05-31 PROCEDURE — 36000 PLACE NEEDLE IN VEIN: CPT

## 2021-05-31 RX ORDER — ACETAMINOPHEN 500 MG
650 TABLET ORAL EVERY 6 HOURS
Refills: 0 | Status: DISCONTINUED | OUTPATIENT
Start: 2021-05-31 | End: 2021-06-02

## 2021-05-31 RX ADMIN — Medication 1: at 08:18

## 2021-05-31 RX ADMIN — LISINOPRIL 20 MILLIGRAM(S): 2.5 TABLET ORAL at 06:44

## 2021-05-31 RX ADMIN — Medication 3 MILLILITER(S): at 22:02

## 2021-05-31 RX ADMIN — BUDESONIDE AND FORMOTEROL FUMARATE DIHYDRATE 2 PUFF(S): 160; 4.5 AEROSOL RESPIRATORY (INHALATION) at 06:49

## 2021-05-31 RX ADMIN — Medication 3 MILLILITER(S): at 19:56

## 2021-05-31 RX ADMIN — Medication 1 PATCH: at 12:21

## 2021-05-31 RX ADMIN — SENNA PLUS 2 TABLET(S): 8.6 TABLET ORAL at 22:02

## 2021-05-31 RX ADMIN — ATORVASTATIN CALCIUM 40 MILLIGRAM(S): 80 TABLET, FILM COATED ORAL at 22:02

## 2021-05-31 RX ADMIN — AZITHROMYCIN 255 MILLIGRAM(S): 500 TABLET, FILM COATED ORAL at 00:27

## 2021-05-31 RX ADMIN — Medication 25 MILLIGRAM(S): at 10:41

## 2021-05-31 RX ADMIN — Medication 650 MILLIGRAM(S): at 10:41

## 2021-05-31 RX ADMIN — Medication 1 PATCH: at 19:15

## 2021-05-31 RX ADMIN — BUDESONIDE AND FORMOTEROL FUMARATE DIHYDRATE 2 PUFF(S): 160; 4.5 AEROSOL RESPIRATORY (INHALATION) at 18:38

## 2021-05-31 RX ADMIN — ENOXAPARIN SODIUM 40 MILLIGRAM(S): 100 INJECTION SUBCUTANEOUS at 06:41

## 2021-05-31 RX ADMIN — POLYETHYLENE GLYCOL 3350 17 GRAM(S): 17 POWDER, FOR SOLUTION ORAL at 12:21

## 2021-05-31 RX ADMIN — Medication 40 MILLIGRAM(S): at 08:26

## 2021-05-31 RX ADMIN — Medication 3 MILLILITER(S): at 16:49

## 2021-05-31 RX ADMIN — Medication 2: at 13:47

## 2021-05-31 RX ADMIN — CEFTRIAXONE 100 MILLIGRAM(S): 500 INJECTION, POWDER, FOR SOLUTION INTRAMUSCULAR; INTRAVENOUS at 06:39

## 2021-05-31 RX ADMIN — Medication 40 MILLIGRAM(S): at 00:27

## 2021-05-31 RX ADMIN — Medication 40 MILLIGRAM(S): at 18:38

## 2021-05-31 RX ADMIN — Medication 81 MILLIGRAM(S): at 12:21

## 2021-05-31 RX ADMIN — Medication 1 PATCH: at 04:14

## 2021-05-31 RX ADMIN — Medication 1 PATCH: at 12:20

## 2021-05-31 RX ADMIN — PANTOPRAZOLE SODIUM 40 MILLIGRAM(S): 20 TABLET, DELAYED RELEASE ORAL at 06:44

## 2021-05-31 RX ADMIN — Medication 3 MILLILITER(S): at 10:41

## 2021-05-31 RX ADMIN — Medication 3 MILLILITER(S): at 03:02

## 2021-05-31 RX ADMIN — Medication 3 MILLILITER(S): at 06:45

## 2021-05-31 RX ADMIN — SERTRALINE 50 MILLIGRAM(S): 25 TABLET, FILM COATED ORAL at 12:21

## 2021-05-31 NOTE — PHYSICAL THERAPY INITIAL EVALUATION ADULT - GAIT DEVIATIONS NOTED, PT EVAL
slightly unsteady gait however no LOB/knee buckling noted, increased time required with turning; good negotiation through hallway obstacles without gait disturbances noted/decreased mone/decreased velocity of limb motion/decreased step length/decreased weight-shifting ability

## 2021-05-31 NOTE — PHYSICAL THERAPY INITIAL EVALUATION ADULT - PERTINENT HX OF CURRENT PROBLEM, REHAB EVAL
A 69yo F with PMH of COPD on home O2 (3L), TIAs, right carotid stenosis, HTN, HLD, T2DM, Depression, smoker (quit 7days ago) presents to ED with symptoms of worsening SOB. Patient says her oxygen machine stopped working 2 weeks ago and since then has had worsening SOB with increased sputum production and cough. Please refer to H&P on [place] for remaining.

## 2021-05-31 NOTE — PHYSICAL THERAPY INITIAL EVALUATION ADULT - GAIT DISTANCE, PT EVAL
~100 feet x 1 (ambulated on 2LO2NC, SpO2: 94-95% maintained throughout although patient reported "mild SOB and chest tightness which resided with semi-supine rest)

## 2021-05-31 NOTE — PROCEDURE NOTE - NSICDXPROCEDURE_GEN_ALL_CORE_FT
PROCEDURES:  Insertion, catheter, intravenous 31-May-2021 02:52:22  Ambrosio Rodríguez  Ultrasound guided venous access 31-May-2021 02:52:34  Ambrosio Rodríguez

## 2021-05-31 NOTE — PHYSICAL THERAPY INITIAL EVALUATION ADULT - GENERAL OBSERVATIONS, REHAB EVAL
PT IE completed. Chart reviewed. GaitFIM:5, StairFIM:n/a. Patient without complaints of pain at rest, agreeable to PT. Patient received OOB in chair, NAD, +3LO2NC, +IV hep lock, JOAQUÍN Pantoja cleared patient for treatment.

## 2021-05-31 NOTE — PROGRESS NOTE ADULT - PROBLEM SELECTOR PLAN 1
Hx of COPD on home O2 (3L) and CPAP presents with worsening SOB/MUNSON, increased sputum production for one week in setting of home O2 noncompliance; in addition with nasal congestion throat pain with mild leukocytosis (2/2 steroids?). CXR with RLL atelectasis vs infiltrate. COVID swab neg. Diffusely wheezing in ED. Pt is euvolemic, no increased O2 requirements from baseline 3LNC. Likely COPD exacerbation 2/2 O2 noncompliance. Procal and RVP negative.  - s/p Solumedrol 80mg and douneb x2 in ED with improvement of symptoms and tachypnea  - still slightly tachypneic; per pt is close to baseline- c/w BIPAP overnight. No retention on vbg, pt refused ABG  - c/w solumedrol 40mg q8hrs, taper as tolerated   - c/w doubneb q4hrs  - c/w Ceft/Azithro for possible CAP/atypical pna  - c/w Symbicort BID   - Pulm to see in AM  - f/up echo w/ bubble to asses pulm htn   - PT consult

## 2021-05-31 NOTE — PHYSICAL THERAPY INITIAL EVALUATION ADULT - PLANNED THERAPY INTERVENTIONS, PT EVAL
endurance training/balance training/bed mobility training/gait training/strengthening/transfer training

## 2021-05-31 NOTE — PROGRESS NOTE ADULT - SUBJECTIVE AND OBJECTIVE BOX
OVERNIGHT EVENTS: No acute events reported overnight    SUBJECTIVE / INTERVAL HPI: Patient seen and examined at bedside. Resting comfortably and in no acute distress. Patient reports she is breathing better than when she came in. Denies chest pain, subjective tachypnea shortness of breath, syncope or near syncopal episodes. ROS otherwise negative.     VITAL SIGNS:  Vital Signs Last 24 Hrs  T(C): 36.9 (31 May 2021 05:55), Max: 36.9 (31 May 2021 05:55)  T(F): 98.5 (31 May 2021 05:55), Max: 98.5 (31 May 2021 05:55)  HR: 92 (31 May 2021 06:00) (66 - 92)  BP: 128/60 (31 May 2021 05:55) (113/55 - 144/77)  RR: 17 (31 May 2021 06:00) (17 - 18)  SpO2: 96% (31 May 2021 06:00) (96% - 99%)    PHYSICAL EXAM:  General: WDWN, NAD  HEENT: NC/AT; PERRL, anicteric sclera; MMM, no cyanosis  Neck: supple  Cardiovascular: +S1/S2; RRR  Respiratory: CTA B/L; no W/R/R, no accessory muscle use, no tachypnea   Gastrointestinal: soft, NT/ND; +BSx4  Extremities: WWP; no edema, clubbing or cyanosis  Neurological: AAOx3; no focal deficits    MEDICATIONS:  MEDICATIONS  (STANDING):  albuterol/ipratropium for Nebulization 3 milliLiter(s) Nebulizer every 4 hours  aspirin enteric coated 81 milliGRAM(s) Oral daily  atorvastatin 40 milliGRAM(s) Oral at bedtime  budesonide 160 MICROgram(s)/formoterol 4.5 MICROgram(s) Inhaler 2 Puff(s) Inhalation two times a day  cefTRIAXone   IVPB 1000 milliGRAM(s) IV Intermittent every 24 hours  dextrose 40% Gel 15 Gram(s) Oral once  dextrose 5%. 1000 milliLiter(s) (50 mL/Hr) IV Continuous <Continuous>  dextrose 5%. 1000 milliLiter(s) (100 mL/Hr) IV Continuous <Continuous>  dextrose 50% Injectable 25 Gram(s) IV Push once  dextrose 50% Injectable 12.5 Gram(s) IV Push once  dextrose 50% Injectable 25 Gram(s) IV Push once  enoxaparin Injectable 40 milliGRAM(s) SubCutaneous every 24 hours  glucagon  Injectable 1 milliGRAM(s) IntraMuscular once  hydrochlorothiazide 25 milliGRAM(s) Oral every 24 hours  insulin lispro (ADMELOG) corrective regimen sliding scale   SubCutaneous three times a day before meals  lisinopril 20 milliGRAM(s) Oral daily  methylPREDNISolone sodium succinate Injectable 40 milliGRAM(s) IV Push every 8 hours  nicotine - 21 mG/24Hr(s) Patch 1 patch Transdermal daily  pantoprazole    Tablet 40 milliGRAM(s) Oral before breakfast  polyethylene glycol 3350 17 Gram(s) Oral daily  senna 2 Tablet(s) Oral at bedtime  sertraline 50 milliGRAM(s) Oral daily    MEDICATIONS  (PRN):      ALLERGIES:  Allergies    No Known Allergies    Intolerances        LABS:                        9.4    19.09 )-----------( 424      ( 31 May 2021 07:54 )             30.7     05-31    138  |  101  |  33<H>  ----------------------------<  175<H>  4.6   |  25  |  0.61    Ca    9.0      31 May 2021 07:54  Phos  3.1     05-31  Mg     2.1     05-31    TPro  6.6  /  Alb  3.4  /  TBili  <0.2  /  DBili  x   /  AST  20  /  ALT  13  /  AlkPhos  77  05-29    PT/INR - ( 29 May 2021 08:38 )   PT: 11.9 sec;   INR: 0.99          PTT - ( 29 May 2021 08:38 )  PTT:30.0 sec    CAPILLARY BLOOD GLUCOSE      POCT Blood Glucose.: 174 mg/dL (31 May 2021 08:07)      RADIOLOGY & ADDITIONAL TESTS: Reviewed.

## 2021-05-31 NOTE — PROGRESS NOTE ADULT - PROBLEM SELECTOR PLAN 2
Hgb 8.9 on admission, baseline 11. No s/s of bleeding. Likely AOCD  - iron studies: total Fe, % Fe low; all else wnl  - b12, folate WNL   - continue to monitor   - T/S

## 2021-05-31 NOTE — PROCEDURE NOTE - NSPROCDETAILS_GEN_ALL_CORE
ERROR     location identified, draped/prepped, sterile technique used/blood seen on insertion/dressing applied/flushes easily/secured in place/sterile technique, catheter placed

## 2021-05-31 NOTE — PHYSICAL THERAPY INITIAL EVALUATION ADULT - ADDITIONAL COMMENTS
Patient reports previously independent with all ADLs/IADLs prior to admission. Patient's son (Osvaldo) is patient's HHA and hours with patient are 7 days/week from ~8-2:30pm. Patient denies history of mechanical falls prior to admission.

## 2021-05-31 NOTE — PHYSICAL THERAPY INITIAL EVALUATION ADULT - THERAPY FREQUENCY, PT EVAL
Patient educated on frequency of inpatient physical therapy at [facility], patient verbalized understanding./5-7x/week

## 2021-06-01 ENCOUNTER — OUTPATIENT (OUTPATIENT)
Dept: OUTPATIENT SERVICES | Facility: HOSPITAL | Age: 71
LOS: 1 days | End: 2021-06-01
Payer: MEDICARE

## 2021-06-01 LAB
ANION GAP SERPL CALC-SCNC: 10 MMOL/L — SIGNIFICANT CHANGE UP (ref 5–17)
BUN SERPL-MCNC: 31 MG/DL — HIGH (ref 7–23)
CALCIUM SERPL-MCNC: 9.8 MG/DL — SIGNIFICANT CHANGE UP (ref 8.4–10.5)
CHLORIDE SERPL-SCNC: 99 MMOL/L — SIGNIFICANT CHANGE UP (ref 96–108)
CO2 SERPL-SCNC: 27 MMOL/L — SIGNIFICANT CHANGE UP (ref 22–31)
CREAT SERPL-MCNC: 0.67 MG/DL — SIGNIFICANT CHANGE UP (ref 0.5–1.3)
GLUCOSE BLDC GLUCOMTR-MCNC: 179 MG/DL — HIGH (ref 70–99)
GLUCOSE BLDC GLUCOMTR-MCNC: 236 MG/DL — HIGH (ref 70–99)
GLUCOSE BLDC GLUCOMTR-MCNC: 238 MG/DL — HIGH (ref 70–99)
GLUCOSE BLDC GLUCOMTR-MCNC: 263 MG/DL — HIGH (ref 70–99)
GLUCOSE SERPL-MCNC: 167 MG/DL — HIGH (ref 70–99)
HCT VFR BLD CALC: 34.1 % — LOW (ref 34.5–45)
HGB BLD-MCNC: 10.5 G/DL — LOW (ref 11.5–15.5)
MAGNESIUM SERPL-MCNC: 2.4 MG/DL — SIGNIFICANT CHANGE UP (ref 1.6–2.6)
MCHC RBC-ENTMCNC: 29 PG — SIGNIFICANT CHANGE UP (ref 27–34)
MCHC RBC-ENTMCNC: 30.8 GM/DL — LOW (ref 32–36)
MCV RBC AUTO: 94.2 FL — SIGNIFICANT CHANGE UP (ref 80–100)
NRBC # BLD: 0 /100 WBCS — SIGNIFICANT CHANGE UP (ref 0–0)
PHOSPHATE SERPL-MCNC: 3.5 MG/DL — SIGNIFICANT CHANGE UP (ref 2.5–4.5)
PLATELET # BLD AUTO: 457 K/UL — HIGH (ref 150–400)
POTASSIUM SERPL-MCNC: 5 MMOL/L — SIGNIFICANT CHANGE UP (ref 3.5–5.3)
POTASSIUM SERPL-SCNC: 5 MMOL/L — SIGNIFICANT CHANGE UP (ref 3.5–5.3)
RBC # BLD: 3.62 M/UL — LOW (ref 3.8–5.2)
RBC # FLD: 15.2 % — HIGH (ref 10.3–14.5)
SODIUM SERPL-SCNC: 136 MMOL/L — SIGNIFICANT CHANGE UP (ref 135–145)
WBC # BLD: 18.55 K/UL — HIGH (ref 3.8–10.5)
WBC # FLD AUTO: 18.55 K/UL — HIGH (ref 3.8–10.5)

## 2021-06-01 PROCEDURE — 93306 TTE W/DOPPLER COMPLETE: CPT | Mod: 26

## 2021-06-01 PROCEDURE — 99232 SBSQ HOSP IP/OBS MODERATE 35: CPT | Mod: GC

## 2021-06-01 RX ORDER — BENZOCAINE AND MENTHOL 5; 1 G/100ML; G/100ML
1 LIQUID ORAL THREE TIMES A DAY
Refills: 0 | Status: DISCONTINUED | OUTPATIENT
Start: 2021-06-01 | End: 2021-06-02

## 2021-06-01 RX ORDER — INSULIN LISPRO 100/ML
VIAL (ML) SUBCUTANEOUS
Refills: 0 | Status: DISCONTINUED | OUTPATIENT
Start: 2021-06-01 | End: 2021-06-02

## 2021-06-01 RX ADMIN — Medication 1: at 09:40

## 2021-06-01 RX ADMIN — Medication 1 PATCH: at 11:40

## 2021-06-01 RX ADMIN — Medication 3 MILLILITER(S): at 22:38

## 2021-06-01 RX ADMIN — Medication 2: at 22:38

## 2021-06-01 RX ADMIN — Medication 3 MILLILITER(S): at 15:00

## 2021-06-01 RX ADMIN — LISINOPRIL 20 MILLIGRAM(S): 2.5 TABLET ORAL at 06:27

## 2021-06-01 RX ADMIN — BENZOCAINE AND MENTHOL 1 LOZENGE: 5; 1 LIQUID ORAL at 21:03

## 2021-06-01 RX ADMIN — BUDESONIDE AND FORMOTEROL FUMARATE DIHYDRATE 2 PUFF(S): 160; 4.5 AEROSOL RESPIRATORY (INHALATION) at 17:53

## 2021-06-01 RX ADMIN — Medication 40 MILLIGRAM(S): at 09:37

## 2021-06-01 RX ADMIN — Medication 3 MILLILITER(S): at 11:40

## 2021-06-01 RX ADMIN — ATORVASTATIN CALCIUM 40 MILLIGRAM(S): 80 TABLET, FILM COATED ORAL at 21:03

## 2021-06-01 RX ADMIN — Medication 81 MILLIGRAM(S): at 11:40

## 2021-06-01 RX ADMIN — Medication 1 PATCH: at 19:21

## 2021-06-01 RX ADMIN — ENOXAPARIN SODIUM 40 MILLIGRAM(S): 100 INJECTION SUBCUTANEOUS at 06:26

## 2021-06-01 RX ADMIN — Medication 40 MILLIGRAM(S): at 17:08

## 2021-06-01 RX ADMIN — Medication 2: at 17:51

## 2021-06-01 RX ADMIN — PANTOPRAZOLE SODIUM 40 MILLIGRAM(S): 20 TABLET, DELAYED RELEASE ORAL at 06:27

## 2021-06-01 RX ADMIN — Medication 1 PATCH: at 07:57

## 2021-06-01 RX ADMIN — CEFTRIAXONE 100 MILLIGRAM(S): 500 INJECTION, POWDER, FOR SOLUTION INTRAMUSCULAR; INTRAVENOUS at 06:26

## 2021-06-01 RX ADMIN — SERTRALINE 50 MILLIGRAM(S): 25 TABLET, FILM COATED ORAL at 11:40

## 2021-06-01 RX ADMIN — Medication 40 MILLIGRAM(S): at 02:30

## 2021-06-01 RX ADMIN — Medication 25 MILLIGRAM(S): at 11:41

## 2021-06-01 RX ADMIN — Medication 3 MILLILITER(S): at 19:21

## 2021-06-01 RX ADMIN — POLYETHYLENE GLYCOL 3350 17 GRAM(S): 17 POWDER, FOR SOLUTION ORAL at 11:41

## 2021-06-01 RX ADMIN — Medication 3 MILLILITER(S): at 06:27

## 2021-06-01 RX ADMIN — BUDESONIDE AND FORMOTEROL FUMARATE DIHYDRATE 2 PUFF(S): 160; 4.5 AEROSOL RESPIRATORY (INHALATION) at 06:26

## 2021-06-01 RX ADMIN — Medication 1 PATCH: at 12:43

## 2021-06-01 RX ADMIN — Medication 3 MILLILITER(S): at 02:30

## 2021-06-01 RX ADMIN — Medication 3: at 12:28

## 2021-06-01 NOTE — PROGRESS NOTE ADULT - TIME BILLING
Patient seen and examined with house-staff during bedside rounds.  Resident note read, including vitals, physical findings, laboratory data, and radiological reports.   Revisions included below.  Direct personal management at bed side and extensive interpretation of the data.  Plan was outlined and discussed in details with the housestaff.  Decision making of high complexity  Action taken for acute disease activity to reflect the level of care provided:  - medication reconciliation  - review laboratory data  Patient was admitted with acute exacerbation of COPD. The patient was started smoking which triggered her COPD exacerbation. The patient improved. Patient required noninvasive ventilation.. Continue noninvasive ventilation she did not tolerate it yesterday. Will change to CPAP of 5 and will follow. Continue bronchodilators. Continue steroids and change to po
Patient seen and examined with house-staff during bedside rounds.  Resident note read, including vitals, physical findings, laboratory data, and radiological reports.   Revisions included below.  Direct personal management at bed side and extensive interpretation of the data.  Plan was outlined and discussed in details with the housestaff.  Decision making of high complexity  Action taken for acute disease activity to reflect the level of care provided:  - medication reconciliation  - review laboratory dataThe patient is clinically stable.  The patient blood sugar is elevated and will decrease the prednisone to 30 mg.  The patient on Symbicort and Spiriva Respimat as an outpatient.  Patient will require escalation of therapy.  The patient has COPD with chronic bronchitis.  Patient has productive cough.  The echocardiogram revealed diastolic dysfunction will follow up with Dr. Vitale at that might be contributing to her condition.  She tolerated the CPAP and will change his CPAP to 5 at home.  The patient will be discharged on home oxygen.

## 2021-06-01 NOTE — CONSULT NOTE ADULT - ASSESSMENT
per Internal Medicine    69 yo F with PMH of COPD on home O2 (3L) and CPAP, TIAs, right carotid stenosis, HTN, HLD, T2DM, Depression, smoker (quit 7days ago) presents to ED with symptoms of worsening SOB; likely 2/2 COPD exacerbation 2/2 home O2 noncompliance given patients home machine broke and she was unable to obtain a replacement prior to admission.     Problem/Plan - 1:  ·  Problem: COPD exacerbation.  Plan: Hx of COPD on home O2 (3L) and CPAP presents with worsening SOB/MUNSON, increased sputum production for one week in setting of home O2 noncompliance; in addition with nasal congestion throat pain with mild leukocytosis (2/2 steroids?). CXR with RLL atelectasis vs infiltrate. COVID swab neg. Diffusely wheezing in ED. Pt is euvolemic, no increased O2 requirements from baseline 3LNC. Likely COPD exacerbation 2/2 O2 noncompliance. Procal and RVP negative.  - s/p Solumedrol 80mg and douneb x2 in ED with improvement of symptoms and tachypnea  - still slightly tachypneic; per pt is close to baseline- c/w BIPAP overnight. No retention on vbg, pt refused ABG  - c/w solumedrol 40mg q8hrs, taper as tolerated   - c/w doubneb q4hrs  - c/w Ceft/Azithro for possible CAP/atypical pna  - c/w Symbicort BID   - Pulm to see in AM  - f/up echo w/ bubble to asses pulm htn   - PT consult.     Problem/Plan - 2:  ·  Problem: Anemia.  Plan: Hgb 8.9 on admission, baseline 11. No s/s of bleeding. Likely AOCD  - iron studies: total Fe, % Fe low; all else wnl  - b12, folate WNL   - continue to monitor   - T/S.     Problem/Plan - 3:  ·  Problem: HTN (hypertension).  Plan: - c/w home HCTX 25mg and lisinopril 20mg QD  - monitor BP.     Problem/Plan - 4:  ·  Problem: Carotid stenosis.  Plan: CTA head/neck with moderate to severe R ICA stenosis 2/2 calcified atherosclerotic plaque, possible calcific emboli in R MCA, no large vessel occlusion. Follows neuro outpt however unable to see since pandemic   - c/w asa 81  - obtain collateral   - cont outpt f/up.     Problem/Plan - 5:  ·  Problem: Tobacco use disorder.  Plan: heavy smoker 40+years; tried quitting many times, especially now on oxygen however patient again restarted smoking, quit 7 days ago  - nicotine patch   - discuss risks with home O2 and smoking.     Problem/Plan - 6:  Problem: Type 2 diabetes mellitus. Plan: A1c 6.4 on this admission  - ISS.    Problem/Plan - 7:  ·  Problem: Nutrition, metabolism, and development symptoms.  Plan: F: none   E: replete prn   N: DASH.     Problem/Plan - 8:  ·  Problem: Need for prophylactic measure.  Plan: DVT: Lovenox   GI: pantoprazole     FULL CODE.

## 2021-06-01 NOTE — PROGRESS NOTE ADULT - PROBLEM SELECTOR PLAN 1
Hx of COPD on home O2 (3L) and CPAP presents with worsening SOB/MUNSON, increased sputum production for one week in setting of home O2 noncompliance; in addition with nasal congestion throat pain with mild leukocytosis (2/2 steroids?). CXR with RLL atelectasis vs infiltrate. COVID swab neg. Diffusely wheezing in ED. Pt is euvolemic, no increased O2 requirements from baseline 3LNC. Likely COPD exacerbation 2/2 O2 noncompliance. Procal and RVP negative.  - s/p Solumedrol 80mg and douneb x2 in ED with improvement of symptoms and tachypnea  - still slightly tachypneic; per pt is close to baseline- c/w BIPAP overnight. No retention on vbg, pt refused ABG  - c/w solumedrol 40mg q8hrs, taper as tolerated   - c/w doubneb q4hrs  - c/w Ceft/Azithro for possible CAP/atypical pna  - c/w Symbicort BID   - Pulm to see in AM  - f/up echo w/ bubble to asses pulm htn   - PT consult Hx of COPD on home O2 (3L) and CPAP presents with worsening SOB/MUNSON, increased sputum production for one week in setting of home O2 noncompliance; in addition with nasal congestion throat pain with mild leukocytosis (2/2 steroids?). CXR with RLL atelectasis vs infiltrate. COVID swab neg. Diffusely wheezing in ED. Pt is euvolemic, no increased O2 requirements from baseline 3LNC. Likely COPD exacerbation 2/2 O2 noncompliance. Procal and RVP negative.  - s/p Solumedrol 80mg and douneb x2 in ED with improvement of symptoms and tachypnea  - still slightly tachypneic; per pt is close to baseline- c/w BIPAP overnight. No retention on vbg, pt refused ABG  - c/w solumedrol 40mg q8hrs, taper as tolerated   - c/w doubneb q4hrs  - c/w Ceft/Azithro for possible CAP/atypical pna  - c/w Symbicort BID   - f/up echo w/ bubble to asses pulm htn   - PT consult

## 2021-06-01 NOTE — PROGRESS NOTE ADULT - PROBLEM SELECTOR PLAN 5
heavy smoker 40+years; tried quitting many times, especially now on oxygen however patient again restarted smoking, quit 7 days ago  - nicotine patch   - discuss risks with home O2 and smoking heavy smoker 40+years; tried quitting many times, especially now on oxygen however patient again restarted smoking, quit 7 days prior to admission  - nicotine patch   - discuss risks with home O2 and smoking

## 2021-06-01 NOTE — PROGRESS NOTE ADULT - SUBJECTIVE AND OBJECTIVE BOX
*** INCOMPLETE ***    OVERNIGHT EVENTS: As per overnight staff, there were no acute events overnight    INTERVAL EVENTS:    SUBJECTIVE HPI: Patient seen and examined at bedside. Patient resting comfortably, no complaints at this time. Patient denies: fever, chills, weakness, dizziness, headaches, changes in vision, chest pain, palpitations, shortness of breath, cough, N/V, diarrhea or constipation, dysuria, LE edema. ROS otherwise negative.      VITAL SIGNS:  Vital Signs Last 24 Hrs  T(C): 36.1 (01 Jun 2021 13:37), Max: 37.3 (31 May 2021 21:00)  T(F): 97 (01 Jun 2021 13:37), Max: 99.2 (31 May 2021 21:00)  HR: 88 (01 Jun 2021 13:37) (78 - 99)  BP: 115/78 (01 Jun 2021 13:37) (111/62 - 177/74)  BP(mean): 108 (01 Jun 2021 05:25) (108 - 108)  RR: 17 (01 Jun 2021 13:37) (17 - 23)  SpO2: 97% (01 Jun 2021 13:37) (95% - 100%)        PHYSICAL EXAM:  General: Comfortable, pleasant/anxious/agitated, Ill-appearing, well-nourished/frail/cachectic, comfortable / in distress  Neurological: AAOx3, no focal deficits  HEENT: NC/AT; EOMI, PERRL, clear conjunctiva, no nasal or oropharyngeal discharge or exudates, MMM  Neck: supple, no cervical or post-auricular lymphadenopathy  Cardiovascular: +S1/S2, no murmurs/rubs/gallops, RRR  Respiratory: CTA B/L, no diminished breath sounds, no wheezes/rales/rhonchi, no increased work of breathing or accessory muscle use  Gastrointestinal: soft, NT/ND; active BSx4 quadrants  Genitourinary: no suprapubic tenderness, no CVA tenderness  Extremities: WWP; no edema, clubbing or cyanosis  Vascular: 2+ radial, DP/PT pulses B/L  Skin: no rashes  Lines/Drains:       MEDICATIONS:  MEDICATIONS  (STANDING):  albuterol/ipratropium for Nebulization 3 milliLiter(s) Nebulizer every 4 hours  aspirin enteric coated 81 milliGRAM(s) Oral daily  atorvastatin 40 milliGRAM(s) Oral at bedtime  budesonide 160 MICROgram(s)/formoterol 4.5 MICROgram(s) Inhaler 2 Puff(s) Inhalation two times a day  cefTRIAXone   IVPB 1000 milliGRAM(s) IV Intermittent every 24 hours  dextrose 40% Gel 15 Gram(s) Oral once  dextrose 5%. 1000 milliLiter(s) (50 mL/Hr) IV Continuous <Continuous>  dextrose 5%. 1000 milliLiter(s) (100 mL/Hr) IV Continuous <Continuous>  dextrose 50% Injectable 25 Gram(s) IV Push once  dextrose 50% Injectable 12.5 Gram(s) IV Push once  dextrose 50% Injectable 25 Gram(s) IV Push once  enoxaparin Injectable 40 milliGRAM(s) SubCutaneous every 24 hours  glucagon  Injectable 1 milliGRAM(s) IntraMuscular once  hydrochlorothiazide 25 milliGRAM(s) Oral every 24 hours  insulin lispro (ADMELOG) corrective regimen sliding scale   SubCutaneous three times a day before meals  lisinopril 20 milliGRAM(s) Oral daily  methylPREDNISolone sodium succinate Injectable 40 milliGRAM(s) IV Push every 8 hours  nicotine - 21 mG/24Hr(s) Patch 1 patch Transdermal daily  pantoprazole    Tablet 40 milliGRAM(s) Oral before breakfast  polyethylene glycol 3350 17 Gram(s) Oral daily  senna 2 Tablet(s) Oral at bedtime  sertraline 50 milliGRAM(s) Oral daily    MEDICATIONS  (PRN):  acetaminophen   Tablet .. 650 milliGRAM(s) Oral every 6 hours PRN Mild Pain (1 - 3)  benzocaine 15 mG/menthol 3.6 mG (Sugar-Free) Lozenge 1 Lozenge Oral three times a day PRN Sore Throat      ALLERGIES/INTOLERANCES:  Allergies    No Known Allergies    Intolerances        LABS:                        10.5   18.55 )-----------( 457      ( 01 Jun 2021 06:05 )             34.1     06-01    136  |  99  |  31<H>  ----------------------------<  167<H>  5.0   |  27  |  0.67    Ca    9.8      01 Jun 2021 06:05  Phos  3.5     06-01  Mg     2.4     06-01        CAPILLARY BLOOD GLUCOSE      POCT Blood Glucose.: 263 mg/dL (01 Jun 2021 12:20)                    Microbiology:      RADIOLOGY, EKG AND ADDITIONAL TESTS: Reviewed. OVERNIGHT EVENTS: As per overnight staff, there were no acute events overnight    INTERVAL EVENTS:    SUBJECTIVE HPI: Patient seen and examined at bedside. Patient resting comfortably, no complaints at this time. Patient denies: fever, chills, weakness, dizziness, headaches, changes in vision, chest pain, palpitations, shortness of breath, cough, N/V, diarrhea or constipation, dysuria, LE edema. ROS otherwise negative.      VITAL SIGNS:  Vital Signs Last 24 Hrs  T(C): 36.1 (01 Jun 2021 13:37), Max: 37.3 (31 May 2021 21:00)  T(F): 97 (01 Jun 2021 13:37), Max: 99.2 (31 May 2021 21:00)  HR: 88 (01 Jun 2021 13:37) (78 - 99)  BP: 115/78 (01 Jun 2021 13:37) (111/62 - 177/74)  BP(mean): 108 (01 Jun 2021 05:25) (108 - 108)  RR: 17 (01 Jun 2021 13:37) (17 - 23)  SpO2: 97% (01 Jun 2021 13:37) (95% - 100%)        PHYSICAL EXAM:  General: Comfortable, pleasant, well-nourished in NAD  Neurological: AAOx3, no focal deficits  HEENT: NC/AT; clear conjunctiva, no nasal or oropharyngeal discharge or exudates, MMM  Neck: supple, no cervical or post-auricular lymphadenopathy  Cardiovascular: +S1/S2, no murmurs/rubs/gallops, RRR  Respiratory: slight wheeze in right lung base, no increased work of breathing or accessory muscle use  Gastrointestinal: soft, NT/ND; active BSx4 quadrants  Genitourinary: no suprapubic tenderness, no CVA tenderness  Extremities: WWP; no edema, clubbing or cyanosis  Vascular: 2+ radial, DP/PT pulses B/L  Skin: no rashes  Lines/Drains:       MEDICATIONS:  MEDICATIONS  (STANDING):  albuterol/ipratropium for Nebulization 3 milliLiter(s) Nebulizer every 4 hours  aspirin enteric coated 81 milliGRAM(s) Oral daily  atorvastatin 40 milliGRAM(s) Oral at bedtime  budesonide 160 MICROgram(s)/formoterol 4.5 MICROgram(s) Inhaler 2 Puff(s) Inhalation two times a day  cefTRIAXone   IVPB 1000 milliGRAM(s) IV Intermittent every 24 hours  dextrose 40% Gel 15 Gram(s) Oral once  dextrose 5%. 1000 milliLiter(s) (50 mL/Hr) IV Continuous <Continuous>  dextrose 5%. 1000 milliLiter(s) (100 mL/Hr) IV Continuous <Continuous>  dextrose 50% Injectable 25 Gram(s) IV Push once  dextrose 50% Injectable 12.5 Gram(s) IV Push once  dextrose 50% Injectable 25 Gram(s) IV Push once  enoxaparin Injectable 40 milliGRAM(s) SubCutaneous every 24 hours  glucagon  Injectable 1 milliGRAM(s) IntraMuscular once  hydrochlorothiazide 25 milliGRAM(s) Oral every 24 hours  insulin lispro (ADMELOG) corrective regimen sliding scale   SubCutaneous three times a day before meals  lisinopril 20 milliGRAM(s) Oral daily  methylPREDNISolone sodium succinate Injectable 40 milliGRAM(s) IV Push every 8 hours  nicotine - 21 mG/24Hr(s) Patch 1 patch Transdermal daily  pantoprazole    Tablet 40 milliGRAM(s) Oral before breakfast  polyethylene glycol 3350 17 Gram(s) Oral daily  senna 2 Tablet(s) Oral at bedtime  sertraline 50 milliGRAM(s) Oral daily    MEDICATIONS  (PRN):  acetaminophen   Tablet .. 650 milliGRAM(s) Oral every 6 hours PRN Mild Pain (1 - 3)  benzocaine 15 mG/menthol 3.6 mG (Sugar-Free) Lozenge 1 Lozenge Oral three times a day PRN Sore Throat      ALLERGIES/INTOLERANCES:  Allergies    No Known Allergies    Intolerances        LABS:                        10.5   18.55 )-----------( 457      ( 01 Jun 2021 06:05 )             34.1     06-01    136  |  99  |  31<H>  ----------------------------<  167<H>  5.0   |  27  |  0.67    Ca    9.8      01 Jun 2021 06:05  Phos  3.5     06-01  Mg     2.4     06-01        CAPILLARY BLOOD GLUCOSE      POCT Blood Glucose.: 263 mg/dL (01 Jun 2021 12:20)                    Microbiology:      RADIOLOGY, EKG AND ADDITIONAL TESTS: Reviewed. OVERNIGHT EVENTS: As per overnight staff, there were no acute events overnight    INTERVAL EVENTS:    SUBJECTIVE HPI: Patient seen and examined at bedside. Patient resting comfortably, no complaints at this time. Patient denies: fever, chills, weakness, dizziness, headaches, changes in vision, chest pain, palpitations, shortness of breath, cough, N/V, diarrhea or constipation, dysuria, LE edema. ROS otherwise negative.      VITAL SIGNS:  Vital Signs Last 24 Hrs  T(C): 36.1 (01 Jun 2021 13:37), Max: 37.3 (31 May 2021 21:00)  T(F): 97 (01 Jun 2021 13:37), Max: 99.2 (31 May 2021 21:00)  HR: 88 (01 Jun 2021 13:37) (78 - 99)  BP: 115/78 (01 Jun 2021 13:37) (111/62 - 177/74)  BP(mean): 108 (01 Jun 2021 05:25) (108 - 108)  RR: 17 (01 Jun 2021 13:37) (17 - 23)  SpO2: 97% (01 Jun 2021 13:37) (95% - 100%)        PHYSICAL EXAM:  General: Comfortable, pleasant, well-nourished in NAD  Neurological: AAOx3, no focal deficits  HEENT: NC/AT; clear conjunctiva, no nasal or oropharyngeal discharge or exudates, MMM  Neck: supple, no cervical or post-auricular lymphadenopathy  Cardiovascular: +S1/S2, no murmurs/rubs/gallops, RRR  Respiratory: slight wheeze in right lung base, no increased work of breathing or accessory muscle use  Gastrointestinal: soft, NT/ND; active BSx4 quadrants  Genitourinary: no suprapubic tenderness, no CVA tenderness  Extremities: WWP; no edema, clubbing or cyanosis  Vascular: 2+ radial, DP/PT pulses B/L  Skin: no rashes  Lines/Drains:       MEDICATIONS:  MEDICATIONS  (STANDING):  albuterol/ipratropium for Nebulization 3 milliLiter(s) Nebulizer every 4 hours  aspirin enteric coated 81 milliGRAM(s) Oral daily  atorvastatin 40 milliGRAM(s) Oral at bedtime  budesonide 160 MICROgram(s)/formoterol 4.5 MICROgram(s) Inhaler 2 Puff(s) Inhalation two times a day  cefTRIAXone   IVPB 1000 milliGRAM(s) IV Intermittent every 24 hours  dextrose 40% Gel 15 Gram(s) Oral once  dextrose 5%. 1000 milliLiter(s) (50 mL/Hr) IV Continuous <Continuous>  dextrose 5%. 1000 milliLiter(s) (100 mL/Hr) IV Continuous <Continuous>  dextrose 50% Injectable 25 Gram(s) IV Push once  dextrose 50% Injectable 12.5 Gram(s) IV Push once  dextrose 50% Injectable 25 Gram(s) IV Push once  enoxaparin Injectable 40 milliGRAM(s) SubCutaneous every 24 hours  glucagon  Injectable 1 milliGRAM(s) IntraMuscular once  hydrochlorothiazide 25 milliGRAM(s) Oral every 24 hours  insulin lispro (ADMELOG) corrective regimen sliding scale   SubCutaneous three times a day before meals  lisinopril 20 milliGRAM(s) Oral daily  methylPREDNISolone sodium succinate Injectable 40 milliGRAM(s) IV Push every 8 hours  nicotine - 21 mG/24Hr(s) Patch 1 patch Transdermal daily  pantoprazole    Tablet 40 milliGRAM(s) Oral before breakfast  polyethylene glycol 3350 17 Gram(s) Oral daily  senna 2 Tablet(s) Oral at bedtime  sertraline 50 milliGRAM(s) Oral daily    MEDICATIONS  (PRN):  acetaminophen   Tablet .. 650 milliGRAM(s) Oral every 6 hours PRN Mild Pain (1 - 3)  benzocaine 15 mG/menthol 3.6 mG (Sugar-Free) Lozenge 1 Lozenge Oral three times a day PRN Sore Throat      ALLERGIES/INTOLERANCES:  Allergies    No Known Allergies    Intolerances        LABS:                        10.5   18.55 )-----------( 457      ( 01 Jun 2021 06:05 )             34.1     06-01    136  |  99  |  31<H>  ----------------------------<  167<H>  5.0   |  27  |  0.67    Ca    9.8      01 Jun 2021 06:05  Phos  3.5     06-01  Mg     2.4     06-01        CAPILLARY BLOOD GLUCOSE      POCT Blood Glucose.: 263 mg/dL (01 Jun 2021 12:20)              < from: TTE Echo Complete w/o Contrast w/ Doppler (06.01.21 @ 14:27) >  CONCLUSIONS:     1. No significant valvular disease.   2. The right ventricle is normal in size. Right ventricular systolic function is normal.   3. The left ventricle is normal in size, wall thickness, and systolic function with a calculated ejection fraction of 74%.   4. No pericardial effusion.   5. Compared to the previous TTE performed on 5/11/2018, there have been no significant interval changes.    < end of copied text >        Microbiology:      RADIOLOGY, EKG AND ADDITIONAL TESTS: Reviewed.

## 2021-06-01 NOTE — CONSULT NOTE ADULT - SUBJECTIVE AND OBJECTIVE BOX
Patient is a 70y old  Female who presents with a chief complaint of COPD exacerbation (31 May 2021 08:34)       HPI:  A 69yo F with PMH of COPD on home O2 (3L), TIAs, right carotid stenosis, HTN, HLD, T2DM, Depression, smoker (quit 7days ago) presents to ED with symptoms of worsening SOB. Patient says her oxygen machine stopped working 2 weeks ago and since then has had worsening SOB with increased sputum production and cough. She denies any fevers/chills, abdominal pain, nausea/vomiting, diarhea, LE edema, CP, palpitations. Pt shes she is normally tachypneic and dyspneic with minor activities however now unable to walk due to SOB prompting visit to ED. She is currently on prednisone which was increased from 5mg to 10mg QD one week ago by PCP.    In ED VS: 98.7, 92, 123/69, RR 22 and SpO2 94% on RA. Labs s/f WBC 13, Hgb 8.9. PT given Azithro 500mg, Solumedrol 80mg IVP, NS bolus 1L. And douneb x2 with improvment of symptoms.  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    Disease of trachea and bronchus  Tracheomalacia    Epilepsy  No longer on medications.    Depressive disorder  Depression    Transient cerebral ischemia  TIA (transient ischemic attack)    Peptic ulcer  2/2 H. pylori (s/p amoxicillin, clarithromycin, omeprazole)    Tobacco use disorder  Tobacco abuse    Emphysema  COPD (chronic obstructive pulmonary disease) with emphysema    Hyperlipidemia  Hyperlipidemia    Essential hypertension  Hypertension    Type 2 diabetes mellitus  Diabetes    Other postprocedural status  S/P hernia repair        MEDICATIONS  (STANDING):  albuterol/ipratropium for Nebulization 3 milliLiter(s) Nebulizer every 4 hours  aspirin enteric coated 81 milliGRAM(s) Oral daily  atorvastatin 40 milliGRAM(s) Oral at bedtime  budesonide 160 MICROgram(s)/formoterol 4.5 MICROgram(s) Inhaler 2 Puff(s) Inhalation two times a day  cefTRIAXone   IVPB 1000 milliGRAM(s) IV Intermittent every 24 hours  dextrose 40% Gel 15 Gram(s) Oral once  dextrose 5%. 1000 milliLiter(s) (50 mL/Hr) IV Continuous <Continuous>  dextrose 5%. 1000 milliLiter(s) (100 mL/Hr) IV Continuous <Continuous>  dextrose 50% Injectable 25 Gram(s) IV Push once  dextrose 50% Injectable 12.5 Gram(s) IV Push once  dextrose 50% Injectable 25 Gram(s) IV Push once  enoxaparin Injectable 40 milliGRAM(s) SubCutaneous every 24 hours  glucagon  Injectable 1 milliGRAM(s) IntraMuscular once  hydrochlorothiazide 25 milliGRAM(s) Oral every 24 hours  insulin lispro (ADMELOG) corrective regimen sliding scale   SubCutaneous three times a day before meals  lisinopril 20 milliGRAM(s) Oral daily  methylPREDNISolone sodium succinate Injectable 40 milliGRAM(s) IV Push every 8 hours  nicotine - 21 mG/24Hr(s) Patch 1 patch Transdermal daily  pantoprazole    Tablet 40 milliGRAM(s) Oral before breakfast  polyethylene glycol 3350 17 Gram(s) Oral daily  senna 2 Tablet(s) Oral at bedtime  sertraline 50 milliGRAM(s) Oral daily    MEDICATIONS  (PRN):  acetaminophen   Tablet .. 650 milliGRAM(s) Oral every 6 hours PRN Mild Pain (1 - 3)  benzocaine 15 mG/menthol 3.6 mG (Sugar-Free) Lozenge 1 Lozenge Oral three times a day PRN Sore Throat        Social History:                -  Home Living Status:  lives alone in an elevator accessible apartment building         -  Prior Home Care Services:   son is her hha 7 hrs x 7 days/week         Baseline Functional Level Prior to Admission:             - ADL's/ IADL's:  independent         - ambulatory status PTA:  walked with a cane at home/ rollator in the community    FAMILY HISTORY:  Family history of ovarian cancer (Mother, Sibling)    Family history of colon cancer (Sibling)  sister    Family history of diabetes mellitus (Mother, Father)        CBC Full  -  ( 01 Jun 2021 06:05 )  WBC Count : 18.55 K/uL  RBC Count : 3.62 M/uL  Hemoglobin : 10.5 g/dL  Hematocrit : 34.1 %  Platelet Count - Automated : 457 K/uL  Mean Cell Volume : 94.2 fl  Mean Cell Hemoglobin : 29.0 pg  Mean Cell Hemoglobin Concentration : 30.8 gm/dL  Auto Neutrophil # : x  Auto Lymphocyte # : x  Auto Monocyte # : x  Auto Eosinophil # : x  Auto Basophil # : x  Auto Neutrophil % : x  Auto Lymphocyte % : x  Auto Monocyte % : x  Auto Eosinophil % : x  Auto Basophil % : x      06-01    136  |  99  |  31<H>  ----------------------------<  167<H>  5.0   |  27  |  0.67    Ca    9.8      01 Jun 2021 06:05  Phos  3.5     06-01  Mg     2.4     06-01              Radiology:    < from: Xray Chest 2 Views PA/Lat (05.29.21 @ 01:04) >    EXAM:  XR CHEST PA LAT 2V                          PROCEDURE DATE:  05/29/2021          INTERPRETATION:  Clinical history: Shortness of breath    Frontal and lateral examination of the chest demonstrates the heart to be within normal limits in transverse diameter. No acute infiltrates. Discoid changes lung bases. Degenerative changes thoracic spine. Calcification aortic knob.    IMPRESSION: No acute infiltrates                    Vital Signs Last 24 Hrs  T(C): 36.1 (01 Jun 2021 13:37), Max: 37.3 (31 May 2021 21:00)  T(F): 97 (01 Jun 2021 13:37), Max: 99.2 (31 May 2021 21:00)  HR: 88 (01 Jun 2021 13:37) (78 - 99)  BP: 115/78 (01 Jun 2021 13:37) (111/62 - 177/74)  BP(mean): 108 (01 Jun 2021 05:25) (108 - 108)  RR: 17 (01 Jun 2021 13:37) (17 - 23)  SpO2: 97% (01 Jun 2021 13:37) (95% - 100%)        REVIEW OF SYSTEMS:    CONSTITUTIONAL: No fever, weight loss, or fatigue  EYES: No eye pain, visual disturbances, or discharge  ENMT:  No difficulty hearing, tinnitus, vertigo; No sinus or throat pain  NECK: No pain or stiffness  BREASTS: No pain, masses, or nipple discharge  RESPIRATORY: shortness of breath  CARDIOVASCULAR: No chest pain, palpitations, dizziness, or leg swelling  GASTROINTESTINAL: No abdominal or epigastric pain. No nausea, vomiting, or hematemesis; No diarrhea or constipation. No melena or hematochezia.  GENITOURINARY: No dysuria, frequency, hematuria, or incontinence  NEUROLOGICAL: No headaches, memory loss, loss of strength, numbness, or tremors  SKIN: No itching, burning, rashes, or lesions   LYMPH NODES: No enlarged glands  ENDOCRINE: No heat or cold intolerance; No hair loss  MUSCULOSKELETAL: No joint pain or swelling; No muscle, back, or extremity pain  PSYCHIATRIC: No depression, anxiety, mood swings, or difficulty sleeping  HEME/LYMPH: No easy bruising, or bleeding gums  ALLERGY AND IMMUNOLOGIC: No hives or eczema  VASCULAR: no swelling, erythema,   : no dysuria, hematuria, frequency        Physical Exam: WDWN 71 yo  woman sitting up in bed, no acute complaints    Head: normocephalic, atraumatic    Eyes: PERRLA, EOMI, no nystagmus, sclera anicteric    ENT: nasal discharge, uvula midline, no oropharyngeal erythema/exudate    Neck: supple, negative JVD, negative carotid bruits, no thyromegaly    Chest: CTA bilaterally, neg wheeze/ rhonchi/ rales/ crackles/ egophany    Cardiovascular: regular rate and rhythm, neg murmurs/rubs/gallops    Abdomen: soft, non distended, non tender to palpation in all 4 quadrants, negative rebound/guarding, normal bowel sounds    Extremities: WWP, neg cyanosis/clubbing/edema, negative calf tenderness to palpation, negative Reshma's sign    Musculoskeletal:    Skin:      :     Neurologic Exam:    Alert and oriented to person, place, date/year, speech fluent w/o dysarthria    Motor Exam:    Right UE:           > 4/5                               Left UE:              > 4/5      Right LE:           > 4/5    Left LE:               > 4/5               Sensation:         intact to light touch x 4 extremities                                                 DTR:                  biceps/brachioradialis: equal bilaterally                                                     patella/ankle: equal bilaterally                     Gait:  not tested        PM&R Impression:    1) COPD exacerbation  2) no focal weakness    Plan:    1) Physical therapy focusing on therapeutic exercises, bed mobility/transfer out of bed evaluation, progressive ambulation with assistive devices prn.    2) Anticipated Disposition Plan/Recs:   d/c home with home physical therapy

## 2021-06-02 ENCOUNTER — TRANSCRIPTION ENCOUNTER (OUTPATIENT)
Age: 71
End: 2021-06-02

## 2021-06-02 VITALS
OXYGEN SATURATION: 95 % | TEMPERATURE: 98 F | HEART RATE: 86 BPM | DIASTOLIC BLOOD PRESSURE: 66 MMHG | RESPIRATION RATE: 18 BRPM | SYSTOLIC BLOOD PRESSURE: 115 MMHG

## 2021-06-02 LAB
ANION GAP SERPL CALC-SCNC: 11 MMOL/L — SIGNIFICANT CHANGE UP (ref 5–17)
BUN SERPL-MCNC: 35 MG/DL — HIGH (ref 7–23)
CALCIUM SERPL-MCNC: 9.2 MG/DL — SIGNIFICANT CHANGE UP (ref 8.4–10.5)
CHLORIDE SERPL-SCNC: 96 MMOL/L — SIGNIFICANT CHANGE UP (ref 96–108)
CO2 SERPL-SCNC: 28 MMOL/L — SIGNIFICANT CHANGE UP (ref 22–31)
CREAT SERPL-MCNC: 0.7 MG/DL — SIGNIFICANT CHANGE UP (ref 0.5–1.3)
GLUCOSE BLDC GLUCOMTR-MCNC: 152 MG/DL — HIGH (ref 70–99)
GLUCOSE SERPL-MCNC: 160 MG/DL — HIGH (ref 70–99)
HCT VFR BLD CALC: 32.9 % — LOW (ref 34.5–45)
HGB BLD-MCNC: 10.1 G/DL — LOW (ref 11.5–15.5)
MAGNESIUM SERPL-MCNC: 2.2 MG/DL — SIGNIFICANT CHANGE UP (ref 1.6–2.6)
MCHC RBC-ENTMCNC: 29.2 PG — SIGNIFICANT CHANGE UP (ref 27–34)
MCHC RBC-ENTMCNC: 30.7 GM/DL — LOW (ref 32–36)
MCV RBC AUTO: 95.1 FL — SIGNIFICANT CHANGE UP (ref 80–100)
NRBC # BLD: 0 /100 WBCS — SIGNIFICANT CHANGE UP (ref 0–0)
PHOSPHATE SERPL-MCNC: 3.9 MG/DL — SIGNIFICANT CHANGE UP (ref 2.5–4.5)
PLATELET # BLD AUTO: 446 K/UL — HIGH (ref 150–400)
POTASSIUM SERPL-MCNC: 5.3 MMOL/L — SIGNIFICANT CHANGE UP (ref 3.5–5.3)
POTASSIUM SERPL-SCNC: 5.3 MMOL/L — SIGNIFICANT CHANGE UP (ref 3.5–5.3)
RBC # BLD: 3.46 M/UL — LOW (ref 3.8–5.2)
RBC # FLD: 15.1 % — HIGH (ref 10.3–14.5)
SODIUM SERPL-SCNC: 135 MMOL/L — SIGNIFICANT CHANGE UP (ref 135–145)
WBC # BLD: 19.25 K/UL — HIGH (ref 3.8–10.5)
WBC # FLD AUTO: 19.25 K/UL — HIGH (ref 3.8–10.5)

## 2021-06-02 PROCEDURE — 83880 ASSAY OF NATRIURETIC PEPTIDE: CPT

## 2021-06-02 PROCEDURE — 97161 PT EVAL LOW COMPLEX 20 MIN: CPT

## 2021-06-02 PROCEDURE — 82728 ASSAY OF FERRITIN: CPT

## 2021-06-02 PROCEDURE — 87635 SARS-COV-2 COVID-19 AMP PRB: CPT

## 2021-06-02 PROCEDURE — 93306 TTE W/DOPPLER COMPLETE: CPT

## 2021-06-02 PROCEDURE — 93005 ELECTROCARDIOGRAM TRACING: CPT

## 2021-06-02 PROCEDURE — 86901 BLOOD TYPING SEROLOGIC RH(D): CPT

## 2021-06-02 PROCEDURE — 85730 THROMBOPLASTIN TIME PARTIAL: CPT

## 2021-06-02 PROCEDURE — 71046 X-RAY EXAM CHEST 2 VIEWS: CPT

## 2021-06-02 PROCEDURE — 96374 THER/PROPH/DIAG INJ IV PUSH: CPT

## 2021-06-02 PROCEDURE — 0225U NFCT DS DNA&RNA 21 SARSCOV2: CPT

## 2021-06-02 PROCEDURE — 94660 CPAP INITIATION&MGMT: CPT

## 2021-06-02 PROCEDURE — 83550 IRON BINDING TEST: CPT

## 2021-06-02 PROCEDURE — 82330 ASSAY OF CALCIUM: CPT

## 2021-06-02 PROCEDURE — 86769 SARS-COV-2 COVID-19 ANTIBODY: CPT

## 2021-06-02 PROCEDURE — 85027 COMPLETE CBC AUTOMATED: CPT

## 2021-06-02 PROCEDURE — 96375 TX/PRO/DX INJ NEW DRUG ADDON: CPT

## 2021-06-02 PROCEDURE — 84132 ASSAY OF SERUM POTASSIUM: CPT

## 2021-06-02 PROCEDURE — 82962 GLUCOSE BLOOD TEST: CPT

## 2021-06-02 PROCEDURE — 99239 HOSP IP/OBS DSCHRG MGMT >30: CPT | Mod: GC

## 2021-06-02 PROCEDURE — 99285 EMERGENCY DEPT VISIT HI MDM: CPT | Mod: 25

## 2021-06-02 PROCEDURE — 86850 RBC ANTIBODY SCREEN: CPT

## 2021-06-02 PROCEDURE — 82803 BLOOD GASES ANY COMBINATION: CPT

## 2021-06-02 PROCEDURE — 82746 ASSAY OF FOLIC ACID SERUM: CPT

## 2021-06-02 PROCEDURE — 86900 BLOOD TYPING SEROLOGIC ABO: CPT

## 2021-06-02 PROCEDURE — 83036 HEMOGLOBIN GLYCOSYLATED A1C: CPT

## 2021-06-02 PROCEDURE — 84100 ASSAY OF PHOSPHORUS: CPT

## 2021-06-02 PROCEDURE — 84443 ASSAY THYROID STIM HORMONE: CPT

## 2021-06-02 PROCEDURE — 85610 PROTHROMBIN TIME: CPT

## 2021-06-02 PROCEDURE — 85025 COMPLETE CBC W/AUTO DIFF WBC: CPT

## 2021-06-02 PROCEDURE — 36415 COLL VENOUS BLD VENIPUNCTURE: CPT

## 2021-06-02 PROCEDURE — 83735 ASSAY OF MAGNESIUM: CPT

## 2021-06-02 PROCEDURE — 80048 BASIC METABOLIC PNL TOTAL CA: CPT

## 2021-06-02 PROCEDURE — 84295 ASSAY OF SERUM SODIUM: CPT

## 2021-06-02 PROCEDURE — 80053 COMPREHEN METABOLIC PANEL: CPT

## 2021-06-02 PROCEDURE — 83540 ASSAY OF IRON: CPT

## 2021-06-02 PROCEDURE — 84145 PROCALCITONIN (PCT): CPT

## 2021-06-02 PROCEDURE — 82607 VITAMIN B-12: CPT

## 2021-06-02 PROCEDURE — 94640 AIRWAY INHALATION TREATMENT: CPT

## 2021-06-02 RX ORDER — NICOTINE POLACRILEX 2 MG
1 GUM BUCCAL
Qty: 30 | Refills: 0
Start: 2021-06-02 | End: 2021-07-01

## 2021-06-02 RX ORDER — OMEPRAZOLE 10 MG/1
1 CAPSULE, DELAYED RELEASE ORAL
Qty: 0 | Refills: 0 | DISCHARGE

## 2021-06-02 RX ORDER — FLUTICASONE PROPIONATE AND SALMETEROL 50; 250 UG/1; UG/1
1 POWDER ORAL; RESPIRATORY (INHALATION)
Qty: 0 | Refills: 0 | DISCHARGE

## 2021-06-02 RX ORDER — ALBUTEROL 90 UG/1
2 AEROSOL, METERED ORAL
Qty: 0 | Refills: 0 | DISCHARGE

## 2021-06-02 RX ADMIN — Medication 40 MILLIGRAM(S): at 00:05

## 2021-06-02 RX ADMIN — Medication 1: at 08:45

## 2021-06-02 RX ADMIN — POLYETHYLENE GLYCOL 3350 17 GRAM(S): 17 POWDER, FOR SOLUTION ORAL at 11:37

## 2021-06-02 RX ADMIN — Medication 600 MILLIGRAM(S): at 01:08

## 2021-06-02 RX ADMIN — Medication 1 PATCH: at 11:42

## 2021-06-02 RX ADMIN — LISINOPRIL 20 MILLIGRAM(S): 2.5 TABLET ORAL at 05:19

## 2021-06-02 RX ADMIN — Medication 81 MILLIGRAM(S): at 11:37

## 2021-06-02 RX ADMIN — PANTOPRAZOLE SODIUM 40 MILLIGRAM(S): 20 TABLET, DELAYED RELEASE ORAL at 05:19

## 2021-06-02 RX ADMIN — SERTRALINE 50 MILLIGRAM(S): 25 TABLET, FILM COATED ORAL at 11:37

## 2021-06-02 RX ADMIN — Medication 1 PATCH: at 11:36

## 2021-06-02 RX ADMIN — Medication 3 MILLILITER(S): at 03:30

## 2021-06-02 RX ADMIN — BUDESONIDE AND FORMOTEROL FUMARATE DIHYDRATE 2 PUFF(S): 160; 4.5 AEROSOL RESPIRATORY (INHALATION) at 05:20

## 2021-06-02 RX ADMIN — CEFTRIAXONE 100 MILLIGRAM(S): 500 INJECTION, POWDER, FOR SOLUTION INTRAMUSCULAR; INTRAVENOUS at 05:19

## 2021-06-02 RX ADMIN — Medication 25 MILLIGRAM(S): at 11:37

## 2021-06-02 RX ADMIN — Medication 40 MILLIGRAM(S): at 08:45

## 2021-06-02 RX ADMIN — Medication 3 MILLILITER(S): at 08:34

## 2021-06-02 RX ADMIN — ENOXAPARIN SODIUM 40 MILLIGRAM(S): 100 INJECTION SUBCUTANEOUS at 05:19

## 2021-06-02 RX ADMIN — Medication 1 PATCH: at 07:00

## 2021-06-02 NOTE — DISCHARGE NOTE PROVIDER - NSDCFUSCHEDAPPT_GEN_ALL_CORE_FT
ISELA BROWN ; 06/07/2021 ; NPP PulmMed [address] [place] [address]  ISELA BROWN ; 06/21/2021 ; NPP PulmMed [address] [place] [address] [facility] ; 06/07/2021 ; NPP PulmMed [address] ; 06/21/2021 ; NPP PulmMed [address] ; 07/23/2021 ; NPP Neuro [address] [place] [address]

## 2021-06-02 NOTE — DISCHARGE NOTE PROVIDER - CARE PROVIDER_API CALL
Trinity Trevizo)  Critical Care Medicine; Pulmonary Disease  [address], [address] [place] [address]  Phone: [phone]  Fax: [phone]  Established Patient  Scheduled Appointment: 06/07/2021 01:45 PM    Trinity Trevizo  [address] [place] [id number]  Phone: [phone]  Fax: (   )    -  Established Patient  Scheduled Appointment: 06/21/2021 01:40 PM   Trinity Trevizo)  Critical Care Medicine; Pulmonary Disease  [address], [address] [place] [address]  Phone: [phone]  Fax: [phone]  Established Patient  Scheduled Appointment: 06/07/2021 01:45 PM    Trinity Trevizo  [address]   [address] [place] [address]  Phone: [phone]  Fax: (   )    -  Established Patient  Scheduled Appointment: 06/21/2021 01:40 PM    Olivia Tello)  Neurology  [address] [place] [address]  Phone: [phone]  Fax: [phone]  Established Patient  Scheduled Appointment: 07/23/2021 04:40 PM

## 2021-06-02 NOTE — DISCHARGE NOTE PROVIDER - HOSPITAL COURSE
#Discharge: do not delete    Patient is __ yo M/F with past medical history of _____  Presented with _____, found to have _____  Problem List/Main Diagnoses (system-based):   Inpatient treatment course:   New medications:   Labs to be followed outpatient:   Exam to be followed outpatient:    #Discharge: do not delete    Patient is 69 yo F with past medical history of COPD on home O2 (3L) and CPAP, TIAs, right carotid stenosis, HTN, HLD, T2DM, Depression, smoker (quit 7days ago)  Presented with worsening SOB, found to have  COPD exacerbation 2/2 home O2 noncompliance given patients home machine broke and she was unable to obtain a replacement prior to admission     Problem List/Main Diagnoses (system-based): #COPD exacerbation   Likely COPD exacerbation 2/2 O2 noncompliance. Hx of COPD on home O2 (3L) and CPAP presents with worsening SOB/MUNSON, increased sputum production for one week in setting of home O2 noncompliance; throat pain with mild leukocytosis. Procal and RVP negative. CXR with RLL atelectasis vs infiltrate. COVID swab neg. Diffusely wheezing in ED. No increased O2 requirements from baseline 3LNC. Procal and RVP negative. No retention on vbg, pt refused ABG. s/p ceft/ azithro.   - s/p Solumedrol 80mg and douneb x2 in ED with improvement of symptoms and tachypnea  - c/w BIPAP overnight - changed pressure setting to 5   - c/w Symbicort BID   - oxygen to be delivered on 6/2    #r/o pulmonary hypertension  6/1 echo   1. No significant valvular disease.   2. The right ventricle is normal in size. Right ventricular systolic function is normal.   3. The left ventricle is normal in size, wall thickness, and systolic function with a calculated ejection fraction of 74%.   4. No pericardial effusion.   5. Compared to the previous TTE performed on 5/11/2018, there have been no significant interval changes.    # Anemia  Hgb 8.9 on admission, baseline 11. No s/s of bleeding. Likely AOCD. Iron studies: total Fe, % Fe low; all else wnl. b12, folate WNL   - continue to monitor     #HTN (hypertension)  C/w home HCTX 25mg and lisinopril 20mg QD    #Carotid stenosis  CTA head/neck with moderate to severe R ICA stenosis 2/2 calcified atherosclerotic plaque, possible calcific emboli in R MCA, no large vessel occlusion. Follows neuro outpt however unable to see since pandemic   - c/w asa 81  - cont outpt f/up.     #Tobacco use disorder  heavy smoker 40+years; tried quitting many times, especially now on oxygen however patient again restarted smoking, quit 7 days prior to admission  - nicotine patch   - discuss risks with home O2 and smoking.     #Type 2 diabetes mellitus. Plan: A1c 6.4 on this admission. Not on home medications.  - outpatient follow up    Inpatient treatment course:   Admitted for management of COPD exacerbation, likely in setting of O2 noncompliance as patient's oxygen at home stopped working. Improved with steroids, abx, continuation of home COPD medications and CPAP at night.     New medications: None     Labs to be followed outpatient: A1c      Exam to be followed outpatient: None   #Discharge: do not delete    Patient is 69 yo F with past medical history of COPD on home O2 (3L) and CPAP, TIAs, right carotid stenosis, HTN, HLD, T2DM, Depression, smoker (quit 7days ago)  Presented with worsening SOB, found to have  COPD exacerbation 2/2 home O2 noncompliance given patients home machine broke and she was unable to obtain a replacement prior to admission     Problem List/Main Diagnoses (system-based): #COPD exacerbation   Likely COPD exacerbation 2/2 O2 noncompliance. Hx of COPD on home O2 (3L) and CPAP presents with worsening SOB/MUNSON, increased sputum production for one week in setting of home O2 noncompliance; throat pain with mild leukocytosis. Procal and RVP negative. CXR with RLL atelectasis vs infiltrate. COVID swab neg. Diffusely wheezing in ED. No increased O2 requirements from baseline 3LNC. Procal and RVP negative. No retention on vbg, pt refused ABG. s/p ceft/ azithro.   - s/p Solumedrol 80mg and douneb x2 in ED with improvement of symptoms and tachypnea  - c/w BIPAP overnight - changed pressure setting to 5   - c/w Symbicort BID   - oxygen to be delivered on 6/2    #r/o pulmonary hypertension  6/1 echo   1. No significant valvular disease.   2. The right ventricle is normal in size. Right ventricular systolic function is normal.   3. The left ventricle is normal in size, wall thickness, and systolic function with a calculated ejection fraction of 74%.   4. No pericardial effusion.   5. Compared to the previous TTE performed on 5/11/2018, there have been no significant interval changes.    # Anemia  Hgb 8.9 on admission, baseline 11. No s/s of bleeding. Likely AOCD. Iron studies: total Fe, % Fe low; all else wnl. b12, folate WNL   - continue to monitor     #HTN (hypertension)  C/w home HCTX 25mg and lisinopril 20mg QD    #Carotid stenosis  CTA head/neck with moderate to severe R ICA stenosis 2/2 calcified atherosclerotic plaque, possible calcific emboli in R MCA, no large vessel occlusion. Follows neuro outpt however unable to see since pandemic   - c/w asa 81  - cont outpt f/up.     #Tobacco use disorder  heavy smoker 40+years; tried quitting many times, especially now on oxygen however patient again restarted smoking, quit 7 days prior to admission  - nicotine patch   - discuss risks with home O2 and smoking.     #Type 2 diabetes mellitus. Plan: A1c 6.4 on this admission. Not on home medications.  - outpatient follow up    Inpatient treatment course:   Admitted for management of COPD exacerbation, likely in setting of O2 noncompliance as patient's oxygen at home stopped working. Improved with steroids, abx, continuation of home COPD medications and CPAP at night.     New medications: prednsione 40 x 2 days     Labs to be followed outpatient: A1c      Exam to be followed outpatient: None   #Discharge: do not delete    Patient is 71 yo F with past medical history of COPD on home O2 (3L) and CPAP, TIAs, right carotid stenosis, HTN, HLD, T2DM, Depression, smoker (quit 7days ago)  Presented with worsening SOB, found to have  COPD exacerbation 2/2 home O2 noncompliance given patients home machine broke and she was unable to obtain a replacement prior to admission     Problem List/Main Diagnoses (system-based): #COPD exacerbation   Likely COPD exacerbation 2/2 O2 noncompliance. Hx of COPD on home O2 (3L) and CPAP presents with worsening SOB/MUNSON, increased sputum production for one week in setting of home O2 noncompliance; throat pain with mild leukocytosis. Procal and RVP negative. CXR with RLL atelectasis vs infiltrate. COVID swab neg. Diffusely wheezing in ED. No increased O2 requirements from baseline 3LNC. Procal and RVP negative. No retention on vbg, pt refused ABG. s/p ceft/ azithro.   - s/p Solumedrol 80mg once and 40 TID x 2 days  - c/w BIPAP overnight - changed pressure setting to 5   - c/w Symbicort BID   - oxygen to be delivered on 6/2  - complete 5 day course of steroids - 2 more days of prednisone    #r/o pulmonary hypertension  6/1 echo   1. No significant valvular disease.   2. The right ventricle is normal in size. Right ventricular systolic function is normal.   3. The left ventricle is normal in size, wall thickness, and systolic function with a calculated ejection fraction of 74%.   4. No pericardial effusion.   5. Compared to the previous TTE performed on 5/11/2018, there have been no significant interval changes.    # Anemia  Hgb 8.9 on admission, baseline 11. No s/s of bleeding. Likely AOCD. Iron studies: total Fe, % Fe low; all else wnl. b12, folate WNL   - continue to monitor     #HTN (hypertension)  C/w home HCTX 25mg and lisinopril 20mg QD    #Carotid stenosis  CTA head/neck with moderate to severe R ICA stenosis 2/2 calcified atherosclerotic plaque, possible calcific emboli in R MCA, no large vessel occlusion. Follows neuro outpt however unable to see since pandemic   - c/w asa 81  - cont outpt f/up.     #Tobacco use disorder  heavy smoker 40+years; tried quitting many times, especially now on oxygen however patient again restarted smoking, quit 7 days prior to admission  - nicotine patch   - discuss risks with home O2 and smoking.     #Type 2 diabetes mellitus. Plan: A1c 6.4 on this admission. On metformin 500 BID at home.   - outpatient follow up    Inpatient treatment course:   Admitted for management of COPD exacerbation, likely in setting of O2 noncompliance as patient's oxygen at home stopped working. Improved with steroids, abx, continuation of home COPD medications and CPAP at night.     New medications: prednsione 40 x 2 days, oxygen, nicotine patch    Labs to be followed outpatient: None    Exam to be followed outpatient: None   #Discharge: do not delete    Patient is 71 yo F with past medical history of COPD on home O2 (3L) and CPAP, TIAs, right carotid stenosis, HTN, HLD, T2DM, Depression, smoker (quit 7days ago)  Presented with worsening SOB, found to have  COPD exacerbation 2/2 home O2 noncompliance given patients home machine broke and she was unable to obtain a replacement prior to admission     Problem List/Main Diagnoses (system-based):   #COPD exacerbation   Likely COPD exacerbation 2/2 O2 noncompliance. Hx of COPD on home O2 (3L) and CPAP presents with worsening SOB/MUNSON, increased sputum production for one week in setting of home O2 noncompliance; throat pain with mild leukocytosis. Procal and RVP negative. CXR with RLL atelectasis vs infiltrate. COVID swab neg. Diffusely wheezing in ED. No increased O2 requirements from baseline 3LNC. Procal and RVP negative. No retention on vbg, pt refused ABG. s/p ceft/ azithro.   - s/p Solumedrol 80mg once and 40 TID x 2 days  - c/w BIPAP overnight - changed pressure setting to 5   - c/w Symbicort BID   - oxygen to be delivered on 6/2  - complete 5 day course of steroids - 2 more days of prednisone    #acute on chronic respiratory failure  Patient w/ acute on chronic respiratory failure in the setting of dysfunctional oxygen equipment at home. Patient with stable O2 saturation of home 3L O2.  - Desaturated to 87% on room air at rest  - maintained on 3L NC  - with BIPAP at night    #r/o pulmonary hypertension  6/1 echo   1. No significant valvular disease.   2. The right ventricle is normal in size. Right ventricular systolic function is normal.   3. The left ventricle is normal in size, wall thickness, and systolic function with a calculated ejection fraction of 74%.   4. No pericardial effusion.   5. Compared to the previous TTE performed on 5/11/2018, there have been no significant interval changes.    # Anemia  Hgb 8.9 on admission, baseline 11. No s/s of bleeding. Likely AOCD. Iron studies: total Fe, % Fe low; all else wnl. b12, folate WNL   - continue to monitor     #HTN (hypertension)  C/w home HCTX 25mg and lisinopril 20mg QD    #Carotid stenosis  CTA head/neck with moderate to severe R ICA stenosis 2/2 calcified atherosclerotic plaque, possible calcific emboli in R MCA, no large vessel occlusion. Follows neuro outpt however unable to see since pandemic   - c/w asa 81  - cont outpt f/up.     #Tobacco use disorder  heavy smoker 40+years; tried quitting many times, especially now on oxygen however patient again restarted smoking, quit 7 days prior to admission  - nicotine patch   - discuss risks with home O2 and smoking.     #Type 2 diabetes mellitus. Plan: A1c 6.4 on this admission. On metformin 500 BID at home.   - outpatient follow up    Inpatient treatment course:   Admitted for management of COPD exacerbation, likely in setting of O2 noncompliance as patient's oxygen at home stopped working. Improved with steroids, abx, continuation of home COPD medications and CPAP at night.     New medications: prednsione 40 x 2 days, oxygen, nicotine patch    Labs to be followed outpatient: None    Exam to be followed outpatient: None

## 2021-06-02 NOTE — PROGRESS NOTE ADULT - PROBLEM SELECTOR PROBLEM 4
Carotid stenosis

## 2021-06-02 NOTE — DISCHARGE NOTE PROVIDER - NSDCCPCAREPLAN_GEN_ALL_CORE_FT
PRINCIPAL DISCHARGE DIAGNOSIS  Diagnosis: COPD exacerbation  Assessment and Plan of Treatment: COPD is a lung disease that makes it hard to breathe. In people with COPD, the airways (the branching tubes that carry air within the lungs) become narrow and damaged. This makes people feel out of breath and tired. COPD can be a serious illness. It cannot be cured and it usually gets worse over time. But there are treatments that can help. The most common cause of COPD is smoking. Smoke can damage the lungs forever and cause COPD. Common symptoms include shortness of breath, wheezing, and worsening cough and mucus production. COPD can put you at an increased risk for lung infections, lung cancer and heart problems. If you smoke, the most important thing you can do for your COPD is to stop smoking. There are a lot of medicines to treat COPD. Most people use inhalers that help open up their airways or decrease swelling in the airways. Often people need more than one inhaler at a time. You might need to take a steroid medicine in a pill for a flare of COPD. If the disease gets worse, you might need to use oxygen. Pulmonary rehabilitation may be recommended and can teach you how to improve your symptoms through exercises and different ways to breathe. RARELY, people with severe COPD will have surgery to remove the most damaged parts of their lung. This surgery can reduce symptoms, but it does not always work. In order to prevent COPD exacerbations it is important that you take your prescribed medications and follow the recommendations of your primary care and pulmonary doctors. If your shortness of breath worsens or your experience an increase in or change of your sputum production you should seek evaluation by your primary care doctor, or, if severe, an emergency room.        SECONDARY DISCHARGE DIAGNOSES  Diagnosis: Anemia  Assessment and Plan of Treatment: Anemia is the medical term for when a person has too few red blood cells. Red blood cells are the cells in your blood that carry oxygen. If you have too few red blood cells, your body does not get all the oxygen it needs. Most people with anemia have no symptoms. They find out they have it after their doctor does blood tests for another reason. People who do have symptoms might feel tired or weak, especially if they try to exercise or have headaches. If you experience these symptoms you should see your primary care provider for further evaluation.      Diagnosis: HTN (hypertension)  Assessment and Plan of Treatment: Hypertension is the medical term for high blood pressure. Blood pressure refers to the pressure that blood applies to the inner walls of the arteries. Arteries carry blood from the heart to other organs and parts of the body. Untreated high blood pressure increases the strain on the heart and arteries, eventually causing organ damage. High blood pressure increases the risk of heart failure, heart attack (myocardial infarction), stroke, and kidney failure. High blood pressure does not usually cause any symptoms. Treatment of hypertension usually begins with lifestyle changes. Making these lifestyle changes involves little or no risk. Recommended changes often include reducing the amount of salt in your diet, losing weight if you are overweight or obese, avoiding drinking too much alcohol, stopping smoking and exercising at least 30 minutes per day most days of the week. If you are prescribed medication for your hypertension it is important to take these as prescribed to prevent the possible complications of uncontrolled hypertension.      Diagnosis: H/O carotid stenosis  Assessment and Plan of Treatment: Narrowing of the blood vessels in the neck that carry blood from the heart to the brain. Carotid artery stenosis can be caused by cholesterol build-up in the blood vessels (atherosclerosis). Blood clots can form in this area and travel up to the brain. This condition may be present for a long time before symptoms appear. When symptoms do occur, stroke or brief stroke-like attacks are common. If this condition is discovered as a result of a stroke or stroke-like attack, cholesterol lowering medications and blood thinners may be used to improve blood flow to the brain. If the degree of narrowing is severe, surgery may be needed to open the blood vessel.    Diagnosis: Tobacco use disorder  Assessment and Plan of Treatment: What is tobacco use disorder? Tobacco use disorder means you are addicted to tobacco. With tobacco use disorder, you have trouble stopping using tobacco.  - Please continue to use your nicotine patches    Diagnosis: Type 2 diabetes mellitus  Assessment and Plan of Treatment: A chronic condition that affects the way the body processes blood sugar (glucose). With type 2 diabetes, the body either doesn't produce enough insulin, or it resists insulin. Symptoms include increased thirst, frequent urination, hunger, fatigue, and blurred vision. In some cases, there may be no symptoms. Treatments include diet, exercise, medication, and insulin therapy.  - Please follow up with your primary care doctor

## 2021-06-02 NOTE — PROGRESS NOTE ADULT - PROBLEM SELECTOR PLAN 8
DVT: Lovenox   GI: pantoprazole     FULL CODE- will discuss code status with family.
DVT: Lovenox   GI: pantoprazole     FULL CODE- will discuss code status with family.
DVT: Lovenox   GI: pantoprazole     FULL CODE
DVT: Lovenox   GI: pantoprazole     FULL CODE- will discuss code status with family.

## 2021-06-02 NOTE — PROGRESS NOTE ADULT - PROBLEM SELECTOR PROBLEM 5
Tobacco use disorder

## 2021-06-02 NOTE — DISCHARGE NOTE PROVIDER - CARE PROVIDERS DIRECT ADDRESSES
[email or web address]

## 2021-06-02 NOTE — DISCHARGE NOTE PROVIDER - PROVIDER TOKENS
PROVIDER:[TOKEN:[[id number]],SCHEDULEDAPPT:[06/07/2021],SCHEDULEDAPPTTIME:[01:45 PM],ESTABLISHEDPATIENT:[T]],FREE:[LAST:[Santhosh],FIRST:[Trinity],PHONE:[phone]],FAX:[(   )    -],ADDRESS:[[address] [place] [address]],SCHEDULEDAPPT:[06/21/2021],SCHEDULEDAPPTTIME:[01:40 PM],ESTABLISHEDPATIENT:[T]] PROVIDER:[TOKEN:[[id number]],SCHEDULEDAPPT:[06/07/2021],SCHEDULEDAPPTTIME:[01:45 PM],ESTABLISHEDPATIENT:[T]],FREE:[LAST:[Santhosh],FIRST:[Trinity],PHONE:[phone]],FAX:[(   )    -],ADDRESS:[[address] [place] [address]],SCHEDULEDAPPT:[06/21/2021],SCHEDULEDAPPTTIME:[01:40 PM],ESTABLISHEDPATIENT:[T]],PROVIDER:[TOKEN:[[id number]],SCHEDULEDAPPT:[07/23/2021],SCHEDULEDAPPTTIME:[04:40 PM],ESTABLISHEDPATIENT:[T]]

## 2021-06-02 NOTE — PROGRESS NOTE ADULT - PROBLEM SELECTOR PLAN 3
- c/w home HCTX 25mg and lisinopril 20mg QD  - monitor BP

## 2021-06-02 NOTE — PROGRESS NOTE ADULT - ASSESSMENT
A 69yo F with PMH of COPD on home O2 (3L) and CPAP, TIAs, right carotid stenosis, HTN, HLD, T2DM, Depression, smoker (quit 7days ago) presents to ED with symptoms of worsening SOB; likely 2/2 COPD exacerbation 2/2 home O2 noncompliance given patients home machine broke and she was unable to obtain a replacement prior to admission. 
A 69yo F with PMH of COPD on home O2 (3L) and CPAP, TIAs, right carotid stenosis, HTN, HLD, T2DM, Depression, smoker (quit 7days ago) presents to ED with symptoms of worsening SOB; likely 2/2 COPD exacerbation 2/2 home O2 noncompliance given patients home machine broke and she was unable to obtain a replacement prior to admission. 
A 71yo F with PMH of COPD on home O2 (3L) and CPAP, TIAs, right carotid stenosis, HTN, HLD, T2DM, Depression, smoker (quit 7days ago) presents to ED with symptoms of worsening SOB; likely 2/2 COPD exacerbation 2/2 home O2 noncompliance given patients home machine broke and she was unable to obtain a replacement prior to admission. 
A 69yo F with PMH of COPD on home O2 (3L) and CPAP, TIAs, right carotid stenosis, HTN, HLD, T2DM, Depression, smoker (quit 7days ago) presents to ED with symptoms of worsening SOB; likely 2/2 COPD exacerbation 2/2 home O2 noncompliance vs viral URI vs atypical pna   
A 69yo F with PMH of COPD on home O2 (3L) and CPAP, TIAs, right carotid stenosis, HTN, HLD, T2DM, Depression, smoker (quit 7days ago) presents to ED with symptoms of worsening SOB; likely 2/2 COPD exacerbation 2/2 home O2 noncompliance vs viral URI vs atypical pna   
A 71yo F with PMH of COPD on home O2 (3L) and CPAP, TIAs, right carotid stenosis, HTN, HLD, T2DM, Depression, smoker (quit 7days ago) presents to ED with symptoms of worsening SOB; likely 2/2 COPD exacerbation 2/2 home O2 noncompliance vs viral URI vs atypical pna

## 2021-06-02 NOTE — PROGRESS NOTE ADULT - PROBLEM SELECTOR PLAN 5
heavy smoker 40+years; tried quitting many times, especially now on oxygen however patient again restarted smoking, quit 7 days prior to admission  - nicotine patch   - discuss risks with home O2 and smoking

## 2021-06-02 NOTE — DISCHARGE NOTE PROVIDER - NSDCMRMEDTOKEN_GEN_ALL_CORE_FT
Advair Diskus 100 mcg-50 mcg inhalation powder: 1 puff(s) inhaled 2 times a day  aspirin 81 mg oral delayed release tablet: 1 tab(s) orally once a day  hydroCHLOROthiazide 25 mg oral tablet: 1 tab(s) orally once a day  Lipitor 40 mg oral tablet: 1 tab(s) orally once a day  lisinopril 20 mg oral tablet: 1 tab(s) orally once a day  omeprazole 20 mg oral delayed release capsule: 1 cap(s) orally once a day  predniSONE 10 mg oral tablet: 1 tab(s) orally once a day  ProAir HFA 90 mcg/inh inhalation aerosol: 2 puff(s) inhaled every 6 hours  Zoloft 50 mg oral tablet: 1 tab(s) orally once a day   Advair Diskus 100 mcg-50 mcg inhalation powder: 1 puff(s) inhaled 2 times a day  aspirin 81 mg oral delayed release tablet: 1 tab(s) orally once a day  hydroCHLOROthiazide 25 mg oral tablet: 1 tab(s) orally once a day  Lipitor 40 mg oral tablet: 1 tab(s) orally once a day  lisinopril 20 mg oral tablet: 1 tab(s) orally once a day  omeprazole 20 mg oral delayed release capsule: 1 cap(s) orally once a day  predniSONE 10 mg oral tablet: 1 tab(s) orally once a day  predniSONE 20 mg oral tablet: 2 tab(s) orally once a day   ProAir HFA 90 mcg/inh inhalation aerosol: 2 puff(s) inhaled every 6 hours  Zoloft 50 mg oral tablet: 1 tab(s) orally once a day   aspirin 81 mg oral delayed release tablet: 1 tab(s) orally once a day  Flovent 220 mcg/inh inhalation aerosol with adapter: 2 puff(s) inhaled 2 times a day  hydroCHLOROthiazide 25 mg oral tablet: 1 tab(s) orally once a day  ipratropium-albuterol 20 mcg-100 mcg/inh inhalation aerosol: 1 puff(s) inhaled 4 times a day  Lipitor 40 mg oral tablet: 1 tab(s) orally once a day  lisinopril 20 mg oral tablet: 1 tab(s) orally once a day  metFORMIN 500 mg oral tablet: 1 tab(s) orally 2 times a day  nicotine 21 mg/24 hr transdermal film, extended release: 1 patch transdermal once a day   predniSONE 10 mg oral tablet: 1 tab(s) orally once a day  predniSONE 20 mg oral tablet: 2 tab(s) orally once a day   Singulair 10 mg oral tablet: 1 tab(s) orally once a day  Stiolto Respimat 10 ACT 2.5 mcg-2.5 mcg/inh inhalation aerosol: 2 puff(s) inhaled every 24 hours  Zoloft 50 mg oral tablet: 1 tab(s) orally once a day

## 2021-06-02 NOTE — DISCHARGE NOTE NURSING/CASE MANAGEMENT/SOCIAL WORK - PATIENT PORTAL LINK FT
You can access the FollowMyHealth Patient Portal offered by [facility] by registering at the following website: [email or web address]. By joining [facility]’s FollowMyHealth portal, you will also be able to view your health information using other applications (apps) compatible with our system.

## 2021-06-02 NOTE — PROGRESS NOTE ADULT - PROBLEM SELECTOR PLAN 1
Hx of COPD on home O2 (3L) and CPAP presents with worsening SOB/MUNSON, increased sputum production for one week in setting of home O2 noncompliance; in addition with nasal congestion throat pain with mild leukocytosis (2/2 steroids?). CXR with RLL atelectasis vs infiltrate. COVID swab neg. Diffusely wheezing in ED. Pt is euvolemic, no increased O2 requirements from baseline 3LNC. Likely COPD exacerbation 2/2 O2 noncompliance. Procal and RVP negative.  - s/p Solumedrol 80mg and douneb x2 in ED with improvement of symptoms and tachypnea  - still slightly tachypneic; per pt is close to baseline- c/w BIPAP overnight. No retention on vbg, pt refused ABG  - c/w solumedrol 40mg q8hrs, taper as tolerated   - c/w doubneb q4hrs  - c/w Ceft/Azithro for possible CAP/atypical pna  - c/w Symbicort BID   - f/up echo w/ bubble to asses pulm htn   - PT consult

## 2021-06-02 NOTE — DISCHARGE NOTE PROVIDER - NSDCCAREPROVSEEN_GEN_ALL_CORE_FT
Trinity Trevizo Trinity Trevizo A  Patient seen and examined with house-staff during bedside rounds.  Resident note read, including vitals, physical findings, laboratory data, and radiological reports.   Revisions included below.  Direct personal management at bed side and extensive interpretation of the data.  Plan was outlined and discussed in details with the housestaff.  Decision making of high complexity  Action taken for acute disease activity to reflect the level of care provided:  - medication reconciliation  - review laboratory data  she is stable and discussed smoke cessation.  Need escal;ation of her therapy.  DC on oxygen follow on Monday

## 2021-06-02 NOTE — DISCHARGE NOTE NURSING/CASE MANAGEMENT/SOCIAL WORK - NSDCPEWEB_GEN_ALL_CORE
[facility] for Tobacco Control website --- [email or web address] --- [email or web address]

## 2021-06-02 NOTE — PROGRESS NOTE ADULT - SUBJECTIVE AND OBJECTIVE BOX
*** INCOMPLETE ***    OVERNIGHT EVENTS: As per overnight staff, there were no acute events overnight    INTERVAL EVENTS:    SUBJECTIVE HPI: Patient seen and examined at bedside. Patient resting comfortably, no complaints at this time. Patient denies: fever, chills, weakness, dizziness, headaches, changes in vision, chest pain, palpitations, shortness of breath, cough, N/V, diarrhea or constipation, dysuria, LE edema. ROS otherwise negative.      VITAL SIGNS:  Vital Signs Last 24 Hrs  T(C): 36.7 (02 Jun 2021 05:41), Max: 37 (01 Jun 2021 20:49)  T(F): 98 (02 Jun 2021 05:41), Max: 98.6 (01 Jun 2021 20:49)  HR: 80 (02 Jun 2021 05:41) (80 - 99)  BP: 117/68 (02 Jun 2021 05:41) (111/62 - 117/68)  BP(mean): --  RR: 19 (02 Jun 2021 05:41) (17 - 20)  SpO2: 93% (02 Jun 2021 05:41) (93% - 98%)        PHYSICAL EXAM:  General: Comfortable, pleasant/anxious/agitated, Ill-appearing, well-nourished/frail/cachectic, comfortable / in distress  Neurological: AAOx3, no focal deficits  HEENT: NC/AT; EOMI, PERRL, clear conjunctiva, no nasal or oropharyngeal discharge or exudates, MMM  Neck: supple, no cervical or post-auricular lymphadenopathy  Cardiovascular: +S1/S2, no murmurs/rubs/gallops, RRR  Respiratory: CTA B/L, no diminished breath sounds, no wheezes/rales/rhonchi, no increased work of breathing or accessory muscle use  Gastrointestinal: soft, NT/ND; active BSx4 quadrants  Genitourinary: no suprapubic tenderness, no CVA tenderness  Extremities: WWP; no edema, clubbing or cyanosis  Vascular: 2+ radial, DP/PT pulses B/L  Skin: no rashes  Lines/Drains:       MEDICATIONS:  MEDICATIONS  (STANDING):  albuterol/ipratropium for Nebulization 3 milliLiter(s) Nebulizer every 4 hours  aspirin enteric coated 81 milliGRAM(s) Oral daily  atorvastatin 40 milliGRAM(s) Oral at bedtime  budesonide 160 MICROgram(s)/formoterol 4.5 MICROgram(s) Inhaler 2 Puff(s) Inhalation two times a day  dextrose 40% Gel 15 Gram(s) Oral once  dextrose 5%. 1000 milliLiter(s) (50 mL/Hr) IV Continuous <Continuous>  dextrose 5%. 1000 milliLiter(s) (100 mL/Hr) IV Continuous <Continuous>  dextrose 50% Injectable 25 Gram(s) IV Push once  dextrose 50% Injectable 12.5 Gram(s) IV Push once  dextrose 50% Injectable 25 Gram(s) IV Push once  enoxaparin Injectable 40 milliGRAM(s) SubCutaneous every 24 hours  glucagon  Injectable 1 milliGRAM(s) IntraMuscular once  hydrochlorothiazide 25 milliGRAM(s) Oral every 24 hours  insulin lispro (ADMELOG) corrective regimen sliding scale   SubCutaneous Before meals and at bedtime  lisinopril 20 milliGRAM(s) Oral daily  methylPREDNISolone sodium succinate Injectable 40 milliGRAM(s) IV Push every 8 hours  nicotine - 21 mG/24Hr(s) Patch 1 patch Transdermal daily  pantoprazole    Tablet 40 milliGRAM(s) Oral before breakfast  polyethylene glycol 3350 17 Gram(s) Oral daily  senna 2 Tablet(s) Oral at bedtime  sertraline 50 milliGRAM(s) Oral daily    MEDICATIONS  (PRN):  acetaminophen   Tablet .. 650 milliGRAM(s) Oral every 6 hours PRN Mild Pain (1 - 3)  benzocaine 15 mG/menthol 3.6 mG (Sugar-Free) Lozenge 1 Lozenge Oral three times a day PRN Sore Throat      ALLERGIES/INTOLERANCES:  Allergies    No Known Allergies    Intolerances        LABS:                        10.1   19.25 )-----------( 446      ( 02 Jun 2021 07:49 )             32.9     06-02    135  |  96  |  35<H>  ----------------------------<  160<H>  5.3   |  28  |  0.70    Ca    9.2      02 Jun 2021 07:49  Phos  3.9     06-02  Mg     2.2     06-02        CAPILLARY BLOOD GLUCOSE      POCT Blood Glucose.: 152 mg/dL (02 Jun 2021 07:58)                    Microbiology:      RADIOLOGY, EKG AND ADDITIONAL TESTS: Reviewed.

## 2021-06-02 NOTE — DISCHARGE NOTE NURSING/CASE MANAGEMENT/SOCIAL WORK - NSDCPEEMAIL_GEN_ALL_CORE
[facility] for Tobacco Control email [email or web address]

## 2021-06-04 ENCOUNTER — TRANSCRIPTION ENCOUNTER (OUTPATIENT)
Age: 71
End: 2021-06-04

## 2021-06-05 DIAGNOSIS — F32.9 MAJOR DEPRESSIVE DISORDER, SINGLE EPISODE, UNSPECIFIED: ICD-10-CM

## 2021-06-05 DIAGNOSIS — G40.909 EPILEPSY, UNSPECIFIED, NOT INTRACTABLE, WITHOUT STATUS EPILEPTICUS: ICD-10-CM

## 2021-06-05 DIAGNOSIS — E78.5 HYPERLIPIDEMIA, UNSPECIFIED: ICD-10-CM

## 2021-06-05 DIAGNOSIS — R06.02 SHORTNESS OF BREATH: ICD-10-CM

## 2021-06-05 DIAGNOSIS — I66.01 OCCLUSION AND STENOSIS OF RIGHT MIDDLE CEREBRAL ARTERY: ICD-10-CM

## 2021-06-05 DIAGNOSIS — I10 ESSENTIAL (PRIMARY) HYPERTENSION: ICD-10-CM

## 2021-06-05 DIAGNOSIS — Z86.73 PERSONAL HISTORY OF TRANSIENT ISCHEMIC ATTACK (TIA), AND CEREBRAL INFARCTION WITHOUT RESIDUAL DEFICITS: ICD-10-CM

## 2021-06-05 DIAGNOSIS — Z80.41 FAMILY HISTORY OF MALIGNANT NEOPLASM OF OVARY: ICD-10-CM

## 2021-06-05 DIAGNOSIS — D63.8 ANEMIA IN OTHER CHRONIC DISEASES CLASSIFIED ELSEWHERE: ICD-10-CM

## 2021-06-05 DIAGNOSIS — F17.210 NICOTINE DEPENDENCE, CIGARETTES, UNCOMPLICATED: ICD-10-CM

## 2021-06-05 DIAGNOSIS — J18.9 PNEUMONIA, UNSPECIFIED ORGANISM: ICD-10-CM

## 2021-06-05 DIAGNOSIS — Z83.3 FAMILY HISTORY OF DIABETES MELLITUS: ICD-10-CM

## 2021-06-05 DIAGNOSIS — I65.22 OCCLUSION AND STENOSIS OF LEFT CAROTID ARTERY: ICD-10-CM

## 2021-06-05 DIAGNOSIS — Q27.8 OTHER SPECIFIED CONGENITAL MALFORMATIONS OF PERIPHERAL VASCULAR SYSTEM: ICD-10-CM

## 2021-06-05 DIAGNOSIS — I65.21 OCCLUSION AND STENOSIS OF RIGHT CAROTID ARTERY: ICD-10-CM

## 2021-06-05 DIAGNOSIS — Z86.010 PERSONAL HISTORY OF COLONIC POLYPS: ICD-10-CM

## 2021-06-05 DIAGNOSIS — J98.11 ATELECTASIS: ICD-10-CM

## 2021-06-05 DIAGNOSIS — J44.1 CHRONIC OBSTRUCTIVE PULMONARY DISEASE WITH (ACUTE) EXACERBATION: ICD-10-CM

## 2021-06-05 DIAGNOSIS — J39.8 OTHER SPECIFIED DISEASES OF UPPER RESPIRATORY TRACT: ICD-10-CM

## 2021-06-05 DIAGNOSIS — E11.9 TYPE 2 DIABETES MELLITUS WITHOUT COMPLICATIONS: ICD-10-CM

## 2021-06-05 DIAGNOSIS — F17.200 NICOTINE DEPENDENCE, UNSPECIFIED, UNCOMPLICATED: ICD-10-CM

## 2021-06-05 DIAGNOSIS — J96.20 ACUTE AND CHRONIC RESPIRATORY FAILURE, UNSPECIFIED WHETHER WITH HYPOXIA OR HYPERCAPNIA: ICD-10-CM

## 2021-06-05 DIAGNOSIS — Z80.0 FAMILY HISTORY OF MALIGNANT NEOPLASM OF DIGESTIVE ORGANS: ICD-10-CM

## 2021-06-05 DIAGNOSIS — Z71.89 OTHER SPECIFIED COUNSELING: ICD-10-CM

## 2021-06-05 DIAGNOSIS — Z91.19 PATIENT'S NONCOMPLIANCE WITH OTHER MEDICAL TREATMENT AND REGIMEN: ICD-10-CM

## 2021-06-05 DIAGNOSIS — Z99.81 DEPENDENCE ON SUPPLEMENTAL OXYGEN: ICD-10-CM

## 2021-06-09 ENCOUNTER — NON-APPOINTMENT (OUTPATIENT)
Age: 71
End: 2021-06-09

## 2021-06-14 ENCOUNTER — APPOINTMENT (OUTPATIENT)
Dept: PULMONOLOGY | Facility: CLINIC | Age: 71
End: 2021-06-14
Payer: MEDICARE

## 2021-06-14 VITALS
HEIGHT: 59 IN | TEMPERATURE: 97.7 F | WEIGHT: 155 LBS | OXYGEN SATURATION: 98 % | DIASTOLIC BLOOD PRESSURE: 47 MMHG | RESPIRATION RATE: 12 BRPM | BODY MASS INDEX: 31.25 KG/M2 | HEART RATE: 97 BPM | SYSTOLIC BLOOD PRESSURE: 111 MMHG

## 2021-06-14 DIAGNOSIS — B37.0 CANDIDAL STOMATITIS: ICD-10-CM

## 2021-06-14 DIAGNOSIS — R91.8 OTHER NONSPECIFIC ABNORMAL FINDING OF LUNG FIELD: ICD-10-CM

## 2021-06-14 PROCEDURE — 99214 OFFICE O/P EST MOD 30 MIN: CPT

## 2021-06-14 RX ORDER — ROFLUMILAST 250 UG/1
250 TABLET ORAL DAILY
Qty: 30 | Refills: 0 | Status: DISCONTINUED | COMMUNITY
Start: 2021-06-11 | End: 2021-06-14

## 2021-06-14 RX ORDER — AZITHROMYCIN 250 MG/1
250 TABLET, FILM COATED ORAL
Qty: 1 | Refills: 0 | Status: DISCONTINUED | COMMUNITY
Start: 2021-05-03 | End: 2021-06-14

## 2021-06-14 NOTE — ASSESSMENT
[FreeTextEntry1] : COPD\par \par The patient was admitted with acute exacerbation of COPD most likely was related to her smoking history.  The patient stopped smoking since he was discharged.  The patient now is in acute hypoxic respiratory failure new onset oxygen supplementation.  The oxygen saturation is stable. The patient is still on systemic steroids was being tapered off.  The patient is on triple therapy.  I instructed the patient that she might benefit from Trelogy Better compliance.  I gave the patient a sample instruction how to use it.  The patient will update me at the end of the 2 weeks whether she tolerated well on or she is can continue on the Symbicort and Flovent.  The patient CAT score is 35.  There is indication for escalation of therapy.  Patient is group D.  I explained to the patient the pros and cons of azithromycin and Roflumilast.  Patient is to be started on azithromycin 500 mg p.o. 3 times a day and she is aware of the side effects including but not limited to hearing deficit and prolongation of the QT.  Patient will have a EKG next visit.  Increase activity as tolerated.  Continue specimens from smoking.\par \par Obstructive sleep apnea\par \par The patient is compliant with the CPAP.\par \par Multiple nodules\par \par Follow-up in CT scan of the chest\par \par Oral candidiasis\par \par Is most likely related to the systemic steroids and bronchodilators.  I started the patient on nystatin swish and swallow\par \par The patient was included on the [employer] study.  I explained the study and benefits to the patient and she consented.  The patient has a copy of the consent form

## 2021-06-14 NOTE — HISTORY OF PRESENT ILLNESS
[Former] : former [Never] : never [TextBox_4] : she is doing welll.  She stopped smoking for 17 days.  She has been home since she left the hospital.  No one is allowed to smoke in her house.  She ahs a problem.  The tongue is sore and she can not eat or drink water.  She is still on prednisone 10 mg daily.  She is using the nebulizer at lleast twice a day.  She is still coughing but less.She ahs a sputum and is white in color. The breathing is easier.  She is still sob after walkign 10 steps

## 2021-06-21 ENCOUNTER — APPOINTMENT (OUTPATIENT)
Dept: PULMONOLOGY | Facility: CLINIC | Age: 71
End: 2021-06-21

## 2021-06-22 ENCOUNTER — APPOINTMENT (OUTPATIENT)
Dept: INTERNAL MEDICINE | Facility: CLINIC | Age: 71
End: 2021-06-22

## 2021-06-24 ENCOUNTER — APPOINTMENT (OUTPATIENT)
Dept: PULMONOLOGY | Facility: CLINIC | Age: 71
End: 2021-06-24
Payer: MEDICARE

## 2021-06-24 PROCEDURE — 99443: CPT

## 2021-06-24 NOTE — HISTORY OF PRESENT ILLNESS
[Former] : former [Never] : never [TextBox_4] : The patient doing very well since she was discharged from the hospital.  She stopped smoking.  She is off the prednisone.  She is using an inhaler.  She is also taking azithromycin.  She is seen by the visiting nurse.  She is doing physical therapy.

## 2021-06-24 NOTE — ASSESSMENT
[FreeTextEntry1] : The patient was recently hospitalized with acute exacerbation of COPD.  That was related to her smoking history.  The patient was started on azithromycin as she is group D and it was indication to escalate her therapy.  Patient is off systemic steroids.  The patient is non-smoker now.  Continue the current regimen.  She will update me on her condition

## 2021-07-12 RX ORDER — TIOTROPIUM BROMIDE AND OLODATEROL 3.124; 2.736 UG/1; UG/1
2.5-2.5 SPRAY, METERED RESPIRATORY (INHALATION) DAILY
Qty: 1 | Refills: 11 | Status: DISCONTINUED | COMMUNITY
Start: 2019-01-10 | End: 2021-07-12

## 2021-07-23 ENCOUNTER — APPOINTMENT (OUTPATIENT)
Dept: NEUROLOGY | Facility: CLINIC | Age: 71
End: 2021-07-23

## 2021-07-26 ENCOUNTER — APPOINTMENT (OUTPATIENT)
Dept: PULMONOLOGY | Facility: CLINIC | Age: 71
End: 2021-07-26

## 2021-08-04 ENCOUNTER — INPATIENT (INPATIENT)
Facility: HOSPITAL | Age: 71
LOS: 1 days | Discharge: HOME | DRG: 158 | End: 2021-08-06
Payer: MEDICARE

## 2021-08-04 VITALS
DIASTOLIC BLOOD PRESSURE: 59 MMHG | HEIGHT: 61 IN | TEMPERATURE: 98 F | OXYGEN SATURATION: 99 % | HEART RATE: 93 BPM | RESPIRATION RATE: 22 BRPM | SYSTOLIC BLOOD PRESSURE: 102 MMHG

## 2021-08-04 LAB
ALBUMIN SERPL ELPH-MCNC: 3.9 G/DL — SIGNIFICANT CHANGE UP (ref 3.3–5)
ALP SERPL-CCNC: 88 U/L — SIGNIFICANT CHANGE UP (ref 40–120)
ALT FLD-CCNC: 14 U/L — SIGNIFICANT CHANGE UP (ref 10–45)
ANION GAP SERPL CALC-SCNC: 9 MMOL/L — SIGNIFICANT CHANGE UP (ref 5–17)
AST SERPL-CCNC: 21 U/L — SIGNIFICANT CHANGE UP (ref 10–40)
BASE EXCESS BLDV CALC-SCNC: 4.5 MMOL/L — HIGH (ref -2–3)
BASOPHILS # BLD AUTO: 0.05 K/UL — SIGNIFICANT CHANGE UP (ref 0–0.2)
BASOPHILS NFR BLD AUTO: 0.5 % — SIGNIFICANT CHANGE UP (ref 0–2)
BILIRUB SERPL-MCNC: <0.2 MG/DL — SIGNIFICANT CHANGE UP (ref 0.2–1.2)
BUN SERPL-MCNC: 16 MG/DL — SIGNIFICANT CHANGE UP (ref 7–23)
CA-I SERPL-SCNC: SIGNIFICANT CHANGE UP MMOL/L (ref 1.15–1.33)
CALCIUM SERPL-MCNC: 10 MG/DL — SIGNIFICANT CHANGE UP (ref 8.4–10.5)
CHLORIDE SERPL-SCNC: 104 MMOL/L — SIGNIFICANT CHANGE UP (ref 96–108)
CO2 BLDV-SCNC: 31.9 MMOL/L — HIGH (ref 22–26)
CO2 SERPL-SCNC: 28 MMOL/L — SIGNIFICANT CHANGE UP (ref 22–31)
CREAT SERPL-MCNC: 0.67 MG/DL — SIGNIFICANT CHANGE UP (ref 0.5–1.3)
EOSINOPHIL # BLD AUTO: 0.06 K/UL — SIGNIFICANT CHANGE UP (ref 0–0.5)
EOSINOPHIL NFR BLD AUTO: 0.6 % — SIGNIFICANT CHANGE UP (ref 0–6)
GAS PNL BLDV: 138 MMOL/L — SIGNIFICANT CHANGE UP (ref 136–145)
GAS PNL BLDV: SIGNIFICANT CHANGE UP
GAS PNL BLDV: SIGNIFICANT CHANGE UP
GLUCOSE SERPL-MCNC: 175 MG/DL — HIGH (ref 70–99)
HCO3 BLDV-SCNC: 30 MMOL/L — HIGH (ref 22–29)
HCT VFR BLD CALC: 28.6 % — LOW (ref 34.5–45)
HGB BLD-MCNC: 8.6 G/DL — LOW (ref 11.5–15.5)
IMM GRANULOCYTES NFR BLD AUTO: 0.3 % — SIGNIFICANT CHANGE UP (ref 0–1.5)
LACTATE SERPL-SCNC: 1.8 MMOL/L — SIGNIFICANT CHANGE UP (ref 0.5–2)
LYMPHOCYTES # BLD AUTO: 1.6 K/UL — SIGNIFICANT CHANGE UP (ref 1–3.3)
LYMPHOCYTES # BLD AUTO: 14.8 % — SIGNIFICANT CHANGE UP (ref 13–44)
MCHC RBC-ENTMCNC: 28.3 PG — SIGNIFICANT CHANGE UP (ref 27–34)
MCHC RBC-ENTMCNC: 30.1 GM/DL — LOW (ref 32–36)
MCV RBC AUTO: 94.1 FL — SIGNIFICANT CHANGE UP (ref 80–100)
MONOCYTES # BLD AUTO: 0.74 K/UL — SIGNIFICANT CHANGE UP (ref 0–0.9)
MONOCYTES NFR BLD AUTO: 6.8 % — SIGNIFICANT CHANGE UP (ref 2–14)
NEUTROPHILS # BLD AUTO: 8.34 K/UL — HIGH (ref 1.8–7.4)
NEUTROPHILS NFR BLD AUTO: 77 % — SIGNIFICANT CHANGE UP (ref 43–77)
NRBC # BLD: 0 /100 WBCS — SIGNIFICANT CHANGE UP (ref 0–0)
NT-PROBNP SERPL-SCNC: 479 PG/ML — HIGH (ref 0–300)
PCO2 BLDV: 49 MMHG — HIGH (ref 39–42)
PH BLDV: 7.4 — SIGNIFICANT CHANGE UP (ref 7.32–7.43)
PLATELET # BLD AUTO: 425 K/UL — HIGH (ref 150–400)
PO2 BLDV: 64 MMHG — SIGNIFICANT CHANGE UP
POTASSIUM BLDV-SCNC: 4.7 MMOL/L — SIGNIFICANT CHANGE UP (ref 3.5–5.1)
POTASSIUM SERPL-MCNC: 4.8 MMOL/L — SIGNIFICANT CHANGE UP (ref 3.5–5.3)
POTASSIUM SERPL-SCNC: 4.8 MMOL/L — SIGNIFICANT CHANGE UP (ref 3.5–5.3)
PROT SERPL-MCNC: 6.7 G/DL — SIGNIFICANT CHANGE UP (ref 6–8.3)
RBC # BLD: 3.04 M/UL — LOW (ref 3.8–5.2)
RBC # FLD: 15.6 % — HIGH (ref 10.3–14.5)
SAO2 % BLDV: 92.8 % — SIGNIFICANT CHANGE UP
SODIUM SERPL-SCNC: 141 MMOL/L — SIGNIFICANT CHANGE UP (ref 135–145)
TROPONIN T SERPL-MCNC: <0.01 NG/ML — SIGNIFICANT CHANGE UP (ref 0–0.01)
WBC # BLD: 10.82 K/UL — HIGH (ref 3.8–10.5)
WBC # FLD AUTO: 10.82 K/UL — HIGH (ref 3.8–10.5)

## 2021-08-04 PROCEDURE — 71045 X-RAY EXAM CHEST 1 VIEW: CPT | Mod: 26

## 2021-08-04 PROCEDURE — 93010 ELECTROCARDIOGRAM REPORT: CPT

## 2021-08-04 PROCEDURE — 99285 EMERGENCY DEPT VISIT HI MDM: CPT

## 2021-08-04 RX ORDER — LIDOCAINE 4 G/100G
5 CREAM TOPICAL ONCE
Refills: 0 | Status: COMPLETED | OUTPATIENT
Start: 2021-08-04 | End: 2021-08-04

## 2021-08-04 RX ORDER — DIPHENHYDRAMINE HYDROCHLORIDE AND LIDOCAINE HYDROCHLORIDE AND ALUMINUM HYDROXIDE AND MAGNESIUM HYDRO
5 KIT ONCE
Refills: 0 | Status: DISCONTINUED | OUTPATIENT
Start: 2021-08-04 | End: 2021-08-04

## 2021-08-04 RX ORDER — IPRATROPIUM/ALBUTEROL SULFATE 18-103MCG
3 AEROSOL WITH ADAPTER (GRAM) INHALATION ONCE
Refills: 0 | Status: COMPLETED | OUTPATIENT
Start: 2021-08-04 | End: 2021-08-04

## 2021-08-04 RX ADMIN — Medication 3 MILLILITER(S): at 21:52

## 2021-08-04 RX ADMIN — Medication 125 MILLIGRAM(S): at 21:53

## 2021-08-04 RX ADMIN — LIDOCAINE 5 MILLILITER(S): 4 CREAM TOPICAL at 21:53

## 2021-08-04 NOTE — ED PROVIDER NOTE - PROGRESS NOTE DETAILS
Not hypoxic, but still tachypneic and increased work of breathing.  No infiltrate seen on CXR. Will admit.

## 2021-08-04 NOTE — ED PROVIDER NOTE - OBJECTIVE STATEMENT
69 yo fem pmhx COPD on home O2, DM2, HTN, HLD, epilepsy, PUD, depression, PVD, TIA says she was admitted to [facility] for COPD exacerbation about 3 weeks ago, and has had throat pain since that admission.  She is a poor historian, and review of EMR shows that her admission was over 2 months ago; she has a chronically hoarse voice, but she says the throat pain has been persistent and worsening since the hospitalization.  She called EMS today mainly because of the throat pain, and because her breathing problems have gotten worse over the past week or two.  She has pain when swallowing (not unilateral) but no choking.  Says she feels warm but has no recorded fevers.  Chronic cough increasing, sputum color varies from white to beige, no hemoptysis.  No chest pain.  She is on prednisone 10 mg daily.  She says she has been on a new antibiotic since her last admission but EMR does not show an antibiotic prescribed at discharge, and she does not know the name of the medication (nor other meds; she repeatedly tells me to "check the system" because she cant keep up with her multiple medications.  Normally uses her nebs about 3 times/day, has been using them about 3-4 times/day in past 1-2 weeks.  On home oxygen at 3 LPM and has not increased the flow rate.  Uses CPAP at night.  Says she last smoked a cigarette about 3 weeks ago. Fully vaccinated to Covid-19

## 2021-08-04 NOTE — ED ADULT NURSE NOTE - OBJECTIVE STATEMENT
pt is 70y female, here for SOB and sore throat, pt treated for COPD exacerbation and discharged from hospital yesterday, reports sx not improved despite PO abx, A&Ox3, speaking in full sentences but WOB increases when pt talking, tachypneic at 22, spo2 at 96%on 3l via NC, pt states this is her home baseline, reports last albuterol this am, lung sounds diminished bilat with expiratory wheezing in RT lower lobe, heart sounds irregular, no leg swelling, no respiratory distress present

## 2021-08-04 NOTE — ED ADULT NURSE NOTE - NSIMPLEMENTINTERV_GEN_ALL_ED
Implemented All Universal Safety Interventions:  [place] to call system. Call bell, personal items and telephone within reach. Instruct patient to call for assistance. Room bathroom lighting operational. Non-slip footwear when patient is off stretcher. Physically safe environment: no spills, clutter or unnecessary equipment. Stretcher in lowest position, wheels locked, appropriate side rails in place.

## 2021-08-04 NOTE — ED PROVIDER NOTE - IV ALTEPLASE DOOR HIDDEN
Assessment:       Diagnosis Orders   1. DM w/ coma type I, uncontrolled (HCC)  POCT Glucose    POCT glycosylated hemoglobin (Hb A1C)     PLAN:     1. Increase Tresiba 30 units at night   2. Novolog 30 units before meals   3. Continue metformin 1000 mg p.o. twice daily  4. Monitor blood glucose 4 times daily document and bring to next visit  5. We will make insulin dosing adjustments based on these numbers  6. Follow-up in 3 months    Orders Placed This Encounter   Procedures    POCT Glucose    POCT glycosylated hemoglobin (Hb A1C)     No orders of the defined types were placed in this encounter. No follow-ups on file. Subjective:     Chief Complaint   Patient presents with    Diabetes     Vitals:    02/04/20 1132   BP: (!) 146/66   Site: Right Upper Arm   Position: Sitting   Cuff Size: Large Adult   Pulse: 76   Weight: 176 lb (79.8 kg)   Height: 5' 5\" (1.651 m)     Wt Readings from Last 3 Encounters:   02/04/20 176 lb (79.8 kg)   10/03/19 178 lb (80.7 kg)   07/29/19 168 lb (76.2 kg)     BP Readings from Last 3 Encounters:   02/04/20 (!) 146/66   10/03/19 (!) 160/84   07/29/19 134/74     This visit was done utilizing the  phone system. Discharge instructions were given verbally and written. The patient did not bring his glucose logs and was unable to identify what his blood glucose levels were averaging in the morning or postprandial.  He was accompanied by his son but did not know any of the answers to my questions. This patient is very difficult to manage despite using the  phone often times does not seem to understand what is being asked of him. I will slowly increase his insulin dosing, send him to the lab today to get an A1c and CMP. Diabetes   He presents for his follow-up diabetic visit. He has type 2 diabetes mellitus. The initial diagnosis of diabetes was made 5 years ago. His disease course has been worsening.  Pertinent negatives for hypoglycemia include no dizziness or service: Never     Active member of club or organization: No     Attends meetings of clubs or organizations: Never     Relationship status:     Intimate partner violence:     Fear of current or ex partner: Not on file     Emotionally abused: Not on file     Physically abused: Not on file     Forced sexual activity: Not on file   Other Topics Concern    Not on file   Social History Narrative         Lives With: Spouse    Recently moved to [facility] from [facility]  late--2018,             son Lizabeth Ware in the area    Type of Home: House One level    Home Access: Level entry    Bathroom Shower/Tub: Tub/Shower unit    [employer]: Grab bars in shower, Shower chair    Home Equipment: 4 wheeled walker    Receives Help From: Family    ADL Assistance: [address]: (spouse and son able to assist)    Homemaking Responsibilities: No    Ambulation Assistance: Independent(rollator when needed)    Transfer Assistance: Independent    Active : No    Patient's  Info: son    Additional Comments: Pt Azeri speaking interpretor Thor Gonsales present,  Pt recieves help from wife and son @ times    Primary Care Provider: PINEDA Randhawa CNP     Family History   Problem Relation Age of Onset    Diabetes Mother      No Known Allergies    Current Outpatient Medications:     losartan (COZAAR) 50 MG tablet, TAKE 1 TABLET BY MOUTH DAILY, Disp: 30 tablet, Rfl: 3    FREESTYLE LITE strip, 1 STRIP BY OTHER ROUTE 4 TIMES DAILY (BEFORE MEALS AND NIGHTLY), Disp: 150 strip, Rfl: 3    Insulin Degludec (TRESIBA FLEXTOUCH) 100 UNIT/ML SOPN, Inject 20 Units into the skin nightly, Disp: 4 pen, Rfl: 3    blood glucose monitor strips, 1 strip by Other route 4 times daily (before meals and nightly), Disp: 150 strip, Rfl: 3    blood glucose monitor kit and supplies, 1 kit by Other route 4 times daily (before meals and nightly), Disp: 1 kit, Rfl: 0    Lancets MISC, 1 each by Does not apply route Effort: Pulmonary effort is normal.      Breath sounds: Normal breath sounds. Abdominal:      General: Bowel sounds are normal. There is no distension. Palpations: Abdomen is soft. Tenderness: There is no abdominal tenderness. Musculoskeletal: Normal range of motion. Skin:     General: Skin is warm and dry. Neurological:      Mental Status: He is alert and oriented to person, place, and time. show

## 2021-08-04 NOTE — ED ADULT TRIAGE NOTE - CHIEF COMPLAINT QUOTE
Pt brought in by EMS for mouth sore and throat swelling over past week after being discharged and treated for COPD. Denies chest pain, abdominal pain, n/v/d, fevers. Pt states she has baseline shortness of breath a/w COPD and Asthma. Speaking clearly in full sentences.

## 2021-08-04 NOTE — ED PROVIDER NOTE - NS ED ROS FT
CONSTITUTIONAL: No fever, chills, or weakness  NEURO: No headache, no dizziness, no syncope; No focal weakness/tingling/numbness  EYES: No visual changes  ENT: No rhinorrhea or nasal congestion  PULM: HPI  CV: No chest pain or palpitations  GI: No abdominal pain, vomiting, or diarrhea  : No dysuria, hematuria, frequency  MSK: No neck pain or back pain, no joint pain  SKIN: no rash or unusual bruising

## 2021-08-04 NOTE — ED PROVIDER NOTE - CLINICAL SUMMARY MEDICAL DECISION MAKING FREE TEXT BOX
pt with COPD presenting mainly c/o throat pain when swallowing, but the issue at hand seems to be more of a COPD exacerbation.  Resp distress mild at this time and does not need BiPap or other ventilatory support at this time.  Will get labs including VBG (she refused ABG), CXR.  Treat with nebs, steroids, and topical throat anesthetic.  Fully vaccinated to covid, will get Covid-19 swab.  Discussed with Dr Trevizo, if admitted will get ENT for scope tomorrow. Does not require emergent imaging of throat.

## 2021-08-04 NOTE — ED PROVIDER NOTE - PHYSICAL EXAMINATION
CONSTITUTIONAL: NAD   SKIN: Normal color and turgor.    HEAD: NC/AT.  EYES: Conjunctiva clear. EOMI. PERRL.    ENT: Hoarse voice.  OP slightly red, no thrush. No tonsillar swelling or exudate. Uvula midline without swelling.   RESPIRATORY:  Mildly tachypneic with suprasternal retractions.  Speaking 4-5 word sentences.  Diffuse exp wheeze bilat.   CARDIOVASCULAR:  RRR, S1S2. No M/R/G.      GI:  Abdomen soft, nontender.    MSK: Neck supple.  No lower extremity edema or calf tenderness.  No joint swelling or ROM limitation.  NEURO: Alert and oriented; CN II-XII grossly intact. Speech clear. ARREDONDO.

## 2021-08-05 ENCOUNTER — TRANSCRIPTION ENCOUNTER (OUTPATIENT)
Age: 71
End: 2021-08-05

## 2021-08-05 DIAGNOSIS — R63.8 OTHER SYMPTOMS AND SIGNS CONCERNING FOOD AND FLUID INTAKE: ICD-10-CM

## 2021-08-05 DIAGNOSIS — F32.9 MAJOR DEPRESSIVE DISORDER, SINGLE EPISODE, UNSPECIFIED: ICD-10-CM

## 2021-08-05 DIAGNOSIS — I65.29 OCCLUSION AND STENOSIS OF UNSPECIFIED CAROTID ARTERY: ICD-10-CM

## 2021-08-05 DIAGNOSIS — E11.9 TYPE 2 DIABETES MELLITUS WITHOUT COMPLICATIONS: ICD-10-CM

## 2021-08-05 DIAGNOSIS — R07.0 PAIN IN THROAT: ICD-10-CM

## 2021-08-05 DIAGNOSIS — D64.9 ANEMIA, UNSPECIFIED: ICD-10-CM

## 2021-08-05 DIAGNOSIS — J44.1 CHRONIC OBSTRUCTIVE PULMONARY DISEASE WITH (ACUTE) EXACERBATION: ICD-10-CM

## 2021-08-05 DIAGNOSIS — E78.5 HYPERLIPIDEMIA, UNSPECIFIED: ICD-10-CM

## 2021-08-05 DIAGNOSIS — I10 ESSENTIAL (PRIMARY) HYPERTENSION: ICD-10-CM

## 2021-08-05 LAB
A1C WITH ESTIMATED AVERAGE GLUCOSE RESULT: 6.2 % — HIGH (ref 4–5.6)
ANION GAP SERPL CALC-SCNC: 11 MMOL/L — SIGNIFICANT CHANGE UP (ref 5–17)
APTT BLD: 28.4 SEC — SIGNIFICANT CHANGE UP (ref 27.5–35.5)
BUN SERPL-MCNC: 16 MG/DL — SIGNIFICANT CHANGE UP (ref 7–23)
CALCIUM SERPL-MCNC: 10.1 MG/DL — SIGNIFICANT CHANGE UP (ref 8.4–10.5)
CHLORIDE SERPL-SCNC: 102 MMOL/L — SIGNIFICANT CHANGE UP (ref 96–108)
CO2 SERPL-SCNC: 25 MMOL/L — SIGNIFICANT CHANGE UP (ref 22–31)
CREAT SERPL-MCNC: 0.5 MG/DL — SIGNIFICANT CHANGE UP (ref 0.5–1.3)
ESTIMATED AVERAGE GLUCOSE: 131 MG/DL — HIGH (ref 68–114)
GLUCOSE BLDC GLUCOMTR-MCNC: 140 MG/DL — HIGH (ref 70–99)
GLUCOSE BLDC GLUCOMTR-MCNC: 145 MG/DL — HIGH (ref 70–99)
GLUCOSE BLDC GLUCOMTR-MCNC: 155 MG/DL — HIGH (ref 70–99)
GLUCOSE BLDC GLUCOMTR-MCNC: 262 MG/DL — HIGH (ref 70–99)
GLUCOSE SERPL-MCNC: 147 MG/DL — HIGH (ref 70–99)
HCT VFR BLD CALC: 30.1 % — LOW (ref 34.5–45)
HGB BLD-MCNC: 9 G/DL — LOW (ref 11.5–15.5)
INR BLD: 1.02 — SIGNIFICANT CHANGE UP (ref 0.88–1.16)
MAGNESIUM SERPL-MCNC: 2.1 MG/DL — SIGNIFICANT CHANGE UP (ref 1.6–2.6)
MAGNESIUM SERPL-MCNC: 2.2 MG/DL — SIGNIFICANT CHANGE UP (ref 1.6–2.6)
MCHC RBC-ENTMCNC: 28.1 PG — SIGNIFICANT CHANGE UP (ref 27–34)
MCHC RBC-ENTMCNC: 29.9 GM/DL — LOW (ref 32–36)
MCV RBC AUTO: 94.1 FL — SIGNIFICANT CHANGE UP (ref 80–100)
NRBC # BLD: 0 /100 WBCS — SIGNIFICANT CHANGE UP (ref 0–0)
PLATELET # BLD AUTO: 315 K/UL — SIGNIFICANT CHANGE UP (ref 150–400)
POTASSIUM SERPL-MCNC: 4.8 MMOL/L — SIGNIFICANT CHANGE UP (ref 3.5–5.3)
POTASSIUM SERPL-SCNC: 4.8 MMOL/L — SIGNIFICANT CHANGE UP (ref 3.5–5.3)
PROTHROM AB SERPL-ACNC: 12.2 SEC — SIGNIFICANT CHANGE UP (ref 10.6–13.6)
RAPID RVP RESULT: SIGNIFICANT CHANGE UP
RBC # BLD: 3.2 M/UL — LOW (ref 3.8–5.2)
RBC # FLD: 15.6 % — HIGH (ref 10.3–14.5)
SARS-COV-2 RNA SPEC QL NAA+PROBE: SIGNIFICANT CHANGE UP
SARS-COV-2 RNA SPEC QL NAA+PROBE: SIGNIFICANT CHANGE UP
SODIUM SERPL-SCNC: 138 MMOL/L — SIGNIFICANT CHANGE UP (ref 135–145)
WBC # BLD: 11.15 K/UL — HIGH (ref 3.8–10.5)
WBC # FLD AUTO: 11.15 K/UL — HIGH (ref 3.8–10.5)

## 2021-08-05 PROCEDURE — 99223 1ST HOSP IP/OBS HIGH 75: CPT | Mod: GC

## 2021-08-05 RX ORDER — DEXTROSE 50 % IN WATER 50 %
25 SYRINGE (ML) INTRAVENOUS ONCE
Refills: 0 | Status: DISCONTINUED | OUTPATIENT
Start: 2021-08-05 | End: 2021-08-06

## 2021-08-05 RX ORDER — GLUCAGON INJECTION, SOLUTION 0.5 MG/.1ML
1 INJECTION, SOLUTION SUBCUTANEOUS ONCE
Refills: 0 | Status: DISCONTINUED | OUTPATIENT
Start: 2021-08-05 | End: 2021-08-06

## 2021-08-05 RX ORDER — SERTRALINE 25 MG/1
50 TABLET, FILM COATED ORAL DAILY
Refills: 0 | Status: DISCONTINUED | OUTPATIENT
Start: 2021-08-05 | End: 2021-08-06

## 2021-08-05 RX ORDER — DEXTROSE 50 % IN WATER 50 %
12.5 SYRINGE (ML) INTRAVENOUS ONCE
Refills: 0 | Status: DISCONTINUED | OUTPATIENT
Start: 2021-08-05 | End: 2021-08-06

## 2021-08-05 RX ORDER — NYSTATIN 500MM UNIT
500000 POWDER (EA) MISCELLANEOUS EVERY 6 HOURS
Refills: 0 | Status: DISCONTINUED | OUTPATIENT
Start: 2021-08-05 | End: 2021-08-06

## 2021-08-05 RX ORDER — OXYMETAZOLINE HYDROCHLORIDE 0.5 MG/ML
2 SPRAY NASAL ONCE
Refills: 0 | Status: COMPLETED | OUTPATIENT
Start: 2021-08-05 | End: 2021-08-05

## 2021-08-05 RX ORDER — CLOTRIMAZOLE 10 MG
1 TROCHE MUCOUS MEMBRANE
Refills: 0 | Status: DISCONTINUED | OUTPATIENT
Start: 2021-08-05 | End: 2021-08-06

## 2021-08-05 RX ORDER — INSULIN LISPRO 100/ML
VIAL (ML) SUBCUTANEOUS
Refills: 0 | Status: DISCONTINUED | OUTPATIENT
Start: 2021-08-05 | End: 2021-08-06

## 2021-08-05 RX ORDER — SODIUM CHLORIDE 9 MG/ML
1000 INJECTION, SOLUTION INTRAVENOUS
Refills: 0 | Status: DISCONTINUED | OUTPATIENT
Start: 2021-08-05 | End: 2021-08-06

## 2021-08-05 RX ORDER — DEXTROSE 50 % IN WATER 50 %
15 SYRINGE (ML) INTRAVENOUS ONCE
Refills: 0 | Status: DISCONTINUED | OUTPATIENT
Start: 2021-08-05 | End: 2021-08-06

## 2021-08-05 RX ORDER — HYDROCHLOROTHIAZIDE 25 MG
25 TABLET ORAL DAILY
Refills: 0 | Status: DISCONTINUED | OUTPATIENT
Start: 2021-08-05 | End: 2021-08-06

## 2021-08-05 RX ORDER — BUDESONIDE AND FORMOTEROL FUMARATE DIHYDRATE 160; 4.5 UG/1; UG/1
2 AEROSOL RESPIRATORY (INHALATION)
Refills: 0 | Status: DISCONTINUED | OUTPATIENT
Start: 2021-08-05 | End: 2021-08-06

## 2021-08-05 RX ORDER — ENOXAPARIN SODIUM 100 MG/ML
40 INJECTION SUBCUTANEOUS EVERY 24 HOURS
Refills: 0 | Status: DISCONTINUED | OUTPATIENT
Start: 2021-08-05 | End: 2021-08-06

## 2021-08-05 RX ORDER — LISINOPRIL 2.5 MG/1
20 TABLET ORAL DAILY
Refills: 0 | Status: DISCONTINUED | OUTPATIENT
Start: 2021-08-05 | End: 2021-08-06

## 2021-08-05 RX ORDER — IPRATROPIUM/ALBUTEROL SULFATE 18-103MCG
3 AEROSOL WITH ADAPTER (GRAM) INHALATION ONCE
Refills: 0 | Status: COMPLETED | OUTPATIENT
Start: 2021-08-05 | End: 2021-08-05

## 2021-08-05 RX ORDER — IPRATROPIUM/ALBUTEROL SULFATE 18-103MCG
3 AEROSOL WITH ADAPTER (GRAM) INHALATION EVERY 4 HOURS
Refills: 0 | Status: DISCONTINUED | OUTPATIENT
Start: 2021-08-05 | End: 2021-08-06

## 2021-08-05 RX ORDER — ASPIRIN/CALCIUM CARB/MAGNESIUM 324 MG
81 TABLET ORAL DAILY
Refills: 0 | Status: DISCONTINUED | OUTPATIENT
Start: 2021-08-05 | End: 2021-08-06

## 2021-08-05 RX ORDER — ATORVASTATIN CALCIUM 80 MG/1
40 TABLET, FILM COATED ORAL AT BEDTIME
Refills: 0 | Status: DISCONTINUED | OUTPATIENT
Start: 2021-08-05 | End: 2021-08-06

## 2021-08-05 RX ORDER — PANTOPRAZOLE SODIUM 20 MG/1
40 TABLET, DELAYED RELEASE ORAL
Refills: 0 | Status: DISCONTINUED | OUTPATIENT
Start: 2021-08-05 | End: 2021-08-06

## 2021-08-05 RX ADMIN — Medication 3 MILLILITER(S): at 17:01

## 2021-08-05 RX ADMIN — LISINOPRIL 20 MILLIGRAM(S): 2.5 TABLET ORAL at 07:13

## 2021-08-05 RX ADMIN — ENOXAPARIN SODIUM 40 MILLIGRAM(S): 100 INJECTION SUBCUTANEOUS at 04:56

## 2021-08-05 RX ADMIN — Medication 3 MILLILITER(S): at 01:00

## 2021-08-05 RX ADMIN — OXYMETAZOLINE HYDROCHLORIDE 2 SPRAY(S): 0.5 SPRAY NASAL at 09:21

## 2021-08-05 RX ADMIN — ATORVASTATIN CALCIUM 40 MILLIGRAM(S): 80 TABLET, FILM COATED ORAL at 21:53

## 2021-08-05 RX ADMIN — Medication 40 MILLIGRAM(S): at 19:23

## 2021-08-05 RX ADMIN — Medication 500000 UNIT(S): at 15:06

## 2021-08-05 RX ADMIN — Medication 1 LOZENGE: at 19:23

## 2021-08-05 RX ADMIN — Medication 2: at 09:18

## 2021-08-05 RX ADMIN — Medication 3 MILLILITER(S): at 05:58

## 2021-08-05 RX ADMIN — Medication 1 LOZENGE: at 16:40

## 2021-08-05 RX ADMIN — Medication 500000 UNIT(S): at 21:54

## 2021-08-05 RX ADMIN — Medication 1 LOZENGE: at 23:16

## 2021-08-05 RX ADMIN — Medication 500000 UNIT(S): at 09:20

## 2021-08-05 RX ADMIN — SERTRALINE 50 MILLIGRAM(S): 25 TABLET, FILM COATED ORAL at 12:38

## 2021-08-05 RX ADMIN — Medication 3 MILLILITER(S): at 12:39

## 2021-08-05 RX ADMIN — Medication 3 MILLILITER(S): at 09:20

## 2021-08-05 RX ADMIN — Medication 3 MILLILITER(S): at 02:52

## 2021-08-05 RX ADMIN — BUDESONIDE AND FORMOTEROL FUMARATE DIHYDRATE 2 PUFF(S): 160; 4.5 AEROSOL RESPIRATORY (INHALATION) at 12:38

## 2021-08-05 RX ADMIN — Medication 3 MILLILITER(S): at 21:51

## 2021-08-05 RX ADMIN — Medication 81 MILLIGRAM(S): at 12:38

## 2021-08-05 RX ADMIN — Medication 6: at 23:10

## 2021-08-05 RX ADMIN — PANTOPRAZOLE SODIUM 40 MILLIGRAM(S): 20 TABLET, DELAYED RELEASE ORAL at 07:15

## 2021-08-05 RX ADMIN — BUDESONIDE AND FORMOTEROL FUMARATE DIHYDRATE 2 PUFF(S): 160; 4.5 AEROSOL RESPIRATORY (INHALATION) at 21:54

## 2021-08-05 RX ADMIN — Medication 25 MILLIGRAM(S): at 07:13

## 2021-08-05 NOTE — CONSULT NOTE ADULT - SUBJECTIVE AND OBJECTIVE BOX
70F w/ PMH COPD admitted for COPD exacerbation and now ENT consult for persistent throat pain. At bedside, patient states that she has been having this throat pain since May when she was hospitalized for COPD exacerbation. States that they started her on many inhaler pumps and steroids at that time and she has had the pain ever since. States the pain is with swallowing and speaking. States she has more pain with solid food and that she feels like it gets caught in her throat. She is able to tolerate soft food and liquids and denies feeling like they get stuck in her throat or make her cough. States her voice has changed since that hospitalization and that is hurts to vocalize, but it has not been getting worse. States she has a long smoking history- roughly 50 pack years- and has been having weight loss of 10-15 pounds, but cannot clarify if it is due to lack of eating or not. She take symbicort inhaler daily and tried nystatin swish and spit in the past with incomplete resolution of pain. No fevers, no hemoptysis, no masses in mouth or neck.    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    Disease of trachea and bronchus  Tracheomalacia    Epilepsy  No longer on medications.    Depressive disorder  Depression    Transient cerebral ischemia  TIA (transient ischemic attack)    Peptic ulcer  2/2 H. pylori (s/p amoxicillin, clarithromycin, omeprazole)    Tobacco use disorder  Tobacco abuse    Emphysema  COPD (chronic obstructive pulmonary disease) with emphysema    Hyperlipidemia  Hyperlipidemia    Essential hypertension  Hypertension    Type 2 diabetes mellitus  Diabetes    Other postprocedural status  S/P hernia repair        MEDICATIONS:  Antiinfectives:   nystatin    Suspension 506154 Unit(s) Oral every 6 hours    Hematologic/Anticoagulation:  aspirin enteric coated 81 milliGRAM(s) Oral daily  enoxaparin Injectable 40 milliGRAM(s) SubCutaneous every 24 hours    Pain medications/Neuro:  sertraline 50 milliGRAM(s) Oral daily    IV fluids:  dextrose 5%. 1000 milliLiter(s) IV Continuous <Continuous>  dextrose 5%. 1000 milliLiter(s) IV Continuous <Continuous>    Endocrine Medications:   atorvastatin 40 milliGRAM(s) Oral at bedtime  dextrose 40% Gel 15 Gram(s) Oral once  dextrose 50% Injectable 25 Gram(s) IV Push once  dextrose 50% Injectable 12.5 Gram(s) IV Push once  dextrose 50% Injectable 25 Gram(s) IV Push once  glucagon  Injectable 1 milliGRAM(s) IntraMuscular once  insulin lispro (ADMELOG) corrective regimen sliding scale   SubCutaneous Before meals and at bedtime  predniSONE   Tablet 40 milliGRAM(s) Oral daily    All other standing medications:   albuterol/ipratropium for Nebulization 3 milliLiter(s) Nebulizer every 4 hours  budesonide 160 MICROgram(s)/formoterol 4.5 MICROgram(s) Inhaler 2 Puff(s) Inhalation two times a day  hydrochlorothiazide 25 milliGRAM(s) Oral daily  lisinopril 20 milliGRAM(s) Oral daily  pantoprazole    Tablet 40 milliGRAM(s) Oral before breakfast    All other PRN medications:      SOCIAL HISTORY:  Tobacco History:  ETOH Use:   Drug Use:     FAMILY HISTORY:  Family history of ovarian cancer (Mother, Sibling)    Family history of colon cancer (Sibling)  sister    Family history of diabetes mellitus (Mother, Father)        REVIEW OF SYSTEMS:     LABS:  CBC-                        8.6    10.82 )-----------( 425      ( 04 Aug 2021 22:04 )             28.6         08-04    141  |  104  |  16  ----------------------------<  175<H>  4.8   |  28  |  0.67    Ca    10.0      04 Aug 2021 22:04  Mg     2.2     08-04    TPro  6.7  /  Alb  3.9  /  TBili  <0.2  /  DBili  x   /  AST  21  /  ALT  14  /  AlkPhos  88  08-04    Coagulation Studies-    Endocrine Panel-  --  --  10.0 mg/dL      Vital Signs Last 24 Hrs  T(C): 36.3 (05 Aug 2021 06:03), Max: 36.8 (04 Aug 2021 22:17)  T(F): 97.3 (05 Aug 2021 06:03), Max: 98.3 (05 Aug 2021 05:01)  HR: 96 (05 Aug 2021 09:29) (87 - 98)  BP: 149/73 (05 Aug 2021 06:03) (102/59 - 149/73)  BP(mean): --  RR: 20 (05 Aug 2021 09:29) (20 - 22)  SpO2: 96% (05 Aug 2021 09:29) (94% - 99%)    PHYSICAL EXAM:  ENT EXAM-   Constitutional: NAD, nasal cannula in place, hoarse, weak voice  Head:  normocephalic, atraumatic.   Ears:  clear externally  Nose:  crusting anteriorly bilaterally  OC/OP:  absent dentition, upper and lower dentures in place, left lateral tongue with small white lesions that can be scraped off, soft palate with small white lesions and left buccalgingoval sulcus with white lesions, no masses, mucus membranes moist, posterior oropharynx clear, no lesions  Neck:  soft, tender to palpation of b/l submandibular glands, no LAD, full ROM  Lymph:  No cervical adenopathy.    LARYNGOSCOPY EXAM:     Verbal consent was obtained from patient prior to procedure.    Indication: odynophagia    Anesthesia: Afrin and 1% lidocaine spray wer applied to the nasal cavities.    Flexible laryngoscopy was performed and revealed the following:    Nasopharynx had no mass or exudate    Base of tongue was symmetric and not enlarged, no lesions, no masses    Vallecula was clear, no lesions, no masses    epiglottis, both AE folds with diffuse covering with small white lesions, mild erythema    Arytenoids with small white plaques bilaterally, mild erythema and edema     True vocal folds with small white plaques, mild kaylah edema bilateraly, full movement bilaterally and closure, no visible masses    Post cricoid area was clear, pyriform sinus clear bilaterally    mild Interarytenoid edema    Subglottis not visualized    The patient tolerated the procedure well.      A/P:  70y Female      Thank you for the consult, please page ENT at [phone] with any questions/concerns.  ----------------------------------------------------    Polly Slater MD  Department of Otolaryngology - Head and Neck Surgery  [facility]

## 2021-08-05 NOTE — DISCHARGE NOTE PROVIDER - CARE PROVIDERS DIRECT ADDRESSES
,[email or web address] ,DirectAddress_Unknown ,DirectAddress_Unknown,[email or web address]

## 2021-08-05 NOTE — H&P ADULT - HISTORY OF PRESENT ILLNESS
71 y/o woman former smoker with a PMHx of COPD (on 3L home O2) and CPAP at night, HTN, HLD, T2DM, depression, TIAs, who presents with persistent and worsening throat pain and worsening SOB in the past 1-2 weeks. Patient reports she still has a cough that is chronic that has not improved and the color has "mostly white." Used to be a heavy smoker, quit and restarted recently. Last cigarette was about 3-4 weeks ago. Also reports having throat pain for several weeks and noticed it has worsened, associated with some pain with swallowing. No choking events reported.    Denies any fever, chills, CP, palpitations, dysuria, abdominal pain, diarrhea, n/v. Reports persistent (not worsening) cough, SOB, generalized weakness    ED Vitals: T 98.1F, /59, HR 93, RR 22, SpO2 99% on 3L NC  ED Labs: WBC 10.82, Hb 8.6 (discharged with Hb 10.1 in June, previously 8.9 on prior admission), plt 425, Na 141, K 4.8, sCr 0.67, glucose 175, troponin T neg x1, , VBG pH 7.4, pCO2 49, COVID-19 PCR neg x1  ED Imaging:EKG showing NSR with PACs, QTc 456; CXR with LLL haziness  ED Management:Duoneb x2. IV solumedrol 125mg x1. Viscous lidocaine 2% 5ml x1

## 2021-08-05 NOTE — DISCHARGE NOTE PROVIDER - NSDCCPCAREPLAN_GEN_ALL_CORE_FT
PRINCIPAL DISCHARGE DIAGNOSIS  Diagnosis: Throat pain  Assessment and Plan of Treatment: You presented to the hospital with a hoarse voice and throat pain. We consulted the Ear, Nose, and Throat doctors for which they advanced a laryngoscope to visualize your throat and vocal cords. They discovered plaques around the arytenoids and vocal cords which could indicate a yeast infection. The likely cause of your infection could be from your history of inhaler use for COPD. We started you on antifungal medications and recommend continuing them upon discharge. You also have a follow-up appointment made with the Ear, Nose, Throat doctor in September 2021.   INSTRUCTIONS:  1. Please take the nystatin swish and swallow every 6 hours for 12 more days.  2. Please take the clotrimazole lozenge every 4 hours for 12 more days.  3. Upon using your COPD inhalers PLEASE rinse and spit your mouth with water. These inhalers contain steroids that can destroy normal elpidio of the mouth and increase your risk for yeast infections.      SECONDARY DISCHARGE DIAGNOSES  Diagnosis: History of COPD  Assessment and Plan of Treatment: COPD is a lung disease that makes it hard to breathe. In people with COPD, the airways (the branching tubes that carry air within the lungs) become narrow and damaged. This makes people feel out of breath and tired. COPD can be a serious illness. It cannot be cured and it usually gets worse over time. But there are treatments that can help. The most common cause of COPD is smoking. Smoke can damage the lungs forever and cause COPD. Common symptoms include shortness of breath, wheezing, and worsening cough and mucus production. COPD can put you at an increased risk for lung infections, lung cancer and heart problems. If you smoke, the most important thing you can do for your COPD is to stop smoking. There are a lot of medicines to treat COPD.   INSTRUCTIONS:  1. Please continue to use your 3 liters/minute of oxygen therapy  2. Please use your inhalers which consist of stiolto, singulair, flovent, and duonebs. Ensure you rinse and spit your mouth after using these inhalers.     PRINCIPAL DISCHARGE DIAGNOSIS  Diagnosis: Throat pain  Assessment and Plan of Treatment: You presented to the hospital with a hoarse voice and throat pain. We consulted the Ear, Nose, and Throat doctors for which they advanced a laryngoscope to visualize your throat and vocal cords. They discovered plaques around the arytenoids and vocal cords which could indicate a yeast infection. The likely cause of your infection could be from your history of inhaler use for COPD. We started you on antifungal medications and recommend continuing them upon discharge. You also have a follow-up appointment made with the Ear, Nose, Throat doctor in September 2021.   INSTRUCTIONS:  1. Please take the nystatin swish and swallow every 6 hours for 12 more days.  2. Please take the clotrimazole lozenge every 4 hours for 12 more days.  3. Upon using your COPD inhalers PLEASE rinse and spit your mouth with water. These inhalers contain steroids that can destroy normal elpidio of the mouth and increase your risk for yeast infections.  4. We recommend a mechanical soft diet upon discharge which consists of mashed, pureed, chopped foods. Eating solid foods can increase your risk of aspiration and choking.      SECONDARY DISCHARGE DIAGNOSES  Diagnosis: History of COPD  Assessment and Plan of Treatment: COPD is a lung disease that makes it hard to breathe. In people with COPD, the airways (the branching tubes that carry air within the lungs) become narrow and damaged. This makes people feel out of breath and tired. COPD can be a serious illness. It cannot be cured and it usually gets worse over time. But there are treatments that can help. The most common cause of COPD is smoking. Smoke can damage the lungs forever and cause COPD. Common symptoms include shortness of breath, wheezing, and worsening cough and mucus production. COPD can put you at an increased risk for lung infections, lung cancer and heart problems. If you smoke, the most important thing you can do for your COPD is to stop smoking. There are a lot of medicines to treat COPD.   INSTRUCTIONS:  1. Please continue to use your 3 liters/minute of oxygen therapy  2. Please use your inhalers which consist of stiolto, singulair, flovent, and duonebs. Ensure you rinse and spit your mouth after using these inhalers.

## 2021-08-05 NOTE — H&P ADULT - PROBLEM SELECTOR PLAN 6
Plan:   F: none  E: replete K<4, Mg<2  N: NPO for possible procedure  VTE Prophylaxis: lovenox  C: Full Code  D: F Hx of normocytic anemia with Hb previously in the 8s (discharged in June with Hb in the 10s). Vitamin B12 and folate wnl from May 2021. Iron panel from May also showing low total iron 17, % sat 6, low ferritin. Consistent with iron deficiency anemia  - currently without active signs of bleeding  - will monitor for signs of bleeding  - consider iron supplementation

## 2021-08-05 NOTE — PATIENT PROFILE ADULT - NSPROEXTENSIONSOFSELF_GEN_A_NUR
Scheduling received a return phone call.      Family accepted appointment for 4/22/19@3:00 with Dr. Colon.    After encounter has been reviewed, encounter can be closed.    Encounter included:  Dr. Colon, Tameka Saavedra and Dr. Sharpe       none

## 2021-08-05 NOTE — DISCHARGE NOTE PROVIDER - NSDCMRMEDTOKEN_GEN_ALL_CORE_FT
aspirin 81 mg oral delayed release tablet: 1 tab(s) orally once a day  Flovent 220 mcg/inh inhalation aerosol with adapter: 2 puff(s) inhaled 2 times a day  hydroCHLOROthiazide 25 mg oral tablet: 1 tab(s) orally once a day  ipratropium-albuterol 20 mcg-100 mcg/inh inhalation aerosol: 1 puff(s) inhaled 4 times a day  Lipitor 40 mg oral tablet: 1 tab(s) orally once a day  lisinopril 20 mg oral tablet: 1 tab(s) orally once a day  metFORMIN 500 mg oral tablet: 1 tab(s) orally 2 times a day  nicotine 21 mg/24 hr transdermal film, extended release: 1 patch transdermal once a day   Singulair 10 mg oral tablet: 1 tab(s) orally once a day  Stiolto Respimat 10 ACT 2.5 mcg-2.5 mcg/inh inhalation aerosol: 2 puff(s) inhaled every 24 hours  Zoloft 50 mg oral tablet: 1 tab(s) orally once a day   aspirin 81 mg oral delayed release tablet: 1 tab(s) orally once a day  clotrimazole 10 mg oral lozenge: 1 lozenge orally 5 times a day  Flovent 220 mcg/inh inhalation aerosol with adapter: 2 puff(s) inhaled 2 times a day  hydroCHLOROthiazide 25 mg oral tablet: 1 tab(s) orally once a day  ipratropium-albuterol 20 mcg-100 mcg/inh inhalation aerosol: 1 puff(s) inhaled 4 times a day  Lipitor 40 mg oral tablet: 1 tab(s) orally once a day  lisinopril 20 mg oral tablet: 1 tab(s) orally once a day  metFORMIN 500 mg oral tablet: 1 tab(s) orally 2 times a day  nicotine 21 mg/24 hr transdermal film, extended release: 1 patch transdermal once a day   nystatin 100,000 units/mL oral suspension: 5 milliliter(s) orally every 6 hours  Singulair 10 mg oral tablet: 1 tab(s) orally once a day  Stiolto Respimat 10 ACT 2.5 mcg-2.5 mcg/inh inhalation aerosol: 2 puff(s) inhaled every 24 hours  Zoloft 50 mg oral tablet: 1 tab(s) orally once a day

## 2021-08-05 NOTE — DISCHARGE NOTE PROVIDER - CARE PROVIDER_API CALL
Nidhi Musa)  Otolaryngology  [address] [place] [address], [address] [place] [address]  Phone: [phone]  Fax: [phone]  Follow Up Time: 2 weeks   Nidhi Musa.  OTOLARYNGOLOGY  [address] [place] [address], [address]  [place] [address]  Phone: [phone]  Fax: (   )    -  Scheduled Appointment: 09/01/2021 02:45 PM   Nidhi Musa  OTOLARYNGOLOGY  [address], [address]  [place] [address]  Phone: [phone]  Fax: (   )    -  Scheduled Appointment: 09/01/2021 02:45 PM    Trinity Trevizo)  Critical Care Medicine; Pulmonary Disease  [address], [address] [place] [id number]  Phone: [phone]  Fax: [phone]  Established Patient  Follow Up Time: 2 weeks

## 2021-08-05 NOTE — CONSULT NOTE ADULT - SUBJECTIVE AND OBJECTIVE BOX
Patient is a 70y old  Female who presents with a chief complaint of COPD Exacerbation (05 Aug 2021 15:31)       HPI:  69 y/o woman former smoker with a PMHx of COPD (on 3L home O2) and CPAP at night, HTN, HLD, T2DM, depression, TIAs, who presents with persistent and worsening throat pain and worsening SOB in the past 1-2 weeks. Patient reports she still has a cough that is chronic that has not improved and the color has "mostly white." Used to be a heavy smoker, quit and restarted recently. Last cigarette was about 3-4 weeks ago. Also reports having throat pain for several weeks and noticed it has worsened, associated with some pain with swallowing. No choking events reported.    Denies any fever, chills, CP, palpitations, dysuria, abdominal pain, diarrhea, n/v. Reports persistent (not worsening) cough, SOB, generalized weakness    ED Vitals: T 98.1F, /59, HR 93, RR 22, SpO2 99% on 3L NC  ED Labs: WBC 10.82, Hb 8.6 (discharged with Hb 10.1 in June, previously 8.9 on prior admission), plt 425, Na 141, K 4.8, sCr 0.67, glucose 175, troponin T neg x1, , VBG pH 7.4, pCO2 49, COVID-19 PCR neg x1  ED Imaging:EKG showing NSR with PACs, QTc 456; CXR with LLL haziness  ED Management:Duoneb x2. IV solumedrol 125mg x1. 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    Disease of trachea and bronchus  Tracheomalacia    Epilepsy  No longer on medications.    Depressive disorder  Depression    Transient cerebral ischemia  TIA (transient ischemic attack)    Peptic ulcer  2/2 H. pylori (s/p amoxicillin, clarithromycin, omeprazole)    Tobacco use disorder  Tobacco abuse    Emphysema  COPD (chronic obstructive pulmonary disease) with emphysema    Hyperlipidemia  Hyperlipidemia    Essential hypertension  Hypertension    Type 2 diabetes mellitus  Diabetes    Other postprocedural status  S/P hernia repair        MEDICATIONS  (STANDING):  albuterol/ipratropium for Nebulization 3 milliLiter(s) Nebulizer every 4 hours  aspirin enteric coated 81 milliGRAM(s) Oral daily  atorvastatin 40 milliGRAM(s) Oral at bedtime  budesonide 160 MICROgram(s)/formoterol 4.5 MICROgram(s) Inhaler 2 Puff(s) Inhalation two times a day  clotrimazole Lozenge 1 Lozenge Oral five times a day  dextrose 40% Gel 15 Gram(s) Oral once  dextrose 5%. 1000 milliLiter(s) (50 mL/Hr) IV Continuous <Continuous>  dextrose 5%. 1000 milliLiter(s) (100 mL/Hr) IV Continuous <Continuous>  dextrose 50% Injectable 25 Gram(s) IV Push once  dextrose 50% Injectable 12.5 Gram(s) IV Push once  dextrose 50% Injectable 25 Gram(s) IV Push once  enoxaparin Injectable 40 milliGRAM(s) SubCutaneous every 24 hours  glucagon  Injectable 1 milliGRAM(s) IntraMuscular once  hydrochlorothiazide 25 milliGRAM(s) Oral daily  insulin lispro (ADMELOG) corrective regimen sliding scale   SubCutaneous Before meals and at bedtime  lisinopril 20 milliGRAM(s) Oral daily  nystatin    Suspension 969046 Unit(s) Oral every 6 hours  pantoprazole    Tablet 40 milliGRAM(s) Oral before breakfast  predniSONE   Tablet 40 milliGRAM(s) Oral daily  sertraline 50 milliGRAM(s) Oral daily    MEDICATIONS  (PRN):        FAMILY HISTORY:  Family history of ovarian cancer (Mother, Sibling)    Family history of colon cancer (Sibling)  sister    Family history of diabetes mellitus (Mother, Father)        CBC Full  -  ( 05 Aug 2021 10:23 )  WBC Count : 11.15 K/uL  RBC Count : 3.20 M/uL  Hemoglobin : 9.0 g/dL  Hematocrit : 30.1 %  Platelet Count - Automated : 315 K/uL  Mean Cell Volume : 94.1 fl  Mean Cell Hemoglobin : 28.1 pg  Mean Cell Hemoglobin Concentration : 29.9 gm/dL  Auto Neutrophil # : x  Auto Lymphocyte # : x  Auto Monocyte # : x  Auto Eosinophil # : x  Auto Basophil # : x  Auto Neutrophil % : x  Auto Lymphocyte % : x  Auto Monocyte % : x  Auto Eosinophil % : x  Auto Basophil % : x      08-05    138  |  102  |  16  ----------------------------<  147<H>  4.8   |  25  |  0.50    Ca    10.1      05 Aug 2021 10:22  Mg     2.1     08-05    TPro  6.7  /  Alb  3.9  /  TBili  <0.2  /  DBili  x   /  AST  21  /  ALT  14  /  AlkPhos  88  08-04            Radiology:    < from: Xray Chest 1 View- PORTABLE-Urgent (Xray Chest 1 View- PORTABLE-Urgent .) (08.04.21 @ 22:23) >  EXAM:  XR CHEST PORTABLE URGENT 1V                          PROCEDURE DATE:  08/04/2021          INTERPRETATION:  Portable chest    HISTORY: Wheezing patient unable to communicate    IMPRESSION:    Unremarkable with no focal or acute lung infiltrate. No pleural effusion or pneumothorax. Vascular calcification thoracic aorta. No acute bone abnormality. Similar appearance to prior exam 1/7/2019.          Vital Signs Last 24 Hrs  T(C): 36.7 (05 Aug 2021 08:30), Max: 36.8 (04 Aug 2021 22:17)  T(F): 98.1 (05 Aug 2021 08:30), Max: 98.3 (05 Aug 2021 05:01)  HR: 96 (05 Aug 2021 09:29) (82 - 98)  BP: 151/82 (05 Aug 2021 08:30) (102/59 - 151/82)  BP(mean): --  RR: 20 (05 Aug 2021 09:29) (20 - 22)  SpO2: 96% (05 Aug 2021 09:29) (94% - 99%)        REVIEW OF SYSTEMS:    CONSTITUTIONAL: No fever, weight loss, or fatigue  EYES: No eye pain, visual disturbances, or discharge  ENMT:  No difficulty hearing, tinnitus, vertigo; No sinus or throat pain  NECK: No pain or stiffness  BREASTS: No pain, masses, or nipple discharge  RESPIRATORY:  shortness of breath  CARDIOVASCULAR: No chest pain, palpitations, dizziness, or leg swelling  GASTROINTESTINAL: No abdominal or epigastric pain. No nausea, vomiting, or hematemesis; No diarrhea or constipation. No melena or hematochezia.  GENITOURINARY: No dysuria, frequency, hematuria, or incontinence  NEUROLOGICAL: No headaches, memory loss, loss of strength, numbness, or tremors  SKIN: No itching, burning, rashes, or lesions   LYMPH NODES: No enlarged glands  ENDOCRINE: No heat or cold intolerance; No hair loss  MUSCULOSKELETAL: No joint pain or swelling; No muscle, back, or extremity pain  PSYCHIATRIC: No depression, anxiety, mood swings, or difficulty sleeping  HEME/LYMPH: No easy bruising, or bleeding gums  ALLERGY AND IMMUNOLOGIC: No hives or eczema  VASCULAR: no swelling, erythema,   : no dysuria, hematuria, frequency        Physical Exam:  71 yo  woman lying in semi Rhoades's position, awake, no acute complaints      Neurologic Exam:    Alert and oriented to person, place, date/year    Motor Exam:    Right UE:            no focal weakness > 3+/5    Left UE:             no focal weakness > 3+/5      Right LE:          no focal weakness > 3+/5    Left LE:             no focal weakness > 3+/5               Sensation:           intact to light touch x 4 extremities                                               DTR:                  biceps/brachioradialis: equal bilaterally                                                    patella/ankle: equal bilaterally                            Gait:  not tested        PM&R Impression:    1) deconditioned  2) no focal weakness    Recommendations/ Plan :    1) Physical therapy focusing on therapeutic exercises, bed mobility/transfer out of bed evaluation, progressive ambulation with assistive devices prn.    2) Anticipated Disposition Plan/Recs:   pending functional progress

## 2021-08-05 NOTE — H&P ADULT - NSHPSOCIALHISTORY_GEN_ALL_CORE
Reports occasionally smoking. Hx of more than 40 years of smoking. Restarted earlier this year. No alcohol or illicit drug use

## 2021-08-05 NOTE — H&P ADULT - NSHPLABSRESULTS_GEN_ALL_CORE
8.6    10.82 )-----------( 425      ( 04 Aug 2021 22:04 )             28.6     08-04    141  |  104  |  16  ----------------------------<  175<H>  4.8   |  28  |  0.67    Ca    10.0      04 Aug 2021 22:04  Mg     2.2     08-04    TPro  6.7  /  Alb  3.9  /  TBili  <0.2  /  DBili  x   /  AST  21  /  ALT  14  /  AlkPhos  88  08-04          RADIOLOGY & ADDITIONAL TESTS: Reviewed

## 2021-08-05 NOTE — DISCHARGE NOTE PROVIDER - PROVIDER TOKENS
PROVIDER:[TOKEN:[[id number]],FOLLOWUP:[2 weeks]] FREE:[LAST:[Musa],FIRST:[Nidhi DAVIS],PHONE:[phone]:[(   )    -],ADDRESS:[OTOL[facility]NGOLOGY  [address] [place] [address]],SCHEDULEDAPPT:[09/01/2021],SCHEDULEDAPPTTIME:[02:45 PM]] FREE:[LAST:[Musa],FIRST:[Nidhi DAVIS],PHONE:[phone]],FAX:[(   )    -],ADDRESS:[OTOLARYNGOLOGY  [address] [place] [address]],SCHEDULEDAPPT:[09/01/2021],SCHEDULEDAPPTTIME:[02:45 PM]],PROVIDER:[TOKEN:[[id number]],FOLLOWUP:[2 weeks],ESTABLISHEDPATIENT:[T]]

## 2021-08-05 NOTE — SWALLOW BEDSIDE ASSESSMENT ADULT - NS SPL SWALLOW CLINIC TRIAL FT
Oral phase was unremarkable. Swallow trigger palpated. Pt with facial grimaces and c/o severe odynophagia (8/10) which was subjectively worse with solids compared to liquids. Pt noted to have significantly increased WOB following each presentation (exacerbated as trials continued) indicating poor coordination between deglutition and respiration. Immediate wet coughing x2 towards the end of liquid trials suggestive of aspiration.

## 2021-08-05 NOTE — SWALLOW BEDSIDE ASSESSMENT ADULT - SWALLOW EVAL: DIAGNOSIS
Clinical signs of pharyngeal dysphagia likely 2/2 COPD exacerbation and candida. Suspect odynophagia will improve with medical tx (e.g. nystatin and clotrimazole kat). Additionally, suspect swallow-breath coordination will continue to improve as respiratory status improves. At this time, pt does appear to be at risk for aspiration over the course of a meal. However, given clear chest radiography and independence with oral care, pt appears safe for sips/chips/meds with strict aspiration precautions pending reassessment in ~24 hours.

## 2021-08-05 NOTE — H&P ADULT - TIME BILLING
Patient seen and examined with house-staff during bedside rounds.  Resident note read, including vitals, physical findings, laboratory data, and radiological reports.   Revisions included below.  Direct personal management at bed side and extensive interpretation of the data.  Plan was outlined and discussed in details with the housestaff.  Decision making of high complexity  Action taken for acute disease activity to reflect the level of care provided:  - medication reconciliation  - review laboratory data    The case was discussed with the ER attending.  There is no clinical evidence of acute exacerbation of COPD.  The symptoms is most likely related to her change of voice possible URI.  Patient was seen by swallow and that seems that the patient has difficulty with swallowing with repeated attempts.  ENT disease saw the patient no evidence of upper airway involvement.  Patient was started on bronchodilators, systemic steroids, hold antibiotic.  Continue home meds.  The chest x-ray did not reveal any evidence of underlying infectious process

## 2021-08-05 NOTE — H&P ADULT - PROBLEM SELECTOR PLAN 5
On home lipitor 40mg qhs  - c/w lipitor here Hx of HTN, on home HCTZ 25mg qd and lisinopril 20mg qd  - c/w HCTZ and lisinopril here    #Tobacco use disorder  - last tobacco use was within 30 days  - nicotine patch here

## 2021-08-05 NOTE — H&P ADULT - PROBLEM SELECTOR PLAN 2
Pt called EMS because her throat pain has gotten worse. Hx of dysphonia with hoarse voice. Not obstructing airway, no oropharyngeal erythema. Given hx of oral candidiasis noted in chart, this could be possible source of throat pain.   - per Dr. Trevizo, consult ENT in AM for possible scope (patient kept NPO overnight)  - monitor for respiratory distress

## 2021-08-05 NOTE — H&P ADULT - ASSESSMENT
71 y/o woman former smoker with a PMHx of COPD (on 3L home O2) and CPAP at night, HTN, HLD, T2DM, depression, TIAs, who presents with persistent and worsening throat pain and worsening SOB in the past 1-2 weeks. Admitted for COPD exacerbation and throat pain evaluation

## 2021-08-05 NOTE — DISCHARGE NOTE PROVIDER - NSDCCAREPROVSEEN_GEN_ALL_CORE_FT
Trinity Trevizo Trinity Trevizo A  Patient seen and examined with house-staff during bedside rounds.  Resident note read, including vitals, physical findings, laboratory data, and radiological reports.   Revisions included below.  Direct personal management at bed side and extensive interpretation of the data.  Plan was outlined and discussed in details with the housestaff.  Decision making of high complexity  Action taken for acute disease activity to reflect the level of care provided:  - medication reconciliation  - review laboratory data  NO ACUTE EXACERBATION OD COPD  patient was throat pain and most likely related to candidaisis

## 2021-08-05 NOTE — DISCHARGE NOTE PROVIDER - NSDCFUADDAPPT_GEN_ALL_CORE_FT
Please bring your Insurance card, Photo ID and Discharge paperwork to the following appointment:    (1) Please follow up with your Otolaryngology Provider, Dr. Nidhi Musa at [address] [place] [address], [place] [address] on 09/01/2021 at 2:45pm.    Appointment was scheduled by Ms. JOSE ANGEL Osuna, Referral Coordinator.   Please bring your Insurance card, Photo ID and Discharge paperwork to the following appointment:    (1) Please follow up with your Otolaryngology Provider, Dr. Nidhi Musa at [address] [place] [address], [place] [address] on 09/01/2021 at 2:45pm.    (2) The office of Dr. Trevizo should contact you within 1-2 weeks to schedule a follow-up appointment. If you do not hear from them please call the office at [phone].     Appointment was scheduled by Ms. JOSE ANGEL Osuna, Referral Coordinator.

## 2021-08-05 NOTE — DISCHARGE NOTE PROVIDER - HOSPITAL COURSE
#Discharge: do not delete    Patient is 69 yo F with past medical history of COPD (on 3L home O2 and CPAP at night), HTN, HLD, T2DM, depression, TIAs, hemorrhoids, tracheomalacia who presents with shortness of breath, persistent and worsening throat pain and feeling of choking with eating over the past 1-2 weeks, found to have persistent oral, supraglottic, and laryngeal candida infection. In ED vitals were WNL. Labs significant for Hgb 8.6 (decreased from 10.1, 6/2021), , pCO2 49. EKG with NSR with PACs; CXR with LLL haziness. Given duonebs x2, IV solumedrol 125 mg for SOB and lidocaine 2% 5mL for throat pain. Evaluated by ENT by laryngoscopy which showed small white lesions and mild erythema of epiglottis, AE folds, arytenoids and true vocal folds. Treated with nystatin suspension 500,000 U q6hr x 14 days and clotrimazole lozenges PO 5 x a day for 14 days. COPD managed with duoneb q4hrs with no evidence of further respiratory distress.     Discharge to: home    Problem List/Main Diagnoses:     COPD exacerbation.  Plan: Hx of COPD exacerbation (last admission in June for an exacerbation, discharged on steroids). Presented with worsening SOB in the past week, increased sputum, persistent cough. Reports adherence to medications at home. On CPAP at night at home. Likely 2/2 viral URI  - s/p IV solumedrol 125mg x1 and 2x duonebs in ED  - duonebs q4h here  - symbicort BID  - prednisone 40mg qd  - hold antibiotics for now given no fevers, no change in cough; if patient develops fever or does not improve clinically, will consider starting antibiotics    #JONATHAN  On CPAP at home  - CPAP 5 ordered for here.     Orolaryngeal candidiasis .  Plan: Pt called EMS because her throat pain has gotten worse. Hx of dysphonia with hoarse voice. Not obstructing airway, no oropharyngeal erythema. Given hx of oral candidiasis noted in chart, this could be possible source of throat pain.   - per Dr. Trevizo, consult ENT in AM for possible scope (patient kept NPO overnight)  - monitor for respiratory distress.     Type 2 diabetes mellitus.  Plan: Hx of T2DM, on metformin 500mg BID at home  - mISS here.      Carotid stenosis. Plan: Hx of severe R ICA stenosis 2/2 calcified atherosclerotic plaque  - on home ASA 81mg qd  - c/w ASA here  - will need neuro outpt followup.      Essential hypertension. Plan: Hx of HTN, on home HCTZ 25mg qd and lisinopril 20mg qd  - c/w HCTZ and lisinopril here    #Tobacco use disorder  - last tobacco use was within 30 days  - nicotine patch here.    Normocytic anemia.Plan: Hx of normocytic anemia with Hb previously in the 8s (discharged in June with Hb in the 10s). Vitamin B12 and folate wnl from May 2021. Iron panel from May also showing low total iron 17, % sat 6, low ferritin. Consistent with iron deficiency anemia  - currently without active signs of bleeding  - will monitor for signs of bleeding  - consider iron supplementation.     Depression.  Plan: Hx of depression on zoloft 50mg qd  - c/w zoloft here.     New medications/therapies:   New lines/hardware:  Labs to be followed outpatient:   Exam to be followed outpatient:   Outpatient appts:     #Discharge: do not delete    Patient is 71 yo F with past medical history of COPD (on 3L home O2 and CPAP at night), HTN, HLD, T2DM, depression, TIAs, hemorrhoids, tracheomalacia who presents with shortness of breath, persistent and worsening throat pain and feeling of choking with eating over the past 1-2 weeks, found to have persistent oral, supraglottic, and laryngeal candida infection. In ED vitals were WNL. Labs significant for Hgb 8.6 (decreased from 10.1, 6/2021), , pCO2 49. EKG with NSR with PACs; CXR with LLL haziness. Given duonebs x2, IV solumedrol 125 mg for SOB and lidocaine 2% 5mL for throat pain. Evaluated by ENT by laryngoscopy which showed small white lesions and mild erythema of epiglottis, AE folds, arytenoids and true vocal folds. Treated with nystatin suspension 500,000 U q6hr x 14 days and clotrimazole lozenges PO 5 x a day for 14 days. COPD managed with duoneb q4hrs with no evidence of further respiratory distress.     Discharge to: home    Problem List/Main Diagnoses:   #Oropharyngeal candidiasis .    Pt called EMS because her throat pain has gotten worse. Hx of dysphonia with hoarse voice. Not obstructing airway, no oropharyngeal erythema. Given hx of oral candidiasis noted in chart, this could be possible source of throat pain.   - ENT consulted, laryngoscope showed arytenoids w/ small b/l white plaques, true vocal folds w/ small white plaques, mild interarytenoid edema.   - On 8/5 started nystatin + clotrimazole x 14 days.   - ordered FEES assessment for which speech and swallow recommends mechanical soft diet   - will need outpatient ENT f/up with Dr. Nidhi Musa   - monitor for respiratory distress.     #COPD    Hx of COPD exacerbation (last admission in June for an exacerbation, discharged on steroids). Presented with worsening SOB in the past week, increased sputum, persistent cough. Reports adherence to medications at home. On CPAP at night at home. s/p IV solumedrol 125mg x1 and 2x duonebs in ED  - c/w duonebs q4h  - c/w symbicort BID  - prednisone 40mg qd -> discontinued as no concern for COPDe  - hold antibiotics for now given no fevers, no change in cough  - RVP negative  - saturating 95-97% on home 3LNC regimen    #JONATHAN  On CPAP at home  - CPAP 5 ordered for here.     #Type 2 diabetes mellitus.  Plan: Hx of T2DM, on metformin 500mg BID at home  - mISS here.      Carotid stenosis. Plan: Hx of severe R ICA stenosis 2/2 calcified atherosclerotic plaque  - on home ASA 81mg qd  - c/w ASA here  - will need neuro outpt followup.    #Essential hypertension. Plan: Hx of HTN, on home HCTZ 25mg qd and lisinopril 20mg qd  - c/w HCTZ and lisinopril     #Tobacco use disorder  - last tobacco use was within 30 days  - nicotine patch here.    #Normocytic anemia.Plan: Hx of normocytic anemia with Hb previously in the 8s (discharged in June with Hb in the 10s). Vitamin B12 and folate wnl from May 2021. Iron panel from May also showing low total iron 17, % sat 6, low ferritin. Consistent with iron deficiency anemia  - currently without active signs of bleeding  - will monitor for signs of bleeding    #Depression.    Hx of depression on zoloft 50mg qd  - c/w zoloft here.     New medications/therapies: nystatin + clotrimazole x 14 days  New lines/hardware: None  Labs to be followed outpatient: None  Exam to be followed outpatient: None  Outpatient appts: ENT, pulmonology

## 2021-08-05 NOTE — H&P ADULT - PROBLEM SELECTOR PLAN 8
Plan:   F: none  E: replete K<4, Mg<2  N: NPO for possible procedure  VTE Prophylaxis: lovenox  C: Full Code  D: F

## 2021-08-05 NOTE — H&P ADULT - NSHPPHYSICALEXAM_GEN_ALL_CORE
GENERAL: In mild distress, but speaking in fulls sentences. Hoarse voice (baseline). AAOx3  HEENT: NC/AT. PERRL. EOMI. Neck supple. Trachea midline.   CV: RRR. +S1/S2. No murmurs  LUNGS: Mild wheezing in b/l lungs  ABDOMEN: Soft, nontender, nondistended. +BS.  EXT: WWP. Mild edema in b/l extremities GENERAL: In mild distress, but speaking in fulls sentences. Hoarse voice (baseline). AAOx3  HEENT: NC/AT. PERRL. EOMI. Neck supple. Trachea midline. No oropharyngeal erythema  CV: RRR. +S1/S2. No murmurs  LUNGS: Mild wheezing in b/l lungs  ABDOMEN: Soft, nontender, nondistended. +BS.  EXT: WWP. Mild edema in b/l extremities

## 2021-08-05 NOTE — CONSULT NOTE ADULT - ASSESSMENT
per Internal Medicine    69 y/o woman former smoker with a PMHx of COPD (on 3L home O2) and CPAP at night, HTN, HLD, T2DM, depression, TIAs, who presents with persistent and worsening throat pain and worsening SOB in the past 1-2 weeks. Admitted for COPD exacerbation and throat pain evaluation    Problem/Plan - 1:  ·  Problem: COPD exacerbation.  Plan: Hx of COPD exacerbation (last admission in June for an exacerbation, discharged on steroids). Presented with worsening SOB in the past week, increased sputum, persistent cough. Reports adherence to medications at home. On CPAP at night at home. Likely 2/2 viral URI  - s/p IV solumedrol 125mg x1 and 2x duonebs in ED  - duonebs q4h here  - symbicort BID  - prednisone 40mg qd  - hold antibiotics for now given no fevers, no change in cough; if patient develops fever or does not improve clinically, will consider starting antibiotics    #JONATHAN  On CPAP at home  - CPAP 5 ordered for here.     Problem/Plan - 2:  ·  Problem: Throat pain.  Plan: Pt called EMS because her throat pain has gotten worse. Hx of dysphonia with hoarse voice. Not obstructing airway, no oropharyngeal erythema. Given hx of oral candidiasis noted in chart, this could be possible source of throat pain.   - per Dr. Trevizo, consult ENT in AM for possible scope (patient kept NPO overnight)  - monitor for respiratory distress.     Problem/Plan - 3:  ·  Problem: Type 2 diabetes mellitus.  Plan: Hx of T2DM, on metformin 500mg BID at home  - mISS here.     Problem/Plan - 4:  ·  Problem: Carotid stenosis. Plan: Hx of severe R ICA stenosis 2/2 calcified atherosclerotic plaque  - on home ASA 81mg qd  - c/w ASA here  - will need neuro outpt followup.    Problem/Plan - 5:  ·  Problem: Essential hypertension. Plan: Hx of HTN, on home HCTZ 25mg qd and lisinopril 20mg qd  - c/w HCTZ and lisinopril here    #Tobacco use disorder  - last tobacco use was within 30 days  - nicotine patch here.    Problem/Plan - 6:  Problem: Normocytic anemia.Plan: Hx of normocytic anemia with Hb previously in the 8s (discharged in June with Hb in the 10s). Vitamin B12 and folate wnl from May 2021. Iron panel from May also showing low total iron 17, % sat 6, low ferritin. Consistent with iron deficiency anemia  - currently without active signs of bleeding  - will monitor for signs of bleeding  - consider iron supplementation.    Problem/Plan - 7:  ·  Problem: Depression.  Plan: Hx of depression on zoloft 50mg qd  - c/w zoloft here.     Problem/Plan - 8:  ·  Problem: Nutrition, metabolism, and development symptoms.  Plan: Plan:   F: none  E: replete K<4, Mg<2  N: NPO for possible procedure  VTE Prophylaxis: lovenox  C: Full Code  D: RMF.

## 2021-08-05 NOTE — DISCHARGE NOTE PROVIDER - NSDCFUSCHEDAPPT_GEN_ALL_CORE_FT
ISELA BROWN ; 08/13/2021 ; [facility] [address]  ISELA BROWN ; 09/09/2021 ; [facility] [address] ISELA BROWN ; 08/13/2021 ; [facility] [address]  ISELA BROWN ; 09/01/2021 ; [facility] [address]   ISELA BROWN ; 09/09/2021 ; [facility] [address] [place] [address]

## 2021-08-05 NOTE — H&P ADULT - PROBLEM SELECTOR PLAN 4
Hx of HTN, on home HCTZ 25mg qd and lisinopril 20mg qd  - c/w HCTZ and lisinopril here    #Tobacco use disorder  - last tobacco use was within 30 days  - nicotine patch here Hx of severe R ICA stenosis 2/2 calcified atherosclerotic plaque  - on home ASA 81mg qd  - c/w ASA here  - will need neuro outpt followup

## 2021-08-05 NOTE — SWALLOW BEDSIDE ASSESSMENT ADULT - SLP GENERAL OBSERVATIONS
Received awake in bed, voice is severely dysphonic with periods of aphonia. MPT: 8 sec. (average for females: 15-25 sec.). segmented speech 2/2 poor respiratory support needed for speech. Pt receiving 6L supplemental O2.

## 2021-08-05 NOTE — CONSULT NOTE ADULT - ASSESSMENT
70F w/ PMH COPD admitted for COPD exacerbation and now evidence of likely persistent oral, supraglottic, and laryngeal candida infection. Fiberoptic exam demonstrating also probable mild b/l kaylah edema.    Plan:  Agree c/w nystatin rinse and spit  Recommend addition of clotrimazole kat  Recommend teach patient to always swish and spit after symbicort usage  Diet per primary team  Remainder of care per primary team  Please page ENT with any concerns/questions    Discussed with attending, Dr. Musa 70F w/ PMH COPD admitted for COPD exacerbation and now evidence of likely persistent oral, supraglottic, and laryngeal candida infection. Fiberoptic exam demonstrating also probable mild b/l kaylah edema.    Plan:  Agree c/w nystatin rinse and spit  Recommend addition of clotrimazole kat  Recommend teach patient to always swish and spit after symbicort usage  Diet per primary team  ENT will plan to rescope patient in one week if she remains admitted to monitor for resolution  If discharged, please have patient follow up with Dr. Nidhi Musa - can call [phone] for appointment  Remainder of care per primary team  Please page ENT with any concerns/questions    Discussed with attending, Dr. Musa

## 2021-08-05 NOTE — SWALLOW BEDSIDE ASSESSMENT ADULT - COMMENTS
Laryngoscopic exam, completed by ENT, was significant for evidence of likely persistent oral, supraglottic, and laryngeal candida infection and probable mild b/l kaylah edema. Laryngoscopic exam, completed by ENT, was significant for evidence of likely persistent oral, supraglottic, and laryngeal candida infection and probable mild b/l kaylah edema.    CXR unremarkable.     Pt with c/o progressive hoarseness and odynophagia since hospitalization in May of this year. She endorsed a 15-17lb weight loss since that time. Additional c/o occasional choking with liquids and SOB which is exacerbated with PO.

## 2021-08-05 NOTE — H&P ADULT - PROBLEM SELECTOR PLAN 1
Hx of COPD exacerbation (last admission in June for an exacerbation, discharged on steroids). Presented with worsening SOB in the past week, increased sputum, persistent cough. Reports adherence to medications at home. On CPAP at night at home. Likely 2/2 viral URI  - s/p IV solumedrol 125mg x1 and 2x duonebs in ED  - duonebs q4h here  - symbicort BID  - prednisone 40mg qd  - hold antibiotics for now given no fevers, no change in cough; if patient develops fever or does not improve clinically, will consider starting antibiotics    #JONATHAN  On CPAP at home  - CPAP 5 ordered for here

## 2021-08-05 NOTE — H&P ADULT - NSHPREVIEWOFSYSTEMS_GEN_ALL_CORE
CONSTITUTIONAL: +weakness. No fever, chills  EYES/ENT: No visual changes;  No vertigo or throat pain   NECK: No pain or stiffness  RESPIRATORY: +SOB, wheezing, cough (mostly white)  CARDIOVASCULAR: No chest pain or palpitations  GASTROINTESTINAL: No abdominal pain. No nausea, vomiting. No melena or hematochezia.  NEUROLOGICAL: No numbness or weakness  All other review of systems is negative unless indicated above. CONSTITUTIONAL: +weakness. No fever, chills  EYES/ENT: No visual changes; +throat pain  NECK: No pain or stiffness  RESPIRATORY: +SOB, wheezing, cough (mostly white)  CARDIOVASCULAR: No chest pain or palpitations  GASTROINTESTINAL: No abdominal pain. No nausea, vomiting. No melena or hematochezia.  NEUROLOGICAL: No numbness or weakness  All other review of systems is negative unless indicated above.

## 2021-08-06 ENCOUNTER — TRANSCRIPTION ENCOUNTER (OUTPATIENT)
Age: 71
End: 2021-08-06

## 2021-08-06 VITALS — HEART RATE: 104 BPM | RESPIRATION RATE: 22 BRPM | OXYGEN SATURATION: 97 %

## 2021-08-06 LAB
ANION GAP SERPL CALC-SCNC: 11 MMOL/L — SIGNIFICANT CHANGE UP (ref 5–17)
BUN SERPL-MCNC: 12 MG/DL — SIGNIFICANT CHANGE UP (ref 7–23)
CALCIUM SERPL-MCNC: 9.2 MG/DL — SIGNIFICANT CHANGE UP (ref 8.4–10.5)
CHLORIDE SERPL-SCNC: 102 MMOL/L — SIGNIFICANT CHANGE UP (ref 96–108)
CO2 SERPL-SCNC: 23 MMOL/L — SIGNIFICANT CHANGE UP (ref 22–31)
COVID-19 SPIKE DOMAIN AB INTERP: POSITIVE
COVID-19 SPIKE DOMAIN ANTIBODY RESULT: 75.6 U/ML — HIGH
CREAT SERPL-MCNC: 0.66 MG/DL — SIGNIFICANT CHANGE UP (ref 0.5–1.3)
GLUCOSE BLDC GLUCOMTR-MCNC: 146 MG/DL — HIGH (ref 70–99)
GLUCOSE BLDC GLUCOMTR-MCNC: 248 MG/DL — HIGH (ref 70–99)
GLUCOSE SERPL-MCNC: 102 MG/DL — HIGH (ref 70–99)
HCT VFR BLD CALC: 36.8 % — SIGNIFICANT CHANGE UP (ref 34.5–45)
HGB BLD-MCNC: 12.7 G/DL — SIGNIFICANT CHANGE UP (ref 11.5–15.5)
MAGNESIUM SERPL-MCNC: 2.4 MG/DL — SIGNIFICANT CHANGE UP (ref 1.6–2.6)
MCHC RBC-ENTMCNC: 30.2 PG — SIGNIFICANT CHANGE UP (ref 27–34)
MCHC RBC-ENTMCNC: 34.5 GM/DL — SIGNIFICANT CHANGE UP (ref 32–36)
MCV RBC AUTO: 87.6 FL — SIGNIFICANT CHANGE UP (ref 80–100)
NRBC # BLD: 0 /100 WBCS — SIGNIFICANT CHANGE UP (ref 0–0)
NT-PROBNP SERPL-SCNC: 558 PG/ML — HIGH (ref 0–300)
PHOSPHATE SERPL-MCNC: 3.1 MG/DL — SIGNIFICANT CHANGE UP (ref 2.5–4.5)
PLATELET # BLD AUTO: 288 K/UL — SIGNIFICANT CHANGE UP (ref 150–400)
POTASSIUM SERPL-MCNC: 4.3 MMOL/L — SIGNIFICANT CHANGE UP (ref 3.5–5.3)
POTASSIUM SERPL-SCNC: 4.3 MMOL/L — SIGNIFICANT CHANGE UP (ref 3.5–5.3)
RBC # BLD: 4.2 M/UL — SIGNIFICANT CHANGE UP (ref 3.8–5.2)
RBC # FLD: 11.7 % — SIGNIFICANT CHANGE UP (ref 10.3–14.5)
SARS-COV-2 IGG+IGM SERPL QL IA: 75.6 U/ML — HIGH
SARS-COV-2 IGG+IGM SERPL QL IA: POSITIVE
SODIUM SERPL-SCNC: 136 MMOL/L — SIGNIFICANT CHANGE UP (ref 135–145)
WBC # BLD: 6.14 K/UL — SIGNIFICANT CHANGE UP (ref 3.8–10.5)
WBC # FLD AUTO: 6.14 K/UL — SIGNIFICANT CHANGE UP (ref 3.8–10.5)

## 2021-08-06 PROCEDURE — 80048 BASIC METABOLIC PNL TOTAL CA: CPT

## 2021-08-06 PROCEDURE — 94640 AIRWAY INHALATION TREATMENT: CPT

## 2021-08-06 PROCEDURE — U0005: CPT

## 2021-08-06 PROCEDURE — 96374 THER/PROPH/DIAG INJ IV PUSH: CPT

## 2021-08-06 PROCEDURE — 85730 THROMBOPLASTIN TIME PARTIAL: CPT

## 2021-08-06 PROCEDURE — 94660 CPAP INITIATION&MGMT: CPT

## 2021-08-06 PROCEDURE — 36415 COLL VENOUS BLD VENIPUNCTURE: CPT

## 2021-08-06 PROCEDURE — 71045 X-RAY EXAM CHEST 1 VIEW: CPT

## 2021-08-06 PROCEDURE — 85027 COMPLETE CBC AUTOMATED: CPT

## 2021-08-06 PROCEDURE — 99285 EMERGENCY DEPT VISIT HI MDM: CPT

## 2021-08-06 PROCEDURE — 93005 ELECTROCARDIOGRAM TRACING: CPT

## 2021-08-06 PROCEDURE — 85610 PROTHROMBIN TIME: CPT

## 2021-08-06 PROCEDURE — 92612 ENDOSCOPY SWALLOW (FEES) VID: CPT

## 2021-08-06 PROCEDURE — 85025 COMPLETE CBC W/AUTO DIFF WBC: CPT

## 2021-08-06 PROCEDURE — 99239 HOSP IP/OBS DSCHRG MGMT >30: CPT | Mod: GC

## 2021-08-06 PROCEDURE — 84484 ASSAY OF TROPONIN QUANT: CPT

## 2021-08-06 PROCEDURE — 83605 ASSAY OF LACTIC ACID: CPT

## 2021-08-06 PROCEDURE — 0225U NFCT DS DNA&RNA 21 SARSCOV2: CPT

## 2021-08-06 PROCEDURE — 84100 ASSAY OF PHOSPHORUS: CPT

## 2021-08-06 PROCEDURE — U0003: CPT

## 2021-08-06 PROCEDURE — 97161 PT EVAL LOW COMPLEX 20 MIN: CPT

## 2021-08-06 PROCEDURE — 84295 ASSAY OF SERUM SODIUM: CPT

## 2021-08-06 PROCEDURE — 83735 ASSAY OF MAGNESIUM: CPT

## 2021-08-06 PROCEDURE — 83880 ASSAY OF NATRIURETIC PEPTIDE: CPT

## 2021-08-06 PROCEDURE — 82330 ASSAY OF CALCIUM: CPT

## 2021-08-06 PROCEDURE — 92610 EVALUATE SWALLOWING FUNCTION: CPT

## 2021-08-06 PROCEDURE — 84132 ASSAY OF SERUM POTASSIUM: CPT

## 2021-08-06 PROCEDURE — 82962 GLUCOSE BLOOD TEST: CPT

## 2021-08-06 PROCEDURE — 86769 SARS-COV-2 COVID-19 ANTIBODY: CPT

## 2021-08-06 PROCEDURE — 80053 COMPREHEN METABOLIC PANEL: CPT

## 2021-08-06 PROCEDURE — 83036 HEMOGLOBIN GLYCOSYLATED A1C: CPT

## 2021-08-06 PROCEDURE — 82803 BLOOD GASES ANY COMBINATION: CPT

## 2021-08-06 RX ORDER — NYSTATIN 500MM UNIT
5 POWDER (EA) MISCELLANEOUS
Qty: 240 | Refills: 0
Start: 2021-08-06 | End: 2021-08-17

## 2021-08-06 RX ORDER — CLOTRIMAZOLE 10 MG
1 TROCHE MUCOUS MEMBRANE
Qty: 60 | Refills: 0
Start: 2021-08-06 | End: 2021-08-17

## 2021-08-06 RX ADMIN — Medication 81 MILLIGRAM(S): at 11:37

## 2021-08-06 RX ADMIN — BUDESONIDE AND FORMOTEROL FUMARATE DIHYDRATE 2 PUFF(S): 160; 4.5 AEROSOL RESPIRATORY (INHALATION) at 09:58

## 2021-08-06 RX ADMIN — LISINOPRIL 20 MILLIGRAM(S): 2.5 TABLET ORAL at 06:03

## 2021-08-06 RX ADMIN — Medication 4: at 12:50

## 2021-08-06 RX ADMIN — Medication 500000 UNIT(S): at 09:58

## 2021-08-06 RX ADMIN — Medication 3 MILLILITER(S): at 09:58

## 2021-08-06 RX ADMIN — Medication 1 LOZENGE: at 09:58

## 2021-08-06 RX ADMIN — Medication 3 MILLILITER(S): at 12:51

## 2021-08-06 RX ADMIN — Medication 3 MILLILITER(S): at 05:54

## 2021-08-06 RX ADMIN — Medication 3 MILLILITER(S): at 00:53

## 2021-08-06 RX ADMIN — Medication 1 LOZENGE: at 12:51

## 2021-08-06 RX ADMIN — Medication 500000 UNIT(S): at 02:07

## 2021-08-06 RX ADMIN — Medication 25 MILLIGRAM(S): at 05:55

## 2021-08-06 RX ADMIN — SERTRALINE 50 MILLIGRAM(S): 25 TABLET, FILM COATED ORAL at 11:37

## 2021-08-06 RX ADMIN — PANTOPRAZOLE SODIUM 40 MILLIGRAM(S): 20 TABLET, DELAYED RELEASE ORAL at 06:03

## 2021-08-06 RX ADMIN — Medication 40 MILLIGRAM(S): at 05:54

## 2021-08-06 NOTE — SWALLOW FEES ASSESSMENT ADULT - SLP GENERAL OBSERVATIONS
Receiving 4L supplemental O2 via NCL. Dysphonic with segmented speech 2/2 poor respiratory support needed for speech

## 2021-08-06 NOTE — DISCHARGE NOTE NURSING/CASE MANAGEMENT/SOCIAL WORK - NSDCPEWEB_GEN_ALL_CORE
[facility] website --- [email or web address] --- [email or web address]

## 2021-08-06 NOTE — PHYSICAL THERAPY INITIAL EVALUATION ADULT - IMPAIRMENTS FOUND, PT EVAL
aerobic capacity/endurance/arousal, attention, and cognition/gait, locomotion, and balance/muscle strength/poor safety awareness/ventilation and respiration/gas exchange

## 2021-08-06 NOTE — SWALLOW FEES ASSESSMENT ADULT - SLP PERTINENT HISTORY OF CURRENT PROBLEM
Hx of COPD, HTN, HLD, T2DM, depression, TIAs. Presented with persistent and worsening throat pain and SOB in past 1-2 weeks. Admitted for COPD exacerbation. Laryngoscopic exam significant for candida infection and mild b/l kaylah edema.

## 2021-08-06 NOTE — DISCHARGE NOTE NURSING/CASE MANAGEMENT/SOCIAL WORK - NSDCPEEMAIL_GEN_ALL_CORE
[facility] for Tobacco Control email [email or web address]

## 2021-08-06 NOTE — SWALLOW FEES ASSESSMENT ADULT - DIAGNOSTIC IMPRESSIONS
Mild pharyngeal dysphagia with mildly reduced pharyngeal swallow efficiency and relatively preserved airway protection with the exception of laryngeal penetration x1. Although no aspiration was visualized during today's study, pt is at risk for aspiration over the course of a meal d/t fatigue as a result of respiratory insufficiency 2/2 COPD exacerbation with increased respiratory requirements. Pt benefited from frequent rest breaks throughout trials to optimize respiratory status.

## 2021-08-06 NOTE — SWALLOW FEES ASSESSMENT ADULT - RECOMMENDED FEEDING/EATING TECHNIQUES
frequent rest breaks throughout meals, smaller meals scattered throughout the day vs 3 large meals to reduce risk of fatigue/maintain upright posture during/after eating for 30 mins/position upright (90 degrees)/small sips/bites

## 2021-08-06 NOTE — PHYSICAL THERAPY INITIAL EVALUATION ADULT - GAIT PATTERN USED, PT EVAL
Fairly steady; no LOB noted but shuffling gait with turns. Pt tachypneic throughout and SOB, however talks throughout the duration of ambulation. Pt does not always use cane throughout strides but holds it for comfort. Pt requires 3 standing breaks ~20sec to catch her breath. VSS, 99% spO2. Reported 6/10 modified RPE with ambulation; confirms its her baseline./swing-through gait

## 2021-08-06 NOTE — SWALLOW FEES ASSESSMENT ADULT - PHARYNGEAL PHASE COMMENTS
Liquids spill to the vallecula prior to the swallow. Diffuse (mild) pooling of liquids. Liquids spill to the vallecula prior to the swallow. Diffuse (mild) pooling of liquids. Laryngeal penetration, above the level of the true vocal folds, as residue spilled over the L arytenoid/aryepiglottic fold. Of note, coughing noted throughout examination without the presence of aspiration. Thick copious yellow/greenish secretions expectorated from the trachea into the hypopharynx x1.

## 2021-08-06 NOTE — PROGRESS NOTE ADULT - TIME BILLING
Patient seen and examined with house-staff during bedside rounds.  Resident note read, including vitals, physical findings, laboratory data, and radiological reports.   Revisions included below.  Direct personal management at bed side and extensive interpretation of the data.  Plan was outlined and discussed in details with the housestaff.  Decision making of high complexity  Action taken for acute disease activity to reflect the level of care provided:  - medication reconciliation  - review laboratory data    The patient improved.  The patient had a fees.  I discussed the case in details with the son.  The picture is most likely consistent with URI especially with fungal infection.  There is no evidence of acute exacerbation of COPD.  The patient is to be discharged today

## 2021-08-06 NOTE — DISCHARGE NOTE NURSING/CASE MANAGEMENT/SOCIAL WORK - NSDCFUADDAPPT_GEN_ALL_CORE_FT
Please bring your Insurance card, Photo ID and Discharge paperwork to the following appointment:    (1) Please follow up with your Otolaryngology Provider, Dr. Nidhi Musa at [address] [place] [address], [place] [address] on 09/01/2021 at 2:45pm.    (2) The office of Dr. Trevizo should contact you within 1-2 weeks to schedule a follow-up appointment. If you do not hear from them please call the office at [phone].     Appointment was scheduled by Ms. JOSE ANGEL Osuna, Referral Coordinator.

## 2021-08-06 NOTE — PHYSICAL THERAPY INITIAL EVALUATION ADULT - PERTINENT HX OF CURRENT PROBLEM, REHAB EVAL
69 y/o woman former smoker with a PMHx of COPD (on 3L home O2) and CPAP at night, HTN, HLD, T2DM, depression, TIAs, who presents with persistent and worsening throat pain and worsening SOB in the past 1-2 weeks. Admitted for COPD exacerbation and throat pain evaluation

## 2021-08-06 NOTE — PHYSICAL THERAPY INITIAL EVALUATION ADULT - ADDITIONAL COMMENTS
Pt reports she lives in an apartment alone with ramp and elevator access. Pt has a rollator, cane, portable O2 and wears 3L at baseline; CPAP at night. Her son is her HHA Mon-Sun 8A-2:30P Pt reports she lives in an apartment alone with ramp and elevator access. Pt has a rollator, cane, portable O2 and wears 3L at baseline; CPAP at night. Her son is her HHA Mon-Sun 8A-2:30P. Pt reports she uses the cane in the house and rollator in the community. She reports she takes breaks with walking as she needs to, depending on how shes feeling that day. Pt reports she sleeps on the sofa. Unable to assess other DME, pt becomes frustrated with history taking 2/2 NPO as she waits for a procedure.

## 2021-08-06 NOTE — PHYSICAL THERAPY INITIAL EVALUATION ADULT - GENERAL OBSERVATIONS, REHAB EVAL
PT IE Complete. Pt received R S/L in bed, tachypneic, on 3L NC, +heplock, pt agree to PT, RN Phyllis notified.Pt appears to be at/close to baseline but can benefit from gait, endurance and balance training during this admission. Pt left as found, call bell, on RA, NAD.

## 2021-08-06 NOTE — PHYSICAL THERAPY INITIAL EVALUATION ADULT - MANUAL MUSCLE TESTING RESULTS, REHAB EVAL
UE/LE strength grossly assessed with functional mobility. Bilaterally pt strength is equal/greater than 3/5.

## 2021-08-06 NOTE — SWALLOW FEES ASSESSMENT ADULT - SPECIFY REASON(S)
FEES recommended based on clinical swallow evaluation, recent unintentional weight loss, subjective complaints of choking with PO

## 2021-08-06 NOTE — PHYSICAL THERAPY INITIAL EVALUATION ADULT - DISCHARGE DISPOSITION, PT EVAL
Pt can benefit from HPT, but declines services. Pt is safe to return home with prior level of supervision./home w/ assist

## 2021-08-06 NOTE — SWALLOW FEES ASSESSMENT ADULT - COMMENTS
Small white plaques/patches noted along the laryngeal surface of the epiglottis, AE folds (L>R), b/l arytenoids, and TVF (R>L). Risks, benefits, and alternatives to this study were reviewed with pt and son (via phone). Verbal consent provided. Flexible endoscope passed transnasally to further assess swallow anatomy and physiology. Pt tolerated the passing of the scope and its presence.

## 2021-08-06 NOTE — DISCHARGE NOTE NURSING/CASE MANAGEMENT/SOCIAL WORK - PATIENT PORTAL LINK FT
You can access the FollowMyHealth Patient Portal offered by [facility] by registering at the following website: [email or web address]. By joining [employer]’s FollowMyHealth portal, you will also be able to view your health information using other applications (apps) compatible with our system.

## 2021-08-13 DIAGNOSIS — Z99.81 DEPENDENCE ON SUPPLEMENTAL OXYGEN: ICD-10-CM

## 2021-08-13 DIAGNOSIS — Z79.82 LONG TERM (CURRENT) USE OF ASPIRIN: ICD-10-CM

## 2021-08-13 DIAGNOSIS — E78.5 HYPERLIPIDEMIA, UNSPECIFIED: ICD-10-CM

## 2021-08-13 DIAGNOSIS — J43.9 EMPHYSEMA, UNSPECIFIED: ICD-10-CM

## 2021-08-13 DIAGNOSIS — Z86.19 PERSONAL HISTORY OF OTHER INFECTIOUS AND PARASITIC DISEASES: ICD-10-CM

## 2021-08-13 DIAGNOSIS — I10 ESSENTIAL (PRIMARY) HYPERTENSION: ICD-10-CM

## 2021-08-13 DIAGNOSIS — Z79.52 LONG TERM (CURRENT) USE OF SYSTEMIC STEROIDS: ICD-10-CM

## 2021-08-13 DIAGNOSIS — F32.9 MAJOR DEPRESSIVE DISORDER, SINGLE EPISODE, UNSPECIFIED: ICD-10-CM

## 2021-08-13 DIAGNOSIS — B37.0 CANDIDAL STOMATITIS: ICD-10-CM

## 2021-08-13 DIAGNOSIS — J39.8 OTHER SPECIFIED DISEASES OF UPPER RESPIRATORY TRACT: ICD-10-CM

## 2021-08-13 DIAGNOSIS — Z79.51 LONG TERM (CURRENT) USE OF INHALED STEROIDS: ICD-10-CM

## 2021-08-13 DIAGNOSIS — R13.19 OTHER DYSPHAGIA: ICD-10-CM

## 2021-08-13 DIAGNOSIS — G40.909 EPILEPSY, UNSPECIFIED, NOT INTRACTABLE, WITHOUT STATUS EPILEPTICUS: ICD-10-CM

## 2021-08-13 DIAGNOSIS — I73.89 OTHER SPECIFIED PERIPHERAL VASCULAR DISEASES: ICD-10-CM

## 2021-08-13 DIAGNOSIS — Z99.89 DEPENDENCE ON OTHER ENABLING MACHINES AND DEVICES: ICD-10-CM

## 2021-08-13 DIAGNOSIS — I65.21 OCCLUSION AND STENOSIS OF RIGHT CAROTID ARTERY: ICD-10-CM

## 2021-08-13 DIAGNOSIS — J44.1 CHRONIC OBSTRUCTIVE PULMONARY DISEASE WITH (ACUTE) EXACERBATION: ICD-10-CM

## 2021-08-13 DIAGNOSIS — G47.33 OBSTRUCTIVE SLEEP APNEA (ADULT) (PEDIATRIC): ICD-10-CM

## 2021-08-13 DIAGNOSIS — J96.11 CHRONIC RESPIRATORY FAILURE WITH HYPOXIA: ICD-10-CM

## 2021-08-13 DIAGNOSIS — E11.9 TYPE 2 DIABETES MELLITUS WITHOUT COMPLICATIONS: ICD-10-CM

## 2021-08-13 DIAGNOSIS — Z87.19 PERSONAL HISTORY OF OTHER DISEASES OF THE DIGESTIVE SYSTEM: ICD-10-CM

## 2021-08-13 DIAGNOSIS — D64.9 ANEMIA, UNSPECIFIED: ICD-10-CM

## 2021-08-13 DIAGNOSIS — Z86.73 PERSONAL HISTORY OF TRANSIENT ISCHEMIC ATTACK (TIA), AND CEREBRAL INFARCTION WITHOUT RESIDUAL DEFICITS: ICD-10-CM

## 2021-08-13 DIAGNOSIS — F17.210 NICOTINE DEPENDENCE, CIGARETTES, UNCOMPLICATED: ICD-10-CM

## 2021-08-13 DIAGNOSIS — Z79.84 LONG TERM (CURRENT) USE OF ORAL HYPOGLYCEMIC DRUGS: ICD-10-CM

## 2021-08-13 NOTE — CHART NOTE - NSCHARTNOTEFT_GEN_A_CORE
Addendum  The patient current diagnosis is chronic hypoxic respiratory failure secondary to copd and no evidence of AECOPD or acute deterioration upon admission

## 2021-09-01 ENCOUNTER — APPOINTMENT (OUTPATIENT)
Dept: OTOLARYNGOLOGY | Facility: CLINIC | Age: 71
End: 2021-09-01

## 2021-09-01 PROCEDURE — G9005: CPT

## 2021-09-07 ENCOUNTER — APPOINTMENT (OUTPATIENT)
Dept: INTERNAL MEDICINE | Facility: CLINIC | Age: 71
End: 2021-09-07

## 2021-09-14 VITALS
SYSTOLIC BLOOD PRESSURE: 100 MMHG | HEIGHT: 61 IN | DIASTOLIC BLOOD PRESSURE: 52 MMHG | HEART RATE: 90 BPM | OXYGEN SATURATION: 97 % | RESPIRATION RATE: 18 BRPM | TEMPERATURE: 99 F

## 2021-09-14 LAB
ALBUMIN SERPL ELPH-MCNC: 3.9 G/DL — SIGNIFICANT CHANGE UP (ref 3.3–5)
ALP SERPL-CCNC: 97 U/L — SIGNIFICANT CHANGE UP (ref 40–120)
ALT FLD-CCNC: 15 U/L — SIGNIFICANT CHANGE UP (ref 10–45)
ANION GAP SERPL CALC-SCNC: 8 MMOL/L — SIGNIFICANT CHANGE UP (ref 5–17)
APTT BLD: 28.9 SEC — SIGNIFICANT CHANGE UP (ref 27.5–35.5)
AST SERPL-CCNC: 20 U/L — SIGNIFICANT CHANGE UP (ref 10–40)
BASE EXCESS BLDV CALC-SCNC: 4.5 MMOL/L — HIGH (ref -2–3)
BASOPHILS # BLD AUTO: 0.09 K/UL — SIGNIFICANT CHANGE UP (ref 0–0.2)
BASOPHILS NFR BLD AUTO: 0.7 % — SIGNIFICANT CHANGE UP (ref 0–2)
BILIRUB SERPL-MCNC: <0.2 MG/DL — SIGNIFICANT CHANGE UP (ref 0.2–1.2)
BUN SERPL-MCNC: 20 MG/DL — SIGNIFICANT CHANGE UP (ref 7–23)
CA-I SERPL-SCNC: 1.3 MMOL/L — SIGNIFICANT CHANGE UP (ref 1.15–1.33)
CALCIUM SERPL-MCNC: 9.8 MG/DL — SIGNIFICANT CHANGE UP (ref 8.4–10.5)
CHLORIDE SERPL-SCNC: 100 MMOL/L — SIGNIFICANT CHANGE UP (ref 96–108)
CO2 BLDV-SCNC: 34.4 MMOL/L — HIGH (ref 22–26)
CO2 SERPL-SCNC: 30 MMOL/L — SIGNIFICANT CHANGE UP (ref 22–31)
CREAT SERPL-MCNC: 0.81 MG/DL — SIGNIFICANT CHANGE UP (ref 0.5–1.3)
EOSINOPHIL # BLD AUTO: 0.21 K/UL — SIGNIFICANT CHANGE UP (ref 0–0.5)
EOSINOPHIL NFR BLD AUTO: 1.6 % — SIGNIFICANT CHANGE UP (ref 0–6)
GAS PNL BLDV: 137 MMOL/L — SIGNIFICANT CHANGE UP (ref 136–145)
GAS PNL BLDV: SIGNIFICANT CHANGE UP
GAS PNL BLDV: SIGNIFICANT CHANGE UP
GLUCOSE SERPL-MCNC: 154 MG/DL — HIGH (ref 70–99)
HCO3 BLDV-SCNC: 32 MMOL/L — HIGH (ref 22–29)
HCT VFR BLD CALC: 29.7 % — LOW (ref 34.5–45)
HGB BLD-MCNC: 8.8 G/DL — LOW (ref 11.5–15.5)
IMM GRANULOCYTES NFR BLD AUTO: 0.5 % — SIGNIFICANT CHANGE UP (ref 0–1.5)
INR BLD: 0.96 — SIGNIFICANT CHANGE UP (ref 0.88–1.16)
LYMPHOCYTES # BLD AUTO: 19.5 % — SIGNIFICANT CHANGE UP (ref 13–44)
LYMPHOCYTES # BLD AUTO: 2.61 K/UL — SIGNIFICANT CHANGE UP (ref 1–3.3)
MCHC RBC-ENTMCNC: 26.6 PG — LOW (ref 27–34)
MCHC RBC-ENTMCNC: 29.6 GM/DL — LOW (ref 32–36)
MCV RBC AUTO: 89.7 FL — SIGNIFICANT CHANGE UP (ref 80–100)
MONOCYTES # BLD AUTO: 1.2 K/UL — HIGH (ref 0–0.9)
MONOCYTES NFR BLD AUTO: 8.9 % — SIGNIFICANT CHANGE UP (ref 2–14)
NEUTROPHILS # BLD AUTO: 9.23 K/UL — HIGH (ref 1.8–7.4)
NEUTROPHILS NFR BLD AUTO: 68.8 % — SIGNIFICANT CHANGE UP (ref 43–77)
NRBC # BLD: 0 /100 WBCS — SIGNIFICANT CHANGE UP (ref 0–0)
PCO2 BLDV: 63 MMHG — HIGH (ref 39–42)
PH BLDV: 7.32 — SIGNIFICANT CHANGE UP (ref 7.32–7.43)
PLATELET # BLD AUTO: 537 K/UL — HIGH (ref 150–400)
PO2 BLDV: <35 MMHG — SIGNIFICANT CHANGE UP (ref 25–45)
POTASSIUM BLDV-SCNC: 5 MMOL/L — SIGNIFICANT CHANGE UP (ref 3.5–5.1)
POTASSIUM SERPL-MCNC: 5 MMOL/L — SIGNIFICANT CHANGE UP (ref 3.5–5.3)
POTASSIUM SERPL-SCNC: 5 MMOL/L — SIGNIFICANT CHANGE UP (ref 3.5–5.3)
PROT SERPL-MCNC: 6.8 G/DL — SIGNIFICANT CHANGE UP (ref 6–8.3)
PROTHROM AB SERPL-ACNC: 11.5 SEC — SIGNIFICANT CHANGE UP (ref 10.6–13.6)
RBC # BLD: 3.31 M/UL — LOW (ref 3.8–5.2)
RBC # FLD: 15.9 % — HIGH (ref 10.3–14.5)
SAO2 % BLDV: 59.1 % — LOW (ref 67–88)
SARS-COV-2 RNA SPEC QL NAA+PROBE: NEGATIVE — SIGNIFICANT CHANGE UP
SODIUM SERPL-SCNC: 138 MMOL/L — SIGNIFICANT CHANGE UP (ref 135–145)
TROPONIN T SERPL-MCNC: 0.01 NG/ML — SIGNIFICANT CHANGE UP (ref 0–0.01)
WBC # BLD: 13.41 K/UL — HIGH (ref 3.8–10.5)
WBC # FLD AUTO: 13.41 K/UL — HIGH (ref 3.8–10.5)

## 2021-09-14 PROCEDURE — 70450 CT HEAD/BRAIN W/O DYE: CPT | Mod: 26

## 2021-09-14 PROCEDURE — 72125 CT NECK SPINE W/O DYE: CPT | Mod: 26

## 2021-09-14 PROCEDURE — 99285 EMERGENCY DEPT VISIT HI MDM: CPT

## 2021-09-14 PROCEDURE — 93010 ELECTROCARDIOGRAM REPORT: CPT

## 2021-09-14 RX ORDER — IPRATROPIUM/ALBUTEROL SULFATE 18-103MCG
3 AEROSOL WITH ADAPTER (GRAM) INHALATION
Refills: 0 | Status: COMPLETED | OUTPATIENT
Start: 2021-09-14 | End: 2021-09-14

## 2021-09-14 RX ADMIN — Medication 3 MILLILITER(S): at 22:45

## 2021-09-14 RX ADMIN — Medication 3 MILLILITER(S): at 22:57

## 2021-09-14 RX ADMIN — Medication 125 MILLIGRAM(S): at 22:54

## 2021-09-14 RX ADMIN — Medication 3 MILLILITER(S): at 22:54

## 2021-09-14 NOTE — ED PROVIDER NOTE - PHYSICAL EXAMINATION
CONST: overweight speaking in short sentences  HEAD: atraumatic  EYES: conjunctivae clear, PERRL, EOMI  ENT: mmm  NECK: supple/FROM, nttp, no jvd  CARD: rrr no murmurs  CHEST: ctab no r/r/w, no stridor/retractions/tripoding  ABD: soft, nd, nttp, no rebound/guarding  EXT: FROM, symmetric distal pulses intact  SKIN: warm, dry, no rash, no pedal edema/ttp/rash, cap refill <2sec  NEURO: a+ox3, 5/5 strength x4, gross sensation intact x4, baseline gait CONST: overweight tired appearing speaking in short sentences  HEAD: atraumatic  EYES: conjunctivae clear, PERRL, EOMI  ENT: mmm  NECK: supple/FROM, nttp, no jvd  CARD: rrr no murmurs  CHEST: +tachypnea, +bl end exp wheezing, no rales/stridor/tripoding  ABD: soft, nd, nttp, no rebound/guarding  EXT: FROM, symmetric distal pulses intact  SKIN: warm, dry, no rash, +bl ankle swelling, cap refill <2sec  NEURO: a+ox2 (self, location), 5/5 strength x4, gross sensation intact x4

## 2021-09-14 NOTE — ED PROVIDER NOTE - OBJECTIVE STATEMENT
70F former smoker, tracheomalacia, copd on 3L home o2/cpap qhs, htn, hld, dm, tia, recently admitted for oropharyngeal candidiasis and chronic hypoxic respiratory failure 2/2 copd w/o AECOPD (8/5/21-8/6/21), biba from home w/ daughter-in-law c/o 2w daily ha/"head fog" and recurrent falls including today. per pt, she has had sob req an increase in home o2 req from 3L to 5L and never told family. pt also cannot recall how/why she fell today but admits to falling at least 2 times today and eventually crawling to phone to call family. ?duration on ground. per daughter-in-law, pt has been noncompliant to cpap and has been canceling her outpatient fu appts. +chronic cough w/o change in sputum. +chronic intermittent bl ankle swelling. no fever/chills, no ha, no cp, no abd pain/n/v, no diarrhea, no hematochezia/melena, no dysuria, no pedal pain/rash, no recent travel, no sick contacts, no etoh-dpt/ivdu.     at baseline, a+ox3, +ADLs, ambulates unassisted, lives at home alone.    pulm: cliff  daughter-in-law: michelle [phone]

## 2021-09-14 NOTE — ED PROVIDER NOTE - PROGRESS NOTE DETAILS
bipap started.     dr coronado contacted. updated and agrees w/ plan. requesting call back for final dispo. ddimer stable 300s (prior 2/2021). pt initially consenting to bipap but now requesting a break.

## 2021-09-14 NOTE — ED ADULT NURSE NOTE - NSIMPLEMENTINTERV_GEN_ALL_ED
142 Implemented All Universal Safety Interventions:  [place] to call system. Call bell, personal items and telephone within reach. Instruct patient to call for assistance. Room bathroom lighting operational. Non-slip footwear when patient is off stretcher. Physically safe environment: no spills, clutter or unnecessary equipment. Stretcher in lowest position, wheels locked, appropriate side rails in place.

## 2021-09-14 NOTE — ED PROVIDER NOTE - CLINICAL SUMMARY MEDICAL DECISION MAKING FREE TEXT BOX
avss. nontoxic. NAD. no systemic sx. found to have hypercapnic hypoxic acute resp failure in setting of cpap noncompliance. no leukocytosis vs significant anemia vs electrolyte abnl. trop wnl. ekg w/o significant st/t changes. cxr w/o acute focal consol vs ptx vs pulm edema vs widened mediastinum. ct head/c spine w/o acute abnl. sx improved s/p duoneb/solumedrol/bipap. no indication for abx at this time. dr coronado contacted. will admit per reccs. avss. nontoxic. NAD. no systemic sx. found to have hypercapnic hypoxic acute resp failure in setting of cpap noncompliance. no leukocytosis vs significant anemia vs electrolyte abnl. ddimer 300s, unchanged from prior. trop wnl. ekg w/o significant st/t changes. cxr w/o acute focal consol vs ptx vs pulm edema vs widened mediastinum. ct head/c spine w/o acute abnl. sx improved s/p duoneb/solumedrol/bipap. no indication for abx at this time. dr coronado contacted. will admit per reccs.

## 2021-09-14 NOTE — ED ADULT NURSE NOTE - OBJECTIVE STATEMENT
As per daughter in law michelle  at bedside, patient fell around 1530 hitting her head, positive LOC for approx 3 to 5 minutes and has been complaining of headache behind R ear, no nausea vomiting dizziness reported.  No signs of injury

## 2021-09-15 ENCOUNTER — INPATIENT (INPATIENT)
Facility: HOSPITAL | Age: 71
LOS: 1 days | Discharge: HOME HEALTH | DRG: 369 | End: 2021-09-17
Admitting: INTERNAL MEDICINE
Payer: MEDICARE

## 2021-09-15 DIAGNOSIS — I10 ESSENTIAL (PRIMARY) HYPERTENSION: ICD-10-CM

## 2021-09-15 DIAGNOSIS — E11.9 TYPE 2 DIABETES MELLITUS WITHOUT COMPLICATIONS: ICD-10-CM

## 2021-09-15 DIAGNOSIS — F17.200 NICOTINE DEPENDENCE, UNSPECIFIED, UNCOMPLICATED: ICD-10-CM
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
## 2021-09-15 DIAGNOSIS — I65.29 OCCLUSION AND STENOSIS OF UNSPECIFIED CAROTID ARTERY: ICD-10-CM

## 2021-09-15 DIAGNOSIS — F32.9 MAJOR DEPRESSIVE DISORDER, SINGLE EPISODE, UNSPECIFIED: ICD-10-CM

## 2021-09-15 DIAGNOSIS — R63.8 OTHER SYMPTOMS AND SIGNS CONCERNING FOOD AND FLUID INTAKE: ICD-10-CM

## 2021-09-15 DIAGNOSIS — J44.1 CHRONIC OBSTRUCTIVE PULMONARY DISEASE WITH (ACUTE) EXACERBATION: ICD-10-CM

## 2021-09-15 LAB
ALBUMIN SERPL ELPH-MCNC: 4.2 G/DL — SIGNIFICANT CHANGE UP (ref 3.3–5)
ALP SERPL-CCNC: 90 U/L — SIGNIFICANT CHANGE UP (ref 40–120)
ALT FLD-CCNC: 13 U/L — SIGNIFICANT CHANGE UP (ref 10–45)
ANION GAP SERPL CALC-SCNC: 10 MMOL/L — SIGNIFICANT CHANGE UP (ref 5–17)
AST SERPL-CCNC: 18 U/L — SIGNIFICANT CHANGE UP (ref 10–40)
BASE EXCESS BLDV CALC-SCNC: 5.2 MMOL/L — HIGH (ref -2–3)
BASOPHILS # BLD AUTO: 0.03 K/UL — SIGNIFICANT CHANGE UP (ref 0–0.2)
BASOPHILS NFR BLD AUTO: 0.2 % — SIGNIFICANT CHANGE UP (ref 0–2)
BILIRUB SERPL-MCNC: <0.2 MG/DL — SIGNIFICANT CHANGE UP (ref 0.2–1.2)
BUN SERPL-MCNC: 19 MG/DL — SIGNIFICANT CHANGE UP (ref 7–23)
CA-I SERPL-SCNC: 1.28 MMOL/L — SIGNIFICANT CHANGE UP (ref 1.15–1.33)
CALCIUM SERPL-MCNC: 9.7 MG/DL — SIGNIFICANT CHANGE UP (ref 8.4–10.5)
CHLORIDE SERPL-SCNC: 98 MMOL/L — SIGNIFICANT CHANGE UP (ref 96–108)
CO2 BLDV-SCNC: 34.4 MMOL/L — HIGH (ref 22–26)
CO2 SERPL-SCNC: 28 MMOL/L — SIGNIFICANT CHANGE UP (ref 22–31)
CREAT SERPL-MCNC: 0.64 MG/DL — SIGNIFICANT CHANGE UP (ref 0.5–1.3)
EOSINOPHIL # BLD AUTO: 0 K/UL — SIGNIFICANT CHANGE UP (ref 0–0.5)
EOSINOPHIL NFR BLD AUTO: 0 % — SIGNIFICANT CHANGE UP (ref 0–6)
GAS PNL BLDV: 136 MMOL/L — SIGNIFICANT CHANGE UP (ref 136–145)
GAS PNL BLDV: SIGNIFICANT CHANGE UP
GLUCOSE BLDC GLUCOMTR-MCNC: 149 MG/DL — HIGH (ref 70–99)
GLUCOSE BLDC GLUCOMTR-MCNC: 210 MG/DL — HIGH (ref 70–99)
GLUCOSE BLDC GLUCOMTR-MCNC: 216 MG/DL — HIGH (ref 70–99)
GLUCOSE BLDC GLUCOMTR-MCNC: 226 MG/DL — HIGH (ref 70–99)
GLUCOSE SERPL-MCNC: 216 MG/DL — HIGH (ref 70–99)
HCO3 BLDV-SCNC: 33 MMOL/L — HIGH (ref 22–29)
HCT VFR BLD CALC: 31.9 % — LOW (ref 34.5–45)
HGB BLD-MCNC: 9.4 G/DL — LOW (ref 11.5–15.5)
IMM GRANULOCYTES NFR BLD AUTO: 0.5 % — SIGNIFICANT CHANGE UP (ref 0–1.5)
LYMPHOCYTES # BLD AUTO: 0.82 K/UL — LOW (ref 1–3.3)
LYMPHOCYTES # BLD AUTO: 6.2 % — LOW (ref 13–44)
MAGNESIUM SERPL-MCNC: 2.2 MG/DL — SIGNIFICANT CHANGE UP (ref 1.6–2.6)
MCHC RBC-ENTMCNC: 26.6 PG — LOW (ref 27–34)
MCHC RBC-ENTMCNC: 29.5 GM/DL — LOW (ref 32–36)
MCV RBC AUTO: 90.4 FL — SIGNIFICANT CHANGE UP (ref 80–100)
MONOCYTES # BLD AUTO: 0.3 K/UL — SIGNIFICANT CHANGE UP (ref 0–0.9)
MONOCYTES NFR BLD AUTO: 2.3 % — SIGNIFICANT CHANGE UP (ref 2–14)
NEUTROPHILS # BLD AUTO: 11.99 K/UL — HIGH (ref 1.8–7.4)
NEUTROPHILS NFR BLD AUTO: 90.8 % — HIGH (ref 43–77)
NRBC # BLD: 0 /100 WBCS — SIGNIFICANT CHANGE UP (ref 0–0)
PCO2 BLDV: 59 MMHG — HIGH (ref 39–42)
PH BLDV: 7.35 — SIGNIFICANT CHANGE UP (ref 7.32–7.43)
PHOSPHATE SERPL-MCNC: 3 MG/DL — SIGNIFICANT CHANGE UP (ref 2.5–4.5)
PLATELET # BLD AUTO: 602 K/UL — HIGH (ref 150–400)
PO2 BLDV: 37 MMHG — SIGNIFICANT CHANGE UP (ref 25–45)
POTASSIUM BLDV-SCNC: 5 MMOL/L — SIGNIFICANT CHANGE UP (ref 3.5–5.1)
POTASSIUM SERPL-MCNC: 4.6 MMOL/L — SIGNIFICANT CHANGE UP (ref 3.5–5.3)
POTASSIUM SERPL-SCNC: 4.6 MMOL/L — SIGNIFICANT CHANGE UP (ref 3.5–5.3)
PROT SERPL-MCNC: 7.1 G/DL — SIGNIFICANT CHANGE UP (ref 6–8.3)
RBC # BLD: 3.53 M/UL — LOW (ref 3.8–5.2)
RBC # FLD: 15.8 % — HIGH (ref 10.3–14.5)
SAO2 % BLDV: 62.9 % — LOW (ref 67–88)
SODIUM SERPL-SCNC: 136 MMOL/L — SIGNIFICANT CHANGE UP (ref 135–145)
WBC # BLD: 13.21 K/UL — HIGH (ref 3.8–10.5)
WBC # FLD AUTO: 13.21 K/UL — HIGH (ref 3.8–10.5)

## 2021-09-15 PROCEDURE — 71045 X-RAY EXAM CHEST 1 VIEW: CPT | Mod: 26

## 2021-09-15 PROCEDURE — 99223 1ST HOSP IP/OBS HIGH 75: CPT | Mod: GC

## 2021-09-15 PROCEDURE — 99222 1ST HOSP IP/OBS MODERATE 55: CPT

## 2021-09-15 PROCEDURE — 72141 MRI NECK SPINE W/O DYE: CPT | Mod: 26

## 2021-09-15 PROCEDURE — 73523 X-RAY EXAM HIPS BI 5/> VIEWS: CPT | Mod: 26

## 2021-09-15 RX ORDER — SERTRALINE 25 MG/1
50 TABLET, FILM COATED ORAL DAILY
Refills: 0 | Status: DISCONTINUED | OUTPATIENT
Start: 2021-09-15 | End: 2021-09-17

## 2021-09-15 RX ORDER — DEXTROSE 50 % IN WATER 50 %
12.5 SYRINGE (ML) INTRAVENOUS ONCE
Refills: 0 | Status: DISCONTINUED | OUTPATIENT
Start: 2021-09-15 | End: 2021-09-17

## 2021-09-15 RX ORDER — LISINOPRIL 2.5 MG/1
20 TABLET ORAL DAILY
Refills: 0 | Status: DISCONTINUED | OUTPATIENT
Start: 2021-09-15 | End: 2021-09-17

## 2021-09-15 RX ORDER — SODIUM CHLORIDE 9 MG/ML
1000 INJECTION, SOLUTION INTRAVENOUS
Refills: 0 | Status: DISCONTINUED | OUTPATIENT
Start: 2021-09-15 | End: 2021-09-17

## 2021-09-15 RX ORDER — AZITHROMYCIN 500 MG/1
500 TABLET, FILM COATED ORAL EVERY 24 HOURS
Refills: 0 | Status: COMPLETED | OUTPATIENT
Start: 2021-09-15 | End: 2021-09-17

## 2021-09-15 RX ORDER — ASPIRIN/CALCIUM CARB/MAGNESIUM 324 MG
81 TABLET ORAL DAILY
Refills: 0 | Status: DISCONTINUED | OUTPATIENT
Start: 2021-09-15 | End: 2021-09-17

## 2021-09-15 RX ORDER — ENOXAPARIN SODIUM 100 MG/ML
40 INJECTION SUBCUTANEOUS EVERY 24 HOURS
Refills: 0 | Status: DISCONTINUED | OUTPATIENT
Start: 2021-09-15 | End: 2021-09-17

## 2021-09-15 RX ORDER — MONTELUKAST 4 MG/1
10 TABLET, CHEWABLE ORAL DAILY
Refills: 0 | Status: DISCONTINUED | OUTPATIENT
Start: 2021-09-15 | End: 2021-09-17

## 2021-09-15 RX ORDER — INSULIN LISPRO 100/ML
VIAL (ML) SUBCUTANEOUS EVERY 6 HOURS
Refills: 0 | Status: DISCONTINUED | OUTPATIENT
Start: 2021-09-15 | End: 2021-09-17

## 2021-09-15 RX ORDER — NYSTATIN 500MM UNIT
500000 POWDER (EA) MISCELLANEOUS
Refills: 0 | Status: DISCONTINUED | OUTPATIENT
Start: 2021-09-15 | End: 2021-09-17

## 2021-09-15 RX ORDER — IPRATROPIUM/ALBUTEROL SULFATE 18-103MCG
3 AEROSOL WITH ADAPTER (GRAM) INHALATION EVERY 4 HOURS
Refills: 0 | Status: DISCONTINUED | OUTPATIENT
Start: 2021-09-15 | End: 2021-09-17

## 2021-09-15 RX ORDER — PANTOPRAZOLE SODIUM 20 MG/1
40 TABLET, DELAYED RELEASE ORAL DAILY
Refills: 0 | Status: DISCONTINUED | OUTPATIENT
Start: 2021-09-15 | End: 2021-09-17

## 2021-09-15 RX ORDER — MOMETASONE FUROATE 220 UG/1
2 INHALANT RESPIRATORY (INHALATION)
Refills: 0 | Status: DISCONTINUED | OUTPATIENT
Start: 2021-09-15 | End: 2021-09-17

## 2021-09-15 RX ORDER — DEXTROSE 50 % IN WATER 50 %
25 SYRINGE (ML) INTRAVENOUS ONCE
Refills: 0 | Status: DISCONTINUED | OUTPATIENT
Start: 2021-09-15 | End: 2021-09-17

## 2021-09-15 RX ORDER — TIOTROPIUM BROMIDE AND OLODATEROL 3.124; 2.736 UG/1; UG/1
2 SPRAY, METERED RESPIRATORY (INHALATION) DAILY
Refills: 0 | Status: DISCONTINUED | OUTPATIENT
Start: 2021-09-15 | End: 2021-09-17

## 2021-09-15 RX ORDER — DEXTROSE 50 % IN WATER 50 %
15 SYRINGE (ML) INTRAVENOUS ONCE
Refills: 0 | Status: DISCONTINUED | OUTPATIENT
Start: 2021-09-15 | End: 2021-09-17

## 2021-09-15 RX ORDER — FLUCONAZOLE 150 MG/1
100 TABLET ORAL DAILY
Refills: 0 | Status: DISCONTINUED | OUTPATIENT
Start: 2021-09-15 | End: 2021-09-17

## 2021-09-15 RX ORDER — CEFTRIAXONE 500 MG/1
1000 INJECTION, POWDER, FOR SOLUTION INTRAMUSCULAR; INTRAVENOUS ONCE
Refills: 0 | Status: DISCONTINUED | OUTPATIENT
Start: 2021-09-15 | End: 2021-09-15

## 2021-09-15 RX ORDER — HYDROCHLOROTHIAZIDE 25 MG
25 TABLET ORAL DAILY
Refills: 0 | Status: DISCONTINUED | OUTPATIENT
Start: 2021-09-15 | End: 2021-09-17

## 2021-09-15 RX ORDER — ATORVASTATIN CALCIUM 80 MG/1
40 TABLET, FILM COATED ORAL AT BEDTIME
Refills: 0 | Status: DISCONTINUED | OUTPATIENT
Start: 2021-09-15 | End: 2021-09-17

## 2021-09-15 RX ORDER — GLUCAGON INJECTION, SOLUTION 0.5 MG/.1ML
1 INJECTION, SOLUTION SUBCUTANEOUS ONCE
Refills: 0 | Status: DISCONTINUED | OUTPATIENT
Start: 2021-09-15 | End: 2021-09-17

## 2021-09-15 RX ADMIN — MONTELUKAST 10 MILLIGRAM(S): 4 TABLET, CHEWABLE ORAL at 13:03

## 2021-09-15 RX ADMIN — TIOTROPIUM BROMIDE AND OLODATEROL 2 PUFF(S): 3.124; 2.736 SPRAY, METERED RESPIRATORY (INHALATION) at 12:15

## 2021-09-15 RX ADMIN — SERTRALINE 50 MILLIGRAM(S): 25 TABLET, FILM COATED ORAL at 12:16

## 2021-09-15 RX ADMIN — Medication 3 MILLILITER(S): at 06:47

## 2021-09-15 RX ADMIN — Medication 3 MILLILITER(S): at 18:15

## 2021-09-15 RX ADMIN — ENOXAPARIN SODIUM 40 MILLIGRAM(S): 100 INJECTION SUBCUTANEOUS at 19:08

## 2021-09-15 RX ADMIN — MOMETASONE FUROATE 2 PUFF(S): 220 INHALANT RESPIRATORY (INHALATION) at 18:15

## 2021-09-15 RX ADMIN — LISINOPRIL 20 MILLIGRAM(S): 2.5 TABLET ORAL at 06:47

## 2021-09-15 RX ADMIN — AZITHROMYCIN 255 MILLIGRAM(S): 500 TABLET, FILM COATED ORAL at 06:47

## 2021-09-15 RX ADMIN — Medication 500000 UNIT(S): at 18:31

## 2021-09-15 RX ADMIN — MOMETASONE FUROATE 2 PUFF(S): 220 INHALANT RESPIRATORY (INHALATION) at 06:48

## 2021-09-15 RX ADMIN — Medication 25 MILLIGRAM(S): at 06:47

## 2021-09-15 RX ADMIN — Medication 2: at 06:45

## 2021-09-15 RX ADMIN — PANTOPRAZOLE SODIUM 40 MILLIGRAM(S): 20 TABLET, DELAYED RELEASE ORAL at 13:04

## 2021-09-15 RX ADMIN — Medication 81 MILLIGRAM(S): at 12:16

## 2021-09-15 RX ADMIN — Medication 2: at 18:15

## 2021-09-15 RX ADMIN — ATORVASTATIN CALCIUM 40 MILLIGRAM(S): 80 TABLET, FILM COATED ORAL at 21:59

## 2021-09-15 RX ADMIN — Medication 40 MILLIGRAM(S): at 12:16

## 2021-09-15 RX ADMIN — Medication 3 MILLILITER(S): at 21:59

## 2021-09-15 RX ADMIN — Medication 3 MILLILITER(S): at 14:16

## 2021-09-15 RX ADMIN — Medication 3 MILLILITER(S): at 10:22

## 2021-09-15 NOTE — CONSULT NOTE ADULT - ASSESSMENT
70F w/ PMH COPD admitted for COPD exacerbation (on inhaled steroids), also c/o odynophagia and hoarseness, PE and scope exam reveal persistent oral, supraglottic, and laryngeal candida infection.    Plan:  - C/w nystatin rinse and spit  - Recommend addition of clotrimazole kat  - Recommend starting fluconazole 100 mg   - Recommend teach patient to always swish and spit after symbicort usage  - Diet per primary team  - Please have patient follow up with Dr. Nidhi Musa - can call [phone] for appointment  - Remainder of care per primary team  - Please page ENT with any concerns/questions  - Discussed with attending, Dr. Musa

## 2021-09-15 NOTE — CONSULT NOTE ADULT - SUBJECTIVE AND OBJECTIVE BOX
ASHLEY BROWNIAM  70y  Female      Brief HPI: 70F current smoker, tracheomalacia, COPD on 3L home O2/CPAP qhs (noncompliant) HTN, HLD, DM2, TIA, recently admitted for oropharyngeal candidiasis and chronic hypoxic respiratory failure 2/2 COPD (8/5/21-8/6/21), BIBEMS from home with falls x2 on day of admission and progressive SOB. Per pt, she has had sob requiring an increase in home O2 from 3L to 5L and never told family. Has 40 pack year history and quit intermittently but has restarted smoking again this year. Smokes 3-4 cigarettes per day.  Says she has weight loss. Neuro consulted for evaluation of weakness and falls.     Patient states that her first fall was in February, and at that time was told that she had a TIA, though no documentation of this in her chart and no evidence on prior imaging as per chart review. Since then she has fallen multiple times at home but has been reluctant to see her doctor. She was brought in this time by her son because she fell twice this week at home, and when her son, who is also her HHA, checked her pulse Ox, noted her to be hypoxic. Her first fall this week occurred after she was unloading her washing machine, she notes that she stood up and immediately felt dizzy. She has no recollection of her second fall. She is denying headaches, nausea, vomiting, recent illness.         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    Disease of trachea and bronchus  Tracheomalacia    Epilepsy  No longer on medications.    Depressive disorder    Transient cerebral ischemia  TIA (transient ischemic attack)    Peptic ulcer  2/2 H. pylori (s/p amoxicillin, clarithromycin, omeprazole)    Tobacco use disorder  currently smoking    Emphysema  COPD (chronic obstructive pulmonary disease)    Hyperlipidemia    Essential hypertension    Type 2 diabetes mellitus    Other postprocedural status  S/P hernia repair        REVIEW OF SYSTEMS:  CONSTITUTIONAL: No fever, weight loss, or fatigue  EYES: No eye pain, visual disturbances, or discharge  ENMT:  No difficulty hearing, tinnitus, vertigo; No sinus or throat pain  NECK: No pain or stiffness  BREASTS: No pain, masses, or nipple discharge  RESPIRATORY: No cough, wheezing, chills or hemoptysis; No shortness of breath  CARDIOVASCULAR: No chest pain, palpitations, dizziness, or leg swelling  GASTROINTESTINAL: No abdominal or epigastric pain. No nausea, vomiting, or hematemesis; No diarrhea or constipation. No melena or hematochezia.  GENITOURINARY: No dysuria, frequency, hematuria, or incontinence  NEUROLOGICAL: No headaches, memory loss, loss of strength, numbness, or tremors  SKIN: No itching, burning, rashes, or lesions   LYMPH NODES: No enlarged glands  ENDOCRINE: No heat or cold intolerance; No hair loss  MUSCULOSKELETAL: No joint pain or swelling; No muscle, back, or extremity pain  PSYCHIATRIC: No depression, anxiety, mood swings, or difficulty sleeping  HEME/LYMPH: No easy bruising, or bleeding gums  ALLERY AND IMMUNOLOGIC: No hives or eczema    T(C): 36.5 (09-15-21 @ 09:09), Max: 37.3 (09-14-21 @ 20:28)  HR: 89 (09-15-21 @ 12:00) (80 - 100)  BP: 117/63 (09-15-21 @ 09:09) (100/52 - 144/68)  RR: 18 (09-15-21 @ 12:00) (16 - 20)  SpO2: 96% (09-15-21 @ 09:09) (91% - 99%)  Wt(kg): --Vital Signs Last 24 Hrs  T(C): 36.5 (15 Sep 2021 09:09), Max: 37.3 (14 Sep 2021 20:28)  T(F): 97.7 (15 Sep 2021 09:09), Max: 99.1 (14 Sep 2021 20:28)  HR: 89 (15 Sep 2021 12:00) (80 - 100)  BP: 117/63 (15 Sep 2021 09:09) (100/52 - 144/68)  BP(mean): --  RR: 18 (15 Sep 2021 12:00) (16 - 20)  SpO2: 96% (15 Sep 2021 09:09) (91% - 99%)    PHYSICAL EXAM:  GENERAL: NAD, well-groomed, well-developed  HEAD:  Atraumatic, Normocephalic  EYES: EOMI, PERRLA, conjunctiva and sclera clear  ENMT: No tonsillar erythema, exudates, or enlargement; Moist mucous membranes, Good dentition, No lesions  NECK: Supple, No JVD, Normal thyroid  NERVOUS SYSTEM:  Alert & Oriented X3, Good concentration; Motor Strength 5/5 B/L upper and lower extremities; DTRs 2+ intact and symmetric  CHEST/LUNG: Clear to percussion bilaterally; No rales, rhonchi, wheezing, or rubs  HEART: Regular rate and rhythm; No murmurs, rubs, or gallops  ABDOMEN: Soft, Nontender, Nondistended; Bowel sounds present  EXTREMITIES:  2+ Peripheral Pulses, No clubbing, cyanosis, or edema  LYMPH: No lymphadenopathy noted  SKIN: No rashes or lesions    Consultant(s) Notes Reviewed:  [x ] YES  [ ] NO  Care Discussed with Consultants/Other Providers [ x] YES  [ ] NO    LABS:  CBC   09-15-21 @ 08:54  Hematcorit 31.9  Hemoglobin 9.4  Mean Cell Hemoglobin 26.6  Platelet Count-Automated 602  RBC Count 3.53  Red Cell Distrib Width 15.8  Wbc Count 13.21  09-14-21 @ 21:59  Hematcorit 29.7  Hemoglobin 8.8  Mean Cell Hemoglobin 26.6  Platelet Count-Automated 537  RBC Count 3.31  Red Cell Distrib Width 15.9  Wbc Count 13.41      BMP  09-15-21 @ 08:54  Anion Gap. Serum 10  Blood Urea Nitrogen,Serm 19  Calcium, Total Serum 9.7  Carbon Dioxide, Serum 28  Chloride, Serum 98  Creatinine, Serum 0.64  eGFR in  104  eGFR in Non Afican American 90  Gloucose, serum 216  Potassium, Serum 4.6  Sodium, Serum 136              09-14-21 @ 21:59  Anion Gap. Serum 8  Blood Urea Nitrogen,Serm 20  Calcium, Total Serum 9.8  Carbon Dioxide, Serum 30  Chloride, Serum 100  Creatinine, Serum 0.81  eGFR in  85  eGFR in Non Afican American 73  Gloucose, serum 154  Potassium, Serum 5.0  Sodium, Serum 138                  CMP  09-15-21 @ 08:54  Prerna Aminotransferase(ALT/SGPT)13  Albumin, Serum 4.2  Alkaline Phosphatase, Serum 90  Anion Gap, Serum 10  Aspartate Aminotransferase (AST/SGOT)18  Bilirubin Total, Serum <0.2  Blood Urea Nitrogen, Serum 19  Calcium,Total Serum 9.7  Carbon Dioxide, Serum 28  Chloride, Serum 98  Creatinine, Serum 0.64  eGFR if  104  eGFR if Non African American 90  Glucose, Serum 216  Potassium, Serum 4.6  Protein Total, Serum 7.1  Sodium, Serum 136                      09-14-21 @ 21:59  Prerna Aminotransferase(ALT/SGPT)15  Albumin, Serum 3.9  Alkaline Phosphatase, Serum 97  Anion Gap, Serum 8  Aspartate Aminotransferase (AST/SGOT)20  Bilirubin Total, Serum <0.2  Blood Urea Nitrogen, Serum 20  Calcium,Total Serum 9.8  Carbon Dioxide, Serum 30  Chloride, Serum 100  Creatinine, Serum 0.81  eGFR if  85  eGFR if Non African American 73  Glucose, Serum 154  Potassium, Serum 5.0  Protein Total, Serum 6.8  Sodium, Serum 138                          PT/INR  PT/INR  09-14-21 @ 21:59  INR 0.96  Prothrombin Time Comment --  Prothrobin Time, [id number]      Amylase/Lipase            RADIOLOGY & ADDITIONAL TESTS:    Imaging Personally Reviewed:  [ ] YES  [ ] NO ASHLEY BROWNIAM  70y  Female      Brief HPI: 70F current smoker, tracheomalacia, COPD on 3L home O2/CPAP qhs (noncompliant) HTN, HLD, DM2, TIA, recently admitted for oropharyngeal candidiasis and chronic hypoxic respiratory failure 2/2 COPD (8/5/21-8/6/21), BIBEMS from home with falls x2 on day of admission and progressive SOB. Per pt, she has had sob requiring an increase in home O2 from 3L to 5L and never told family. Has 40 pack year history and quit intermittently but has restarted smoking again this year. Smokes 3-4 cigarettes per day.  Says she has weight loss. Neuro consulted for evaluation of weakness and falls.     Patient states that her first fall was in February, and at that time was told that she had a TIA, though no documentation of this in her chart and no evidence on prior imaging as per chart review. Since then she has fallen multiple times at home but has been reluctant to see her doctor. She was brought in this time by her son because she fell twice this week at home, and when her son, who is also her HHA, checked her pulse Ox, noted her to be hypoxic. Her first fall this week occurred after she was unloading her washing machine, she notes that she stood up and immediately felt dizzy. She has no recollection of her second fall. She is denying headaches, nausea, vomiting, recent illness.         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    Disease of trachea and bronchus  Tracheomalacia    Epilepsy  No longer on medications.    Depressive disorder    Transient cerebral ischemia  TIA (transient ischemic attack)    Peptic ulcer  2/2 H. pylori (s/p amoxicillin, clarithromycin, omeprazole)    Tobacco use disorder  currently smoking    Emphysema  COPD (chronic obstructive pulmonary disease)    Hyperlipidemia    Essential hypertension    Type 2 diabetes mellitus    Other postprocedural status  S/P hernia repair        REVIEW OF SYSTEMS:  CONSTITUTIONAL: No fever, weight loss, or fatigue  EYES: No eye pain, visual disturbances, or discharge  ENMT:  No difficulty hearing, tinnitus, vertigo; No sinus or throat pain  NECK: No pain or stiffness  BREASTS: No pain, masses, or nipple discharge  RESPIRATORY: No cough, wheezing, chills or hemoptysis; No shortness of breath  CARDIOVASCULAR: No chest pain, palpitations, dizziness, or leg swelling  GASTROINTESTINAL: No abdominal or epigastric pain. No nausea, vomiting, or hematemesis; No diarrhea or constipation. No melena or hematochezia.  GENITOURINARY: No dysuria, frequency, hematuria, or incontinence  NEUROLOGICAL: No headaches, memory loss, loss of strength, numbness, or tremors  SKIN: No itching, burning, rashes, or lesions   LYMPH NODES: No enlarged glands  ENDOCRINE: No heat or cold intolerance; No hair loss  MUSCULOSKELETAL: No joint pain or swelling; No muscle, back, or extremity pain  PSYCHIATRIC: No depression, anxiety, mood swings, or difficulty sleeping  HEME/LYMPH: No easy bruising, or bleeding gums  ALLERY AND IMMUNOLOGIC: No hives or eczema    T(C): 36.5 (09-15-21 @ 09:09), Max: 37.3 (09-14-21 @ 20:28)  HR: 89 (09-15-21 @ 12:00) (80 - 100)  BP: 117/63 (09-15-21 @ 09:09) (100/52 - 144/68)  RR: 18 (09-15-21 @ 12:00) (16 - 20)  SpO2: 96% (09-15-21 @ 09:09) (91% - 99%)  Wt(kg): --Vital Signs Last 24 Hrs  T(C): 36.5 (15 Sep 2021 09:09), Max: 37.3 (14 Sep 2021 20:28)  T(F): 97.7 (15 Sep 2021 09:09), Max: 99.1 (14 Sep 2021 20:28)  HR: 89 (15 Sep 2021 12:00) (80 - 100)  BP: 117/63 (15 Sep 2021 09:09) (100/52 - 144/68)  BP(mean): --  RR: 18 (15 Sep 2021 12:00) (16 - 20)  SpO2: 96% (15 Sep 2021 09:09) (91% - 99%)    PHYSICAL EXAM:  GENERAL: NAD, well-groomed, well-developed  HEAD:  Atraumatic, Normocephalic  EYES: EOMI, PERRLA, conjunctiva and sclera clear  NECK: Supple, No JVD, Normal thyroid  NERVOUS SYSTEM:  Alert & Oriented X3, Good concentration; Motor Strength 5/5 B/L upper and 4/5 BL lower extremities; DTRs 3+ symmetrically, both in UE and LE. Gait-narrow based and unstable. Sensation to pinprick impaired RLE.   ABDOMEN: Soft, Nontender, Nondistended; Bowel sounds present  EXTREMITIES:  2+ Peripheral Pulses, No clubbing, cyanosis, or edema  SKIN: No rashes or lesions    Consultant(s) Notes Reviewed:  [x ] YES  [ ] NO  Care Discussed with Consultants/Other Providers [ x] YES  [ ] NO    LABS:  CBC   09-15-21 @ 08:54  Hematcorit 31.9  Hemoglobin 9.4  Mean Cell Hemoglobin 26.6  Platelet Count-Automated 602  RBC Count 3.53  Red Cell Distrib Width 15.8  Wbc Count 13.21  09-14-21 @ 21:59  Hematcorit 29.7  Hemoglobin 8.8  Mean Cell Hemoglobin 26.6  Platelet Count-Automated 537  RBC Count 3.31  Red Cell Distrib Width 15.9  Wbc Count 13.41      BMP  09-15-21 @ 08:54  Anion Gap. Serum 10  Blood Urea Nitrogen,Serm 19  Calcium, Total Serum 9.7  Carbon Dioxide, Serum 28  Chloride, Serum 98  Creatinine, Serum 0.64  eGFR in  104  eGFR in Non Afican American 90  Gloucose, serum 216  Potassium, Serum 4.6  Sodium, Serum 136              09-14-21 @ 21:59  Anion Gap. Serum 8  Blood Urea Nitrogen,Serm 20  Calcium, Total Serum 9.8  Carbon Dioxide, Serum 30  Chloride, Serum 100  Creatinine, Serum 0.81  eGFR in  85  eGFR in Non Afican American 73  Gloucose, serum 154  Potassium, Serum 5.0  Sodium, Serum 138                  CMP  09-15-21 @ 08:54  Prerna Aminotransferase(ALT/SGPT)13  Albumin, Serum 4.2  Alkaline Phosphatase, Serum 90  Anion Gap, Serum 10  Aspartate Aminotransferase (AST/SGOT)18  Bilirubin Total, Serum <0.2  Blood Urea Nitrogen, Serum 19  Calcium,Total Serum 9.7  Carbon Dioxide, Serum 28  Chloride, Serum 98  Creatinine, Serum 0.64  eGFR if  104  eGFR if Non African American 90  Glucose, Serum 216  Potassium, Serum 4.6  Protein Total, Serum 7.1  Sodium, Serum 136                      09-14-21 @ 21:59  Prerna Aminotransferase(ALT/SGPT)15  Albumin, Serum 3.9  Alkaline Phosphatase, Serum 97  Anion Gap, Serum 8  Aspartate Aminotransferase (AST/SGOT)20  Bilirubin Total, Serum <0.2  Blood Urea Nitrogen, Serum 20  Calcium,Total Serum 9.8  Carbon Dioxide, Serum 30  Chloride, Serum 100  Creatinine, Serum 0.81  eGFR if  85  eGFR if Non African American 73  Glucose, Serum 154  Potassium, Serum 5.0  Protein Total, Serum 6.8  Sodium, Serum 138                          PT/INR  PT/INR  09-14-21 @ 21:59  INR 0.96  Prothrombin Time Comment --  Prothrobin Time, [id number]      Amylase/Lipase            RADIOLOGY & ADDITIONAL TESTS:    Imaging Personally Reviewed:  [ ] YES  [ ] NO ASHLEY BROWNIAM  70y  Female      Brief HPI: 70F current smoker, tracheomalacia, COPD on 3L home O2/CPAP qhs (noncompliant) HTN, HLD, DM2, TIA, recently admitted for oropharyngeal candidiasis and chronic hypoxic respiratory failure 2/2 COPD (8/5/21-8/6/21), BIBEMS from home with falls x2 on day of admission and progressive SOB. Per pt, she has had sob requiring an increase in home O2 from 3L to 5L and never told family. Has 40 pack year history and quit intermittently but has restarted smoking again this year. Smokes 3-4 cigarettes per day.  Says she has weight loss. Neuro consulted for evaluation of weakness and falls.     Patient states that her first fall was in February, and at that time was told that she had a TIA, though no documentation of this in her chart and no evidence on prior imaging as per chart review. Since then she has fallen multiple times at home but has been reluctant to see her doctor. She was brought in this time by her son because she fell twice this week at home, and when her son, who is also her HHA, checked her pulse Ox, noted her to be hypoxic. Her first fall this week occurred after she was unloading her washing machine, she notes that she stood up and immediately felt dizzy. She has no recollection of her second fall. She is denying headaches, nausea, vomiting, recent illness.         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    Disease of trachea and bronchus  Tracheomalacia    Epilepsy  No longer on medications.    Depressive disorder    Transient cerebral ischemia  TIA (transient ischemic attack)    Peptic ulcer  2/2 H. pylori (s/p amoxicillin, clarithromycin, omeprazole)    Tobacco use disorder  currently smoking    Emphysema  COPD (chronic obstructive pulmonary disease)    Hyperlipidemia    Essential hypertension    Type 2 diabetes mellitus    Other postprocedural status  S/P hernia repair        REVIEW OF SYSTEMS:  CONSTITUTIONAL: No fever, weight loss, or fatigue  EYES: No eye pain, visual disturbances, or discharge  ENMT:  No difficulty hearing, tinnitus, vertigo; No sinus or throat pain  NECK: No pain or stiffness  BREASTS: No pain, masses, or nipple discharge  RESPIRATORY: No cough, wheezing, chills or hemoptysis; No shortness of breath  CARDIOVASCULAR: No chest pain, palpitations, dizziness, or leg swelling  GASTROINTESTINAL: No abdominal or epigastric pain. No nausea, vomiting, or hematemesis; No diarrhea or constipation. No melena or hematochezia.  GENITOURINARY: No dysuria, frequency, hematuria, or incontinence  NEUROLOGICAL: No headaches, memory loss, loss of strength, numbness, or tremors  SKIN: No itching, burning, rashes, or lesions   LYMPH NODES: No enlarged glands  ENDOCRINE: No heat or cold intolerance; No hair loss  MUSCULOSKELETAL: No joint pain or swelling; No muscle, back, or extremity pain  PSYCHIATRIC: No depression, anxiety, mood swings, or difficulty sleeping  HEME/LYMPH: No easy bruising, or bleeding gums  ALLERY AND IMMUNOLOGIC: No hives or eczema    T(C): 36.5 (09-15-21 @ 09:09), Max: 37.3 (09-14-21 @ 20:28)  HR: 89 (09-15-21 @ 12:00) (80 - 100)  BP: 117/63 (09-15-21 @ 09:09) (100/52 - 144/68)  RR: 18 (09-15-21 @ 12:00) (16 - 20)  SpO2: 96% (09-15-21 @ 09:09) (91% - 99%)  Wt(kg): --Vital Signs Last 24 Hrs  T(C): 36.5 (15 Sep 2021 09:09), Max: 37.3 (14 Sep 2021 20:28)  T(F): 97.7 (15 Sep 2021 09:09), Max: 99.1 (14 Sep 2021 20:28)  HR: 89 (15 Sep 2021 12:00) (80 - 100)  BP: 117/63 (15 Sep 2021 09:09) (100/52 - 144/68)  BP(mean): --  RR: 18 (15 Sep 2021 12:00) (16 - 20)  SpO2: 96% (15 Sep 2021 09:09) (91% - 99%)    PHYSICAL EXAM:  GENERAL: NAD, well-groomed, well-developed  HEAD:  Atraumatic, Normocephalic  EYES: EOMI, PERRLA, conjunctiva and sclera clear  NECK: Supple, No JVD, Normal thyroid  NERVOUS SYSTEM:  Alert & Oriented X3, Good concentration; Motor Strength 4+ RUE and RLE, 5/5 LUE and LLE; DTRs 3+ symmetrically, both in UE and LE. Gait-narrow based and unstable. Sensation to pinprick impaired RLE. Vibration reduced at toes and ankles worse on right than left   ABDOMEN: Soft, Nontender, Nondistended; Bowel sounds present  EXTREMITIES:  2+ Peripheral Pulses, No clubbing, cyanosis, or edema  SKIN: No rashes or lesions    Consultant(s) Notes Reviewed:  [x ] YES  [ ] NO  Care Discussed with Consultants/Other Providers [ x] YES  [ ] NO    LABS:  CBC   09-15-21 @ 08:54  Hematcorit 31.9  Hemoglobin 9.4  Mean Cell Hemoglobin 26.6  Platelet Count-Automated 602  RBC Count 3.53  Red Cell Distrib Width 15.8  Wbc Count 13.21  09-14-21 @ 21:59  Hematcorit 29.7  Hemoglobin 8.8  Mean Cell Hemoglobin 26.6  Platelet Count-Automated 537  RBC Count 3.31  Red Cell Distrib Width 15.9  Wbc Count 13.41      BMP  09-15-21 @ 08:54  Anion Gap. Serum 10  Blood Urea Nitrogen,Serm 19  Calcium, Total Serum 9.7  Carbon Dioxide, Serum 28  Chloride, Serum 98  Creatinine, Serum 0.64  eGFR in  104  eGFR in Non Afican American 90  Gloucose, serum 216  Potassium, Serum 4.6  Sodium, Serum 136

## 2021-09-15 NOTE — CONSULT NOTE ADULT - ATTENDING COMMENTS
71 yo W, heavy smoker, history of COPD, admitted with dizziness, falls, feeling diffusely weak  One of the falls was consistent with syncope / presyncope 71 yo W, heavy smoker, history of COPD, admitted with dizziness, falls, feeling diffusely weak  One of the falls was consistent with syncope / presyncope  But outside of that she's had imbalance and has been using a walker  On exam, she has mild right hand and hip flexion weakness 4+/5, left side is 5/5; with diffuse hyperreflexia and equivocal plantar responses   Gait is narrow based but mildly unstable  Exam is somewhat concerning for mild cervical cord compression  Recommend MRI C spine w/o contrast  will follow

## 2021-09-15 NOTE — H&P ADULT - HISTORY OF PRESENT ILLNESS
70F current smoker, tracheomalacia, COPD on 3L home O2/CPAP qhs (noncompliant) HTN, HLD, DM2, TIA, recently admitted for oropharyngeal candidiasis and chronic hypoxic respiratory failure 2/2 COPD (8/5/21-8/6/21), BIBEMS from home with falls x2 on day of admission and progressive SOB. Per pt, she has had sob requiring an increase in home O2 from 3L to 5L and never told family. Has a chronic cough without increase in sputum production or color change. Pt states she was sitting in the living room today and smoking cigarettes. Has 40 pack year history and quit intermittently but has restarted smoking again this year. Smokes 3-4 cigarettes per day.  Pt states she was feeling generalized weakness and fatigue and fell to ground two times today.  Was able to crawl to reach phone to call family. No chest pain, palpitations, fevers, chills, abdominal pain.  Per ED documentation who spoke to daughter in law, pt has been noncompliant on CPAP and has been canceling her outpatient appointments.    In the ED:   Vitals: temp 99.1 --> 97.2, hr 90, bp 100/52, rr18 97% 4L NC (intermittent BIPAP)   Labs: wbc 13.41, hgb 8.8, plt 537, ddimer 341. VBG 7.32, pco2 63, venous sat 59.1  CTH: no acute abnormalities  CT Cervical Spine: motion artifact. vertebral body heights maintained.   Interventions: CTX 1g, Levofloxacin 750mg, Duoneb x3, methylprednisolon 125mg IVP     70F current smoker, tracheomalacia, COPD on 3L home O2/CPAP qhs (noncompliant) HTN, HLD, DM2, TIA, recently admitted for oropharyngeal candidiasis and chronic hypoxic respiratory failure 2/2 COPD (8/5/21-8/6/21), BIBEMS from home with falls x2 on day of admission and progressive SOB. Per pt, she has had sob requiring an increase in home O2 from 3L to 5L and never told family. Has a chronic cough without increase in sputum production or color change. Pt states she was sitting in the living room today and smoking cigarettes. Has 40 pack year history and quit intermittently but has restarted smoking again this year. Smokes 3-4 cigarettes per day.  Pt states she was feeling generalized weakness and fatigue and fell to ground two times today.  Was able to crawl to reach phone to call family. No chest pain, palpitations, fevers, chills, abdominal pain.  Per ED documentation who spoke to daughter in law, pt has been noncompliant on CPAP and has been canceling her outpatient appointments.    In the ED:   Vitals: temp 99.1 --> 97.2, hr 90, bp 100/52, rr18 97% 4L NC (intermittent BIPAP)   Labs: wbc 13.41, hgb 8.8, plt 537, ddimer 341. VBG 7.32, pco2 63, venous sat 59.1  CTH: no acute abnormalities  CT Cervical Spine: motion artifact. vertebral body heights maintained.   Interventions: CTX 1g, Levofloxacin 750mg, Duoneb x3, methylprednisolone 125mg IVP

## 2021-09-15 NOTE — H&P ADULT - PROBLEM SELECTOR PLAN 3
40 pack year smoking history and had quit inbetween but has restarted smoking this year. Smokes between 2-4 cigarettes daily. Likely in COPD exacerbation in the setting of her continued smoking.   - Nicotine patch offered, pt refused.

## 2021-09-15 NOTE — CONSULT NOTE ADULT - ASSESSMENT
pain on swallowing  may be related to candida i.e. esophageal candidiasis, ? fluconazole  smoking history is risk also for esophageal cancer  in an ideal setting EGD probably best test but patient has tracheomalacia and also copd  consider esophagram and see if diagnostic and if not will consider an EGD  anemia ? etiology

## 2021-09-15 NOTE — CONSULT NOTE ADULT - ASSESSMENT
per Internal Medicine    69 yo F current smoker, tracheomalacia, COPD on 3L home O2/CPAP qhs (noncompliant) HTN, HLD, DM2, TIA, recently admitted for oropharyngeal candidiasis and chronic hypoxic respiratory failure 2/2 COPD (8/5/21-8/6/21), BIBEMS from home with falls x2 on day of admission and progressive SOB.      Problem/Plan - 1:  ·  Problem: COPD exacerbation.   ·  Plan: Pt tachypneic and not speaking in full sentences on exam. Was actively smoking at home when she got worsening SOB. Is on stiolto, flovent, and duonebs at home per chart review. Given solumedrol, CTX, Levaquin, and duonebs in the ED.  - c/w home Stiolto, flovent   - c/w Duoneb q4 hours standing   - Azithro 500 x 3 doses for COPD exacerbation  - Prednisone 40mg for 4 more doses  - f/u RVP  - BIPAP 10/5 ordered, however patient wanted a break from BIPAP and removed it. Attempted to place patient back on BIPAP in ED however pt verbally aggressive and refusing to put mask back on.   - 5L NC while off bipap.      NO EVIDENCE OF ACUTE EXACERBATION.    Problem/Plan - 2:  ·  Problem: Falls.   ·  Plan: Has had 2 falls in the past 1 day she states due to generalized weakness. No presyncopal symptoms such as diaphoresis, flushing, chest pain, palpitations. Kian LOC, ? HS.   - CTH negative in ed  - CT cervical spine inconclusive due to motion artifact. But no spinal tenderness on exam   - PT consulted.    Problem/Plan - 3:  ·  Problem: Tobacco dependence.   ·  Plan: 40 pack year smoking history and had quit inbetween but has restarted smoking this year. Smokes between 2-4 cigarettes daily. Likely in COPD exacerbation in the setting of her continued smoking.   - Nicotine patch offered, pt refused.    Problem/Plan - 4:  ·  Problem: HTN (hypertension).   ·  Plan: On HCTZ 25mg and Lisinopril 20mg   - c/w home meds.    Problem/Plan - 5:  ·  Problem: Carotid artery stenosis.   ·  Plan: R sided carotid stenosis on ASA 81mg   - c/w ASA 81 mg.    Problem/Plan - 6:  ·  Problem: Diabetes mellitus.   ·  Plan: Is diabetic but most recent A1c 6.2 (prediabetes range) on metformin 500 BID at home.  - iSS while inpatient.    Problem/Plan - 7:  ·  Problem: Depression.   ·  Plan: Is on zoloft 50mg outpatient   - c/w zoloft 50mg.    Problem/Plan - 8:  ·  Problem: Nutrition, metabolism, and development symptoms.   ·  Plan: .  F: none   E: replete pRN  N: cc    dispo: RMF  code: FULL CODE.

## 2021-09-15 NOTE — CONSULT NOTE ADULT - SUBJECTIVE AND OBJECTIVE BOX
70F current smoker, tracheomalacia, COPD on 3L home O2/CPAP qhs (noncompliant) HTN, HLD, DM2, TIA, recently admitted for oropharyngeal candidiasis and chronic hypoxic respiratory failure 2/2 COPD (8/5/21-8/6/21), BIBEMS from home with falls x2 on day of admission and progressive SOB. Per pt, she has had sob requiring an increase in home O2 from 3L to 5L and never told family. Has 40 pack year history and quit intermittently but has restarted smoking again this year. Smokes 3-4 cigarettes per day.  Says she has weight loss.  Called to evaluate for pain when swallowing.  Apparently last admit had laryngoscopy and FEEs study = candida      REVIEW OF SYSTEMS:  Constitutional: No fever, + weight loss   ENMT:  No difficulty hearing, tinnitus, vertigo; + throat pain, hoarse  Respiratory: needs O2  Cardiovascular: No chest pain, palpitations, dizziness or leg swelling  Gastrointestinal: No abdominal or epigastric pain. No nausea, vomiting or hematemesis; No diarrhea or constipation. 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    Disease of trachea and bronchus  Tracheomalacia    Epilepsy  No longer on medications.    Depressive disorder    Transient cerebral ischemia  TIA (transient ischemic attack)    Peptic ulcer  2/2 H. pylori (s/p amoxicillin, clarithromycin, omeprazole)    Tobacco use disorder  currently smoking    Emphysema  COPD (chronic obstructive pulmonary disease)    Hyperlipidemia    Essential hypertension    Type 2 diabetes mellitus    Other postprocedural status  S/P hernia repair        FAMILY HISTORY:  Family history of ovarian cancer (Mother, Sibling)    Family history of colon cancer (Sibling)  sister    Family history of diabetes mellitus (Mother, Father)        SOCIAL HISTORY:  Smoking Status: [ ] Current, [ ] Former, [ ] Never  Pack Years:    MEDICATIONS:  MEDICATIONS  (STANDING):  albuterol/ipratropium for Nebulization 3 milliLiter(s) Nebulizer every 4 hours  aspirin enteric coated 81 milliGRAM(s) Oral daily  atorvastatin 40 milliGRAM(s) Oral at bedtime  azithromycin  IVPB 500 milliGRAM(s) IV Intermittent every 24 hours  dextrose 40% Gel 15 Gram(s) Oral once  dextrose 5%. 1000 milliLiter(s) (50 mL/Hr) IV Continuous <Continuous>  dextrose 5%. 1000 milliLiter(s) (100 mL/Hr) IV Continuous <Continuous>  dextrose 50% Injectable 25 Gram(s) IV Push once  dextrose 50% Injectable 12.5 Gram(s) IV Push once  dextrose 50% Injectable 25 Gram(s) IV Push once  glucagon  Injectable 1 milliGRAM(s) IntraMuscular once  hydrochlorothiazide 25 milliGRAM(s) Oral daily  insulin lispro (ADMELOG) corrective regimen sliding scale   SubCutaneous every 6 hours  lisinopril 20 milliGRAM(s) Oral daily  mometasone 220 MICROgram(s) Inhaler 2 Puff(s) Inhalation two times a day  montelukast 10 milliGRAM(s) Oral daily  pantoprazole  Injectable 40 milliGRAM(s) IV Push daily  predniSONE   Tablet 40 milliGRAM(s) Oral every 24 hours  sertraline 50 milliGRAM(s) Oral daily  tiotropium 2.5 MICROgram(s)/olodaterol 2.5 MICROgram(s) (STIOLTO) Inhaler 2 Puff(s) Inhalation daily    MEDICATIONS  (PRN):      Allergies    No Known Allergies    Intolerances        Vital Signs Last 24 Hrs  T(C): 36.5 (15 Sep 2021 09:09), Max: 37.3 (14 Sep 2021 20:28)  T(F): 97.7 (15 Sep 2021 09:09), Max: 99.1 (14 Sep 2021 20:28)  HR: 89 (15 Sep 2021 12:00) (80 - 100)  BP: 117/63 (15 Sep 2021 09:09) (100/52 - 144/68)  BP(mean): --  RR: 18 (15 Sep 2021 12:00) (16 - 20)  SpO2: 96% (15 Sep 2021 09:09) (91% - 99%)        PHYSICAL EXAM:    General:  in no acute distress  HEENT: MMM, conjunctiva and sclera clear  Gastrointestinal: Soft, non-tender non-distended; Normal bowel sounds; No rebound or guarding  Extremities: Normal range of motion, No clubbing, cyanosis or edema  Neurological: Alert and oriented x3  Skin: Warm and dry. No obvious rash      LABS:                        9.4    13.21 )-----------( 602      ( 15 Sep 2021 08:54 )             31.9     09-15    136  |  98  |  19  ----------------------------<  216<H>  4.6   |  28  |  0.64    Ca    9.7      15 Sep 2021 08:54  Phos  3.0     09-15  Mg     2.2     09-15    TPro  7.1  /  Alb  4.2  /  TBili  <0.2  /  DBili  x   /  AST  18  /  ALT  13  /  AlkPhos  90  09-15          RADIOLOGY & ADDITIONAL STUDIES:

## 2021-09-15 NOTE — H&P ADULT - PROBLEM SELECTOR PLAN 2
Has had 2 falls in the past 1 day she states due to generalized weakness. No presyncopal symptoms such as diaphoresis, flushing, chest pain, palpitations. Kian LOC, ? HS.   - CTH negative in ed  - CT cervical spine inconclusive due to motion artifact. But no spinal tenderness on exam   - PT consulted.

## 2021-09-15 NOTE — CONSULT NOTE ADULT - SUBJECTIVE AND OBJECTIVE BOX
70F w/ PMH COPD, tracheomalacia, HTN, HLD, DM2, TIA, admitted for COPD exacerbation and now ENT consult for persistent throat pain, hoarseness. Recent admission for oropharyngeal candidiasis and chronic hypoxic respiratory failure 2/2 COPD (8/5/21-8/6/21). States her throat pain and hoarseness have been ongoing since she started an inhaled steroids. Thinks sx may have worsened somewhat.  States her quality of voice has changed, but cannot describe what. Has pain with swallowing but denies dysphagia.     Has tried nystatin swish and spit in the past with incomplete resolution of pain. During last admission, ENT reccs included nystatin rinses and clotrimazole troches. Scheduled to f/u with Dr. Musa. Cleared for PO by SLP also at that time.    Denies F/C/CP/SOB.    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    Disease of trachea and bronchus  Tracheomalacia    Epilepsy  No longer on medications.    Depressive disorder    Transient cerebral ischemia  TIA (transient ischemic attack)    Peptic ulcer  2/2 H. pylori (s/p amoxicillin, clarithromycin, omeprazole)    Tobacco use disorder  currently smoking    Emphysema  COPD (chronic obstructive pulmonary disease)    Hyperlipidemia    Essential hypertension    Type 2 diabetes mellitus    Other postprocedural status  S/P hernia repair        MEDICATIONS:  Antiinfectives:   azithromycin  IVPB 500 milliGRAM(s) IV Intermittent every 24 hours    Hematologic/Anticoagulation:  aspirin enteric coated 81 milliGRAM(s) Oral daily    Pain medications/Neuro:  sertraline 50 milliGRAM(s) Oral daily    IV fluids:  dextrose 5%. 1000 milliLiter(s) IV Continuous <Continuous>  dextrose 5%. 1000 milliLiter(s) IV Continuous <Continuous>    Endocrine Medications:   atorvastatin 40 milliGRAM(s) Oral at bedtime  dextrose 40% Gel 15 Gram(s) Oral once  dextrose 50% Injectable 25 Gram(s) IV Push once  dextrose 50% Injectable 12.5 Gram(s) IV Push once  dextrose 50% Injectable 25 Gram(s) IV Push once  glucagon  Injectable 1 milliGRAM(s) IntraMuscular once  insulin lispro (ADMELOG) corrective regimen sliding scale   SubCutaneous every 6 hours  predniSONE   Tablet 40 milliGRAM(s) Oral every 24 hours    All other standing medications:   albuterol/ipratropium for Nebulization 3 milliLiter(s) Nebulizer every 4 hours  hydrochlorothiazide 25 milliGRAM(s) Oral daily  lisinopril 20 milliGRAM(s) Oral daily  mometasone 220 MICROgram(s) Inhaler 2 Puff(s) Inhalation two times a day  montelukast 10 milliGRAM(s) Oral daily  pantoprazole  Injectable 40 milliGRAM(s) IV Push daily  tiotropium 2.5 MICROgram(s)/olodaterol 2.5 MICROgram(s) (STIOLTO) Inhaler 2 Puff(s) Inhalation daily    All other PRN medications:      SOCIAL HISTORY:  Tobacco History:  ETOH Use:   Drug Use:     FAMILY HISTORY:  Family history of ovarian cancer (Mother, Sibling)    Family history of colon cancer (Sibling)  sister    Family history of diabetes mellitus (Mother, Father)        REVIEW OF SYSTEMS:     LABS:  CBC-                        9.4    13.21 )-----------( 602      ( 15 Sep 2021 08:54 )             31.9         09-15    136  |  98  |  19  ----------------------------<  216<H>  4.6   |  28  |  0.64    Ca    9.7      15 Sep 2021 08:54  Phos  3.0     09-15  Mg     2.2     09-15    TPro  7.1  /  Alb  4.2  /  TBili  <0.2  /  DBili  x   /  AST  18  /  ALT  13  /  AlkPhos  90  09-15    Coagulation Studies-  PT/INR - ( 14 Sep 2021 21:59 )   PT: 11.5 sec;   INR: 0.96          PTT - ( 14 Sep 2021 21:59 )  PTT:28.9 sec  Endocrine Panel-  --  --  9.7 mg/dL  --  --  9.8 mg/dL      Vital Signs Last 24 Hrs  T(C): 37.2 (15 Sep 2021 16:12), Max: 37.3 (14 Sep 2021 20:28)  T(F): 99 (15 Sep 2021 16:12), Max: 99.1 (14 Sep 2021 20:28)  HR: 84 (15 Sep 2021 16:22) (80 - 100)  BP: 127/65 (15 Sep 2021 16:12) (100/52 - 144/68)  BP(mean): --  RR: 21 (15 Sep 2021 16:22) (16 - 22)  SpO2: 93% (15 Sep 2021 16:22) (91% - 99%)    PHYSICAL EXAM:  ENT EXAM-   Constitutional: NAD, nasal cannula in place, hoarse, weak voice  Head:  normocephalic, atraumatic.   Ears:  clear externally  Nose:  crusting anteriorly bilaterally  OC/OP:  absent dentition, upper and lower dentures in place, some signs of posterior tongue candida, rest of CO clear of candida, no masses, mucous membranes moist, posterior oropharynx clear, no lesions  Neck:  soft, tender to palpation of b/l submandibular glands, no LAD, full ROM  Lymph:  No cervical adenopathy.    LARYNGOSCOPY EXAM:     Verbal consent was obtained from patient prior to procedure.    Indication: odynophagia    Anesthesia: Afrin and 1% lidocaine spray were applied to the nasal cavities.    Flexible laryngoscopy was performed and revealed the following:    Nasopharynx had no mass or exudate    Base of tongue was symmetric with few small white plaques    Vallecula was clear, no lesions, no masses    epiglottis, both AE folds with diffuse covering with small white lesions, mild erythema    Arytenoids with small white plaques bilaterally, mild erythema and edema     True vocal folds with small white plaques, full movement bilaterally and closure, no visible masses    Post cricoid area was clear, pyriform sinuses clear bilaterally    mild Interarytenoid edema    Subglottis not visualized    The patient tolerated the procedure well.

## 2021-09-15 NOTE — CONSULT NOTE ADULT - SUBJECTIVE AND OBJECTIVE BOX
Patient is a 70y old  Female who presents with a chief complaint of COPD exacerbation (15 Sep 2021 14:03)       HPI:  70F current smoker, tracheomalacia, COPD on 3L home O2/CPAP qhs (noncompliant) HTN, HLD, DM2, TIA, recently admitted for oropharyngeal candidiasis and chronic hypoxic respiratory failure 2/2 COPD (8/5/21-8/6/21), BIBEMS from home with falls x2 on day of admission and progressive SOB. Per pt, she has had sob requiring an increase in home O2 from 3L to 5L and never told family. Has a chronic cough without increase in sputum production or color change. Pt states she was sitting in the living room today and smoking cigarettes. Has 40 pack year history and quit intermittently but has restarted smoking again this year. Smokes 3-4 cigarettes per day.  Pt states she was feeling generalized weakness and fatigue and fell to ground two times today.  Was able to crawl to reach phone to call family. No chest pain, palpitations, fevers, chills, abdominal pain.  Per ED documentation who spoke to daughter in law, pt has been noncompliant on CPAP and has been canceling her outpatient appointments.    In the ED:   Vitals: temp 99.1 --> 97.2, hr 90, bp 100/52, rr18 97% 4L NC (intermittent BIPAP)   Labs: wbc 13.41, hgb 8.8, plt 537, ddimer 341. VBG 7.32, pco2 63, venous sat 59.1  CTH: no acute abnormalities  CT Cervical Spine: motion artifact. vertebral body heights maintained.   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    Disease of trachea and bronchus  Tracheomalacia    Epilepsy  No longer on medications.    Depressive disorder    Transient cerebral ischemia  TIA (transient ischemic attack)    Peptic ulcer  2/2 H. pylori (s/p amoxicillin, clarithromycin, omeprazole)    Tobacco use disorder  currently smoking    Emphysema  COPD (chronic obstructive pulmonary disease)    Hyperlipidemia    Essential hypertension    Type 2 diabetes mellitus    Other postprocedural status  S/P hernia repair        MEDICATIONS  (STANDING):  albuterol/ipratropium for Nebulization 3 milliLiter(s) Nebulizer every 4 hours  aspirin enteric coated 81 milliGRAM(s) Oral daily  atorvastatin 40 milliGRAM(s) Oral at bedtime  azithromycin  IVPB 500 milliGRAM(s) IV Intermittent every 24 hours  dextrose 40% Gel 15 Gram(s) Oral once  dextrose 5%. 1000 milliLiter(s) (50 mL/Hr) IV Continuous <Continuous>  dextrose 5%. 1000 milliLiter(s) (100 mL/Hr) IV Continuous <Continuous>  dextrose 50% Injectable 25 Gram(s) IV Push once  dextrose 50% Injectable 12.5 Gram(s) IV Push once  dextrose 50% Injectable 25 Gram(s) IV Push once  glucagon  Injectable 1 milliGRAM(s) IntraMuscular once  hydrochlorothiazide 25 milliGRAM(s) Oral daily  insulin lispro (ADMELOG) corrective regimen sliding scale   SubCutaneous every 6 hours  lisinopril 20 milliGRAM(s) Oral daily  mometasone 220 MICROgram(s) Inhaler 2 Puff(s) Inhalation two times a day  montelukast 10 milliGRAM(s) Oral daily  pantoprazole  Injectable 40 milliGRAM(s) IV Push daily  predniSONE   Tablet 40 milliGRAM(s) Oral every 24 hours  sertraline 50 milliGRAM(s) Oral daily  tiotropium 2.5 MICROgram(s)/olodaterol 2.5 MICROgram(s) (STIOLTO) Inhaler 2 Puff(s) Inhalation daily    MEDICATIONS  (PRN):      FAMILY HISTORY:  Family history of ovarian cancer (Mother, Sibling)    Family history of colon cancer (Sibling)  sister    Family history of diabetes mellitus (Mother, Father)        CBC Full  -  ( 15 Sep 2021 08:54 )  WBC Count : 13.21 K/uL  RBC Count : 3.53 M/uL  Hemoglobin : 9.4 g/dL  Hematocrit : 31.9 %  Platelet Count - Automated : 602 K/uL  Mean Cell Volume : 90.4 fl  Mean Cell Hemoglobin : 26.6 pg  Mean Cell Hemoglobin Concentration : 29.5 gm/dL  Auto Neutrophil # : 11.99 K/uL  Auto Lymphocyte # : 0.82 K/uL  Auto Monocyte # : 0.30 K/uL  Auto Eosinophil # : 0.00 K/uL  Auto Basophil # : 0.03 K/uL  Auto Neutrophil % : 90.8 %  Auto Lymphocyte % : 6.2 %  Auto Monocyte % : 2.3 %  Auto Eosinophil % : 0.0 %  Auto Basophil % : 0.2 %      09-15    136  |  98  |  19  ----------------------------<  216<H>  4.6   |  28  |  0.64    Ca    9.7      15 Sep 2021 08:54  Phos  3.0     09-15  Mg     2.2     09-15    TPro  7.1  /  Alb  4.2  /  TBili  <0.2  /  DBili  x   /  AST  18  /  ALT  13  /  AlkPhos  90  09-15            Radiology:    < from: Xray Hip w/ Pelvis Min 5 Views, Bilateral (09.15.21 @ 00:40) >    EXAM:  XR HIPS BI WITH PELV MIN 5V                          PROCEDURE DATE:  09/15/2021          INTERPRETATION:  CLINICAL HISTORY: 70-year-old with fall.        IMPRESSION: Frontal view of the pelvis and frontal and abduction is of both hips demonstrates degenerative changes lower lumbar spine. Mild narrowing of the bilateral hip joints. Osteopenia. No fracture. No dislocation.        < from: CT Head No Cont (09.14.21 @ 23:43) >  EXAM:  CT BRAIN                          PROCEDURE DATE:  09/14/2021          INTERPRETATION:  HEAD CT WITHOUT CONTRAST dated 9/14/2021 11:43 PM    HISTORY: Fall.    TECHNIQUE: Head CT was performed without intravenous contrast. Axial, sagittal, and coronal images were obtained.    COMPARISON: Head CT from 7/8/2020.    FINDINGS:    The examination is somewhat limited due to patient motion.    There are prominent ventricles and sulci most compatible with mild generalized parenchymal volume loss.No acute transcortical infarction, hemorrhage, or mass effect is identified. Minimal patchy hypodensities in the supratentorial white matter are suggestive of chronic microvascular ischemic disease.    The calvarium is intact. The visualized paranasal sinuses and mastoid air cells are predominantly clear. Anterior nasal septal defect is again noted.    IMPRESSION:    No acute intracranial abnormality or calvarial fracture.        < from: CT Cervical Spine No Cont (09.14.21 @ 23:43) >  EXAM:  CT CERVICAL SPINE                          PROCEDURE DATE:  09/14/2021          INTERPRETATION:  CERVICAL SPINE CT WITHOUT CONTRAST dated 9/14/2021 11:43 PM    CLINICAL STATEMENT: Fall.    TECHNIQUE: Contiguous noncontrast transaxial CT images were obtained through the cervical spine. Reconstructions were then created in the axial, sagittal, and coronal planes.    COMPARISON: Cervical spine CT from 7/8/2020.    FINDINGS:    Evaluation of the cervical spine is markedly limited due to motion artifact. The cervical vertebral body heights appear maintained. Otherwise evaluation for fractures and spondylolisthesis is extremely limited. Multilevel degenerative changes are again noted. No significant prevertebral soft tissue swelling is demonstrated. Evaluation of the spinal canal and neural foramina is very limited.    The visualized thorax demonstrates severe emphysema and severe aortic arch calcifications.    IMPRESSION:    Markedly limited evaluation of the cervical spine due to motion artifact, as above. Cervical vertebral body heights appear maintained. Consider repeat exam without patient motion as clinically warranted.              Vital Signs Last 24 Hrs  T(C): 36.5 (15 Sep 2021 09:09), Max: 37.3 (14 Sep 2021 20:28)  T(F): 97.7 (15 Sep 2021 09:09), Max: 99.1 (14 Sep 2021 20:28)  HR: 89 (15 Sep 2021 12:00) (80 - 100)  BP: 117/63 (15 Sep 2021 09:09) (100/52 - 144/68)  BP(mean): --  RR: 18 (15 Sep 2021 12:00) (16 - 20)  SpO2: 96% (15 Sep 2021 09:09) (91% - 99%)        REVIEW OF SYSTEMS:    CONSTITUTIONAL: No fever, weight loss, or fatigue  EYES: No eye pain, visual disturbances, or discharge  ENMT:  No difficulty hearing, tinnitus, vertigo; No sinus or throat pain  NECK: No pain or stiffness  BREASTS: No pain, masses, or nipple discharge  RESPIRATORY:  as per HPI  CARDIOVASCULAR: No chest pain, palpitations, dizziness, or leg swelling  GASTROINTESTINAL: No abdominal or epigastric pain. No nausea, vomiting, or hematemesis; No diarrhea or constipation. No melena or hematochezia.  GENITOURINARY: No dysuria, frequency, hematuria, or incontinence  NEUROLOGICAL: No headaches, memory loss, loss of strength, numbness, or tremors  SKIN: No itching, burning, rashes, or lesions   LYMPH NODES: No enlarged glands  ENDOCRINE: No heat or cold intolerance; No hair loss  MUSCULOSKELETAL: No joint pain or swelling; No muscle, back, or extremity pain  PSYCHIATRIC: No depression, anxiety, mood swings, or difficulty sleeping  HEME/LYMPH: No easy bruising, or bleeding gums  ALLERGY AND IMMUNOLOGIC: No hives or eczema  VASCULAR: no swelling, erythema,   : no dysuria, hematuria, frequency        Physical Exam:   71 yo  woman lying in semi Rhoades's position, no acute complaints    Head: normocephalic, atraumatic    Eyes: PERRLA, EOMI, no nystagmus, sclera anicteric    ENT: nasal discharge, uvula midline, no oropharyngeal erythema/exudate    Neck: supple, negative JVD, negative carotid bruits, no thyromegaly    Chest: [place] exp wheezes    Cardiovascular: regular rate and rhythm, neg murmurs/rubs/gallops    Abdomen: soft, non distended, non tender to palpation in all 4 quadrants, negative rebound/guarding, normal bowel sounds    Extremities: WWP, neg cyanosis/clubbing/edema, negative calf tenderness to palpation, negative Reshma's sign    Neurologic Exam:    Alert and oriented to person, place, date/year, speech fluent w/o dysarthria,Motor Exam:    Right UE:          no focal weakness > 4/5    Left UE:           no focal weakness > 4/5      Right LE:             no focal weakness > 4/5    Left LE:              no focal weakness > 4/5    Sensation:           intact to light touch x 4 extremities                                                  DTR:                  biceps/brachioradialis: equal bilaterally                                                         patella/ankle: equal bilaterally                             Gait:  not tested        PM&R Impression:    1) deconditioned  2) no focal weakness    Recommendations/ Plan :    1) Physical therapy focusing on therapeutic exercises, bed mobility/transfer out of bed evaluation, progressive ambulation with assistive devices prn.    2) Anticipated Disposition Plan/Recs:    pending functional progress

## 2021-09-15 NOTE — H&P ADULT - NSICDXPASTMEDICALHX_GEN_ALL_CORE_FT
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    Depressive disorder     Disease of trachea and bronchus Tracheomalacia    Emphysema COPD (chronic obstructive pulmonary disease)    Epilepsy No longer on medications.    Essential hypertension     Hemorrhoids Hemorrhoids    Hyperlipidemia     Peptic ulcer 2/2 H. pylori (s/p amoxicillin, clarithromycin, omeprazole)    Tobacco use disorder currently smoking    Transient cerebral ischemia TIA (transient ischemic attack)    Type 2 diabetes mellitus

## 2021-09-15 NOTE — H&P ADULT - PROBLEM SELECTOR PLAN 6
Is diabetic but most recent A1c 6.2 (prediabetes range) on metformin 500 BID at home.  - iSS while inpatient.

## 2021-09-15 NOTE — CONSULT NOTE ADULT - ASSESSMENT
70F current smoker, tracheomalacia, COPD on 3L home O2/CPAP qhs (noncompliant) HTN, HLD, TIA, recently admitted for oropharyngeal candidiasis and chronic hypoxic respiratory failure 2/2 COPD (8/5/21-8/6/21), BIBEMS from home with falls x2 on day of admission and progressive SOB, neuro consulted for weakness and falls.     #Falls-patient reports to have been falling, recently more often. Last two falls occurred this past week, one of which was preceded by dizziness. This history, along with patient complaint of neck pain, and hyperreflexia on exam may indicate more centralized etiology for imbalance and falls, such as cervical spinal compression or stenosis.   -Order MRI cervical spine  -Neurology will continue to follow.  70F current smoker, tracheomalacia, COPD on 3L home O2/CPAP qhs (noncompliant) HTN, HLD, TIA, DM2 recently admitted for oropharyngeal candidiasis and chronic hypoxic respiratory failure 2/2 COPD (8/5/21-8/6/21), BIBEMS from home with falls x2 on day of admission and progressive SOB, neuro consulted for weakness and falls.     #Falls-patient reports to have been falling, recently more often. Last two falls occurred this past week, one of which was preceded by dizziness. This history, along with patient complaint of neck pain, and hyperreflexia on exam may indicate more centralized etiology for imbalance and falls, such as cervical degenerative disk disease or stenosis.   -Order MRI cervical spine without contrast.   -Neurology will continue to follow.  70F current smoker, tracheomalacia, COPD on 3L home O2/CPAP qhs (noncompliant) HTN, HLD, TIA, DM2 recently admitted for oropharyngeal candidiasis and chronic hypoxic respiratory failure 2/2 COPD (8/5/21-8/6/21), BIBEMS from home with falls x2 on day of admission and progressive SOB, neuro consulted for weakness and falls.     #Falls-patient reports to have been falling, recently more often. Last two falls occurred this past week, one of which was preceded by dizziness. This history, along with patient complaint of neck pain, and hyperreflexia on exam may indicate more centralized etiology for imbalance and falls, such as cervical degenerative disk disease or stenosis.   -Order MRI cervical spine without contrast.   -Neurology will continue to follow.     All recommendations final upon attending attestation.  70F current smoker, tracheomalacia, COPD on 3L home O2/CPAP qhs (noncompliant) HTN, HLD, TIA, DM2 recently admitted for oropharyngeal candidiasis and chronic hypoxic respiratory failure 2/2 COPD (8/5/21-8/6/21), BIBEMS from home with falls x2 on day of admission and progressive SOB, neuro consulted for weakness and falls.     #Falls-patient reports to have been falling, recently more often. Last two falls occurred this past week, one of which was preceded by dizziness. On exam she has mild right sided weakness and diffuse hyperreflexia that may indicate cervical degenerative disk disease with cord compression  -Order MRI cervical spine without contrast.   -Neurology will continue to follow.     All recommendations final upon attending attestation.

## 2021-09-15 NOTE — H&P ADULT - ASSESSMENT
70F current smoker, tracheomalacia, COPD on 3L home O2/CPAP qhs (noncompliant) HTN, HLD, DM2, TIA, recently admitted for oropharyngeal candidiasis and chronic hypoxic respiratory failure 2/2 COPD (8/5/21-8/6/21), BIBEMS from home with falls x2 on day of admission and progressive SOB.

## 2021-09-15 NOTE — H&P ADULT - PROBLEM SELECTOR PLAN 1
Pt tachypneic and not speaking in full sentences on exam. Was actively smoking at home when she got worsening SOB. Is on stiolto, flovent, and duonebs at home per chart review. Given solumedrol, CTX, Levaquin, and duonebs in the ED.  - c/w home Stiolto, flovent   - c/w Duoneb q4 hours standing   - Azithro 500 x 3 doses for COPD exacerbation  - Prednisone 40mg for 4 more doses  - f/u RVP  - BIPAP 10/5 ordered, however patient wanted a break from BIPAP and removed it. Attempted to place patient back on BIPAP in ED however pt verbally aggressive and refusing to put mask back on.   - 5L NC while off bipap. Pt tachypneic and not speaking in full sentences on exam. Was actively smoking at home when she got worsening SOB. Is on stiolto, flovent, and duonebs at home per chart review. Given solumedrol, CTX, Levaquin, and duonebs in the ED.  - c/w home Stiolto, flovent   - c/w Duoneb q4 hours standing   - Azithro 500 x 3 doses for COPD exacerbation  - Prednisone 40mg for 4 more doses  - f/u RVP  - BIPAP 10/5 ordered, however patient wanted a break from BIPAP and removed it. Attempted to place patient back on BIPAP in ED however pt verbally aggressive and refusing to put mask back on.   - 5L NC while off bipap.      NO EVIDENCE OF ACUTE EXACERBATION

## 2021-09-15 NOTE — ED ADULT NURSE REASSESSMENT NOTE - NS ED NURSE REASSESS COMMENT FT1
Patient removed BIPAP, refuses to wear, stated she needs a break, Oumar BAUER made aware.  Patient placed on 5L NC, 98%

## 2021-09-15 NOTE — H&P ADULT - TIME BILLING
Patient seen and examined with house-staff during bedside rounds.  Resident note read, including vitals, physical findings, laboratory data, and radiological reports.   Revisions included below.  Direct personal management at bed side and extensive interpretation of the data.  Plan was outlined and discussed in details with the housestaff.  Decision making of high complexity  Action taken for acute disease activity to reflect the level of care provided:  - medication reconciliation  - review laboratory data    Patient was admitted with dysphagia, hoarseness of voice, and multiple falls.  There is no evidence of acute exacerbation of COPD and the above will be corrected.  Patient was scheduled to see Dr Musa.    Dysphagia  Hoarsness  Multiple falls  Dr Musa to see patient  Dr Panchal to see patient  Neurology to see patient.  The patient home regimen is azithromycin 3 times a day Monday Wednesday and Friday and change it to that.  No evidence of infectious process.

## 2021-09-15 NOTE — H&P ADULT - NSHPLABSRESULTS_GEN_ALL_CORE
LABS:                         8.8    13.41 )-----------( 537      ( 14 Sep 2021 21:59 )             29.7     09-14    138  |  100  |  20  ----------------------------<  154<H>  5.0   |  30  |  0.81    Ca    9.8      14 Sep 2021 21:59  Mg     2.4     09-14    TPro  6.8  /  Alb  3.9  /  TBili  <0.2  /  DBili  x   /  AST  20  /  ALT  15  /  AlkPhos  97  09-14    PT/INR - ( 14 Sep 2021 21:59 )   PT: 11.5 sec;   INR: 0.96          PTT - ( 14 Sep 2021 21:59 )  PTT:28.9 sec    CARDIAC MARKERS ( 14 Sep 2021 21:59 )  x     / 0.01 ng/mL / 87 U/L / x     / 2.6 ng/mL      CAPILLARY BLOOD GLUCOSE        Serum Pro-Brain Natriuretic Peptide: 94 pg/mL (09-14 @ 21:59)        RADIOLOGY, EKG & ADDITIONAL TESTS:

## 2021-09-15 NOTE — H&P ADULT - NSHPPHYSICALEXAM_GEN_ALL_CORE
PHYSICAL EXAM:    Vital Signs Last 24 Hrs  T(C): 36.2 (15 Sep 2021 01:45), Max: 37.3 (14 Sep 2021 20:28)  T(F): 97.2 (15 Sep 2021 01:45), Max: 99.1 (14 Sep 2021 20:28)  HR: 98 (15 Sep 2021 01:45) (89 - 98)  BP: 144/68 (15 Sep 2021 01:45) (100/52 - 144/68)  BP(mean): --  RR: 20 (15 Sep 2021 01:45) (18 - 20)  SpO2: 97% (15 Sep 2021 01:45) (97% - 99%)  I&O's Summary      General: NAD, well developed  HEENT: NC/AT; EOMI, PERRLA, anicteric sclera; moist mucosal membranes.  Neck: supple, trachea midline  Cardiovascular: RRR, +S1/S2; NO M/R/G  Respiratory: CTA B/L; no W/R/R  Gastrointestinal: soft, NT/ND; +BSx4  Extremities: WWP; no edema or cyanosis  Vascular: 2+ radial, DP/PT pulses B/L  Neurological: AAOx3; no focal deficits PHYSICAL EXAM:    Vital Signs Last 24 Hrs  T(C): 36.2 (15 Sep 2021 01:45), Max: 37.3 (14 Sep 2021 20:28)  T(F): 97.2 (15 Sep 2021 01:45), Max: 99.1 (14 Sep 2021 20:28)  HR: 98 (15 Sep 2021 01:45) (89 - 98)  BP: 144/68 (15 Sep 2021 01:45) (100/52 - 144/68)  BP(mean): --  RR: 20 (15 Sep 2021 01:45) (18 - 20)  SpO2: 97% (15 Sep 2021 01:45) (97% - 99%)  I&O's Summary      General: tachypneic, mild distress obese woman not wearing BIPAP at time of interview  HEENT: NC/AT; EOMI, PERRLA, anicteric sclera; dry mucosal membranes  Neck: supple, trachea midline  Cardiovascular: RRR, +S1/S2; NO M/R/G  Respiratory: diffuse bilateral end expiratory wheezes. prolonged expiratory phase.  Gastrointestinal: soft, NT/ND; +BSx4  Extremities: WWP; 2+ edema at ankle and lower shin.  Vascular: 2+ radial, DP/PT pulses B/L  Neurological: AAOx3 to person place and year; no focal deficits

## 2021-09-16 ENCOUNTER — TRANSCRIPTION ENCOUNTER (OUTPATIENT)
Age: 71
End: 2021-09-16

## 2021-09-16 DIAGNOSIS — K20.80 OTHER ESOPHAGITIS WITHOUT BLEEDING: ICD-10-CM

## 2021-09-16 LAB
ANION GAP SERPL CALC-SCNC: 9 MMOL/L — SIGNIFICANT CHANGE UP (ref 5–17)
BUN SERPL-MCNC: 32 MG/DL — HIGH (ref 7–23)
CALCIUM SERPL-MCNC: 9.8 MG/DL — SIGNIFICANT CHANGE UP (ref 8.4–10.5)
CHLORIDE SERPL-SCNC: 99 MMOL/L — SIGNIFICANT CHANGE UP (ref 96–108)
CO2 SERPL-SCNC: 29 MMOL/L — SIGNIFICANT CHANGE UP (ref 22–31)
COVID-19 SPIKE DOMAIN AB INTERP: POSITIVE
COVID-19 SPIKE DOMAIN ANTIBODY RESULT: 94.8 U/ML — HIGH
CREAT SERPL-MCNC: 0.77 MG/DL — SIGNIFICANT CHANGE UP (ref 0.5–1.3)
GLUCOSE BLDC GLUCOMTR-MCNC: 135 MG/DL — HIGH (ref 70–99)
GLUCOSE BLDC GLUCOMTR-MCNC: 153 MG/DL — HIGH (ref 70–99)
GLUCOSE BLDC GLUCOMTR-MCNC: 222 MG/DL — HIGH (ref 70–99)
GLUCOSE BLDC GLUCOMTR-MCNC: 318 MG/DL — HIGH (ref 70–99)
GLUCOSE SERPL-MCNC: 112 MG/DL — HIGH (ref 70–99)
HCT VFR BLD CALC: 32.3 % — LOW (ref 34.5–45)
HGB BLD-MCNC: 9.2 G/DL — LOW (ref 11.5–15.5)
MAGNESIUM SERPL-MCNC: 2.4 MG/DL — SIGNIFICANT CHANGE UP (ref 1.6–2.6)
MCHC RBC-ENTMCNC: 25.8 PG — LOW (ref 27–34)
MCHC RBC-ENTMCNC: 28.5 GM/DL — LOW (ref 32–36)
MCV RBC AUTO: 90.7 FL — SIGNIFICANT CHANGE UP (ref 80–100)
NRBC # BLD: 0 /100 WBCS — SIGNIFICANT CHANGE UP (ref 0–0)
PHOSPHATE SERPL-MCNC: 3.9 MG/DL — SIGNIFICANT CHANGE UP (ref 2.5–4.5)
PLATELET # BLD AUTO: 565 K/UL — HIGH (ref 150–400)
POTASSIUM SERPL-MCNC: 4.7 MMOL/L — SIGNIFICANT CHANGE UP (ref 3.5–5.3)
POTASSIUM SERPL-SCNC: 4.7 MMOL/L — SIGNIFICANT CHANGE UP (ref 3.5–5.3)
RBC # BLD: 3.56 M/UL — LOW (ref 3.8–5.2)
RBC # FLD: 15.7 % — HIGH (ref 10.3–14.5)
SARS-COV-2 IGG+IGM SERPL QL IA: 94.8 U/ML — HIGH
SARS-COV-2 IGG+IGM SERPL QL IA: POSITIVE
SODIUM SERPL-SCNC: 137 MMOL/L — SIGNIFICANT CHANGE UP (ref 135–145)
WBC # BLD: 18.21 K/UL — HIGH (ref 3.8–10.5)
WBC # FLD AUTO: 18.21 K/UL — HIGH (ref 3.8–10.5)

## 2021-09-16 PROCEDURE — 99232 SBSQ HOSP IP/OBS MODERATE 35: CPT | Mod: GC

## 2021-09-16 PROCEDURE — 99233 SBSQ HOSP IP/OBS HIGH 50: CPT

## 2021-09-16 RX ORDER — DIPHENHYDRAMINE HYDROCHLORIDE AND LIDOCAINE HYDROCHLORIDE AND ALUMINUM HYDROXIDE AND MAGNESIUM HYDRO
30 KIT THREE TIMES A DAY
Refills: 0 | Status: DISCONTINUED | OUTPATIENT
Start: 2021-09-16 | End: 2021-09-17

## 2021-09-16 RX ORDER — NYSTATIN 500MM UNIT
5 POWDER (EA) MISCELLANEOUS
Qty: 0 | Refills: 0 | DISCHARGE
Start: 2021-09-16

## 2021-09-16 RX ORDER — DIPHENHYDRAMINE HYDROCHLORIDE AND LIDOCAINE HYDROCHLORIDE AND ALUMINUM HYDROXIDE AND MAGNESIUM HYDRO
1 KIT
Qty: 0 | Refills: 0 | DISCHARGE
Start: 2021-09-16

## 2021-09-16 RX ORDER — FLUCONAZOLE 150 MG/1
1 TABLET ORAL
Qty: 0 | Refills: 0 | DISCHARGE
Start: 2021-09-16

## 2021-09-16 RX ORDER — CLOTRIMAZOLE 10 MG
1 TROCHE MUCOUS MEMBRANE
Qty: 0 | Refills: 0 | DISCHARGE
Start: 2021-09-16

## 2021-09-16 RX ORDER — CLOTRIMAZOLE 10 MG
1 TROCHE MUCOUS MEMBRANE
Refills: 0 | Status: DISCONTINUED | OUTPATIENT
Start: 2021-09-16 | End: 2021-09-17

## 2021-09-16 RX ADMIN — DIPHENHYDRAMINE HYDROCHLORIDE AND LIDOCAINE HYDROCHLORIDE AND ALUMINUM HYDROXIDE AND MAGNESIUM HYDRO 30 MILLILITER(S): KIT at 06:45

## 2021-09-16 RX ADMIN — ENOXAPARIN SODIUM 40 MILLIGRAM(S): 100 INJECTION SUBCUTANEOUS at 17:27

## 2021-09-16 RX ADMIN — Medication 1 LOZENGE: at 22:18

## 2021-09-16 RX ADMIN — Medication 500000 UNIT(S): at 00:05

## 2021-09-16 RX ADMIN — Medication 2: at 00:04

## 2021-09-16 RX ADMIN — Medication 3 MILLILITER(S): at 03:41

## 2021-09-16 RX ADMIN — MOMETASONE FUROATE 2 PUFF(S): 220 INHALANT RESPIRATORY (INHALATION) at 17:28

## 2021-09-16 RX ADMIN — Medication 500000 UNIT(S): at 12:03

## 2021-09-16 RX ADMIN — Medication 3 MILLILITER(S): at 11:13

## 2021-09-16 RX ADMIN — Medication 3 MILLILITER(S): at 06:45

## 2021-09-16 RX ADMIN — Medication 3 MILLILITER(S): at 13:50

## 2021-09-16 RX ADMIN — Medication 25 MILLIGRAM(S): at 06:44

## 2021-09-16 RX ADMIN — Medication 1 LOZENGE: at 17:27

## 2021-09-16 RX ADMIN — LISINOPRIL 20 MILLIGRAM(S): 2.5 TABLET ORAL at 06:45

## 2021-09-16 RX ADMIN — Medication 1: at 12:20

## 2021-09-16 RX ADMIN — FLUCONAZOLE 100 MILLIGRAM(S): 150 TABLET ORAL at 12:03

## 2021-09-16 RX ADMIN — Medication 40 MILLIGRAM(S): at 12:03

## 2021-09-16 RX ADMIN — MOMETASONE FUROATE 2 PUFF(S): 220 INHALANT RESPIRATORY (INHALATION) at 06:45

## 2021-09-16 RX ADMIN — TIOTROPIUM BROMIDE AND OLODATEROL 2 PUFF(S): 3.124; 2.736 SPRAY, METERED RESPIRATORY (INHALATION) at 12:06

## 2021-09-16 RX ADMIN — Medication 3 MILLILITER(S): at 22:18

## 2021-09-16 RX ADMIN — ATORVASTATIN CALCIUM 40 MILLIGRAM(S): 80 TABLET, FILM COATED ORAL at 22:18

## 2021-09-16 RX ADMIN — Medication 500000 UNIT(S): at 17:27

## 2021-09-16 RX ADMIN — AZITHROMYCIN 255 MILLIGRAM(S): 500 TABLET, FILM COATED ORAL at 06:44

## 2021-09-16 RX ADMIN — Medication 500000 UNIT(S): at 06:44

## 2021-09-16 RX ADMIN — Medication 81 MILLIGRAM(S): at 12:05

## 2021-09-16 RX ADMIN — PANTOPRAZOLE SODIUM 40 MILLIGRAM(S): 20 TABLET, DELAYED RELEASE ORAL at 12:04

## 2021-09-16 RX ADMIN — SERTRALINE 50 MILLIGRAM(S): 25 TABLET, FILM COATED ORAL at 12:04

## 2021-09-16 RX ADMIN — Medication 4: at 17:57

## 2021-09-16 RX ADMIN — Medication 3 MILLILITER(S): at 17:27

## 2021-09-16 RX ADMIN — MONTELUKAST 10 MILLIGRAM(S): 4 TABLET, CHEWABLE ORAL at 12:04

## 2021-09-16 NOTE — DISCHARGE NOTE PROVIDER - NSDCFUADDAPPT_GEN_ALL_CORE_FT
if phone number for Nidhi Lim does not work. Please call [phone] Please bring your Insurance card, Photo ID, Covid-19 vaccination card (if applicable) and Discharge paperwork to the following appointment:    (1) Please follow up with your Otolaryngology Provider, Dr. Nidhi Musa at [address] [place] [address], [address] [place] [address] on 10/15/2021 at 10:15am.    Appointment was scheduled by Ms. JOSE ANGEL Osuna, Referral Coordinator.

## 2021-09-16 NOTE — CHART NOTE - NSCHARTNOTEFT_GEN_A_CORE
Called by nurse at 3:13AM, pt speaking hoarsely and complaining of throat discomfort, SpO2 94%, denies any precipitating medication or other event. Pt seen and examined at bedside. Pt reports throat and mouth dryness, says it happened last night as well, thinks she received some kind of medication last night that helped. Voice is hoarse, pt says this happens often at night. Sleeps with O2, denies any difficulty breathing, no CP or SOB. Drank some water with minimal improvement.     Focused exam:  Constitutional: sleeping in bed comfortably, wakes to voice, voice hoarse-sounding but speaking fluidly   Chest: on 5LNC, CTA b/l, no wheezing or abnormal breath sounds appreciated     Plan:  -satting well and no signs of respiratory distress at this time  -pt refused duonebs at 2AM, will give now   -encouraged PO hydration to help with feeling of dryness Called by nurse at 3:13AM, pt speaking hoarsely and complaining of throat discomfort, SpO2 94%, denies any precipitating medication or other event. Pt seen and examined at bedside. Pt reports throat and mouth dryness, says it happened last night as well, thinks she received some kind of medication last night that helped. Voice is hoarse, pt says this happens often at night. Sleeps with O2, denies any difficulty breathing, no CP or SOB. Drank some water with minimal improvement.     Focused exam:  Constitutional: sleeping in bed comfortably, wakes to voice, voice hoarse-sounding but speaking fluidly   Chest: on 5LNC, CTA b/l, no wheezing or abnormal breath sounds appreciated     Plan:  -satting well and no signs of respiratory distress at this time  -pt refused duonebs at 2AM, will give now   -encouraged PO hydration to help with feeling of dryness    Interval update:  Around 4AM pt finished duoneb, still w/ throat discomfort.   -encouraged PO hydration again  -starting magic mouthwash swish and swallow TID PRN mouth/throat dryness

## 2021-09-16 NOTE — PROGRESS NOTE ADULT - PROBLEM SELECTOR PLAN 3
40 pack year smoking history and had quit inbetween but has restarted smoking this year. Smokes between 2-4 cigarettes daily. Likely in COPD exacerbation in the setting of her continued smoking.   - Nicotine patch offered, pt refused. Pt tachypneic and not speaking in full sentences on exam. Was actively smoking at home when she got worsening SOB. Is on stiolto, flovent, and duonebs at home per chart review. Given solumedrol, CTX, Levaquin, and duonebs in the ED.  - c/w home Stiolto, flovent   - c/w Duoneb q4 hours standing   - Azithro 500 x 3 doses for COPD exacerbation  - Prednisone 40mg for 3 more doses  - f/u RVP - negative  - BIPAP 10/5 ordered, however patient wanted a break from BIPAP and removed it. Attempted to place patient back on BIPAP in ED however pt verbally aggressive and refusing to put mask back on.   - 5L NC while off bipap.      NO EVIDENCE OF ACUTE EXACERBATION

## 2021-09-16 NOTE — PROGRESS NOTE ADULT - PROBLEM SELECTOR PLAN 8
.  F: none   E: replete pRN  N: cc    dispo: [facility]  code: FULL CODE Is on zoloft 50mg outpatient   - c/w zoloft 50mg

## 2021-09-16 NOTE — PROGRESS NOTE ADULT - PROBLEM SELECTOR PLAN 6
Is diabetic but most recent A1c 6.2 (prediabetes range) on metformin 500 BID at home.  - iSS while inpatient. R sided carotid stenosis on ASA 81mg   - c/w ASA 81 mg

## 2021-09-16 NOTE — PROGRESS NOTE ADULT - TIME BILLING
Patient seen and examined with house-staff during bedside rounds.  Resident note read, including vitals, physical findings, laboratory data, and radiological reports.   Revisions included below.  Direct personal management at bed side and extensive interpretation of the data.  Plan was outlined and discussed in details with the housestaff.  Decision making of high complexity  Action taken for acute disease activity to reflect the level of care provided:  - medication reconciliation  - review laboratory data    The patient is stable.  Patient was seen by ENT and GI.  Started antifungal.  DC steroids to the oral candidiasis and uncontrolled blood sugar.  Patient is not in acute exacerbation pecan.  Continue inhaler.  Arrange for home oxygen supply.  Discharge when oxygen is available

## 2021-09-16 NOTE — PROGRESS NOTE ADULT - SUBJECTIVE AND OBJECTIVE BOX
Pt seen and examined   no new complaints  able to eat    REVIEW OF SYSTEMS:  Constitutional: No fever,  Cardiovascular: No chest pain, palpitations, dizziness or leg swelling  ENT still hoarse  Gastrointestinal: No abdominal or epigastric pain. No nausea, vomiting No diarrhea   Skin: No itching, burning, rashes or lesions       MEDICATIONS:  MEDICATIONS  (STANDING):  albuterol/ipratropium for Nebulization 3 milliLiter(s) Nebulizer every 4 hours  aspirin enteric coated 81 milliGRAM(s) Oral daily  atorvastatin 40 milliGRAM(s) Oral at bedtime  azithromycin  IVPB 500 milliGRAM(s) IV Intermittent every 24 hours  dextrose 40% Gel 15 Gram(s) Oral once  dextrose 5%. 1000 milliLiter(s) (50 mL/Hr) IV Continuous <Continuous>  dextrose 5%. 1000 milliLiter(s) (100 mL/Hr) IV Continuous <Continuous>  dextrose 50% Injectable 25 Gram(s) IV Push once  dextrose 50% Injectable 12.5 Gram(s) IV Push once  dextrose 50% Injectable 25 Gram(s) IV Push once  enoxaparin Injectable 40 milliGRAM(s) SubCutaneous every 24 hours  fluconAZOLE   Tablet 100 milliGRAM(s) Oral daily  glucagon  Injectable 1 milliGRAM(s) IntraMuscular once  hydrochlorothiazide 25 milliGRAM(s) Oral daily  insulin lispro (ADMELOG) corrective regimen sliding scale   SubCutaneous every 6 hours  lisinopril 20 milliGRAM(s) Oral daily  mometasone 220 MICROgram(s) Inhaler 2 Puff(s) Inhalation two times a day  montelukast 10 milliGRAM(s) Oral daily  nystatin    Suspension 227138 Unit(s) Swish and Swallow four times a day  pantoprazole  Injectable 40 milliGRAM(s) IV Push daily  predniSONE   Tablet 40 milliGRAM(s) Oral every 24 hours  sertraline 50 milliGRAM(s) Oral daily  tiotropium 2.5 MICROgram(s)/olodaterol 2.5 MICROgram(s) (STIOLTO) Inhaler 2 Puff(s) Inhalation daily    MEDICATIONS  (PRN):  FIRST- Mouthwash  BLM 30 milliLiter(s) Swish and Swallow three times a day PRN throat/mouth dryness      Allergies    No Known Allergies    Intolerances        Vital Signs Last 24 Hrs  T(C): 36.7 (16 Sep 2021 09:39), Max: 37.2 (15 Sep 2021 16:12)  T(F): 98.1 (16 Sep 2021 09:39), Max: 99 (15 Sep 2021 16:12)  HR: 86 (16 Sep 2021 09:39) (75 - 89)  BP: 127/73 (16 Sep 2021 09:39) (122/68 - 133/60)  BP(mean): --  RR: 20 (16 Sep 2021 09:39) (20 - 22)  SpO2: 98% (16 Sep 2021 09:39) (92% - 99%)      PHYSICAL EXAM:    General: in no acute distress  HEENT: MMM, conjunctiva and sclera clear  Gastrointestinal: Soft non-tender non-distended; Normal bowel sounds;   Skin: Warm and dry. No obvious rash    LABS:      CBC Full  -  ( 16 Sep 2021 06:53 )  WBC Count : 18.21 K/uL  RBC Count : 3.56 M/uL  Hemoglobin : 9.2 g/dL  Hematocrit : 32.3 %  Platelet Count - Automated : 565 K/uL  Mean Cell Volume : 90.7 fl  Mean Cell Hemoglobin : 25.8 pg  Mean Cell Hemoglobin Concentration : 28.5 gm/dL  Auto Neutrophil # : x  Auto Lymphocyte # : x  Auto Monocyte # : x  Auto Eosinophil # : x  Auto Basophil # : x  Auto Neutrophil % : x  Auto Lymphocyte % : x  Auto Monocyte % : x  Auto Eosinophil % : x  Auto Basophil % : x    09-16    137  |  99  |  32<H>  ----------------------------<  112<H>  4.7   |  29  |  0.77    Ca    9.8      16 Sep 2021 06:53  Phos  3.9     09-16  Mg     2.4     09-16    TPro  7.1  /  Alb  4.2  /  TBili  <0.2  /  DBili  x   /  AST  18  /  ALT  13  /  AlkPhos  90  09-15    PT/INR - ( 14 Sep 2021 21:59 )   PT: 11.5 sec;   INR: 0.96          PTT - ( 14 Sep 2021 21:59 )  PTT:28.9 sec                  RADIOLOGY & ADDITIONAL STUDIES (The following images were personally reviewed):

## 2021-09-16 NOTE — PROGRESS NOTE ADULT - PROBLEM SELECTOR PLAN 5
R sided carotid stenosis on ASA 81mg   - c/w ASA 81 mg On HCTZ 25mg and Lisinopril 20mg   - c/w home meds.

## 2021-09-16 NOTE — DISCHARGE NOTE PROVIDER - CARE PROVIDER_API CALL
Trinity Trevizo)  Critical Care Medicine; Pulmonary Disease  [address] [place] [address]  Phone: [phone]  Fax: [phone]  Established Patient  Scheduled Appointment: 10/28/2021 03:15 PM   Trinity Trevizo)  Critical Care Medicine; Pulmonary Disease  [address], [address] [place], [place] [address]  Phone: [phone]  Fax: [phone]  Established Patient  Scheduled Appointment: 10/28/2021 03:15 PM    Nidhi Musa)  Otolaryngology  [address], [address] [place] [address]  Phone: [phone]  Fax: [phone]  Follow Up Time: 2 weeks   Trinity Trevizo)  Critical Care Medicine; Pulmonary Disease  [address], [address] [place] [id number]  Phone: [phone]  Fax: [phone]  Scheduled Appointment: 10/28/2021 03:15 PM    Nidhi Musa  Otolaryngology  [address] [place] [address]  Phone: [phone]  Fax: (   )    -  Scheduled Appointment: 10/15/2021 10:15 AM

## 2021-09-16 NOTE — PROGRESS NOTE ADULT - SUBJECTIVE AND OBJECTIVE BOX
SUBJECTIVE / INTERVAL HPI: Patient seen and examined at bedside. She is complaining of weakness and fatigue, noted to be hypoxic to 88% with pulse ox during exam. Has not been able to get out of bed due to weakness. Aside from this no other complaints.     VITAL SIGNS:  Vital Signs Last 24 Hrs  T(C): 36.7 (16 Sep 2021 09:39), Max: 37.2 (15 Sep 2021 16:12)  T(F): 98.1 (16 Sep 2021 09:39), Max: 99 (15 Sep 2021 16:12)  HR: 86 (16 Sep 2021 09:39) (75 - 89)  BP: 127/73 (16 Sep 2021 09:39) (122/68 - 133/60)  BP(mean): --  RR: 20 (16 Sep 2021 09:39) (18 - 22)  SpO2: 98% (16 Sep 2021 09:39) (92% - 99%)    PHYSICAL EXAM:    General: WDWN  HEENT: NC/AT; PERRL, anicteric sclera; MMM  Neck: supple  Cardiovascular: +S1/S2; RRR  Respiratory: CTA B/L; no W/R/R  Gastrointestinal: soft, NT/ND; +BSx4  Extremities: WWP; no edema, clubbing or cyanosis  Vascular: 2+ radial, DP/PT pulses B/L  Neurological: Alert & Oriented X3, Good concentration; Motor Strength 4+ RUE and RLE, 5/5 LUE and LLE; DTRs 3+ symmetrically, both in UE and LE. Sensation impaired to pinprick on RUE.       MEDICATIONS:  MEDICATIONS  (STANDING):  albuterol/ipratropium for Nebulization 3 milliLiter(s) Nebulizer every 4 hours  aspirin enteric coated 81 milliGRAM(s) Oral daily  atorvastatin 40 milliGRAM(s) Oral at bedtime  azithromycin  IVPB 500 milliGRAM(s) IV Intermittent every 24 hours  dextrose 40% Gel 15 Gram(s) Oral once  dextrose 5%. 1000 milliLiter(s) (50 mL/Hr) IV Continuous <Continuous>  dextrose 5%. 1000 milliLiter(s) (100 mL/Hr) IV Continuous <Continuous>  dextrose 50% Injectable 25 Gram(s) IV Push once  dextrose 50% Injectable 12.5 Gram(s) IV Push once  dextrose 50% Injectable 25 Gram(s) IV Push once  enoxaparin Injectable 40 milliGRAM(s) SubCutaneous every 24 hours  fluconAZOLE   Tablet 100 milliGRAM(s) Oral daily  glucagon  Injectable 1 milliGRAM(s) IntraMuscular once  hydrochlorothiazide 25 milliGRAM(s) Oral daily  insulin lispro (ADMELOG) corrective regimen sliding scale   SubCutaneous every 6 hours  lisinopril 20 milliGRAM(s) Oral daily  mometasone 220 MICROgram(s) Inhaler 2 Puff(s) Inhalation two times a day  montelukast 10 milliGRAM(s) Oral daily  nystatin    Suspension 299925 Unit(s) Swish and Swallow four times a day  pantoprazole  Injectable 40 milliGRAM(s) IV Push daily  predniSONE   Tablet 40 milliGRAM(s) Oral every 24 hours  sertraline 50 milliGRAM(s) Oral daily  tiotropium 2.5 MICROgram(s)/olodaterol 2.5 MICROgram(s) (STIOLTO) Inhaler 2 Puff(s) Inhalation daily    MEDICATIONS  (PRN):  FIRST- Mouthwash  BLM 30 milliLiter(s) Swish and Swallow three times a day PRN throat/mouth dryness      ALLERGIES:  Allergies    No Known Allergies    Intolerances        LABS:                        9.2    18.21 )-----------( 565      ( 16 Sep 2021 06:53 )             32.3     09-16    137  |  99  |  32<H>  ----------------------------<  112<H>  4.7   |  29  |  0.77    Ca    9.8      16 Sep 2021 06:53  Phos  3.9     09-16  Mg     2.4     09-16    TPro  7.1  /  Alb  4.2  /  TBili  <0.2  /  DBili  x   /  AST  18  /  ALT  13  /  AlkPhos  90  09-15    PT/INR - ( 14 Sep 2021 21:59 )   PT: 11.5 sec;   INR: 0.96          PTT - ( 14 Sep 2021 21:59 )  PTT:28.9 sec    CAPILLARY BLOOD GLUCOSE      POCT Blood Glucose.: 135 mg/dL (16 Sep 2021 05:53)      RADIOLOGY & ADDITIONAL TESTS: Reviewed.    ASSESSMENT:    PLAN:  SUBJECTIVE / INTERVAL HPI: Patient seen and examined at bedside. She is complaining of weakness and fatigue, noted to be hypoxic to 88% with pulse ox during exam. Has not been able to get out of bed due to weakness. Aside from this no other complaints.     VITAL SIGNS:  Vital Signs Last 24 Hrs  T(C): 36.7 (16 Sep 2021 09:39), Max: 37.2 (15 Sep 2021 16:12)  T(F): 98.1 (16 Sep 2021 09:39), Max: 99 (15 Sep 2021 16:12)  HR: 86 (16 Sep 2021 09:39) (75 - 89)  BP: 127/73 (16 Sep 2021 09:39) (122/68 - 133/60)  BP(mean): --  RR: 20 (16 Sep 2021 09:39) (18 - 22)  SpO2: 98% (16 Sep 2021 09:39) (92% - 99%)    PHYSICAL EXAM:    General: WDWN  HEENT: NC/AT; PERRL, anicteric sclera; MMM  Neck: supple  Cardiovascular: +S1/S2; RRR  Respiratory: CTA B/L; no W/R/R  Gastrointestinal: soft, NT/ND; +BSx4  Extremities: WWP; no edema, clubbing or cyanosis  Vascular: 2+ radial, DP/PT pulses B/L  Neurological: Alert & Oriented X3, Good concentration; Motor Strength 4+ RUE and RLE, 5/5 LUE and LLE; DTRs 3+ symmetrically, both in UE and LE. Sensation impaired to pinprick on RUE.       MEDICATIONS:  MEDICATIONS  (STANDING):  albuterol/ipratropium for Nebulization 3 milliLiter(s) Nebulizer every 4 hours  aspirin enteric coated 81 milliGRAM(s) Oral daily  atorvastatin 40 milliGRAM(s) Oral at bedtime  azithromycin  IVPB 500 milliGRAM(s) IV Intermittent every 24 hours  dextrose 40% Gel 15 Gram(s) Oral once  dextrose 5%. 1000 milliLiter(s) (50 mL/Hr) IV Continuous <Continuous>  dextrose 5%. 1000 milliLiter(s) (100 mL/Hr) IV Continuous <Continuous>  dextrose 50% Injectable 25 Gram(s) IV Push once  dextrose 50% Injectable 12.5 Gram(s) IV Push once  dextrose 50% Injectable 25 Gram(s) IV Push once  enoxaparin Injectable 40 milliGRAM(s) SubCutaneous every 24 hours  fluconAZOLE   Tablet 100 milliGRAM(s) Oral daily  glucagon  Injectable 1 milliGRAM(s) IntraMuscular once  hydrochlorothiazide 25 milliGRAM(s) Oral daily  insulin lispro (ADMELOG) corrective regimen sliding scale   SubCutaneous every 6 hours  lisinopril 20 milliGRAM(s) Oral daily  mometasone 220 MICROgram(s) Inhaler 2 Puff(s) Inhalation two times a day  montelukast 10 milliGRAM(s) Oral daily  nystatin    Suspension 649262 Unit(s) Swish and Swallow four times a day  pantoprazole  Injectable 40 milliGRAM(s) IV Push daily  predniSONE   Tablet 40 milliGRAM(s) Oral every 24 hours  sertraline 50 milliGRAM(s) Oral daily  tiotropium 2.5 MICROgram(s)/olodaterol 2.5 MICROgram(s) (STIOLTO) Inhaler 2 Puff(s) Inhalation daily    MEDICATIONS  (PRN):  FIRST- Mouthwash  BLM 30 milliLiter(s) Swish and Swallow three times a day PRN throat/mouth dryness      ALLERGIES:  Allergies    No Known Allergies    Intolerances        LABS:                        9.2    18.21 )-----------( 565      ( 16 Sep 2021 06:53 )             32.3     09-16    137  |  99  |  32<H>  ----------------------------<  112<H>  4.7   |  29  |  0.77    Ca    9.8      16 Sep 2021 06:53  Phos  3.9     09-16  Mg     2.4     09-16    TPro  7.1  /  Alb  4.2  /  TBili  <0.2  /  DBili  x   /  AST  18  /  ALT  13  /  AlkPhos  90  09-15    PT/INR - ( 14 Sep 2021 21:59 )   PT: 11.5 sec;   INR: 0.96          PTT - ( 14 Sep 2021 21:59 )  PTT:28.9 sec    CAPILLARY BLOOD GLUCOSE    POCT Blood Glucose.: 135 mg/dL (16 Sep 2021 05:53)    RADIOLOGY & ADDITIONAL TESTS: Reviewed.

## 2021-09-16 NOTE — PROGRESS NOTE ADULT - PROBLEM SELECTOR PLAN 4
On HCTZ 25mg and Lisinopril 20mg   - c/w home meds. 40 pack year smoking history and had quit inbetween but has restarted smoking this year. Smokes between 2-4 cigarettes daily. Likely in COPD exacerbation in the setting of her continued smoking.   - Nicotine patch offered, pt refused.

## 2021-09-16 NOTE — DISCHARGE NOTE PROVIDER - NSDCCAREPROVSEEN_GEN_ALL_CORE_FT
Trinity Trevizo Trinity Trevizo A  Patient seen and examined with house-staff during bedside rounds.  Resident note read, including vitals, physical findings, laboratory data, and radiological reports.   Revisions included below.  Direct personal management at bed side and extensive interpretation of the data.  Plan was outlined and discussed in details with the housestaff.  Decision making of high complexity  Action taken for acute disease activity to reflect the level of care provided:  - medication reconciliation  - review laboratory data    The patient clinically stable.  The patient improved.  I decreased the oxygen saturation from 6 L or 1 wean.  Patient to be discharged today

## 2021-09-16 NOTE — PROGRESS NOTE ADULT - ASSESSMENT
if patient continues to do well continue nystatin  follow clinical course  she says pain in throat still there  consider EGD if symptoms worse or fails to resolve

## 2021-09-16 NOTE — PROGRESS NOTE ADULT - PROBLEM SELECTOR PLAN 1
Pt tachypneic and not speaking in full sentences on exam. Was actively smoking at home when she got worsening SOB. Is on stiolto, flovent, and duonebs at home per chart review. Given solumedrol, CTX, Levaquin, and duonebs in the ED.  - c/w home Stiolto, flovent   - c/w Duoneb q4 hours standing   - Azithro 500 x 3 doses for COPD exacerbation  - Prednisone 40mg for 4 more doses  - f/u RVP  - BIPAP 10/5 ordered, however patient wanted a break from BIPAP and removed it. Attempted to place patient back on BIPAP in ED however pt verbally aggressive and refusing to put mask back on.   - 5L NC while off bipap.      NO EVIDENCE OF ACUTE EXACERBATION Patient with odynophagia and visible fungal patches on imaging with response to antifungal agents.  -Follow ENT recs  - C/w nystatin rinse and spit  - starting clotrimazole kat  - c/w fluconazole 100 mg   - teach patient to always swish and spit after symbicort usage

## 2021-09-16 NOTE — DISCHARGE NOTE PROVIDER - HOSPITAL COURSE
70F current smoker, tracheomalacia, COPD on 3L home O2/CPAP qhs (noncompliant) HTN, HLD, DM2, TIA, recently admitted for oropharyngeal candidiasis and chronic hypoxic respiratory failure 2/2 COPD (8/5/21-8/6/21), BIBEMS from home with falls x2 on day of admission and progressive SOB.      #Fungal esophagitis.  ·  Plan: Patient with odynophagia and visible fungal patches on imaging with response to antifungal agents.  -Follow ENT recs  - C/w nystatin rinse and spit  - starting clotrimazole kat  - c/w fluconazole 100 mg   - teach patient to always swish and spit after symbicort usage.    #Falls.   ·  Plan: Has had 2 falls in the past 1 day she states due to generalized weakness. No presyncopal symptoms such as diaphoresis, flushing, chest pain, palpitations. Kian LOC, ? HS.   - CTH negative in ed  - CT cervical spine inconclusive due to motion artifact. But no spinal tenderness on exam   - Will follow up outpatient  - PT consulted -> home PT.    #COPD  ·  Plan: Pt tachypneic and not speaking in full sentences on exam. Was actively smoking at home when she got worsening SOB. Is on stiolto, flovent, and duonebs at home per chart review. Given solumedrol, CTX, Levaquin, and duonebs in the ED.  - c/w home Stiolto, flovent   - c/w Duoneb q4 hours standing   - Azithro 500 x 3 doses for COPD exacerbation  - Prednisone 40mg for 3 more doses  - f/u RVP - negative  - BIPAP 10/5 ordered, however patient wanted a break from BIPAP and removed it. Attempted to place patient back on BIPAP in ED however pt verbally aggressive and refusing to put mask back on.   - 5L NC while off bipap.          #Tobacco dependence.  ·  Plan: 40 pack year smoking history and had quit inbetween but has restarted smoking this year. Smokes between 2-4 cigarettes daily. Likely in COPD exacerbation in the setting of her continued smoking.   - Nicotine patch offered, pt refused.    #HTN (hypertension).  ·  Plan: On HCTZ 25mg and Lisinopril 20mg       #Carotid artery stenosis.  ·  Plan: R sided carotid stenosis on ASA 81mg   - ASA 81 mg.    #Diabetes mellitus.  ·  Plan: Is diabetic but most recent A1c 6.2 (prediabetes range) on metformin 500 BID at home.  - iSS while inpatient.    #Depression.  - c/w zoloft 50mg.    Medications started: Fluconazole, Clotrimazole Lozenge, Nystatin mouthwash  Medications stopped: none  Labs to follow: CBC  Exams to follow: Oropharyngeal 70F current smoker, tracheomalacia, COPD on 3L home O2/CPAP qhs (noncompliant) HTN, HLD, DM2, TIA, recently admitted for oropharyngeal candidiasis and chronic hypoxic respiratory failure 2/2 COPD (8/5/21-8/6/21), BIBEMS from home with falls x2 on day of admission and progressive SOB.      #Fungal esophagitis.  ·  Plan: Patient with odynophagia and visible fungal patches on scope by ENT with response to antifungal agents.  ENT recs  - C/w nystatin rinse and spit  - c/w clotrimazole kat  - c/w fluconazole 100 mg for 7 days total (End date 9/22)  - teach patient to always swish and spit after symbicort usage.    #Falls.   ·  Plan: Has had 2 falls in the past 1 day she states due to generalized weakness. No presyncopal symptoms such as diaphoresis, flushing, chest pain, palpitations. Kian LOC, ? HS.   - CTH negative in ed  - MRI cervical spine showing C4-C6 spinal stenosis, however limited 2/2 motion. Neuro recommending repeat open MRI   - Will follow up outpatient for repeat MRI  - PT consulted -> home PT    #COPD  ·  Plan: Pt tachypneic and not speaking in full sentences on exam. Was actively smoking at home when she got worsening SOB. Is on stiolto, flovent, and duonebs at home per chart review. Given solumedrol, CTX, Levaquin, and duonebs in the ED. Had a few days of azithromycin and prednisone.  - c/w home Stiolto, flovent   - f/u RVP - negative     #Tobacco dependence.  ·  Plan: 40 pack year smoking history and had quit inbetween but has restarted smoking this year. Smokes between 2-4 cigarettes daily. Likely in COPD exacerbation in the setting of her continued smoking.   - Nicotine patch offered, pt refused.    #HTN (hypertension).  ·  Plan: On HCTZ 25mg and Lisinopril 20mg       #Carotid artery stenosis.  ·  Plan: R sided carotid stenosis on ASA 81mg   - ASA 81 mg.    #Diabetes mellitus.  ·  Plan: Is diabetic but most recent A1c 6.2 (prediabetes range) on metformin 500 BID at home.  - iSS while inpatient.    #Depression.  - c/w zoloft 50mg.    Medications started: Fluconazole, Clotrimazole Lozenge, Nystatin mouthwash  Medications stopped: none  Labs to follow: CBC  Exams to follow: Oropharyngeal, ENT, Endoscopy by GI Dr. Panchal if not improved

## 2021-09-16 NOTE — PHYSICAL THERAPY INITIAL EVALUATION ADULT - PATIENT PROFILE REVIEW, REHAB EVAL
REVIEW OF SYSTEMS:    CONSTITUTIONAL: No weakness, fevers or chills  ENMT:  No ear pain, No vertigo or throat pain  EYES: No visual changes  or photophobia  NECK: No pain or stiffness  RESPIRATORY: No cough, wheezing, hemoptysis; No shortness of breath  CARDIOVASCULAR: No chest pain or palpitations  GASTROINTESTINAL: No abdominal or epigastric pain. No nausea, vomiting, or hematemesis; No diarrhea or constipation. No melena or hematochezia.  MUSCULOSKELETAL: No joint swelling  or warmth.  GENITOURINARY: No dysuria, frequency or hematuria  NEUROLOGICAL: No numbness or weakness  PSYCHIATRIC: No depression or anhedonia.  ENDOCRINE: No sx hypoglycemic episodes.  SKIN: see above. yes

## 2021-09-16 NOTE — PROGRESS NOTE ADULT - SUBJECTIVE AND OBJECTIVE BOX
INCOMPLETE    O/N Events:    Subjective/ROS: Patient seen and examined at bedside.     Denies Fever/Chills, HA, CP, SOB, n/v, changes in bowel/urinary habits.  12pt ROS otherwise negative.    VITALS  Vital Signs Last 24 Hrs  T(C): 36.8 (16 Sep 2021 05:00), Max: 37.2 (15 Sep 2021 16:12)  T(F): 98.3 (16 Sep 2021 05:00), Max: 99 (15 Sep 2021 16:12)  HR: 78 (16 Sep 2021 06:42) (75 - 100)  BP: 122/68 (16 Sep 2021 05:00) (117/63 - 133/60)  BP(mean): --  RR: 22 (16 Sep 2021 06:42) (18 - 22)  SpO2: 98% (16 Sep 2021 06:42) (92% - 99%)    CAPILLARY BLOOD GLUCOSE      POCT Blood Glucose.: 135 mg/dL (16 Sep 2021 05:53)  POCT Blood Glucose.: 216 mg/dL (15 Sep 2021 23:51)  POCT Blood Glucose.: 226 mg/dL (15 Sep 2021 18:03)  POCT Blood Glucose.: 149 mg/dL (15 Sep 2021 12:11)      PHYSICAL EXAM  General: NAD  Head: NC/AT; MMM; PERRL; EOMI;  Neck: Supple; no JVD  Respiratory: CTAB; no wheezes/rales/rhonchi  Cardiovascular: Regular rhythm/rate; S1/S2+, no murmurs, rubs gallops   Gastrointestinal: Soft; NTND; bowel sounds normal and present  Extremities: WWP; no edema/cyanosis  Neurological: A&Ox3, CNII-XII grossly intact; no obvious focal deficits    MEDICATIONS  (STANDING):  albuterol/ipratropium for Nebulization 3 milliLiter(s) Nebulizer every 4 hours  aspirin enteric coated 81 milliGRAM(s) Oral daily  atorvastatin 40 milliGRAM(s) Oral at bedtime  azithromycin  IVPB 500 milliGRAM(s) IV Intermittent every 24 hours  dextrose 40% Gel 15 Gram(s) Oral once  dextrose 5%. 1000 milliLiter(s) (50 mL/Hr) IV Continuous <Continuous>  dextrose 5%. 1000 milliLiter(s) (100 mL/Hr) IV Continuous <Continuous>  dextrose 50% Injectable 25 Gram(s) IV Push once  dextrose 50% Injectable 12.5 Gram(s) IV Push once  dextrose 50% Injectable 25 Gram(s) IV Push once  enoxaparin Injectable 40 milliGRAM(s) SubCutaneous every 24 hours  fluconAZOLE   Tablet 100 milliGRAM(s) Oral daily  glucagon  Injectable 1 milliGRAM(s) IntraMuscular once  hydrochlorothiazide 25 milliGRAM(s) Oral daily  insulin lispro (ADMELOG) corrective regimen sliding scale   SubCutaneous every 6 hours  lisinopril 20 milliGRAM(s) Oral daily  mometasone 220 MICROgram(s) Inhaler 2 Puff(s) Inhalation two times a day  montelukast 10 milliGRAM(s) Oral daily  nystatin    Suspension [id number] Unit(s) Swish and Swallow four times a day  pantoprazole  Injectable 40 milliGRAM(s) IV Push daily  predniSONE   Tablet 40 milliGRAM(s) Oral every 24 hours  sertraline 50 milliGRAM(s) Oral daily  tiotropium 2.5 MICROgram(s)/olodaterol 2.5 MICROgram(s) (STIOLTO) Inhaler 2 Puff(s) Inhalation daily    MEDICATIONS  (PRN):  FIRST- Mouthwash  BLM 30 milliLiter(s) Swish and Swallow three times a day PRN throat/mouth dryness      No Known Allergies      LABS                        9.4    13.21 )-----------( 602      ( 15 Sep 2021 08:54 )             31.9     09-15    136  |  98  |  19  ----------------------------<  216<H>  4.6   |  28  |  0.64    Ca    9.7      15 Sep 2021 08:54  Phos  3.0     09-15  Mg     2.2     09-15    TPro  7.1  /  Alb  4.2  /  TBili  <0.2  /  DBili  x   /  AST  18  /  ALT  13  /  AlkPhos  90  09-15    PT/INR - ( 14 Sep 2021 21:59 )   PT: 11.5 sec;   INR: 0.96          PTT - ( 14 Sep 2021 21:59 )  PTT:28.9 sec    CARDIAC MARKERS ( 14 Sep 2021 21:59 )  x     / 0.01 ng/mL / 87 U/L / x     / 2.6 ng/mL          IMAGING/EKG/ETC   NAEO    O/N Events  Subjective/ROS: Patient seen and examined at bedside.     Denies Fever/Chills, HA, CP, SOB, n/v, changes in bowel/urinary habits.  12pt ROS otherwise negative.    VITALS  Vital Signs Last 24 Hrs  T(C): 36.8 (16 Sep 2021 05:00), Max: 37.2 (15 Sep 2021 16:12)  T(F): 98.3 (16 Sep 2021 05:00), Max: 99 (15 Sep 2021 16:12)  HR: 78 (16 Sep 2021 06:42) (75 - 100)  BP: 122/68 (16 Sep 2021 05:00) (117/63 - 133/60)  BP(mean): --  RR: 22 (16 Sep 2021 06:42) (18 - 22)  SpO2: 98% (16 Sep 2021 06:42) (92% - 99%)    CAPILLARY BLOOD GLUCOSE      POCT Blood Glucose.: 135 mg/dL (16 Sep 2021 05:53)  POCT Blood Glucose.: 216 mg/dL (15 Sep 2021 23:51)  POCT Blood Glucose.: 226 mg/dL (15 Sep 2021 18:03)  POCT Blood Glucose.: 149 mg/dL (15 Sep 2021 12:11)      PHYSICAL EXAM  General: NAD  Head: NC/AT; MMM; PERRL; EOMI;  Neck: Supple; no JVD  Respiratory: CTAB; no wheezes/rales/rhonchi  Cardiovascular: Regular rhythm/rate; S1/S2+, no murmurs, rubs gallops   Gastrointestinal: Soft; NTND; bowel sounds normal and present  Extremities: WWP; no edema/cyanosis  Neurological: A&Ox3, CNII-XII grossly intact; no obvious focal deficits    MEDICATIONS  (STANDING):  albuterol/ipratropium for Nebulization 3 milliLiter(s) Nebulizer every 4 hours  aspirin enteric coated 81 milliGRAM(s) Oral daily  atorvastatin 40 milliGRAM(s) Oral at bedtime  azithromycin  IVPB 500 milliGRAM(s) IV Intermittent every 24 hours  dextrose 40% Gel 15 Gram(s) Oral once  dextrose 5%. 1000 milliLiter(s) (50 mL/Hr) IV Continuous <Continuous>  dextrose 5%. 1000 milliLiter(s) (100 mL/Hr) IV Continuous <Continuous>  dextrose 50% Injectable 25 Gram(s) IV Push once  dextrose 50% Injectable 12.5 Gram(s) IV Push once  dextrose 50% Injectable 25 Gram(s) IV Push once  enoxaparin Injectable 40 milliGRAM(s) SubCutaneous every 24 hours  fluconAZOLE   Tablet 100 milliGRAM(s) Oral daily  glucagon  Injectable 1 milliGRAM(s) IntraMuscular once  hydrochlorothiazide 25 milliGRAM(s) Oral daily  insulin lispro (ADMELOG) corrective regimen sliding scale   SubCutaneous every 6 hours  lisinopril 20 milliGRAM(s) Oral daily  mometasone 220 MICROgram(s) Inhaler 2 Puff(s) Inhalation two times a day  montelukast 10 milliGRAM(s) Oral daily  nystatin    Suspension [id number] Unit(s) Swish and Swallow four times a day  pantoprazole  Injectable 40 milliGRAM(s) IV Push daily  predniSONE   Tablet 40 milliGRAM(s) Oral every 24 hours  sertraline 50 milliGRAM(s) Oral daily  tiotropium 2.5 MICROgram(s)/olodaterol 2.5 MICROgram(s) (STIOLTO) Inhaler 2 Puff(s) Inhalation daily    MEDICATIONS  (PRN):  FIRST- Mouthwash  BLM 30 milliLiter(s) Swish and Swallow three times a day PRN throat/mouth dryness      No Known Allergies      LABS                        9.4    13.21 )-----------( 602      ( 15 Sep 2021 08:54 )             31.9     09-15    136  |  98  |  19  ----------------------------<  216<H>  4.6   |  28  |  0.64    Ca    9.7      15 Sep 2021 08:54  Phos  3.0     09-15  Mg     2.2     09-15    TPro  7.1  /  Alb  4.2  /  TBili  <0.2  /  DBili  x   /  AST  18  /  ALT  13  /  AlkPhos  90  09-15    PT/INR - ( 14 Sep 2021 21:59 )   PT: 11.5 sec;   INR: 0.96          PTT - ( 14 Sep 2021 21:59 )  PTT:28.9 sec    CARDIAC MARKERS ( 14 Sep 2021 21:59 )  x     / 0.01 ng/mL / 87 U/L / x     / 2.6 ng/mL          IMAGING/EKG/ETC

## 2021-09-16 NOTE — DISCHARGE NOTE PROVIDER - NSDCCPCAREPLAN_GEN_ALL_CORE_FT
PRINCIPAL DISCHARGE DIAGNOSIS  Diagnosis: Fungal esophagitis  Assessment and Plan of Treatment: What causes esophagitis? The most common cause is acid reflux. Reflux means stomach acid backs up into your esophagus. The following can also cause esophagitis:   •An infection from bacteria, a virus, or a fungus  •Vomiting or a hiatal hernia  •Medicines such as aspirin or NSAIDs  •Large pills taken without enough water or right before you go to bed  •Cancer treatment, such as radiation  •A toxic object you swallowed, such as a button battery, that gets stuck in your esophagus  •Too much caffeine or acidic or spicy foods  •Smoking cigarettes  What are the signs and symptoms of esophagitis? Signs and symptoms depend on the cause:   •Pain in the middle of your chest that may spread to your back  •Burning or pain in your esophagus, abdominal pain, or indigestion  •Trouble swallowing, or pain when you swallow  •A feeling that something is stuck in your esophagus  •Sore throat, a cough, or hoarseness  •Gagging, drooling, or wheezing  •Mouth sores (white patches), a bad taste in your mouth, or bad breath  •Nausea or vomiting  Your specific cause was fungal, likely due to your symbicort. It is important for you  to swish water through your mouth after you use this medication in order to prevent fungal growth from   Continue to take the antifungal medications as prescribed upon discharge.         PRINCIPAL DISCHARGE DIAGNOSIS  Diagnosis: Fungal esophagitis  Assessment and Plan of Treatment: What causes esophagitis? The most common cause is acid reflux. Reflux means stomach acid backs up into your esophagus. The following can also cause esophagitis:   •An infection from bacteria, a virus, or a fungus  •Vomiting or a hiatal hernia  •Medicines such as aspirin or NSAIDs  •Large pills taken without enough water or right before you go to bed  •Cancer treatment, such as radiation  •A toxic object you swallowed, such as a button battery, that gets stuck in your esophagus  •Too much caffeine or acidic or spicy foods  •Smoking cigarettes  What are the signs and symptoms of esophagitis? Signs and symptoms depend on the cause:   •Pain in the middle of your chest that may spread to your back  •Burning or pain in your esophagus, abdominal pain, or indigestion  •Trouble swallowing, or pain when you swallow  •A feeling that something is stuck in your esophagus  •Sore throat, a cough, or hoarseness  •Gagging, drooling, or wheezing  •Mouth sores (white patches), a bad taste in your mouth, or bad breath  •Nausea or vomiting  Your specific cause was fungal, likely due to your symbicort. It is important for you  to swish water through your mouth after you use this medication in order to prevent fungal growth from   Continue to take the antifungal medications as prescribed upon discharge.  PLEASE TAKE THE FLUCONAZOLE AS PRESCRIBED ONCE A DAY (END DATE 9/22) AND THE SWISH AND SWALLOW ANTIFUNGAL

## 2021-09-16 NOTE — PROGRESS NOTE ADULT - PROBLEM SELECTOR PLAN 2
Has had 2 falls in the past 1 day she states due to generalized weakness. No presyncopal symptoms such as diaphoresis, flushing, chest pain, palpitations. Kian LOC, ? HS.   - CTH negative in ed  - CT cervical spine inconclusive due to motion artifact. But no spinal tenderness on exam   - PT consulted. Has had 2 falls in the past 1 day she states due to generalized weakness. No presyncopal symptoms such as diaphoresis, flushing, chest pain, palpitations. Kian LOC, ? HS.   - CTH negative in ed  - CT cervical spine inconclusive due to motion artifact. But no spinal tenderness on exam   - Will follow up outpatient  - PT consulted -> home PT

## 2021-09-16 NOTE — DISCHARGE NOTE PROVIDER - CARE PROVIDERS DIRECT ADDRESSES
[email or web address]_Unknown

## 2021-09-16 NOTE — PROGRESS NOTE ADULT - ASSESSMENT
incomplete  70F current smoker, tracheomalacia, COPD on 3L home O2/CPAP qhs (noncompliant) HTN, HLD, TIA, DM2 recently admitted for oropharyngeal candidiasis and chronic hypoxic respiratory failure 2/2 COPD (8/5/21-8/6/21), BIBEMS from home with falls x2 on day of admission and progressive SOB, neuro consulted for weakness and falls.     #Falls-patient reports to have been falling, recently more often. Last two falls occurred this past week, one of which was preceded by dizziness. On exam she has mild right sided weakness and diffuse hyperreflexia that may indicate cervical degenerative disk disease with cord compression     incomplete  70F current smoker, tracheomalacia, COPD on 3L home O2/CPAP qhs (noncompliant) HTN, HLD, TIA, DM2 recently admitted for oropharyngeal candidiasis and chronic hypoxic respiratory failure 2/2 COPD (8/5/21-8/6/21), BIBEMS from home with falls x2 on day of admission and progressive SOB, neuro consulted for weakness and falls.     #Falls-patient reports to have been falling, recently more often. Last two falls occurred this past week, one of which was preceded by dizziness. On exam she has mild right sided weakness and diffuse hyperreflexia that may indicate cervical degenerative disk disease with cord compression.  -MRI indicating degenerative disc disease at the C4/5 through the C6/7 levels with spinal stenosis at C4/5 and C5/6; however, limited by motion artifact as patient felt uncomfortable during the exam.   -May benefit from open MRI to obtain better image, once discharged.   -Neurology to sign off, please reconsult for further questions.     All recommendations final upon attending attestation.

## 2021-09-16 NOTE — DISCHARGE NOTE PROVIDER - NSDCMRMEDTOKEN_GEN_ALL_CORE_FT
aspirin 81 mg oral delayed release tablet: 1 tab(s) orally once a day  Flovent 220 mcg/inh inhalation aerosol with adapter: 2 puff(s) inhaled 2 times a day  hydroCHLOROthiazide 25 mg oral tablet: 1 tab(s) orally once a day  ipratropium-albuterol 20 mcg-100 mcg/inh inhalation aerosol: 1 puff(s) inhaled 4 times a day  Lipitor 40 mg oral tablet: 1 tab(s) orally once a day  lisinopril 20 mg oral tablet: 1 tab(s) orally once a day  metFORMIN 500 mg oral tablet: 1 tab(s) orally 2 times a day  Singulair 10 mg oral tablet: 1 tab(s) orally once a day  Stiolto Respimat 10 ACT 2.5 mcg-2.5 mcg/inh inhalation aerosol: 2 puff(s) inhaled every 24 hours  Zoloft 50 mg oral tablet: 1 tab(s) orally once a day   aspirin 81 mg oral delayed release tablet: 1 tab(s) orally once a day  clotrimazole 10 mg oral lozenge: 1 lozenge orally 5 times a day  diphenhydramine/lidocaine/aluminum hydroxide/magnesium hydroxide/simethicone mucous membrane suspension: 1 each mucous membrane once a day  Flovent 220 mcg/inh inhalation aerosol with adapter: 2 puff(s) inhaled 2 times a day  fluconazole 100 mg oral tablet: 1 tab(s) orally once a day  hydroCHLOROthiazide 25 mg oral tablet: 1 tab(s) orally once a day  ipratropium-albuterol 20 mcg-100 mcg/inh inhalation aerosol: 1 puff(s) inhaled 4 times a day  Lipitor 40 mg oral tablet: 1 tab(s) orally once a day  lisinopril 20 mg oral tablet: 1 tab(s) orally once a day  metFORMIN 500 mg oral tablet: 1 tab(s) orally 2 times a day  nystatin 100,000 units/mL oral suspension: 5 milliliter(s) orally 4 times a day  Singulair 10 mg oral tablet: 1 tab(s) orally once a day  Stiolto Respimat 10 ACT 2.5 mcg-2.5 mcg/inh inhalation aerosol: 2 puff(s) inhaled every 24 hours  Zoloft 50 mg oral tablet: 1 tab(s) orally once a day   aspirin 81 mg oral delayed release tablet: 1 tab(s) orally once a day  clotrimazole 10 mg oral lozenge: 1 lozenge orally 5 times a day  diphenhydramine/lidocaine/aluminum hydroxide/magnesium hydroxide/simethicone mucous membrane suspension: 1 each mucous membrane once a day  fluconazole 100 mg oral tablet: 1 tab(s) orally once a day  hydroCHLOROthiazide 25 mg oral tablet: 1 tab(s) orally once a day  ipratropium-albuterol 20 mcg-100 mcg/inh inhalation aerosol: 1 puff(s) inhaled 4 times a day  Lipitor 40 mg oral tablet: 1 tab(s) orally once a day  lisinopril 20 mg oral tablet: 1 tab(s) orally once a day  metFORMIN 500 mg oral tablet: 1 tab(s) orally 2 times a day  Singulair 10 mg oral tablet: 1 tab(s) orally once a day  Stiolto Respimat 10 ACT 2.5 mcg-2.5 mcg/inh inhalation aerosol: 2 puff(s) inhaled every 24 hours  Zoloft 50 mg oral tablet: 1 tab(s) orally once a day   aspirin 81 mg oral delayed release tablet: 1 tab(s) orally once a day  clotrimazole 10 mg oral lozenge: 1 lozenge orally 5 times a day  diphenhydramine/lidocaine/aluminum hydroxide/magnesium hydroxide/simethicone mucous membrane suspension: 1 each mucous membrane once a day  Flovent 220 mcg/inh inhalation aerosol with adapter: 2 puff(s) inhaled 2 times a day  fluconazole 100 mg oral tablet: 1 tab(s) orally once a day  hydroCHLOROthiazide 25 mg oral tablet: 1 tab(s) orally once a day  ipratropium-albuterol 20 mcg-100 mcg/inh inhalation aerosol: 1 puff(s) inhaled 4 times a day  Lipitor 40 mg oral tablet: 1 tab(s) orally once a day  lisinopril 20 mg oral tablet: 1 tab(s) orally once a day  metFORMIN 500 mg oral tablet: 1 tab(s) orally 2 times a day  Singulair 10 mg oral tablet: 1 tab(s) orally once a day  Stiolto Respimat 10 ACT 2.5 mcg-2.5 mcg/inh inhalation aerosol: 2 puff(s) inhaled every 24 hours  Zoloft 50 mg oral tablet: 1 tab(s) orally once a day

## 2021-09-16 NOTE — PROGRESS NOTE ADULT - ATTENDING COMMENTS
MRI C spine reviewed - severely limited by motion artifact. there is definitely significant degenerative disease with cervical spinal stenosis, however difficult to say whether there is mild cord compression or not  Regardless, this is a chronic issue and can be worked up further after discharge with an open MRI to reduce motion artifact, followed by possible referral to spine surgery if necessary  neurology will sign off, call back with further questions

## 2021-09-16 NOTE — PHYSICAL THERAPY INITIAL EVALUATION ADULT - ADDITIONAL COMMENTS
Pt states she occasionally uses a cane in home, not always. Uses a rollator in community. Pt has a HHA 8-2:30, 7 days

## 2021-09-16 NOTE — DISCHARGE NOTE PROVIDER - PROVIDER TOKENS
PROVIDER:[TOKEN:[[id number]],SCHEDULEDAPPT:[10/28/2021],SCHEDULEDAPPTTIME:[03:15 PM],ESTABLISHEDPATIENT:[T]] PROVIDER:[TOKEN:[[id number]],SCHEDULEDAPPT:[10/28/2021],SCHEDULEDAPPTTIME:[03:15 PM],ESTABLISHEDPATIENT:[T]],PROVIDER:[TOKEN:[[id number]],FOLLOWUP:[2 weeks]] PROVIDER:[TOKEN:[[id number]],SCHEDULEDAPPT:[10/28/2021],SCHEDULEDAPPTTIME:[03:15 PM]],FREE:[LAST:[Musa],FIRST:[Nidhi FRANK],PHONE:[phone]],FAX:[(   )    -],ADDRESS:[Otolaryngology  [address] [place] [address], [address] [place] [address]],SCHEDULEDAPPT:[10/15/2021],SCHEDULEDAPPTTIME:[10:15 AM]]

## 2021-09-16 NOTE — PROGRESS NOTE ADULT - PROBLEM SELECTOR PLAN 7
Is on zoloft 50mg outpatient   - c/w zoloft 50mg Is diabetic but most recent A1c 6.2 (prediabetes range) on metformin 500 BID at home.  - iSS while inpatient.

## 2021-09-16 NOTE — PHYSICAL THERAPY INITIAL EVALUATION ADULT - PERTINENT HX OF CURRENT PROBLEM, REHAB EVAL
70F current smoker, tracheomalacia, COPD on 3L home O2/CPAP qhs (noncompliant) HTN, HLD, DM2, TIA, recently admitted for oropharyngeal candidiasis and chronic hypoxic respiratory failure 2/2 COPD (8/5/21-8/6/21), BIBEMS from home with falls x2 on day of admission and progressive SOB. Per pt, she has had sob requiring an increase in home O2 from 3L to 5L and never told family. Has a chronic cough without increase in sputum production or color change.

## 2021-09-17 ENCOUNTER — TRANSCRIPTION ENCOUNTER (OUTPATIENT)
Age: 71
End: 2021-09-17

## 2021-09-17 VITALS — OXYGEN SATURATION: 96 % | HEART RATE: 82 BPM | RESPIRATION RATE: 18 BRPM

## 2021-09-17 LAB
ANION GAP SERPL CALC-SCNC: 6 MMOL/L — SIGNIFICANT CHANGE UP (ref 5–17)
BUN SERPL-MCNC: 38 MG/DL — HIGH (ref 7–23)
CALCIUM SERPL-MCNC: 9.4 MG/DL — SIGNIFICANT CHANGE UP (ref 8.4–10.5)
CHLORIDE SERPL-SCNC: 99 MMOL/L — SIGNIFICANT CHANGE UP (ref 96–108)
CO2 SERPL-SCNC: 31 MMOL/L — SIGNIFICANT CHANGE UP (ref 22–31)
CREAT SERPL-MCNC: 0.83 MG/DL — SIGNIFICANT CHANGE UP (ref 0.5–1.3)
GLUCOSE BLDC GLUCOMTR-MCNC: 115 MG/DL — HIGH (ref 70–99)
GLUCOSE BLDC GLUCOMTR-MCNC: 115 MG/DL — HIGH (ref 70–99)
GLUCOSE BLDC GLUCOMTR-MCNC: 158 MG/DL — HIGH (ref 70–99)
GLUCOSE SERPL-MCNC: 115 MG/DL — HIGH (ref 70–99)
HCT VFR BLD CALC: 30 % — LOW (ref 34.5–45)
HGB BLD-MCNC: 8.8 G/DL — LOW (ref 11.5–15.5)
MAGNESIUM SERPL-MCNC: 2.4 MG/DL — SIGNIFICANT CHANGE UP (ref 1.6–2.6)
MCHC RBC-ENTMCNC: 26.5 PG — LOW (ref 27–34)
MCHC RBC-ENTMCNC: 29.3 GM/DL — LOW (ref 32–36)
MCV RBC AUTO: 90.4 FL — SIGNIFICANT CHANGE UP (ref 80–100)
NRBC # BLD: 0 /100 WBCS — SIGNIFICANT CHANGE UP (ref 0–0)
PHOSPHATE SERPL-MCNC: 3 MG/DL — SIGNIFICANT CHANGE UP (ref 2.5–4.5)
PLATELET # BLD AUTO: 526 K/UL — HIGH (ref 150–400)
POTASSIUM SERPL-MCNC: 5 MMOL/L — SIGNIFICANT CHANGE UP (ref 3.5–5.3)
POTASSIUM SERPL-SCNC: 5 MMOL/L — SIGNIFICANT CHANGE UP (ref 3.5–5.3)
RBC # BLD: 3.32 M/UL — LOW (ref 3.8–5.2)
RBC # FLD: 15.9 % — HIGH (ref 10.3–14.5)
SODIUM SERPL-SCNC: 136 MMOL/L — SIGNIFICANT CHANGE UP (ref 135–145)
WBC # BLD: 15.48 K/UL — HIGH (ref 3.8–10.5)
WBC # FLD AUTO: 15.48 K/UL — HIGH (ref 3.8–10.5)

## 2021-09-17 PROCEDURE — 99285 EMERGENCY DEPT VISIT HI MDM: CPT

## 2021-09-17 PROCEDURE — 97161 PT EVAL LOW COMPLEX 20 MIN: CPT

## 2021-09-17 PROCEDURE — 85730 THROMBOPLASTIN TIME PARTIAL: CPT

## 2021-09-17 PROCEDURE — 83735 ASSAY OF MAGNESIUM: CPT

## 2021-09-17 PROCEDURE — 85025 COMPLETE CBC W/AUTO DIFF WBC: CPT

## 2021-09-17 PROCEDURE — 82330 ASSAY OF CALCIUM: CPT

## 2021-09-17 PROCEDURE — 93005 ELECTROCARDIOGRAM TRACING: CPT

## 2021-09-17 PROCEDURE — 71045 X-RAY EXAM CHEST 1 VIEW: CPT

## 2021-09-17 PROCEDURE — 96374 THER/PROPH/DIAG INJ IV PUSH: CPT

## 2021-09-17 PROCEDURE — 36415 COLL VENOUS BLD VENIPUNCTURE: CPT

## 2021-09-17 PROCEDURE — 84100 ASSAY OF PHOSPHORUS: CPT

## 2021-09-17 PROCEDURE — 84484 ASSAY OF TROPONIN QUANT: CPT

## 2021-09-17 PROCEDURE — 83880 ASSAY OF NATRIURETIC PEPTIDE: CPT

## 2021-09-17 PROCEDURE — 94640 AIRWAY INHALATION TREATMENT: CPT

## 2021-09-17 PROCEDURE — 85027 COMPLETE CBC AUTOMATED: CPT

## 2021-09-17 PROCEDURE — 82803 BLOOD GASES ANY COMBINATION: CPT

## 2021-09-17 PROCEDURE — 80048 BASIC METABOLIC PNL TOTAL CA: CPT

## 2021-09-17 PROCEDURE — 70450 CT HEAD/BRAIN W/O DYE: CPT

## 2021-09-17 PROCEDURE — 0225U NFCT DS DNA&RNA 21 SARSCOV2: CPT

## 2021-09-17 PROCEDURE — 99239 HOSP IP/OBS DSCHRG MGMT >30: CPT | Mod: GC

## 2021-09-17 PROCEDURE — 84132 ASSAY OF SERUM POTASSIUM: CPT

## 2021-09-17 PROCEDURE — 84295 ASSAY OF SERUM SODIUM: CPT

## 2021-09-17 PROCEDURE — 85610 PROTHROMBIN TIME: CPT

## 2021-09-17 PROCEDURE — 82962 GLUCOSE BLOOD TEST: CPT

## 2021-09-17 PROCEDURE — 80053 COMPREHEN METABOLIC PANEL: CPT

## 2021-09-17 PROCEDURE — 85379 FIBRIN DEGRADATION QUANT: CPT

## 2021-09-17 PROCEDURE — 72125 CT NECK SPINE W/O DYE: CPT

## 2021-09-17 PROCEDURE — 73523 X-RAY EXAM HIPS BI 5/> VIEWS: CPT

## 2021-09-17 PROCEDURE — 82553 CREATINE MB FRACTION: CPT

## 2021-09-17 PROCEDURE — 86769 SARS-COV-2 COVID-19 ANTIBODY: CPT

## 2021-09-17 PROCEDURE — 72141 MRI NECK SPINE W/O DYE: CPT

## 2021-09-17 PROCEDURE — 94660 CPAP INITIATION&MGMT: CPT

## 2021-09-17 PROCEDURE — 82550 ASSAY OF CK (CPK): CPT

## 2021-09-17 PROCEDURE — 87635 SARS-COV-2 COVID-19 AMP PRB: CPT

## 2021-09-17 RX ORDER — ATORVASTATIN CALCIUM 80 MG/1
1 TABLET, FILM COATED ORAL
Qty: 30 | Refills: 0
Start: 2021-09-17 | End: 2021-10-16

## 2021-09-17 RX ORDER — MONTELUKAST 4 MG/1
1 TABLET, CHEWABLE ORAL
Qty: 30 | Refills: 0
Start: 2021-09-17 | End: 2021-10-16

## 2021-09-17 RX ORDER — LISINOPRIL 2.5 MG/1
1 TABLET ORAL
Qty: 30 | Refills: 0
Start: 2021-09-17 | End: 2021-10-16

## 2021-09-17 RX ORDER — NYSTATIN 500MM UNIT
4 POWDER (EA) MISCELLANEOUS
Qty: 1 | Refills: 0
Start: 2021-09-17 | End: 2021-09-17

## 2021-09-17 RX ORDER — LISINOPRIL 2.5 MG/1
1 TABLET ORAL
Qty: 0 | Refills: 0 | DISCHARGE

## 2021-09-17 RX ORDER — FLUTICASONE PROPIONATE 220 MCG
2 AEROSOL WITH ADAPTER (GRAM) INHALATION
Qty: 1 | Refills: 0
Start: 2021-09-17 | End: 2021-09-17

## 2021-09-17 RX ORDER — IPRATROPIUM/ALBUTEROL SULFATE 18-103MCG
1 AEROSOL WITH ADAPTER (GRAM) INHALATION
Qty: 1 | Refills: 0
Start: 2021-09-17 | End: 2021-09-17

## 2021-09-17 RX ORDER — DIPHENHYDRAMINE HYDROCHLORIDE AND LIDOCAINE HYDROCHLORIDE AND ALUMINUM HYDROXIDE AND MAGNESIUM HYDRO
1 KIT
Qty: 1 | Refills: 0
Start: 2021-09-17 | End: 2021-09-17

## 2021-09-17 RX ORDER — NYSTATIN 500MM UNIT
5 POWDER (EA) MISCELLANEOUS
Qty: 1 | Refills: 0
Start: 2021-09-17 | End: 2021-09-17

## 2021-09-17 RX ORDER — ATORVASTATIN CALCIUM 80 MG/1
1 TABLET, FILM COATED ORAL
Qty: 0 | Refills: 0 | DISCHARGE

## 2021-09-17 RX ORDER — TIOTROPIUM BROMIDE AND OLODATEROL 3.124; 2.736 UG/1; UG/1
2 SPRAY, METERED RESPIRATORY (INHALATION)
Qty: 1 | Refills: 0
Start: 2021-09-17 | End: 2021-09-17

## 2021-09-17 RX ORDER — SERTRALINE 25 MG/1
1 TABLET, FILM COATED ORAL
Qty: 30 | Refills: 0
Start: 2021-09-17 | End: 2021-10-16

## 2021-09-17 RX ORDER — FLUCONAZOLE 150 MG/1
1 TABLET ORAL
Qty: 5 | Refills: 0
Start: 2021-09-17 | End: 2021-09-21

## 2021-09-17 RX ORDER — IPRATROPIUM/ALBUTEROL SULFATE 18-103MCG
1 AEROSOL WITH ADAPTER (GRAM) INHALATION
Qty: 0 | Refills: 0 | DISCHARGE

## 2021-09-17 RX ORDER — ASPIRIN/CALCIUM CARB/MAGNESIUM 324 MG
1 TABLET ORAL
Qty: 0 | Refills: 0 | DISCHARGE

## 2021-09-17 RX ORDER — METFORMIN HYDROCHLORIDE 850 MG/1
1 TABLET ORAL
Qty: 60 | Refills: 0
Start: 2021-09-17 | End: 2021-10-16

## 2021-09-17 RX ORDER — ASPIRIN/CALCIUM CARB/MAGNESIUM 324 MG
1 TABLET ORAL
Qty: 30 | Refills: 0
Start: 2021-09-17 | End: 2021-10-16

## 2021-09-17 RX ORDER — TIOTROPIUM BROMIDE AND OLODATEROL 3.124; 2.736 UG/1; UG/1
2 SPRAY, METERED RESPIRATORY (INHALATION)
Qty: 0 | Refills: 0 | DISCHARGE

## 2021-09-17 RX ORDER — METFORMIN HYDROCHLORIDE 850 MG/1
1 TABLET ORAL
Qty: 0 | Refills: 0 | DISCHARGE

## 2021-09-17 RX ORDER — SERTRALINE 25 MG/1
1 TABLET, FILM COATED ORAL
Qty: 0 | Refills: 0 | DISCHARGE

## 2021-09-17 RX ORDER — CLOTRIMAZOLE 10 MG
1 TROCHE MUCOUS MEMBRANE
Qty: 35 | Refills: 0
Start: 2021-09-17 | End: 2021-09-23

## 2021-09-17 RX ORDER — FLUTICASONE PROPIONATE 220 MCG
2 AEROSOL WITH ADAPTER (GRAM) INHALATION
Qty: 0 | Refills: 0 | DISCHARGE

## 2021-09-17 RX ORDER — MONTELUKAST 4 MG/1
1 TABLET, CHEWABLE ORAL
Qty: 0 | Refills: 0 | DISCHARGE

## 2021-09-17 RX ADMIN — Medication 25 MILLIGRAM(S): at 06:27

## 2021-09-17 RX ADMIN — SERTRALINE 50 MILLIGRAM(S): 25 TABLET, FILM COATED ORAL at 12:07

## 2021-09-17 RX ADMIN — Medication 500000 UNIT(S): at 00:13

## 2021-09-17 RX ADMIN — MOMETASONE FUROATE 2 PUFF(S): 220 INHALANT RESPIRATORY (INHALATION) at 06:28

## 2021-09-17 RX ADMIN — Medication 81 MILLIGRAM(S): at 12:11

## 2021-09-17 RX ADMIN — AZITHROMYCIN 255 MILLIGRAM(S): 500 TABLET, FILM COATED ORAL at 06:25

## 2021-09-17 RX ADMIN — Medication 1 LOZENGE: at 12:06

## 2021-09-17 RX ADMIN — PANTOPRAZOLE SODIUM 40 MILLIGRAM(S): 20 TABLET, DELAYED RELEASE ORAL at 12:07

## 2021-09-17 RX ADMIN — LISINOPRIL 20 MILLIGRAM(S): 2.5 TABLET ORAL at 06:27

## 2021-09-17 RX ADMIN — Medication 3 MILLILITER(S): at 09:44

## 2021-09-17 RX ADMIN — MONTELUKAST 10 MILLIGRAM(S): 4 TABLET, CHEWABLE ORAL at 12:07

## 2021-09-17 RX ADMIN — Medication 3 MILLILITER(S): at 06:26

## 2021-09-17 RX ADMIN — Medication 1 LOZENGE: at 09:44

## 2021-09-17 RX ADMIN — Medication 3 MILLILITER(S): at 03:05

## 2021-09-17 RX ADMIN — FLUCONAZOLE 100 MILLIGRAM(S): 150 TABLET ORAL at 12:07

## 2021-09-17 RX ADMIN — Medication 500000 UNIT(S): at 06:26

## 2021-09-17 RX ADMIN — TIOTROPIUM BROMIDE AND OLODATEROL 2 PUFF(S): 3.124; 2.736 SPRAY, METERED RESPIRATORY (INHALATION) at 12:18

## 2021-09-17 RX ADMIN — Medication 3 MILLILITER(S): at 13:10

## 2021-09-17 RX ADMIN — Medication 1: at 00:14

## 2021-09-17 RX ADMIN — Medication 500000 UNIT(S): at 12:07

## 2021-09-17 NOTE — DISCHARGE NOTE NURSING/CASE MANAGEMENT/SOCIAL WORK - NSDCFUADDAPPT_GEN_ALL_CORE_FT
Please bring your Insurance card, Photo ID, Covid-19 vaccination card (if applicable) and Discharge paperwork to the following appointment:    (1) Please follow up with your Otolaryngology Provider, Dr. Nidhi Musa at [address] [place] [address], [address] [place] [address] on 10/15/2021 at 10:15am.    Appointment was scheduled by Ms. JOSE ANGEL Osuna, Referral Coordinator.

## 2021-09-17 NOTE — PROGRESS NOTE ADULT - ASSESSMENT
per Internal Medicine    71 yo F current smoker, tracheomalacia, COPD on 3L home O2/CPAP qhs (noncompliant) HTN, HLD, DM2, TIA, recently admitted for oropharyngeal candidiasis and chronic hypoxic respiratory failure 2/2 COPD (8/5/21-8/6/21), BIBEMS from home with falls x2 on day of admission and progressive SOB.      Problem/Plan - 1:  ·  Problem: Fungal esophagitis.   ·  Plan: Patient with odynophagia and visible fungal patches on imaging with response to antifungal agents.  -Follow ENT recs  - C/w nystatin rinse and spit  - starting clotrimazole kat  - c/w fluconazole 100 mg   - teach patient to always swish and spit after symbicort usage.    Problem/Plan - 2:  ·  Problem: Falls.   ·  Plan: Has had 2 falls in the past 1 day she states due to generalized weakness. No presyncopal symptoms such as diaphoresis, flushing, chest pain, palpitations. Kian LOC, ? HS.   - CTH negative in ed  - CT cervical spine inconclusive due to motion artifact. But no spinal tenderness on exam   - Will follow up outpatient  - PT consulted -> home PT.    Problem/Plan - 3:  ·  Problem: COPD exacerbation.   ·  Plan: Pt tachypneic and not speaking in full sentences on exam. Was actively smoking at home when she got worsening SOB. Is on stiolto, flovent, and duonebs at home per chart review. Given solumedrol, CTX, Levaquin, and duonebs in the ED.  - c/w home Stiolto, flovent   - c/w Duoneb q4 hours standing   - Azithro 500 x 3 doses for COPD exacerbation  - Prednisone 40mg for 3 more doses  - f/u RVP - negative  - BIPAP 10/5 ordered, however patient wanted a break from BIPAP and removed it. Attempted to place patient back on BIPAP in ED however pt verbally aggressive and refusing to put mask back on.   - 5L NC while off bipap.      NO EVIDENCE OF ACUTE EXACERBATION.    Problem/Plan - 4:  ·  Problem: Tobacco dependence.   ·  Plan: 40 pack year smoking history and had quit inbetween but has restarted smoking this year. Smokes between 2-4 cigarettes daily. Likely in COPD exacerbation in the setting of her continued smoking.   - Nicotine patch offered, pt refused.    Problem/Plan - 5:  ·  Problem: HTN (hypertension).   ·  Plan: On HCTZ 25mg and Lisinopril 20mg   - c/w home meds.    Problem/Plan - 6:  ·  Problem: Carotid artery stenosis.   ·  Plan: R sided carotid stenosis on ASA 81mg   - c/w ASA 81 mg.    Problem/Plan - 7:  ·  Problem: Diabetes mellitus.   ·  Plan: Is diabetic but most recent A1c 6.2 (prediabetes range) on metformin 500 BID at home.  - iSS while inpatient.    Problem/Plan - 8:  ·  Problem: Depression.   ·  Plan: Is on zoloft 50mg outpatient   - c/w zoloft 50mg.    Problem/Plan - 9:  ·  Problem: Nutrition, metabolism, and development symptoms.   ·  Plan: .  F: none   E: replete pRN  N: cc  DVT: Lovenox  dispo: RMF  code: FULL CODE.

## 2021-09-17 NOTE — DISCHARGE NOTE NURSING/CASE MANAGEMENT/SOCIAL WORK - PATIENT PORTAL LINK FT
You can access the FollowMyHealth Patient Portal offered by [facility] by registering at the following website: [email or web address]. By joining [employer]’s FollowMyHealth portal, you will also be able to view your health information using other applications (apps) compatible with our system.

## 2021-09-17 NOTE — DISCHARGE NOTE NURSING/CASE MANAGEMENT/SOCIAL WORK - NSDCPEFALRISK_GEN_ALL_CORE
For information on Fall & injury Prevention, visit [email or web address]

## 2021-09-17 NOTE — PROGRESS NOTE ADULT - SUBJECTIVE AND OBJECTIVE BOX
Pt seen and examined   swallowing improving  overall improving    REVIEW OF SYSTEMS:  Constitutional: No fever, weight loss or fatigue  Cardiovascular: No chest pain, palpitations, dizziness or leg swelling  Gastrointestinal: No abdominal or epigastric pain. No nausea, vomiting  No diarrhea or constipation. No melena or hematochezia.  Skin: No itching, burning, rashes or lesions       MEDICATIONS:  MEDICATIONS  (STANDING):  albuterol/ipratropium for Nebulization 3 milliLiter(s) Nebulizer every 4 hours  aspirin enteric coated 81 milliGRAM(s) Oral daily  atorvastatin 40 milliGRAM(s) Oral at bedtime  clotrimazole Lozenge 1 Lozenge Oral five times a day  dextrose 40% Gel 15 Gram(s) Oral once  dextrose 5%. 1000 milliLiter(s) (50 mL/Hr) IV Continuous <Continuous>  dextrose 5%. 1000 milliLiter(s) (100 mL/Hr) IV Continuous <Continuous>  dextrose 50% Injectable 25 Gram(s) IV Push once  dextrose 50% Injectable 12.5 Gram(s) IV Push once  dextrose 50% Injectable 25 Gram(s) IV Push once  enoxaparin Injectable 40 milliGRAM(s) SubCutaneous every 24 hours  fluconAZOLE   Tablet 100 milliGRAM(s) Oral daily  glucagon  Injectable 1 milliGRAM(s) IntraMuscular once  hydrochlorothiazide 25 milliGRAM(s) Oral daily  insulin lispro (ADMELOG) corrective regimen sliding scale   SubCutaneous every 6 hours  lisinopril 20 milliGRAM(s) Oral daily  mometasone 220 MICROgram(s) Inhaler 2 Puff(s) Inhalation two times a day  montelukast 10 milliGRAM(s) Oral daily  nystatin    Suspension [id number] Unit(s) Swish and Swallow four times a day  pantoprazole  Injectable 40 milliGRAM(s) IV Push daily  sertraline 50 milliGRAM(s) Oral daily  tiotropium 2.5 MICROgram(s)/olodaterol 2.5 MICROgram(s) (STIOLTO) Inhaler 2 Puff(s) Inhalation daily    MEDICATIONS  (PRN):  FIRST- Mouthwash  BLM 30 milliLiter(s) Swish and Swallow three times a day PRN throat/mouth dryness      Allergies    No Known Allergies    Intolerances        Vital Signs Last 24 Hrs  T(C): 36.6 (17 Sep 2021 06:02), Max: 36.8 (16 Sep 2021 22:22)  T(F): 97.8 (17 Sep 2021 06:02), Max: 98.2 (16 Sep 2021 22:22)  HR: 84 (17 Sep 2021 06:02) (76 - 86)  BP: 144/81 (17 Sep 2021 06:02) (98/50 - 144/81)  BP(mean): --  RR: 18 (17 Sep 2021 06:02) (18 - 18)  SpO2: 94% (17 Sep 2021 06:02) (94% - 97%)      PHYSICAL EXAM:    General:  in no acute distress  HEENT: MMM, conjunctiva and sclera clear  Gastrointestinal: Soft non-tender non-distended; Normal bowel sounds;   Skin: Warm and dry. No obvious rash    LABS:      CBC Full  -  ( 17 Sep 2021 06:29 )  WBC Count : 15.48 K/uL  RBC Count : 3.32 M/uL  Hemoglobin : 8.8 g/dL  Hematocrit : 30.0 %  Platelet Count - Automated : 526 K/uL  Mean Cell Volume : 90.4 fl  Mean Cell Hemoglobin : 26.5 pg  Mean Cell Hemoglobin Concentration : 29.3 gm/dL  Auto Neutrophil # : x  Auto Lymphocyte # : x  Auto Monocyte # : x  Auto Eosinophil # : x  Auto Basophil # : x  Auto Neutrophil % : x  Auto Lymphocyte % : x  Auto Monocyte % : x  Auto Eosinophil % : x  Auto Basophil % : x    09-17    136  |  99  |  38<H>  ----------------------------<  115<H>  5.0   |  31  |  0.83    Ca    9.4      17 Sep 2021 06:29  Phos  3.0     09-17  Mg     2.4     09-17                        RADIOLOGY & ADDITIONAL STUDIES (The following images were personally reviewed):

## 2021-09-17 NOTE — DISCHARGE NOTE NURSING/CASE MANAGEMENT/SOCIAL WORK - NSDCPEWEB_GEN_ALL_CORE
[facility] for Tobacco Control website --- [email or web address] website --- [email or web address]

## 2021-09-17 NOTE — DISCHARGE NOTE NURSING/CASE MANAGEMENT/SOCIAL WORK - NSDCPEEMAIL_GEN_ALL_CORE
[facility] for Tobacco Control email [email or web address]

## 2021-09-17 NOTE — PROGRESS NOTE ADULT - SUBJECTIVE AND OBJECTIVE BOX
Physical Medicine and Rehabilitation Progress Note:    Patient is a 70y old  Female who presents with a chief complaint of COPD exacerbation (17 Sep 2021 10:15)      HPI:  70F current smoker, tracheomalacia, COPD on 3L home O2/CPAP qhs (noncompliant) HTN, HLD, DM2, TIA, recently admitted for oropharyngeal candidiasis and chronic hypoxic respiratory failure 2/2 COPD (8/5/21-8/6/21), BIBEMS from home with falls x2 on day of admission and progressive SOB. Per pt, she has had sob requiring an increase in home O2 from 3L to 5L and never told family. Has a chronic cough without increase in sputum production or color change. Pt states she was sitting in the living room today and smoking cigarettes. Has 40 pack year history and quit intermittently but has restarted smoking again this year. Smokes 3-4 cigarettes per day.  Pt states she was feeling generalized weakness and fatigue and fell to ground two times today.  Was able to crawl to reach phone to call family. No chest pain, palpitations, fevers, chills, abdominal pain.  Per ED documentation who spoke to daughter in law, pt has been noncompliant on CPAP and has been canceling her outpatient appointments.    In the ED:   Vitals: temp 99.1 --> 97.2, hr 90, bp 100/52, rr18 97% 4L NC (intermittent BIPAP)   Labs: wbc 13.41, hgb 8.8, plt 537, ddimer 341. VBG 7.32, pco2 63, venous sat 59.1  CTH: no acute abnormalities  CT Cervical Spine: motion artifact. vertebral body heights maintained.   Interventions: CTX 1g, Levofloxacin 750mg, Duoneb x3, methylprednisolone 125mg IVP     (15 Sep 2021 03:09)                            8.8    15.48 )-----------( 526      ( 17 Sep 2021 06:29 )             30.0       09-17    136  |  99  |  38<H>  ----------------------------<  115<H>  5.0   |  31  |  0.83    Ca    9.4      17 Sep 2021 06:29  Phos  3.0     09-17  Mg     2.4     09-17      Vital Signs Last 24 Hrs  T(C): 36.6 (17 Sep 2021 08:40), Max: 36.8 (16 Sep 2021 22:22)  T(F): 97.9 (17 Sep 2021 08:40), Max: 98.2 (16 Sep 2021 22:22)  HR: 80 (17 Sep 2021 10:17) (76 - 100)  BP: 127/58 (17 Sep 2021 08:40) (98/50 - 144/81)  BP(mean): --  RR: 17 (17 Sep 2021 10:17) (17 - 20)  SpO2: 96% (17 Sep 2021 10:17) (86% - 97%)    MEDICATIONS  (STANDING):  albuterol/ipratropium for Nebulization 3 milliLiter(s) Nebulizer every 4 hours  aspirin enteric coated 81 milliGRAM(s) Oral daily  atorvastatin 40 milliGRAM(s) Oral at bedtime  clotrimazole Lozenge 1 Lozenge Oral five times a day  dextrose 40% Gel 15 Gram(s) Oral once  dextrose 5%. 1000 milliLiter(s) (50 mL/Hr) IV Continuous <Continuous>  dextrose 5%. 1000 milliLiter(s) (100 mL/Hr) IV Continuous <Continuous>  dextrose 50% Injectable 25 Gram(s) IV Push once  dextrose 50% Injectable 12.5 Gram(s) IV Push once  dextrose 50% Injectable 25 Gram(s) IV Push once  enoxaparin Injectable 40 milliGRAM(s) SubCutaneous every 24 hours  fluconAZOLE   Tablet 100 milliGRAM(s) Oral daily  glucagon  Injectable 1 milliGRAM(s) IntraMuscular once  hydrochlorothiazide 25 milliGRAM(s) Oral daily  insulin lispro (ADMELOG) corrective regimen sliding scale   SubCutaneous every 6 hours  lisinopril 20 milliGRAM(s) Oral daily  mometasone 220 MICROgram(s) Inhaler 2 Puff(s) Inhalation two times a day  montelukast 10 milliGRAM(s) Oral daily  nystatin    Suspension [id number] Unit(s) Swish and Swallow four times a day  pantoprazole  Injectable 40 milliGRAM(s) IV Push daily  sertraline 50 milliGRAM(s) Oral daily  tiotropium 2.5 MICROgram(s)/olodaterol 2.5 MICROgram(s) (STIOLTO) Inhaler 2 Puff(s) Inhalation daily    MEDICATIONS  (PRN):  FIRST- Mouthwash  BLM 30 milliLiter(s) Swish and Swallow three times a day PRN throat/mouth dryness    Currently Undergoing Physical/ Occupational Therapy at bedside.    Functional Status Assessment:   9/16/2021    Previous Level of Function:     · Ambulation Skills	independent  · Transfer Skills	independent  · ADL Skills	independent  · Work/Leisure Activity	needed assist; needs device  · Additional Comments	Pt states she occasionally uses a cane in home, not always. Uses a rollator in community. Pt has a HHA 8-2:30, 7 days    Cognitive Status Examination:   · Orientation	oriented to person, place, time and situation  · Level of Consciousness	alert  · Follows Commands and Answers Questions	100% of the time  · Personal Safety and Judgment	intact    Range of Motion Exam:   · Range of Motion Examination	no ROM deficits were identified    Manual Muscle Testing:   · Manual Muscle Testing Results	no strength deficits were identified    Bed Mobility: Sit to Supine:     · Level of [place]	independent    Bed Mobility: Supine to Sit:     · Level of [place]	independent    Transfer: Sit to Stand:     · Level of [place]	supervision  · Physical Assist/Nonphysical Assist	supervision    Transfer: Stand to Sit:     · Level of [place]	supervision  · Physical Assist/Nonphysical Assist	supervision    Sit/Stand Transfer Safety Analysis:     · Impairments Contributing to Impaired Transfers	impaired balance    Gait Skills:     · Level of [place]	supervision  · Physical Assist/Nonphysical Assist	supervision  · Weight-Bearing Restrictions	full weight-bearing  · Gait Distance	50 feet    Gait Analysis:     · Gait Pattern Used	2-point gait  · Gait Deviations Noted	decreased mone  · Impairments Contributing to Gait Deviations	impaired balance    Balance Skills Assessment:     · Sitting Balance: Static	good balance  · Sitting Balance: Dynamic	good balance  · Sit-to-Stand Balance	good minus  · Standing Balance: Static	good minus  · Standing Balance: Dynamic	good minus    Sensory Examination:   Sensory Examination:    Grossly Intact:   · Gross Sensory Examination	Grossly Intact      Clinical Impressions:   · Criteria for Skilled Therapeutic Interventions	impairments found; rehab potential; therapy frequency; anticipated discharge recommendation  · Impairments Found (describe specific impairments)	aerobic capacity/endurance; gait, locomotion, and balance  · Rehab Potential	good, to achieve stated therapy goals  · Therapy Frequency	2-3x/week        PM&R Impression: as above    Current Disposition Plan Recommendations:    d/c home, outpatient physical therapy

## 2021-09-23 DIAGNOSIS — Z87.11 PERSONAL HISTORY OF PEPTIC ULCER DISEASE: ICD-10-CM

## 2021-09-23 DIAGNOSIS — F32.9 MAJOR DEPRESSIVE DISORDER, SINGLE EPISODE, UNSPECIFIED: ICD-10-CM

## 2021-09-23 DIAGNOSIS — Z91.19 PATIENT'S NONCOMPLIANCE WITH OTHER MEDICAL TREATMENT AND REGIMEN: ICD-10-CM

## 2021-09-23 DIAGNOSIS — Z79.51 LONG TERM (CURRENT) USE OF INHALED STEROIDS: ICD-10-CM

## 2021-09-23 DIAGNOSIS — R53.1 WEAKNESS: ICD-10-CM

## 2021-09-23 DIAGNOSIS — I65.21 OCCLUSION AND STENOSIS OF RIGHT CAROTID ARTERY: ICD-10-CM

## 2021-09-23 DIAGNOSIS — Z20.822 CONTACT WITH AND (SUSPECTED) EXPOSURE TO COVID-19: ICD-10-CM

## 2021-09-23 DIAGNOSIS — R13.10 DYSPHAGIA, UNSPECIFIED: ICD-10-CM

## 2021-09-23 DIAGNOSIS — R42 DIZZINESS AND GIDDINESS: ICD-10-CM

## 2021-09-23 DIAGNOSIS — D64.9 ANEMIA, UNSPECIFIED: ICD-10-CM

## 2021-09-23 DIAGNOSIS — F17.210 NICOTINE DEPENDENCE, CIGARETTES, UNCOMPLICATED: ICD-10-CM

## 2021-09-23 DIAGNOSIS — M48.02 SPINAL STENOSIS, CERVICAL REGION: ICD-10-CM

## 2021-09-23 DIAGNOSIS — J96.11 CHRONIC RESPIRATORY FAILURE WITH HYPOXIA: ICD-10-CM

## 2021-09-23 DIAGNOSIS — Z79.84 LONG TERM (CURRENT) USE OF ORAL HYPOGLYCEMIC DRUGS: ICD-10-CM

## 2021-09-23 DIAGNOSIS — B37.0 CANDIDAL STOMATITIS: ICD-10-CM

## 2021-09-23 DIAGNOSIS — J44.1 CHRONIC OBSTRUCTIVE PULMONARY DISEASE WITH (ACUTE) EXACERBATION: ICD-10-CM

## 2021-09-23 DIAGNOSIS — I10 ESSENTIAL (PRIMARY) HYPERTENSION: ICD-10-CM

## 2021-09-23 DIAGNOSIS — T48.6X5A ADVERSE EFFECT OF ANTIASTHMATICS, INITIAL ENCOUNTER: ICD-10-CM

## 2021-09-23 DIAGNOSIS — E78.5 HYPERLIPIDEMIA, UNSPECIFIED: ICD-10-CM

## 2021-09-23 DIAGNOSIS — E11.65 TYPE 2 DIABETES MELLITUS WITH HYPERGLYCEMIA: ICD-10-CM

## 2021-09-23 DIAGNOSIS — J39.8 OTHER SPECIFIED DISEASES OF UPPER RESPIRATORY TRACT: ICD-10-CM

## 2021-09-23 DIAGNOSIS — B37.81 CANDIDAL ESOPHAGITIS: ICD-10-CM

## 2021-09-23 DIAGNOSIS — Z99.81 DEPENDENCE ON SUPPLEMENTAL OXYGEN: ICD-10-CM

## 2021-09-23 DIAGNOSIS — J44.9 CHRONIC OBSTRUCTIVE PULMONARY DISEASE, UNSPECIFIED: ICD-10-CM

## 2021-09-23 DIAGNOSIS — R26.89 OTHER ABNORMALITIES OF GAIT AND MOBILITY: ICD-10-CM

## 2021-09-23 DIAGNOSIS — Z79.82 LONG TERM (CURRENT) USE OF ASPIRIN: ICD-10-CM

## 2021-09-23 DIAGNOSIS — R29.6 REPEATED FALLS: ICD-10-CM

## 2021-09-27 ENCOUNTER — APPOINTMENT (OUTPATIENT)
Dept: PULMONOLOGY | Facility: CLINIC | Age: 71
End: 2021-09-27
Payer: MEDICARE

## 2021-09-27 VITALS
DIASTOLIC BLOOD PRESSURE: 69 MMHG | RESPIRATION RATE: 12 BRPM | OXYGEN SATURATION: 98 % | WEIGHT: 153 LBS | SYSTOLIC BLOOD PRESSURE: 124 MMHG | BODY MASS INDEX: 30.84 KG/M2 | TEMPERATURE: 97.3 F | HEART RATE: 85 BPM | HEIGHT: 59 IN

## 2021-09-27 PROCEDURE — 99214 OFFICE O/P EST MOD 30 MIN: CPT

## 2021-09-27 RX ORDER — TIOTROPIUM BROMIDE AND OLODATEROL 3.124; 2.736 UG/1; UG/1
2.5-2.5 SPRAY, METERED RESPIRATORY (INHALATION) DAILY
Qty: 1 | Refills: 11 | Status: DISCONTINUED | COMMUNITY
Start: 2019-07-19 | End: 2021-09-27

## 2021-09-27 RX ORDER — PREDNISONE 10 MG/1
10 TABLET ORAL DAILY
Qty: 30 | Refills: 5 | Status: DISCONTINUED | COMMUNITY
Start: 2018-11-19 | End: 2021-09-27

## 2021-09-28 NOTE — END OF VISIT
[Time Spent: ___ minutes] : I have spent [unfilled] minutes of time on the encounter. [FreeTextEntry3] : pt seen with CHRISSIE dueñas and agree on the above plan of care.

## 2021-09-28 NOTE — HISTORY OF PRESENT ILLNESS
[Current] : current [Never] : never [TextBox_4] : 70F current smoker, tracheomalacia, COPD on 3L home O2/CPAP qhs (noncompliant) HTN, HLD, DM2, TIA, recently admitted for oropharyngeal candidiasis and chronic hypoxic respiratory failure 2/2 COPD (8/5/21-8/6/21), BIBEMS from home with falls x2 on day of admission and progressive SOB. She is smoking 2 - 3 cigarettes a day.  She is currently on 5 L NC 97% with oxygen.  When she ambulates 80 feet drops to 87%.\par \par Admitted sept 15 - sept 17, 2021 was admitted with fungal esophagitis was treated with nystatin, clotrimazole and fluconazole.\par \par She moved to a new apartment and feeling much better.  She has a choking sensation sometime when she sleeps without CPAP.  She is wearing the CPAP machine at night and takes if off at night.  She has labored breathing without the CPAP .\par \par She is doing physical therapy 2 times a week.  She is urinating at lot. She is taking 10 mg daily of prednisone.  she is getting a lot of headaches.    She is also taking azithromycin three time a week.  She is still smoking.

## 2021-09-28 NOTE — ASSESSMENT
[FreeTextEntry1] : The patient is stable.  Pt is currently smoking. The patient was started on azithromycin as she is group D and is compliant with the medication.  Patient is on 10 mg systemic steroids and to decrease to 5 mg.  Continue the current regimen.  She will update me on her condition\par \par COPD\par \par - GOLD category B\par - Repeat PFT 7/2019 revealed moderate OLD.  Repeat next visit.\par - Continue on Stiolto, Flovent, and albuterol as needed\par - Pt is to increase her exercise\par - she is up to date with immunization. She got her flu vaccine this year\par - She did not require neither hospitalization, urgent care visits, nor ER visits since last visit for COPD.\par - JONATHAN pt is compliant with CPAP machine. She is compliant with the cpap mask and tolerated well.\par - Pt for repeat PFT instructed to go for COVID swab 72 hrs prior.\par - Follow up in 3 months\par \par Pulmonary micronodules\par - Repeat CT chest without any change to the nodules 3 mm to  mm. Repeat CT scan at this time. Pt is current smoker and high risk for CA.\par \par JONATHAN\par \par as above\par \par f/u 1 month \par \par \par \par  \par

## 2021-10-15 ENCOUNTER — APPOINTMENT (OUTPATIENT)
Dept: OTOLARYNGOLOGY | Facility: CLINIC | Age: 71
End: 2021-10-15
Payer: MEDICARE

## 2021-10-15 VITALS
SYSTOLIC BLOOD PRESSURE: 149 MMHG | HEART RATE: 100 BPM | HEIGHT: 59 IN | TEMPERATURE: 96.7 F | BODY MASS INDEX: 31.45 KG/M2 | DIASTOLIC BLOOD PRESSURE: 87 MMHG | WEIGHT: 156 LBS

## 2021-10-15 DIAGNOSIS — R49.0 DYSPHONIA: ICD-10-CM

## 2021-10-15 DIAGNOSIS — Z87.891 PERSONAL HISTORY OF NICOTINE DEPENDENCE: ICD-10-CM

## 2021-10-15 DIAGNOSIS — J34.89 OTHER SPECIFIED DISORDERS OF NOSE AND NASAL SINUSES: ICD-10-CM

## 2021-10-15 DIAGNOSIS — B37.81 CANDIDAL ESOPHAGITIS: ICD-10-CM

## 2021-10-15 DIAGNOSIS — Z87.09 PERSONAL HISTORY OF OTHER DISEASES OF THE RESPIRATORY SYSTEM: ICD-10-CM

## 2021-10-15 PROCEDURE — 99203 OFFICE O/P NEW LOW 30 MIN: CPT

## 2021-10-15 RX ORDER — NYSTATIN 100000 [USP'U]/ML
100000 SUSPENSION ORAL 3 TIMES DAILY
Qty: 210 | Refills: 1 | Status: COMPLETED | COMMUNITY
Start: 2021-06-14 | End: 2021-07-31

## 2021-10-15 NOTE — PHYSICAL EXAM
[Midline] : trachea located in midline position [Normal] : no rashes [FreeTextEntry1] : Mild hoarseness that she reports is baseline.  No stridor. [de-identified] :  large septal perforation [Edentulous] : edentulous [de-identified] : upper and lower dentures [de-identified] : Carotid pulses 2+ bilateral.

## 2021-10-15 NOTE — CONSULT LETTER
[Dear  ___] : Dear  [unfilled], [( Thank you for referring [unfilled] for consultation for _____ )] : Thank you for referring [unfilled] for consultation for [unfilled] [Please see my note below.] : Please see my note below. [Consult Closing:] : Thank you very much for allowing me to participate in the care of this patient.  If you have any questions, please do not hesitate to contact me. [Sincerely,] : Sincerely, [FreeTextEntry2] : Trinity Trevizo M.D.\par [facility] \par Pulmonary Medicine\par 4th [place] [address]  [FreeTextEntry3] : \par Nidhi Musa MD \par Otolaryngology, Head and Neck Surgery \par \par  [DrNisha  ___] : Dr. FRENCH

## 2021-10-15 NOTE — ASSESSMENT
[FreeTextEntry1] : Ms. BROWN was evaluated for the following issues today:\par \par 1.) Previous fungal esophageal candidiasis improved with medications, no symptoms or sign of fungus today\par --> continue clotrimazole lozenges\par --> she does not want laryngoscopy exam today\par \par 2.) chronic hoarseness that  patient states is normal for her.  The prior voice change resolved after treated for \par fungus in hospital.\par \par 3.) Septal perforation\par --> use saline for nasal dryness\par \par Return as needed

## 2021-10-15 NOTE — HISTORY OF PRESENT ILLNESS
[de-identified] : Ms. BROWN is a 70 year old woman who was referred by Dr. Trevizo in early summer 2021 for hoarseness.\par However, before the visit, she was hospitalized for throat pain, worse voice and trouble swallowing, all of which improved after treatment for oral, pharyngeal and esophageal candidiasis.\par PMH:  HTN, HLD, DM, COPD\par \par Today no throat pain or trouble swallowing.  Breathing is improved.\par Her voice is normal to her baseline, also confirmed by her home health aide.\par She is still taking clotrimazole.\par Longtime smoker with COPD. On home oxygen.\par

## 2021-10-28 ENCOUNTER — APPOINTMENT (OUTPATIENT)
Dept: PULMONOLOGY | Facility: CLINIC | Age: 71
End: 2021-10-28

## 2021-11-02 ENCOUNTER — OUTPATIENT (OUTPATIENT)
Dept: OUTPATIENT SERVICES | Facility: HOSPITAL | Age: 71
LOS: 1 days | End: 2021-11-02
Payer: MEDICARE

## 2021-11-02 ENCOUNTER — APPOINTMENT (OUTPATIENT)
Dept: CT IMAGING | Facility: HOSPITAL | Age: 71
End: 2021-11-02
Payer: MEDICARE

## 2021-11-02 PROCEDURE — 71250 CT THORAX DX C-: CPT

## 2021-11-02 PROCEDURE — 71250 CT THORAX DX C-: CPT | Mod: 26

## 2021-12-06 ENCOUNTER — MED ADMIN CHARGE (OUTPATIENT)
Age: 71
End: 2021-12-06

## 2021-12-06 ENCOUNTER — APPOINTMENT (OUTPATIENT)
Dept: PULMONOLOGY | Facility: CLINIC | Age: 71
End: 2021-12-06
Payer: MEDICARE

## 2021-12-06 ENCOUNTER — NON-APPOINTMENT (OUTPATIENT)
Age: 71
End: 2021-12-06

## 2021-12-06 ENCOUNTER — RX RENEWAL (OUTPATIENT)
Age: 71
End: 2021-12-06

## 2021-12-06 VITALS
HEART RATE: 92 BPM | WEIGHT: 152 LBS | RESPIRATION RATE: 95 BRPM | HEIGHT: 55 IN | BODY MASS INDEX: 35.18 KG/M2 | TEMPERATURE: 97.7 F | DIASTOLIC BLOOD PRESSURE: 69 MMHG | SYSTOLIC BLOOD PRESSURE: 114 MMHG

## 2021-12-06 VITALS — RESPIRATION RATE: 12 BRPM | OXYGEN SATURATION: 95 %

## 2021-12-06 DIAGNOSIS — Z11.59 ENCOUNTER FOR SCREENING FOR OTHER VIRAL DISEASES: ICD-10-CM

## 2021-12-06 PROCEDURE — G0008: CPT

## 2021-12-06 PROCEDURE — 90686 IIV4 VACC NO PRSV 0.5 ML IM: CPT

## 2021-12-06 PROCEDURE — 99214 OFFICE O/P EST MOD 30 MIN: CPT | Mod: 25

## 2021-12-06 RX ORDER — FLUTICASONE PROPIONATE 220 UG/1
220 AEROSOL, METERED RESPIRATORY (INHALATION)
Qty: 1 | Refills: 11 | Status: DISCONTINUED | COMMUNITY
Start: 2021-03-16 | End: 2021-12-06

## 2021-12-06 RX ORDER — ASPIRIN 325 MG/1
325 TABLET, FILM COATED ORAL
Qty: 30 | Refills: 2 | Status: DISCONTINUED | COMMUNITY
Start: 2020-11-24 | End: 2021-12-06

## 2021-12-06 RX ORDER — NICOTINE 21 MG/24HR
21 PATCH, TRANSDERMAL 24 HOURS TRANSDERMAL DAILY
Qty: 1 | Refills: 1 | Status: DISCONTINUED | COMMUNITY
Start: 2018-01-29 | End: 2021-12-06

## 2021-12-06 RX ORDER — ASPIRIN 81 MG/1
81 TABLET ORAL DAILY
Qty: 90 | Refills: 3 | Status: DISCONTINUED | COMMUNITY
Start: 2021-04-28 | End: 2021-12-06

## 2021-12-06 RX ORDER — NICOTINE 21 MG/24HR
14 PATCH, TRANSDERMAL 24 HOURS TRANSDERMAL DAILY
Qty: 30 | Refills: 1 | Status: DISCONTINUED | COMMUNITY
Start: 2020-07-24 | End: 2021-12-06

## 2021-12-06 RX ORDER — VARENICLINE TARTRATE 0.5 (11)-1
0.5 MG X 11 & KIT ORAL
Qty: 1 | Refills: 0 | Status: DISCONTINUED | COMMUNITY
Start: 2021-05-03 | End: 2021-12-06

## 2021-12-06 NOTE — HISTORY OF PRESENT ILLNESS
[Current] : current [Never] : never [TextBox_4] : 70F current smoker, tracheomalacia, COPD on 3L home O2/CPAP qhs (noncompliant) HTN, HLD, DM2, TIA, recently admitted for oropharyngeal candidiasis and chronic hypoxic respiratory failure 2/2 COPD (8/5/21-8/6/21), BIBEMS from home with falls x2 on day of admission and progressive SOB. She is smoking.  She is currently on 3 L NC 97% with oxygen.  When she ambulates 80 feet drops to 87%.\par \par Admitted sept 15 - sept 17, 2021 was admitted with fungal esophagitis was treated with nystatin, clotrimazole and fluconazole.\par \par She has complaints of bone hurting right shoulder down to the right wrist.  She is taking trelegy once a day and washing mouth out.  She has not been ER or urgent care.  She is on 5 mg of prednisone. complaint with Azithromycin MWF.  Rarely using the nebulizer.\par \par She is not coughing, wheezing, fevers, chest pain or palpations.  SOB stable.  Complaints Lightheaded.  \par \par She is smoking 4 - 6 cigarettes a day and on a bad day having 1 pack a day. \par \par Oxygen at home and with portable 3L NC and using oxygen with PT.  She has a CPAP but does not use it but using the oxygen at night. \par \par She is doing physical therapy 2 times a week.

## 2021-12-06 NOTE — ASSESSMENT
[FreeTextEntry1] : The patient is stable.  Pt is currently smoking. The patient was started on azithromycin as she is group D and is compliant with the medication.  Patient is on 5 mg systemic steroids.  Continue the current regimen.  She will update me on her condition\par \par COPD\par \par - GOLD category B\par - Repeat PFT 7/2019 revealed moderate OLD.  Repeat next visit.\par - Continue on Trelegy daily and albuterol as needed\par - Pt is to increase her exercise\par - she is up to date with immunization. Pt given flu vaccine without any side effects. \par - She did not require neither hospitalization, urgent care visits, nor ER visits since last visit for COPD.\par - JONATHAN pt is non - compliant with CPAP machine. She is using oxygen at night.\par - Pt for repeat PFT instructed to go for COVID swab 72 hrs prior.\par - EKG today \par - Follow up in 3 months\par \par Pulmonary micronodules\par - Repeat CT chest without any change to the nodules or moderate emphysema.  Repeat CT scan in one year 11/2022.  Pt is current smoker and high risk for CA.\par \par JONATHAN\par \par as above\par \par smoking counseling\par \par I discussed in details with the patient the health risk effects of tobacco smoking. The risks include, but not limited to COPD, cancer of head and neck/lung/stomach/ bladder, and coronary artery disease.  I discussed the options of smoke cessation with the patient. The options include nicotine replacement, pharmacological agents, and supportive tools. I recommended to start the nicotine patches and to handle the nicotine craving with the nicotine gum as needed. The initial dose of the nicotine patch depends of the amount and duration of tobacco consumptions.  Pt is not ready to quit. \par \par Lightheaded\par \par Discussed loop recorder to evaluate for arrhythmias with COPD. Referred to EP for loop recorder.\par \par Left shoulder pain\par \par Referred to Dr. Rousseau. \par \par \par f/u 1 month \par \par \par \par  \par

## 2022-01-03 ENCOUNTER — APPOINTMENT (OUTPATIENT)
Dept: PULMONOLOGY | Facility: CLINIC | Age: 72
End: 2022-01-03
Payer: MEDICARE

## 2022-01-03 DIAGNOSIS — F17.200 NICOTINE DEPENDENCE, UNSPECIFIED, UNCOMPLICATED: ICD-10-CM

## 2022-01-03 PROCEDURE — 99443: CPT

## 2022-01-03 NOTE — HISTORY OF PRESENT ILLNESS
[Current] : current [Never] : never [TextBox_4] : 70F current smoker, tracheomalacia, COPD on 3L home O2/CPAP qhs (noncompliant) HTN, HLD, DM2, TIA, recently admitted for oropharyngeal candidiasis and chronic hypoxic respiratory failure 2/2 COPD (8/5/21-8/6/21), BIBEMS from home with falls x2 on day of admission and progressive SOB. She is smoking.  She is currently on 3 L NC 97% with oxygen.  When she ambulates 80 feet drops to 87%.\par \par Admitted sept 15 - sept 17, 2021 was admitted with fungal esophagitis was treated with nystatin, clotrimazole and fluconazole.\par \par She has complaints of bone hurting right shoulder down to the right wrist.  She is taking trelegy once a day and washing mouth out.  She has not been ER or urgent care.  She is on 5 mg of prednisone. complaint with Azithromycin MWF.  Rarely using the nebulizer.\par \par She is not coughing, wheezing, fevers, chest pain or palpations.  SOB stable.  Complaints Lightheaded.  \par \par She is smoking 4 - 6 cigarettes a day and on a bad day having 1 pack a day. \par \par Oxygen at home and with portable 3L NC and using oxygen with PT.  She has a CPAP but does not use it but using the oxygen at night. \par \par She is doing physical therapy 2 times a week.  \par \par 1/3/2022 \par \par She presents for telemed.  She is cutting down on her smoking to 3 cigarettes a day.  She is not walking much.  Her breathing is stable and she is taking her medications. She sleeps with the oxygen.  She is taking trelegy, azithromycin M,W,F and prednisone 5 mg daily. She denies any recent urgent care or hospitalizations.

## 2022-01-03 NOTE — END OF VISIT
[Time Spent: ___ minutes] : I have spent [unfilled] minutes of time on the encounter. [FreeTextEntry3] : Pt seen with CHRISSIE Justice and agree on the above plan of care.

## 2022-01-03 NOTE — ASSESSMENT
[FreeTextEntry1] : The patient is stable.  Pt is currently smoking but cutting down. The patient was started on azithromycin as she is group D and is compliant with the medication.  Patient is on 5 mg systemic steroids.  Continue the current regimen.  She will update me on her condition\par \par COPD\par \par - GOLD category B\par - CAT score 14 and last 19 \par - Repeat PFT 7/2019 revealed moderate OLD.  Repeat next visit was canceled due to COVID surge \par - Continue on Trelegy daily and albuterol as needed\par - Pt is to increase her exercise\par - she is up to date with immunization, Flu vaccine and COVD vaccine.\par - She did not require neither hospitalization, urgent care visits, nor ER visits since last visit for COPD.\par - JONATHAN pt is non - compliant with CPAP machine. She is using oxygen at night.\par - EKG 12-6-2021 \par - Follow up in 3 months in office \par \par Pulmonary micronodules\par - Repeat CT chest without any change to the nodules or moderate emphysema.  Repeat CT scan in one year 11/2022.  Pt is current smoker and high risk for CA. I put her in for lung CA screening 11/2022 \par \par JONATHAN\par \par as above\par \par smoking counseling\par \par I discussed in details with the patient the health risk effects of tobacco smoking. The risks include, but not limited to COPD, cancer of head and neck/lung/stomach/ bladder, and coronary artery disease.  I discussed the options of smoke cessation with the patient. The options include nicotine replacement, pharmacological agents, and supportive tools. I recommended to start the nicotine patches and to handle the nicotine craving with the nicotine gum as needed. The initial dose of the nicotine patch depends of the amount and duration of tobacco consumptions.  Pt is not ready to quit. \par \par Lightheaded\par \par Discussed loop recorder to evaluate for arrhythmias with COPD. Referred to EP for loop recorder.  She never followed up.  \par \par Left shoulder pain\par \par Referred to Dr. Rousseau 12/6/2021. \par \par \par f/u 3 month in office. \par \par \par \par  \par

## 2022-01-03 NOTE — REASON FOR VISIT
[Home] : at home, [unfilled] , at the time of the visit. [Medical Office: ([facility])___] : at the medical office located in  [Verbal consent obtained from patient] : the patient, [unfilled] [Follow-Up] : a follow-up visit [COPD] : COPD

## 2022-02-05 ENCOUNTER — EMERGENCY (EMERGENCY)
Facility: HOSPITAL | Age: 72
LOS: 1 days | Discharge: HOME | End: 2022-02-05
Attending: EMERGENCY MEDICINE | Admitting: EMERGENCY MEDICINE
Payer: MEDICARE

## 2022-02-05 VITALS
SYSTOLIC BLOOD PRESSURE: 124 MMHG | OXYGEN SATURATION: 100 % | WEIGHT: 145.95 LBS | RESPIRATION RATE: 20 BRPM | TEMPERATURE: 98 F | DIASTOLIC BLOOD PRESSURE: 68 MMHG | HEIGHT: 61 IN | HEART RATE: 93 BPM

## 2022-02-05 VITALS
RESPIRATION RATE: 18 BRPM | SYSTOLIC BLOOD PRESSURE: 128 MMHG | DIASTOLIC BLOOD PRESSURE: 82 MMHG | HEART RATE: 88 BPM | TEMPERATURE: 98 F | OXYGEN SATURATION: 94 %

## 2022-02-05 DIAGNOSIS — M54.2 CERVICALGIA: ICD-10-CM

## 2022-02-05 DIAGNOSIS — I10 ESSENTIAL (PRIMARY) HYPERTENSION: ICD-10-CM

## 2022-02-05 DIAGNOSIS — F17.200 NICOTINE DEPENDENCE, UNSPECIFIED, UNCOMPLICATED: ICD-10-CM

## 2022-02-05 DIAGNOSIS — Z79.82 LONG TERM (CURRENT) USE OF ASPIRIN: ICD-10-CM

## 2022-02-05 DIAGNOSIS — Z20.822 CONTACT WITH AND (SUSPECTED) EXPOSURE TO COVID-19: ICD-10-CM

## 2022-02-05 DIAGNOSIS — E78.5 HYPERLIPIDEMIA, UNSPECIFIED: ICD-10-CM

## 2022-02-05 DIAGNOSIS — E11.9 TYPE 2 DIABETES MELLITUS WITHOUT COMPLICATIONS: ICD-10-CM

## 2022-02-05 DIAGNOSIS — Z79.84 LONG TERM (CURRENT) USE OF ORAL HYPOGLYCEMIC DRUGS: ICD-10-CM

## 2022-02-05 LAB
ALBUMIN SERPL ELPH-MCNC: 4 G/DL — SIGNIFICANT CHANGE UP (ref 3.3–5)
ALP SERPL-CCNC: SIGNIFICANT CHANGE UP (ref 40–120)
ALT FLD-CCNC: SIGNIFICANT CHANGE UP (ref 10–45)
ANION GAP SERPL CALC-SCNC: 12 MMOL/L — SIGNIFICANT CHANGE UP (ref 5–17)
APTT BLD: 32.3 SEC — SIGNIFICANT CHANGE UP (ref 27.5–35.5)
AST SERPL-CCNC: SIGNIFICANT CHANGE UP (ref 10–40)
BASOPHILS # BLD AUTO: 0.06 K/UL — SIGNIFICANT CHANGE UP (ref 0–0.2)
BASOPHILS NFR BLD AUTO: 0.5 % — SIGNIFICANT CHANGE UP (ref 0–2)
BILIRUB SERPL-MCNC: 0.3 MG/DL — SIGNIFICANT CHANGE UP (ref 0.2–1.2)
BUN SERPL-MCNC: 9 MG/DL — SIGNIFICANT CHANGE UP (ref 7–23)
CALCIUM SERPL-MCNC: 9.8 MG/DL — SIGNIFICANT CHANGE UP (ref 8.4–10.5)
CHLORIDE SERPL-SCNC: 101 MMOL/L — SIGNIFICANT CHANGE UP (ref 96–108)
CO2 SERPL-SCNC: 26 MMOL/L — SIGNIFICANT CHANGE UP (ref 22–31)
CREAT SERPL-MCNC: 0.64 MG/DL — SIGNIFICANT CHANGE UP (ref 0.5–1.3)
EOSINOPHIL # BLD AUTO: 0.14 K/UL — SIGNIFICANT CHANGE UP (ref 0–0.5)
EOSINOPHIL NFR BLD AUTO: 1.2 % — SIGNIFICANT CHANGE UP (ref 0–6)
GLUCOSE SERPL-MCNC: 107 MG/DL — HIGH (ref 70–99)
HCT VFR BLD CALC: 35.7 % — SIGNIFICANT CHANGE UP (ref 34.5–45)
HGB BLD-MCNC: 11.3 G/DL — LOW (ref 11.5–15.5)
IMM GRANULOCYTES NFR BLD AUTO: 0.3 % — SIGNIFICANT CHANGE UP (ref 0–1.5)
INR BLD: 1.06 — SIGNIFICANT CHANGE UP (ref 0.88–1.16)
LYMPHOCYTES # BLD AUTO: 1.44 K/UL — SIGNIFICANT CHANGE UP (ref 1–3.3)
LYMPHOCYTES # BLD AUTO: 12.2 % — LOW (ref 13–44)
MCHC RBC-ENTMCNC: 26.8 PG — LOW (ref 27–34)
MCHC RBC-ENTMCNC: 31.7 GM/DL — LOW (ref 32–36)
MCV RBC AUTO: 84.8 FL — SIGNIFICANT CHANGE UP (ref 80–100)
MONOCYTES # BLD AUTO: 1.07 K/UL — HIGH (ref 0–0.9)
MONOCYTES NFR BLD AUTO: 9 % — SIGNIFICANT CHANGE UP (ref 2–14)
NEUTROPHILS # BLD AUTO: 9.1 K/UL — HIGH (ref 1.8–7.4)
NEUTROPHILS NFR BLD AUTO: 76.8 % — SIGNIFICANT CHANGE UP (ref 43–77)
NRBC # BLD: 0 /100 WBCS — SIGNIFICANT CHANGE UP (ref 0–0)
PLATELET # BLD AUTO: 466 K/UL — HIGH (ref 150–400)
POTASSIUM SERPL-MCNC: SIGNIFICANT CHANGE UP (ref 3.5–5.3)
POTASSIUM SERPL-SCNC: SIGNIFICANT CHANGE UP (ref 3.5–5.3)
PROT SERPL-MCNC: 8 G/DL — SIGNIFICANT CHANGE UP (ref 6–8.3)
PROTHROM AB SERPL-ACNC: 12.7 SEC — SIGNIFICANT CHANGE UP (ref 10.6–13.6)
RBC # BLD: 4.21 M/UL — SIGNIFICANT CHANGE UP (ref 3.8–5.2)
RBC # FLD: 17.6 % — HIGH (ref 10.3–14.5)
SARS-COV-2 RNA SPEC QL NAA+PROBE: SIGNIFICANT CHANGE UP
SODIUM SERPL-SCNC: 139 MMOL/L — SIGNIFICANT CHANGE UP (ref 135–145)
WBC # BLD: 11.85 K/UL — HIGH (ref 3.8–10.5)
WBC # FLD AUTO: 11.85 K/UL — HIGH (ref 3.8–10.5)

## 2022-02-05 PROCEDURE — 85610 PROTHROMBIN TIME: CPT

## 2022-02-05 PROCEDURE — U0003: CPT

## 2022-02-05 PROCEDURE — 99284 EMERGENCY DEPT VISIT MOD MDM: CPT | Mod: 25

## 2022-02-05 PROCEDURE — 99285 EMERGENCY DEPT VISIT HI MDM: CPT

## 2022-02-05 PROCEDURE — 96374 THER/PROPH/DIAG INJ IV PUSH: CPT | Mod: XU

## 2022-02-05 PROCEDURE — 36415 COLL VENOUS BLD VENIPUNCTURE: CPT

## 2022-02-05 PROCEDURE — U0005: CPT

## 2022-02-05 PROCEDURE — 85025 COMPLETE CBC W/AUTO DIFF WBC: CPT

## 2022-02-05 PROCEDURE — 70491 CT SOFT TISSUE NECK W/DYE: CPT | Mod: 26,MA

## 2022-02-05 PROCEDURE — 80053 COMPREHEN METABOLIC PANEL: CPT

## 2022-02-05 PROCEDURE — 70491 CT SOFT TISSUE NECK W/DYE: CPT | Mod: MA

## 2022-02-05 PROCEDURE — 85730 THROMBOPLASTIN TIME PARTIAL: CPT

## 2022-02-05 RX ORDER — CYCLOBENZAPRINE HYDROCHLORIDE 10 MG/1
1 TABLET, FILM COATED ORAL
Qty: 8 | Refills: 0
Start: 2022-02-05

## 2022-02-05 RX ORDER — ACETAMINOPHEN 500 MG
975 TABLET ORAL ONCE
Refills: 0 | Status: COMPLETED | OUTPATIENT
Start: 2022-02-05 | End: 2022-02-05

## 2022-02-05 RX ORDER — SODIUM CHLORIDE 9 MG/ML
500 INJECTION INTRAMUSCULAR; INTRAVENOUS; SUBCUTANEOUS ONCE
Refills: 0 | Status: COMPLETED | OUTPATIENT
Start: 2022-02-05 | End: 2022-02-05

## 2022-02-05 RX ORDER — LIDOCAINE 4 G/100G
1 CREAM TOPICAL ONCE
Refills: 0 | Status: COMPLETED | OUTPATIENT
Start: 2022-02-05 | End: 2022-02-05

## 2022-02-05 RX ORDER — LIDOCAINE 4 G/100G
1 CREAM TOPICAL
Qty: 5 | Refills: 0
Start: 2022-02-05

## 2022-02-05 RX ORDER — KETOROLAC TROMETHAMINE 30 MG/ML
15 SYRINGE (ML) INJECTION ONCE
Refills: 0 | Status: DISCONTINUED | OUTPATIENT
Start: 2022-02-05 | End: 2022-02-05

## 2022-02-05 RX ORDER — CYCLOBENZAPRINE HYDROCHLORIDE 10 MG/1
10 TABLET, FILM COATED ORAL ONCE
Refills: 0 | Status: COMPLETED | OUTPATIENT
Start: 2022-02-05 | End: 2022-02-05

## 2022-02-05 RX ADMIN — Medication 975 MILLIGRAM(S): at 21:17

## 2022-02-05 RX ADMIN — SODIUM CHLORIDE 500 MILLILITER(S): 9 INJECTION INTRAMUSCULAR; INTRAVENOUS; SUBCUTANEOUS at 15:23

## 2022-02-05 RX ADMIN — CYCLOBENZAPRINE HYDROCHLORIDE 10 MILLIGRAM(S): 10 TABLET, FILM COATED ORAL at 15:29

## 2022-02-05 RX ADMIN — LIDOCAINE 1 PATCH: 4 CREAM TOPICAL at 21:24

## 2022-02-05 RX ADMIN — Medication 15 MILLIGRAM(S): at 15:50

## 2022-02-05 RX ADMIN — Medication 15 MILLIGRAM(S): at 15:29

## 2022-02-05 NOTE — ED PROVIDER NOTE - PATIENT PORTAL LINK FT
You can access the FollowMyHealth Patient Portal offered by [facility] by registering at the following website: [email or web address]. By joining [facility]’s FollowMyHealth portal, you will also be able to view your health information using other applications (apps) compatible with our system.

## 2022-02-05 NOTE — ED PROVIDER NOTE - OBJECTIVE STATEMENT
71F current smoker, tracheomalacia, COPD on 3L home O2/CPAP qhs (noncompliant) HTN, HLD, DM2, TIA, hx of admission for oropharyngeal and esophageal candidiasis and chronic hypoxic respiratory failure 2/2 COPD who p/w neck pain 71F current smoker, tracheomalacia, COPD on 3L home O2/CPAP qhs (noncompliant) HTN, HLD, DM2, TIA, hx of admission for oropharyngeal and esophageal candidiasis and chronic hypoxic respiratory failure 2/2 COPD who p/w neck pain R>L that she noticed after she woke up yesterday morning. pain has been getting progressively worse such that pt went to  today and was referred to the ER for further work up. Pt has not taken anything for the pain. Denies f/c, ear pain, ear drainage, HA, n/t/w in ext, vision or speech change, trauma or fall, cp/sob, no other complaints.

## 2022-02-05 NOTE — ED PROVIDER NOTE - PHYSICAL EXAMINATION
GEN: chronically ill appearing, well developed, awake, alert, oriented to person, place, time/situation and in no apparent distress. NTAF  ENT: Airway patent, Nasal mucosa clear. Mouth with normal mucosa. B/l mastoid TTP, no erythema, TM clear b/l, no sinus TTP.  EYES: Clear bilaterally. PERRL, EOMI  RESPIRATORY: Breathing comfortably with normal RR. No W/C/R, no hypoxia or resp distress.  CARDIAC: Regular rate and rhythm, no M/R/G  ABDOMEN: Soft, nontender, +bowel sounds, no rebound, rigidity, or guarding.  MSK: Range of motion is not limited, no deformities noted. B/L paracervical/trapezius muscle TTP, no midline c/t/l-spine TTP. Distal ext NVI with +2 pulses, compartments soft, no ligamentous instability.   NEURO: Alert and oriented x 3. Cn 2-12 intact. Strength 5/5 and sensation intact in all 4 extremities. no pronator drift. FTN normal. Neg Rhomberg. Gait normal.   SKIN: Skin normal color for race, warm, dry and intact. No evidence of rash.  PSYCH: Alert and oriented to person, place, time/situation. normal mood and affect. no apparent risk to self or others.

## 2022-02-05 NOTE — ED ADULT NURSE NOTE - OBJECTIVE STATEMENT
71years female alert mental state (AOX3) received by stretcher.  -complain of neck pain and headache yesterday.  tenderness of Rt posterior neck area.   Hx of COPD(3L at home O2)  pt met END doctor in the morning, but pain did not subside, so  pt visited urgent care and heard suspect mastoiditis.   -denied trauma, headache, otalgia, Nausea, vomiting, diarrhea.  Pt is in the bed comfortably at this time. Will continue to monitor and document any changes.

## 2022-02-05 NOTE — ED ADULT TRIAGE NOTE - CHIEF COMPLAINT QUOTE
pt complains of right sided neck pain since yesterday morning, worst this morning. ENT saw pt this morning at home. However, pain became worst pt went to an urgent care where she was sent to the ER to rule out mastoiditis. However denies any fever or difficulty breathing. Pt report mild pain when swallowing.

## 2022-02-05 NOTE — ED PROVIDER NOTE - CLINICAL SUMMARY MEDICAL DECISION MAKING FREE TEXT BOX
71F current smoker, tracheomalacia, COPD on 3L home O2/CPAP qhs (noncompliant) HTN, HLD, DM2, TIA, hx of admission for oropharyngeal and esophageal candidiasis and chronic hypoxic respiratory failure 2/2 COPD who p/w neck pain R>L that she noticed after she woke up yesterday morning. pain has been getting progressively worse such that pt went to  today and was referred to the ER for further work up. Pt has not taken anything for the pain. Denies f/c, ear pain, ear drainage, HA, n/t/w in ext, vision or speech change, trauma or fall, cp/sob, no other complaints.  Pt is well-appearing on exam, VSS, no focal neuro deficits, exam as noted, muscular neck TTP, no midline TTP or meningismus. Suspect msk pain/cervicalgia with muscle spasm, less likely mastoiditis. Clinical picture not c/w meningitis.   Plan for labs CT neck, IV toradol, flexeril, dispo pending workup.

## 2022-02-05 NOTE — ED PROVIDER NOTE - PROGRESS NOTE DETAILS
Dr. Bertha Sanders:   Signed out by Dr. Espinoza. Patient is s/p fall with neck pain pending CT of neck. Discussed with Dr. Espinoza and is likely from COPD and steroid use. Labs show an elevated WBC. Patient has no other infectious symptoms. Dr. Bertha Sanders:   Received sign out by Dr. Espinoza. Patient is s/p fall with neck pain pending CT of neck. Discussed with Dr. Espinoza and is likely from COPD and steroid use. Labs show an elevated WBC. Patient has no other infectious symptoms. Bertha Sanders (MD):   Received sign out by Dr. Espinoza. Patient is s/p fall with neck pain pending CT of neck. Discussed with Dr. Espinoza and symptoms are likely from COPD and steroid use. Labs show an elevated WBC. Patient has no other infectious symptoms. Bertha Sanders (MD):   Received sign out by Dr. Espinoza. Patient is with neck pain pending CT of neck. Discussed with Dr. Espinoza elev wbc are likely from COPD and steroid use. CT w no acute process, added on lido patch/tylenol as pt w some discomfort, pt requesting flexeril, sent few tablets w lidocaine patches to pharmacy. Discussed with pt results of work up, strict return precautions, and need for follow up.  Pt expressed understanding and agrees with plan.

## 2022-02-05 NOTE — ED PROVIDER NOTE - NSFOLLOWUPINSTRUCTIONS_ED_ALL_ED_FT
Please follow up with your primary care physician. You may call our referrals coordinator at [phone] Monday to Friday 11am-7pm for assistance with making an appointment.  Return to the Emergency Department if you have any new or worsening symptoms, or for any other concerns. Please read below for further information.    Musculoskeletal Pain  Musculoskeletal pain refers to aches and pains in your bones, joints, muscles, and the tissues that surround them. This pain can occur in any part of the body. It can last for a short time (acute) or a long time (chronic).    A physical exam, lab tests, and imaging studies may be done to find the cause of your musculoskeletal pain.    Follow these instructions at home:  Lifestyle     Try to control or lower your stress levels. Stress increases muscle tension and can worsen musculoskeletal pain. It is important to recognize when you are anxious or stressed and learn ways to manage it. This may include:    Meditation or yoga.  Cognitive or behavioral therapy.  Acupuncture or massage therapy.    You may continue all activities unless the activities cause more pain. When the pain gets better, slowly resume your normal activities. Gradually increase the intensity and duration of your activities or exercise.  Managing pain, stiffness, and swelling     Take over-the-counter and prescription medicines only as told by your health care provider.  When your pain is severe, bed rest may be helpful. Lie or sit in any position that is comfortable, but get out of bed and walk around at least every couple of hours.  If directed, apply heat to the affected area as often as told by your health care provider. Use the heat source that your health care provider recommends, such as a moist heat pack or a heating pad.    Place a towel between your skin and the heat source.  Leave the heat on for 20–30 minutes.  Remove the heat if your skin turns bright red. This is especially important if you are unable to feel pain, heat, or cold. You may have a greater risk of getting burned.    If directed, put ice on the painful area.    Put ice in a plastic bag.  Place a towel between your skin and the bag.  Leave the ice on for 20 minutes, 2–3 times a day.    General instructions:  Your health care provider may recommend that you see a physical therapist. This person can help you come up with a safe exercise program. Do any exercises as told by your physical therapist.  Keep all follow-up visits, including any physical therapy visits, as told by your health care providers. This is important.  Contact a health care provider if:  Your pain gets worse.  Medicines do not help ease your pain.  You cannot use the part of your body that hurts, such as your arm, leg, or neck.  You have trouble sleeping.  You have trouble doing your normal activities.  Get help right away if:  You have a new injury and your pain is worse or different.  You feel numb or you have tingling in the painful area.  Summary  Musculoskeletal pain refers to aches and pains in your bones, joints, muscles, and the tissues that surround them.  This pain can occur in any part of the body.  Your health care provider may recommend that you see a physical therapist. This person can help you come up with a safe exercise program. Do any exercises as told by your physical therapist.  Lower your stress level. Stress can worsen musculoskeletal pain. Ways to lower stress may include meditation, yoga, cognitive or behavioral therapy, acupuncture, and massage therapy.  This information is not intended to replace advice given to you by your health care provider. Make sure you discuss any questions you have with your health care provider.

## 2022-02-05 NOTE — ED ADULT NURSE NOTE - IN THE PAST 12 MONTHS HAVE YOU USED DRUGS OTHER THAN THOSE REQUIRED FOR MEDICAL REASON?
· Patient with baseline requirement of 2 L via nasal cannula  · Patient follows with Dr Fatoumata Ordaz  · Continue trilogy  · Continue Breo  ·  SpO2 at 97% on 2 L  O2 via NC  ·   Patient stable for discharge  No

## 2022-02-23 ENCOUNTER — APPOINTMENT (OUTPATIENT)
Dept: HEART AND VASCULAR | Facility: CLINIC | Age: 72
End: 2022-02-23
Payer: MEDICARE

## 2022-02-23 VITALS — TEMPERATURE: 97.7 F | HEIGHT: 55 IN | BODY MASS INDEX: 35.41 KG/M2 | WEIGHT: 153 LBS

## 2022-02-23 VITALS — DIASTOLIC BLOOD PRESSURE: 68 MMHG | SYSTOLIC BLOOD PRESSURE: 155 MMHG | HEART RATE: 97 BPM

## 2022-02-23 PROCEDURE — 99203 OFFICE O/P NEW LOW 30 MIN: CPT

## 2022-02-23 PROCEDURE — 93000 ELECTROCARDIOGRAM COMPLETE: CPT

## 2022-03-02 NOTE — PHYSICAL EXAM
[Well Developed] : well developed [Well Nourished] : well nourished [Normal S1, S2] : normal S1, S2 [No Murmur] : no murmur [Clear Lung Fields] : clear lung fields [Soft] : abdomen soft [Non Tender] : non-tender [Normal] : moves all extremities, no focal deficits, normal speech [Moves all extremities] : moves all extremities [de-identified] : reduced air entry b/l

## 2022-03-02 NOTE — DISCUSSION/SUMMARY
[FreeTextEntry1] : In summary Miss Rodriges is a 71 year old female with COPD( on long term oxygen therapy) obstructive sleep apnea, with complaints of exertional dyspnea and lightheadedness who presents today for discussion for loop recorder implantation. We discussed what a loop recorder is and i explained to her that it is a diagnostic tool that would help monitor her heart rhythm and we also discussed enrollment in a trial that would help assess atrial arrhythmia burden COPD. She understands and would like to discuss this with her family before she agrees. All her questions were answered and she knows to contact me should any further queries arise.

## 2022-03-02 NOTE — HISTORY OF PRESENT ILLNESS
[Current] : current [Never] : never [TextBox_4] : I had the pleasure of seeing your patient Miss Skye Cooper at the arrhtymia clinic of [facility].As you well know Miss Cooper is a \par 71 year old current smoker,with COPD on 3L home O2/CPAP qhs (noncompliant) HTN, HLD, DM2, TIA who is referred to us for discussion of ILR implantation as a part of the trial for  monitoring atrial arrhythmias in COPD.\par She is fairly active and ambulates a block and a half slowly. She does not recall any episodes of syncope and denies palpitations. She does complain of exertional dyspnea and feeling light headed when she ambulates, over a block. \par She has a transthoracic echocardiogram from 2016 that demonstrates normal left ventricle ejection fraction, concentric LVH with an EF of 65%.\par Today in clinic the patient is stable and has no complaints, She is overall doing well and reports no palpitations, lightheadedness, pre-syncope or syncope. He remains stable and is doing well except for his pulmonary limitations. His blood pressure today was 121/69 and his pulse was 83 and regular. His EKG demonstrated sinus rhythm with a heart rate of 95, normal LA and QT intervals.\par

## 2022-03-02 NOTE — REVIEW OF SYSTEMS
[SOB on Exertion] : sob on exertion [Dyspnea on exertion] : dyspnea during exertion [Negative] : Heme/Lymph

## 2022-03-21 NOTE — PHYSICAL THERAPY INITIAL EVALUATION ADULT - SOCIAL CONCERNS
Mother calling in to get an appointment for her daughter. Pt scheduled via  today to be seen and then transferred to nurse for Triage.     Mother states pt has been c/o abdominal pain for 1-2 weeks. Last week pt had a few bought's of vomiting, no diarrhea, no fever. No vomiting since last week.     Pt playing and eating/drinking and voiding/BM as usual. Hard to assess abdominal pain in 1 year old. Pt states to mom belly hurts but plays normal.     Pt to see provider this afternoon in clinic for assessment as planned. Mother verbalized an understanding.     Arabella Acuña, RN    
None

## 2022-04-11 PROBLEM — Z11.59 SCREENING FOR VIRAL DISEASE: Status: ACTIVE | Noted: 2020-09-30

## 2022-04-13 ENCOUNTER — TRANSCRIPTION ENCOUNTER (OUTPATIENT)
Age: 72
End: 2022-04-13

## 2022-09-12 ENCOUNTER — APPOINTMENT (OUTPATIENT)
Dept: PULMONOLOGY | Facility: CLINIC | Age: 72
End: 2022-09-12

## 2022-09-12 VITALS
OXYGEN SATURATION: 95 % | SYSTOLIC BLOOD PRESSURE: 143 MMHG | BODY MASS INDEX: 35.87 KG/M2 | TEMPERATURE: 98.1 F | DIASTOLIC BLOOD PRESSURE: 76 MMHG | HEIGHT: 55 IN | HEART RATE: 81 BPM | WEIGHT: 155 LBS

## 2022-09-12 DIAGNOSIS — Z23 ENCOUNTER FOR IMMUNIZATION: ICD-10-CM

## 2022-09-12 PROCEDURE — 99213 OFFICE O/P EST LOW 20 MIN: CPT | Mod: 25

## 2022-09-12 PROCEDURE — G0008: CPT

## 2022-09-12 PROCEDURE — 90662 IIV NO PRSV INCREASED AG IM: CPT

## 2022-09-12 NOTE — ASSESSMENT
[FreeTextEntry1] : Sleep apnea\par \par The patient is compliant with the CPAP mask.\par \par COPD\par \par The concern that patient is still smoking.  She is compliant with the bronchodilator but requiring at least twice a day rescue dose of the short acting beta agonist.  I discussed with the patient the impact of smoking her condition.  This the patient did not require neither ER nor systemic steroids since the last time I saw her.  Continue bronchodilator.  Patient has steroids at home and they reviewed renewed her meds.  Continue azithromycin 3 times a day for a week\par \par Pulmonary nodule\par \par Patient is a lung cancer screening program and scheduled to have CT scan in November.\par \par Flu vaccination was given in the left a toilet and tolerated well\par \par

## 2022-09-12 NOTE — HISTORY OF PRESENT ILLNESS
[Current] : current [Never] : never [TextBox_4] : She is still smoking.  She uses the albuterol nebulizer about twice a day.  She sleeps with a CPAP at night.  She is still short of breath coughing and wheezing.  She has not been in the hospital or the emergency room since the last time I saw she have a question about the disability permit for parking

## 2022-09-14 ENCOUNTER — APPOINTMENT (OUTPATIENT)
Dept: INTERNAL MEDICINE | Facility: CLINIC | Age: 72
End: 2022-09-14

## 2022-09-14 ENCOUNTER — LABORATORY RESULT (OUTPATIENT)
Age: 72
End: 2022-09-14

## 2022-09-14 VITALS
TEMPERATURE: 98 F | WEIGHT: 152 LBS | RESPIRATION RATE: 14 BRPM | HEIGHT: 55 IN | HEART RATE: 95 BPM | OXYGEN SATURATION: 95 % | SYSTOLIC BLOOD PRESSURE: 131 MMHG | DIASTOLIC BLOOD PRESSURE: 78 MMHG | BODY MASS INDEX: 35.18 KG/M2

## 2022-09-14 DIAGNOSIS — Z23 ENCOUNTER FOR IMMUNIZATION: ICD-10-CM

## 2022-09-14 DIAGNOSIS — J44.9 CHRONIC OBSTRUCTIVE PULMONARY DISEASE, UNSPECIFIED: ICD-10-CM

## 2022-09-14 PROCEDURE — 36415 COLL VENOUS BLD VENIPUNCTURE: CPT

## 2022-09-14 PROCEDURE — 99213 OFFICE O/P EST LOW 20 MIN: CPT | Mod: 25

## 2022-09-14 RX ORDER — NICOTINE POLACRILEX 2 MG
2 GUM BUCCAL
Qty: 1 | Refills: 1 | Status: COMPLETED | COMMUNITY
Start: 2020-07-24 | End: 2022-09-14

## 2022-09-14 RX ORDER — SENNOSIDES 8.6 MG TABLETS 8.6 MG/1
8.6 TABLET ORAL
Qty: 90 | Refills: 0 | Status: COMPLETED | COMMUNITY
Start: 2019-01-22 | End: 2022-09-14

## 2022-09-14 RX ORDER — CEFDINIR 300 MG/1
300 CAPSULE ORAL
Qty: 14 | Refills: 0 | Status: COMPLETED | COMMUNITY
Start: 2022-06-22 | End: 2022-09-14

## 2022-09-14 RX ORDER — CLOTRIMAZOLE 10 MG/1
10 LOZENGE ORAL
Refills: 0 | Status: COMPLETED | COMMUNITY
End: 2022-09-14

## 2022-09-14 RX ORDER — BENZONATATE 100 MG/1
100 CAPSULE ORAL 3 TIMES DAILY
Qty: 42 | Refills: 0 | Status: COMPLETED | COMMUNITY
Start: 2021-05-03 | End: 2022-09-14

## 2022-09-14 RX ORDER — DOCUSATE SODIUM 100 MG/1
100 CAPSULE ORAL TWICE DAILY
Qty: 60 | Refills: 0 | Status: COMPLETED | COMMUNITY
Start: 2019-01-22 | End: 2022-09-14

## 2022-09-14 RX ORDER — SERTRALINE HYDROCHLORIDE 50 MG/1
50 TABLET, FILM COATED ORAL DAILY
Qty: 90 | Refills: 3 | Status: COMPLETED | COMMUNITY
Start: 2018-10-10 | End: 2022-09-14

## 2022-09-14 RX ORDER — SODIUM CHLORIDE 0.65 %
0.65 AEROSOL, SPRAY (ML) NASAL
Qty: 1 | Refills: 6 | Status: COMPLETED | COMMUNITY
Start: 2021-10-15 | End: 2022-09-14

## 2022-09-21 RX ORDER — NYSTATIN 100000 [USP'U]/ML
100000 SUSPENSION ORAL 3 TIMES DAILY
Qty: 450 | Refills: 2 | Status: COMPLETED | COMMUNITY
Start: 2022-01-03 | End: 2022-09-21

## 2022-10-10 LAB
ALBUMIN SERPL ELPH-MCNC: 4.5 G/DL
ALP BLD-CCNC: 91 U/L
ALT SERPL-CCNC: 16 U/L
ANION GAP SERPL CALC-SCNC: 15 MMOL/L
APPEARANCE: ABNORMAL
AST SERPL-CCNC: 25 U/L
BASOPHILS # BLD AUTO: 0.06 K/UL
BASOPHILS NFR BLD AUTO: 0.5 %
BILIRUB SERPL-MCNC: 0.3 MG/DL
BILIRUBIN URINE: NEGATIVE
BLOOD URINE: NEGATIVE
BUN SERPL-MCNC: 14 MG/DL
CALCIUM SERPL-MCNC: 10.3 MG/DL
CHLORIDE SERPL-SCNC: 100 MMOL/L
CHOLEST SERPL-MCNC: 183 MG/DL
CO2 SERPL-SCNC: 24 MMOL/L
COLOR: YELLOW
CREAT SERPL-MCNC: 0.69 MG/DL
EGFR: 92 ML/MIN/1.73M2
EOSINOPHIL # BLD AUTO: 0.04 K/UL
EOSINOPHIL NFR BLD AUTO: 0.3 %
ESTIMATED AVERAGE GLUCOSE: 151 MG/DL
GLUCOSE QUALITATIVE U: NEGATIVE
GLUCOSE SERPL-MCNC: 223 MG/DL
HBA1C MFR BLD HPLC: 6.9 %
HCT VFR BLD CALC: 43.8 %
HDLC SERPL-MCNC: 72 MG/DL
HGB BLD-MCNC: 13 G/DL
IMM GRANULOCYTES NFR BLD AUTO: 0.4 %
KETONES URINE: NEGATIVE
LDLC SERPL CALC-MCNC: 83 MG/DL
LEUKOCYTE ESTERASE URINE: NEGATIVE
LYMPHOCYTES # BLD AUTO: 1.13 K/UL
LYMPHOCYTES NFR BLD AUTO: 9 %
MAN DIFF?: NORMAL
MCHC RBC-ENTMCNC: 28.8 PG
MCHC RBC-ENTMCNC: 29.7 GM/DL
MCV RBC AUTO: 96.9 FL
MONOCYTES # BLD AUTO: 0.4 K/UL
MONOCYTES NFR BLD AUTO: 3.2 %
NEUTROPHILS # BLD AUTO: 10.92 K/UL
NEUTROPHILS NFR BLD AUTO: 86.6 %
NITRITE URINE: NEGATIVE
NONHDLC SERPL-MCNC: 111 MG/DL
PH URINE: 7
PLATELET # BLD AUTO: 435 K/UL
POTASSIUM SERPL-SCNC: 4.5 MMOL/L
PROT SERPL-MCNC: 7.2 G/DL
PROTEIN URINE: ABNORMAL
RBC # BLD: 4.52 M/UL
RBC # FLD: 17.4 %
SODIUM SERPL-SCNC: 138 MMOL/L
SPECIFIC GRAVITY URINE: 1.02
TRIGL SERPL-MCNC: 143 MG/DL
UROBILINOGEN URINE: NORMAL
WBC # FLD AUTO: 12.6 K/UL

## 2022-11-01 ENCOUNTER — NON-APPOINTMENT (OUTPATIENT)
Age: 72
End: 2022-11-01

## 2022-11-01 ENCOUNTER — APPOINTMENT (OUTPATIENT)
Dept: PULMONOLOGY | Facility: CLINIC | Age: 72
End: 2022-11-01

## 2022-11-01 VITALS — HEIGHT: 59 IN | WEIGHT: 152 LBS | BODY MASS INDEX: 30.64 KG/M2

## 2022-11-01 PROCEDURE — G0296 VISIT TO DETERM LDCT ELIG: CPT

## 2022-11-14 NOTE — PHYSICAL EXAM
[Normal] : normal rate, regular rhythm, normal S1 and S2 and no murmur heard [No Edema] : there was no peripheral edema [No Rash] : no rash [Normal Affect] : the affect was normal [No Acute Distress] : no acute distress [Well Developed] : well developed [Well-Appearing] : well-appearing [de-identified] : + Hoarseness  [de-identified] : cap refill < 2 sec

## 2022-11-14 NOTE — ED PROVIDER NOTE - NS ED ATTENDING STATEMENT MOD
I reviewed the H&P, I examined the patient, and there are no changes in the patient's condition.    Mr. Roscoe Cosme had presented today for a planned MOISES, EP study, and catheter ablation.  However, based on potentially underestimated degree of aortic stenosis noted on recent TTE in the setting of LV systolic dysfunction, we discussed deferring on ablation today and performing a MOISES-guided cardioversion.  Following cardioversion, amiodarone could be initiated to maintain sinus rhythm and further assessment of severity of aortic stenosis can be undertaken.  If AS is not deemed to be severe, then we would plan to reschedule the ablation within the coming weeks.  Mr. Cosme was in agreement with the plan.      I explained the transeophageal echocardiogram and direct current cardioversion procedure to Roscoe Cosme in detail.  I discussed the procedural rationale, potential benefits, and alternative management options.  I explained the steps involved in the procedure.  I explained the risks, which include, but are not limited to stroke, MI, death, arrhythmia, skin burn, allergic reaction, esophageal perforation, tooth loss, and imponderables.  All questions were answered and informed consent was obtained.     Will plan for the MOISES-guided cardioversion today.  We will plan to start amiodarone afterwards.  Will plan to discontinue digoxin to avoid the amiodarone-digoxin interaction.  Continue on Eliquis for stroke prophylaxis.  All questions were answered and Mr. Cosme was in agreement with the plan.      Suleman Yun M.D.   Attending Only

## 2022-11-14 NOTE — HISTORY OF PRESENT ILLNESS
[Family Member] : family member [FreeTextEntry1] : f/u of HTN, HLD, DM [de-identified] : Pt is a 70 y/o F with PMHx of HTN, HLD, DM, COPD cat D, JONATHAN who presents to the office today for f/u of HTN, HLD, DM\par \par Pt states she has been feeling well lately going on occasional walks. No CP, SOB, dizziness, lightheadedness. Reports compliant with CPAP. Has home health aide.  Pt uses walker to get around.\par \par Declines med rec- states she is frustrated with providers asking her about medications\par \par HTN:\par  - Does not know which antihypertensives she is taking but states she needs refills\par - as per records should be HCTZ and lisinopril\par \par HLD:\par - Should be taking ASA and statin\par - requests refill\par \par DM2:\par - Metformin BID\par \par Positive PHQ9:\par - reviewed questionnaire again with pt - mild depression\par - states has been feeling occasionally down/depressed due to death of multiple family members (6).  \par - Reports has good social support system talks to family and friends \par -denies thoughts of suicide, self harm, not interested in therapy or medication

## 2022-11-14 NOTE — PLAN
[FreeTextEntry1] : Stressed to pt and daughter in law that pt should come in for annual exam\par Willl call pharmacy to get updated list of pt's medications. Will make necessary adjustments based on lab test results.  Emphasized importance of medication compliance\par Pt and daughter in law verbalized understanding

## 2022-11-21 ENCOUNTER — APPOINTMENT (OUTPATIENT)
Dept: PULMONOLOGY | Facility: CLINIC | Age: 72
End: 2022-11-21

## 2022-12-02 ENCOUNTER — APPOINTMENT (OUTPATIENT)
Dept: PULMONOLOGY | Facility: CLINIC | Age: 72
End: 2022-12-02
Payer: MEDICARE

## 2022-12-02 PROCEDURE — 94060 EVALUATION OF WHEEZING: CPT

## 2022-12-02 PROCEDURE — ZZZZZ: CPT

## 2022-12-02 PROCEDURE — 94729 DIFFUSING CAPACITY: CPT

## 2022-12-02 PROCEDURE — 94618 PULMONARY STRESS TESTING: CPT

## 2022-12-02 RX ORDER — FLUTICASONE PROPIONATE 50 UG/1
50 SPRAY, METERED NASAL TWICE DAILY
Qty: 1 | Refills: 3 | Status: ACTIVE | COMMUNITY
Start: 2017-04-27 | End: 1900-01-01

## 2022-12-06 ENCOUNTER — OUTPATIENT (OUTPATIENT)
Dept: OUTPATIENT SERVICES | Facility: HOSPITAL | Age: 72
LOS: 1 days | End: 2022-12-06
Payer: MEDICARE

## 2022-12-06 ENCOUNTER — APPOINTMENT (OUTPATIENT)
Dept: CT IMAGING | Facility: HOSPITAL | Age: 72
End: 2022-12-06

## 2022-12-06 PROCEDURE — 71271 CT THORAX LUNG CANCER SCR C-: CPT | Mod: 26

## 2022-12-06 PROCEDURE — 71271 CT THORAX LUNG CANCER SCR C-: CPT

## 2022-12-08 ENCOUNTER — NON-APPOINTMENT (OUTPATIENT)
Age: 72
End: 2022-12-08

## 2022-12-11 ENCOUNTER — TRANSCRIPTION ENCOUNTER (OUTPATIENT)
Age: 72
End: 2022-12-11

## 2022-12-11 NOTE — HISTORY OF PRESENT ILLNESS
[TextBox_13] : Referred by Dr. Trevizo / Nidhi Justice NP\par \par Ms. ISELA BROWN  is a 71 year old woman with a history of nicotine dependence.\par \par She  was seen in the office by Dr. Trevizo for review of eligibility for, as well as, discussion of Low-Dose CT lung cancer screening program. Over the telephone today we reviewed and confirmed that the patient meets screening eligibility criteria:\par -Age: 71 year \par -Smoking status:\par -Current smoker\par -Number of pack(s) per day: <1\par -Number of years smoked: 40\par -Number of pack years smokin\par \par She is smoking 5 cigarettes per day.\par She would like to quit smoking. She has tried the patch and gum before\par \par Ms. BROWN denies any signs or symptoms of lung cancer including new cough, change in cough, hemoptysis and unintentional weight loss. \par \par Ms. BROWN denies any personal history of lung cancer. Relative has had lung cancer. Denies any history of lung disease. Denies any history of occupational exposures\par  [PacksperYear] : 30

## 2022-12-11 NOTE — PLAN
[FreeTextEntry1] : Plan:\par -Low Dose CT chest for lung cancer screening\par -Follow up with patient and her referring provider after her LDCT results have been reviewed by the multi-disciplinary clinical team\par -Encouraged smoking cessation\par -I prescribed Nicotrol inhaler. Hopefully her insurance will cover it\par -Declined referral to CTC\par \par \par Engaged in shared decision making with Ms. ISELA BROWN . Answered all questions. She verbalized understanding and agreement. She knows to call back with any questions or concerns

## 2022-12-16 ENCOUNTER — APPOINTMENT (OUTPATIENT)
Dept: PULMONOLOGY | Facility: CLINIC | Age: 72
End: 2022-12-16

## 2022-12-16 DIAGNOSIS — G47.33 OBSTRUCTIVE SLEEP APNEA (ADULT) (PEDIATRIC): ICD-10-CM

## 2022-12-16 PROCEDURE — 99443: CPT

## 2022-12-17 PROBLEM — G47.33 OSA (OBSTRUCTIVE SLEEP APNEA): Status: ACTIVE | Noted: 2019-08-11

## 2022-12-17 NOTE — ASSESSMENT
[FreeTextEntry1] : COPD she is doing very well.  She is compliant with the bronchodilator.  She is on azithromycin Monday Wednesday and Thursday.  Since she has not been requiring urgent care nor hospitalizations since the last time I saw her.  She is still on 5 mg of prednisone every day.  She uses a nebulizer as needed basis.  The patient is increasing activity as tolerated.  The patient is down to 5 cigarettes a day.  I informed about the benefit of smoke cessation.  We will follow in the office\par \par The patient and is aware of the negative CT scan for lung cancer prescription screening and is scheduled for next year.\par \par Obstructive sleep apnea\par \par She is noncompliant with the CPAP mask

## 2022-12-17 NOTE — REASON FOR VISIT
[Home] : at home, [unfilled] , at the time of the visit. [Medical Office: ([facility])___] : at the medical office located in  [Verbal consent obtained from patient] : the patient, [unfilled]

## 2022-12-17 NOTE — HISTORY OF PRESENT ILLNESS
[TextBox_4] : Well.  Has not been emergency room.  She try to walk every day with a walker but when she gets tired she sits down.  She uses nebulizer about 2-3 times a day.  She wheezes periodically.  She has not having any swelling of the leg no chest pain.  She does not use the CPAP mask.  She takes her medication on a regular basis.  She is compliant with the azithromycin. [ESS] : 0

## 2023-01-18 NOTE — ED ADULT NURSE NOTE - CAS TRG GEN SKIN COLOR
Normal for race
PAST MEDICAL HISTORY:  COVID-19 virus infection 12/2021 fever, hedaches and runny nose    Fatty liver     H/O cholelithiasis     H/O Helicobacter infection 2018 and treated    H/O trichomoniasis treated 10/2022    HTN (hypertension)     metorrhagia/Bleeding s/p D&C    Morbid Obesity BMI 64.2    NICOLÁS (obstructive sleep apnea) moderate - no CPAP    personal h/o Fatty Liver documented on testing     personal h/o Iron Deficiency Anemia     Vertigo last episode 2019

## 2023-01-27 ENCOUNTER — EMERGENCY (EMERGENCY)
Facility: HOSPITAL | Age: 73
LOS: 1 days | Discharge: HOME | End: 2023-01-27
Attending: STUDENT IN AN ORGANIZED HEALTH CARE EDUCATION/TRAINING PROGRAM | Admitting: STUDENT IN AN ORGANIZED HEALTH CARE EDUCATION/TRAINING PROGRAM
Payer: MEDICARE

## 2023-01-27 VITALS
OXYGEN SATURATION: 93 % | HEIGHT: 62 IN | RESPIRATION RATE: 17 BRPM | WEIGHT: 147.93 LBS | DIASTOLIC BLOOD PRESSURE: 76 MMHG | SYSTOLIC BLOOD PRESSURE: 164 MMHG | TEMPERATURE: 98 F | HEART RATE: 89 BPM

## 2023-01-27 VITALS
OXYGEN SATURATION: 94 % | TEMPERATURE: 98 F | DIASTOLIC BLOOD PRESSURE: 76 MMHG | RESPIRATION RATE: 18 BRPM | HEART RATE: 82 BPM | SYSTOLIC BLOOD PRESSURE: 154 MMHG

## 2023-01-27 DIAGNOSIS — F43.23 ADJUSTMENT DISORDER WITH MIXED ANXIETY AND DEPRESSED MOOD: ICD-10-CM

## 2023-01-27 LAB
ALBUMIN SERPL ELPH-MCNC: 4.1 G/DL — SIGNIFICANT CHANGE UP (ref 3.3–5)
ALP SERPL-CCNC: 105 U/L — SIGNIFICANT CHANGE UP (ref 40–120)
ALT FLD-CCNC: 11 U/L — SIGNIFICANT CHANGE UP (ref 10–45)
ANION GAP SERPL CALC-SCNC: 10 MMOL/L — SIGNIFICANT CHANGE UP (ref 5–17)
APPEARANCE UR: CLEAR — SIGNIFICANT CHANGE UP
APTT BLD: 32.9 SEC — SIGNIFICANT CHANGE UP (ref 27.5–35.5)
AST SERPL-CCNC: 19 U/L — SIGNIFICANT CHANGE UP (ref 10–40)
BASOPHILS # BLD AUTO: 0.07 K/UL — SIGNIFICANT CHANGE UP (ref 0–0.2)
BASOPHILS NFR BLD AUTO: 0.8 % — SIGNIFICANT CHANGE UP (ref 0–2)
BILIRUB SERPL-MCNC: 0.2 MG/DL — SIGNIFICANT CHANGE UP (ref 0.2–1.2)
BILIRUB UR-MCNC: NEGATIVE — SIGNIFICANT CHANGE UP
BUN SERPL-MCNC: 10 MG/DL — SIGNIFICANT CHANGE UP (ref 7–23)
CALCIUM SERPL-MCNC: 10.3 MG/DL — SIGNIFICANT CHANGE UP (ref 8.4–10.5)
CHLORIDE SERPL-SCNC: 98 MMOL/L — SIGNIFICANT CHANGE UP (ref 96–108)
CO2 SERPL-SCNC: 30 MMOL/L — SIGNIFICANT CHANGE UP (ref 22–31)
COLOR SPEC: YELLOW — SIGNIFICANT CHANGE UP
CREAT SERPL-MCNC: 0.63 MG/DL — SIGNIFICANT CHANGE UP (ref 0.5–1.3)
DIFF PNL FLD: NEGATIVE — SIGNIFICANT CHANGE UP
EGFR: 94 ML/MIN/1.73M2 — SIGNIFICANT CHANGE UP
EOSINOPHIL # BLD AUTO: 0.16 K/UL — SIGNIFICANT CHANGE UP (ref 0–0.5)
EOSINOPHIL NFR BLD AUTO: 1.7 % — SIGNIFICANT CHANGE UP (ref 0–6)
ETHANOL SERPL-MCNC: <10 MG/DL — SIGNIFICANT CHANGE UP (ref 0–10)
GLUCOSE SERPL-MCNC: 130 MG/DL — HIGH (ref 70–99)
GLUCOSE UR QL: NEGATIVE — SIGNIFICANT CHANGE UP
HCT VFR BLD CALC: 39.1 % — SIGNIFICANT CHANGE UP (ref 34.5–45)
HGB BLD-MCNC: 12.5 G/DL — SIGNIFICANT CHANGE UP (ref 11.5–15.5)
IMM GRANULOCYTES NFR BLD AUTO: 0.2 % — SIGNIFICANT CHANGE UP (ref 0–0.9)
INR BLD: 1.05 — SIGNIFICANT CHANGE UP (ref 0.88–1.16)
KETONES UR-MCNC: NEGATIVE — SIGNIFICANT CHANGE UP
LACTATE SERPL-SCNC: 1.2 MMOL/L — SIGNIFICANT CHANGE UP (ref 0.5–2)
LEUKOCYTE ESTERASE UR-ACNC: NEGATIVE — SIGNIFICANT CHANGE UP
LYMPHOCYTES # BLD AUTO: 1.85 K/UL — SIGNIFICANT CHANGE UP (ref 1–3.3)
LYMPHOCYTES # BLD AUTO: 19.8 % — SIGNIFICANT CHANGE UP (ref 13–44)
MCHC RBC-ENTMCNC: 29.3 PG — SIGNIFICANT CHANGE UP (ref 27–34)
MCHC RBC-ENTMCNC: 32 GM/DL — SIGNIFICANT CHANGE UP (ref 32–36)
MCV RBC AUTO: 91.6 FL — SIGNIFICANT CHANGE UP (ref 80–100)
MONOCYTES # BLD AUTO: 0.76 K/UL — SIGNIFICANT CHANGE UP (ref 0–0.9)
MONOCYTES NFR BLD AUTO: 8.2 % — SIGNIFICANT CHANGE UP (ref 2–14)
NEUTROPHILS # BLD AUTO: 6.46 K/UL — SIGNIFICANT CHANGE UP (ref 1.8–7.4)
NEUTROPHILS NFR BLD AUTO: 69.3 % — SIGNIFICANT CHANGE UP (ref 43–77)
NITRITE UR-MCNC: NEGATIVE — SIGNIFICANT CHANGE UP
NRBC # BLD: 0 /100 WBCS — SIGNIFICANT CHANGE UP (ref 0–0)
PH UR: 7 — SIGNIFICANT CHANGE UP (ref 5–8)
PLATELET # BLD AUTO: 473 K/UL — HIGH (ref 150–400)
POTASSIUM SERPL-MCNC: 4.4 MMOL/L — SIGNIFICANT CHANGE UP (ref 3.5–5.3)
POTASSIUM SERPL-SCNC: 4.4 MMOL/L — SIGNIFICANT CHANGE UP (ref 3.5–5.3)
PROT SERPL-MCNC: 7.7 G/DL — SIGNIFICANT CHANGE UP (ref 6–8.3)
PROT UR-MCNC: NEGATIVE MG/DL — SIGNIFICANT CHANGE UP
PROTHROM AB SERPL-ACNC: 12.5 SEC — SIGNIFICANT CHANGE UP (ref 10.5–13.4)
RBC # BLD: 4.27 M/UL — SIGNIFICANT CHANGE UP (ref 3.8–5.2)
RBC # FLD: 14.6 % — HIGH (ref 10.3–14.5)
SARS-COV-2 RNA SPEC QL NAA+PROBE: NEGATIVE — SIGNIFICANT CHANGE UP
SODIUM SERPL-SCNC: 138 MMOL/L — SIGNIFICANT CHANGE UP (ref 135–145)
SP GR SPEC: 1.01 — SIGNIFICANT CHANGE UP (ref 1–1.03)
UROBILINOGEN FLD QL: 1 E.U./DL — SIGNIFICANT CHANGE UP
WBC # BLD: 9.32 K/UL — SIGNIFICANT CHANGE UP (ref 3.8–10.5)
WBC # FLD AUTO: 9.32 K/UL — SIGNIFICANT CHANGE UP (ref 3.8–10.5)

## 2023-01-27 PROCEDURE — 84484 ASSAY OF TROPONIN QUANT: CPT

## 2023-01-27 PROCEDURE — 93005 ELECTROCARDIOGRAM TRACING: CPT

## 2023-01-27 PROCEDURE — 85610 PROTHROMBIN TIME: CPT

## 2023-01-27 PROCEDURE — 74177 CT ABD & PELVIS W/CONTRAST: CPT | Mod: MA

## 2023-01-27 PROCEDURE — 96374 THER/PROPH/DIAG INJ IV PUSH: CPT

## 2023-01-27 PROCEDURE — 83690 ASSAY OF LIPASE: CPT

## 2023-01-27 PROCEDURE — 99285 EMERGENCY DEPT VISIT HI MDM: CPT | Mod: 25

## 2023-01-27 PROCEDURE — 80053 COMPREHEN METABOLIC PANEL: CPT

## 2023-01-27 PROCEDURE — 71045 X-RAY EXAM CHEST 1 VIEW: CPT | Mod: 26

## 2023-01-27 PROCEDURE — 83605 ASSAY OF LACTIC ACID: CPT

## 2023-01-27 PROCEDURE — 99284 EMERGENCY DEPT VISIT MOD MDM: CPT

## 2023-01-27 PROCEDURE — 85730 THROMBOPLASTIN TIME PARTIAL: CPT

## 2023-01-27 PROCEDURE — 36415 COLL VENOUS BLD VENIPUNCTURE: CPT

## 2023-01-27 PROCEDURE — 80307 DRUG TEST PRSMV CHEM ANLYZR: CPT

## 2023-01-27 PROCEDURE — 85025 COMPLETE CBC W/AUTO DIFF WBC: CPT

## 2023-01-27 PROCEDURE — 74177 CT ABD & PELVIS W/CONTRAST: CPT | Mod: 26,MA

## 2023-01-27 PROCEDURE — 84443 ASSAY THYROID STIM HORMONE: CPT

## 2023-01-27 PROCEDURE — 81003 URINALYSIS AUTO W/O SCOPE: CPT

## 2023-01-27 PROCEDURE — 71045 X-RAY EXAM CHEST 1 VIEW: CPT

## 2023-01-27 PROCEDURE — 87635 SARS-COV-2 COVID-19 AMP PRB: CPT

## 2023-01-27 RX ORDER — CLOTRIMAZOLE 10 MG
1 TROCHE MUCOUS MEMBRANE
Qty: 35 | Refills: 0
Start: 2023-01-27 | End: 2023-02-02

## 2023-01-27 RX ORDER — DIATRIZOATE MEGLUMINE 180 MG/ML
30 INJECTION, SOLUTION INTRAVESICAL ONCE
Refills: 0 | Status: COMPLETED | OUTPATIENT
Start: 2023-01-27 | End: 2023-01-27

## 2023-01-27 RX ORDER — ONDANSETRON 8 MG/1
4 TABLET, FILM COATED ORAL ONCE
Refills: 0 | Status: COMPLETED | OUTPATIENT
Start: 2023-01-27 | End: 2023-01-27

## 2023-01-27 RX ORDER — SODIUM CHLORIDE 9 MG/ML
1000 INJECTION INTRAMUSCULAR; INTRAVENOUS; SUBCUTANEOUS ONCE
Refills: 0 | Status: COMPLETED | OUTPATIENT
Start: 2023-01-27 | End: 2023-01-27

## 2023-01-27 RX ORDER — SODIUM CHLORIDE 9 MG/ML
500 INJECTION INTRAMUSCULAR; INTRAVENOUS; SUBCUTANEOUS ONCE
Refills: 0 | Status: COMPLETED | OUTPATIENT
Start: 2023-01-27 | End: 2023-01-27

## 2023-01-27 RX ORDER — MECLIZINE HCL 12.5 MG
25 TABLET ORAL ONCE
Refills: 0 | Status: COMPLETED | OUTPATIENT
Start: 2023-01-27 | End: 2023-01-27

## 2023-01-27 RX ORDER — FUROSEMIDE 40 MG
40 TABLET ORAL ONCE
Refills: 0 | Status: DISCONTINUED | OUTPATIENT
Start: 2023-01-27 | End: 2023-01-27

## 2023-01-27 RX ADMIN — SODIUM CHLORIDE 1000 MILLILITER(S): 9 INJECTION INTRAMUSCULAR; INTRAVENOUS; SUBCUTANEOUS at 18:18

## 2023-01-27 RX ADMIN — ONDANSETRON 4 MILLIGRAM(S): 8 TABLET, FILM COATED ORAL at 16:59

## 2023-01-27 RX ADMIN — DIATRIZOATE MEGLUMINE 30 MILLILITER(S): 180 INJECTION, SOLUTION INTRAVESICAL at 18:23

## 2023-01-27 RX ADMIN — SODIUM CHLORIDE 500 MILLILITER(S): 9 INJECTION INTRAMUSCULAR; INTRAVENOUS; SUBCUTANEOUS at 16:55

## 2023-01-27 RX ADMIN — Medication 25 MILLIGRAM(S): at 16:59

## 2023-01-27 NOTE — ED PROVIDER NOTE - PROGRESS NOTE DETAILS
CT without acute findings. Patient eating a sandwich without difficulty, patient stable for discharge.

## 2023-01-27 NOTE — ED BEHAVIORAL HEALTH ASSESSMENT NOTE - NSSUICPROTFACT_PSY_ALL_CORE
Responsibility to children, family, or others/Identifies reasons for living/Supportive social network of family or friends/Fear of death or the actual act of killing self/Cultural, spiritual and/or moral attitudes against suicide/Quaker beliefs

## 2023-01-27 NOTE — ED ADULT NURSE REASSESSMENT NOTE - GENERAL PATIENT STATE
comfortable appearance
comfortable appearance/family/SO at bedside/smiling/interactive
comfortable appearance
comfortable appearance/crying/family/SO at bedside
comfortable appearance/smiling/interactive

## 2023-01-27 NOTE — ED PROVIDER NOTE - NSFOLLOWUPINSTRUCTIONS_ED_ALL_ED_FT
Please stay well hydrated, please return for any worsening symptoms, headache, chest pain, shortness of breath, abdominal pain. Please follow up closely with your primary physician.

## 2023-01-27 NOTE — ED PROVIDER NOTE - PATIENT PORTAL LINK FT
You can access the FollowMyHealth Patient Portal offered by [facility] by registering at the following website: [email or web address]. By joining [employer]’s FollowMyHealth portal, you will also be able to view your health information using other applications (apps) compatible with our system.

## 2023-01-27 NOTE — ED ADULT NURSE NOTE - NSFALLRSKINDICATORS_ED_ALL_ED
Subjective   History of Present Illness  History of Present Illness    Chief complaint: Nasal congestion    Location: Home    Quality/Severity:  Moderate, clear    Timing/Onset/Duration: gradual onset over the last 3 days    Modifying Factors: Nothing seems to make it better or worse    Associated Symptoms: There's been no fever.  There's been no vomiting or diarrhea.  There is been no rashes.  The child's last wet diaper was here in the emergency department.    Narrative: This 4-month-old white male presents with a three-day history of clear nasal congestion.  There's been no fever.  There's been no vomiting or diarrhea.  No rash.  Child's been having normal bowel movements and wet diapers.  There is no smoking in the house, family smokes outside.    PCP: Nadya Lane    Review of Systems   Constitutional: Negative for activity change, crying, decreased responsiveness and irritability.   HENT: Positive for congestion and rhinorrhea. Negative for drooling, ear discharge, facial swelling, mouth sores, nosebleeds, sneezing and trouble swallowing.    Eyes: Negative for discharge and redness.   Respiratory: Positive for cough. Negative for choking, wheezing and stridor.    Cardiovascular: Negative for leg swelling, fatigue with feeds, sweating with feeds and cyanosis.   Gastrointestinal: Negative for blood in stool, constipation, diarrhea and vomiting.   Genitourinary: Negative for decreased urine volume and hematuria.   Musculoskeletal: Negative for extremity weakness and joint swelling.   Skin: Negative for rash.   Allergic/Immunologic: Negative for food allergies.        Medication List      ASK your doctor about these medications          ranitidine 150 MG/10ML syrup   Commonly known as:  ZANTAC   Take 0.7 mL by mouth 2 (Two) Times a Day.           Past Medical History:   Diagnosis Date   • Enterovirus infection 2/1/2017   • Spitting up infant 2/1/2017       No Known Allergies    Past Surgical History:   Procedure  Laterality Date   • CIRCUMCISION         Family History   Problem Relation Age of Onset   • Diabetes Maternal Grandmother      Copied from mother's family history at birth   • No Known Problems Maternal Grandfather      Copied from mother's family history at birth   • Anemia Mother      Copied from mother's history at birth   • Asthma Mother      Copied from mother's history at birth   • Liver disease Mother      Copied from mother's history at birth       Social History     Social History   • Marital status: Single     Spouse name: N/A   • Number of children: N/A   • Years of education: N/A     Social History Main Topics   • Smoking status: Never Smoker   • Smokeless tobacco: None   • Alcohol use None   • Drug use: None   • Sexual activity: Not Asked     Other Topics Concern   • None     Social History Narrative           Objective   Physical Exam   Constitutional: He appears well-developed and well-nourished. He is active. He has a strong cry. No distress.   ED Triage Vitals:  Temp: 98.5 °F (36.9 °C) (04/13/17 1622)  Heart Rate: 146 (04/13/17 1622)  Resp: 30 (04/13/17 1622)  BP: n/a  SpO2: 100 % (04/13/17 1622)  Temp Source: Temporal Art (04/13/17 1622)  Heart Rate Source: Monitor (04/13/17 1622)  Patient Position: n/a  BP Location: n/a  FiO2 (%): n/a    The patient's vitals were reviewed by me.  Unless otherwise noted they are within normal limits.     HENT:   Head: Anterior fontanelle is flat. No cranial deformity or facial anomaly.   Right Ear: Tympanic membrane normal.   Left Ear: Tympanic membrane normal.   Nose: Nose normal. No nasal discharge.   Mouth/Throat: Mucous membranes are moist. Dentition is normal. Oropharynx is clear. Pharynx is normal.   Eyes: Conjunctivae and EOM are normal. Red reflex is present bilaterally. Pupils are equal, round, and reactive to light. Right eye exhibits no discharge. Left eye exhibits no discharge.   Neck: Normal range of motion. Neck supple.   No meningeal signs    Cardiovascular: Normal rate, regular rhythm, S1 normal and S2 normal.  Pulses are strong and palpable.    No murmur heard.  Pulmonary/Chest: Effort normal and breath sounds normal. No nasal flaring or stridor. No respiratory distress. He has no wheezes. He has no rhonchi. He has no rales. He exhibits no retraction.   Abdominal: Soft. Bowel sounds are normal. He exhibits no distension and no mass. There is no hepatosplenomegaly. There is no tenderness. There is no rebound and no guarding. No hernia.   Musculoskeletal: Normal range of motion. He exhibits no edema, tenderness, deformity or signs of injury.   Lymphadenopathy: No occipital adenopathy is present.     He has no cervical adenopathy.   Neurological: He is alert. He has normal strength. He exhibits normal muscle tone. Suck normal.   Skin: Skin is warm and moist. Capillary refill takes less than 3 seconds. Turgor is turgor normal. No petechiae, no purpura and no rash noted. He is not diaphoretic. No cyanosis. No mottling, jaundice or pallor.   Nursing note and vitals reviewed.      Procedures         ED Course  ED Course      5:28 PM, 04/13/17:  The diagnosis of viral upper respiratory infection was discussed with the mother.  She's been counseled to bulb suction the infant as needed.  She's been counseled to elevate the head of the crib as needed to facilitate managing his secretions better.  The infant should follow-up with Dr. Lane within one week, the infant should return if there is difficulty breathing, fever, worse in any way at all.  The mother was advised to have the family member stop smoking.            MDM  No orders to display     Labs Reviewed - No data to display  No results found.    Final diagnoses:   None         ED Medications:  Medications - No data to display    New Medications:     Medication List      ASK your doctor about these medications          ranitidine 150 MG/10ML syrup   Commonly known as:  ZANTAC   Take 0.7 mL by mouth 2  (Two) Times a Day.           Stopped Medications:     Medication List      ASK your doctor about these medications          ranitidine 150 MG/10ML syrup   Commonly known as:  ZANTAC   Take 0.7 mL by mouth 2 (Two) Times a Day.             Final diagnoses:   Viral URI with cough            Jeff Kumari MD  04/13/17 5642     yes

## 2023-01-27 NOTE — ED PROVIDER NOTE - PHYSICAL EXAMINATION
CONSTITUTIONAL: Awake, alert.  Nontoxic, no acute distress.    HEAD: Normocephalic, atraumatic.    EYES: Conjunctivae clear without exudates or hemorrhage. Sclera is non-icteric.    ENT: Normal appearing external ears, nose, mucous membranes moist. +white plaques on tongue    NECK: supple, trachea midline.    HEART:  Normal rate, regular rhythm.  Heart sounds S1, S2.  No murmurs, rubs or gallops.    LUNGS:  Wearing NC @4L.  No acute respiratory distress.  Non-tachypneic and non-labored.  Lungs slightly diminished.  No wheezing, rales, rhonchi.    ABDOMEN: Normal appearing skin without lesions, rashes.  Normal bowel sounds x 4.  Soft, +mild generalized ttp/ discomfort. No rebound or guarding. No hernias or masses palpable.  No pulsatile abdominal mass.   No CVA tenderness b/l.    MUSCULOSKELETAL:  Moving all extremities without issue.    SKIN: Skin in warm, dry and intact without rashes or lesions.  Appropriate color for ethnicity.    NEUROLOGICAL:  Became slightly dizzy when stretcher laid flat, unchanged when standing.  Awake, alert and oriented x 3.  Normal speech and cognition.  Face symmetric with equal sensation to light touch throughout, PERRL, EOMI, no nystagmus, hearing grossly equal and intact b/l, no tongue deviation, intact strength upon turning head against resistance b/l, No gross motor deficit, 5/5 strength throughout, no gross sensory loss to light touch b/l upper and lower ext, normal movement.  No dysmetria on finger to nose testing.  Neg pronator drift.      PSYCH: depressed, expressing some passive SI.  Denies HI, AH, VH

## 2023-01-27 NOTE — ED PROVIDER NOTE - CLINICAL SUMMARY MEDICAL DECISION MAKING FREE TEXT BOX
71 y/o female w/ hx current smoker, tracheomalacia, COPD on 3L home O2/CPAP qhs (noncompliant) HTN, HLD, DM2, TIA, vertigo, hx of admission for oropharyngeal and esophageal candidiasis and chronic hypoxic respiratory failure 2/2 COPD p/w daughter-in-law for eval of multiple complaints.  Pt's primary complaints are dizziness, nbnb vomiting, and decreased po intake x 3 days.  Notes mild associated abd pain and constipation.  Notes has not had BM in 3 days.  Also c/o headaches, cough, chest pain, and shortness of breath, all of which are unchanged from her baseline.  Denies f/c, diarrhea, dysuria, hematuria, focal numbness/tingling/weakness to ext.  Of note, throughout interview, pt tearful and notes feeling depressed.  Has had multiple deaths in family recently.  When asked about SI, states sometimes "doesn't want to be here."  Denies HI, AH, VH.      VS notable for O2 93% RA, /76  Exam notable for mild ttp throughout abd  Pt became slightly dizzy upon laying flat, otherwise neuro exam grossly unrevealing    Plan for EKG, basic labs, CXR, CTAP  Pt with multiple complaints, with primary issue seeming to be dizziness, vomiting, and abd pain/ decreased po intake - seems somewhat vague, could be dehydration, vertigo, however would like to r/o electrolyte abnormality, acs, pna, infection.  Will r/o sbo, intraabdominal pathology with ct scan  Has normal neuro exam without abnl cerebellar findings, do not feel acute brain imaging warranted at this time  Pt with long hx copd, seems to be at baseline today  Will give ivf, meclizine, anti-emetic  Will discuss with psych for passive suicidality/ depression  Additionally, appears to have oral thrush at this time -- pt states ran out of her oral antifungal, will re-fill

## 2023-01-27 NOTE — ED ADULT TRIAGE NOTE - CHIEF COMPLAINT QUOTE
Pt c/o dizziness, vomiting, epigastric pain, chest pain, and chronic SOB x3 days.. pt denies fevers or coughing, hx of COPD (2-3L NC PRN). Pt also endorses feeling anxious lately due to multiple deaths in the family and is requesting referral for "someone to speak to." Denies SI/HI.

## 2023-01-27 NOTE — ED BEHAVIORAL HEALTH ASSESSMENT NOTE - HPI (INCLUDE ILLNESS QUALITY, SEVERITY, DURATION, TIMING, CONTEXT, MODIFYING FACTORS, ASSOCIATED SIGNS AND SYMPTOMS)
72 year old female, , retired (on SSI), domiciled alone with PMH tracheomalacia, COPD on 3L home O2/CPAP qhs (non-compliant) HTN, HLD, DM2, TIA, hx of admission for oropharyngeal and esophageal candidiasis and chronic hypoxic respiratory failure 2/2 COPD and PPH Tobacco Use Disorder, MDD, no prior inpatient psychiatric hospitalizations, not currently engaged in outpatient psychiatric treatment, no history of SA/NSSIB/Violence, no history of trauma, BIB daughter-in-law for dizziness, nbnb vomiting, abdominal pain, constipation, and decreased po intake x 3 days.  Patient also reporting chronic headaches, cough, chest pain, and shortness of breath, all of which are unchanged from her baseline.  Psychiatry consulted for worsening depressive symptoms and passive SI in the setting of multiple medical co-morbidities and the loss of 5 family members in the past year, most recently her brother approximately one week ago.    On evaluation, the patient is calm, cooperative, well-related with good eye contact, euthymic affect, demonstrating good behavioral control and linear thought processes.  The patient states that she feels very overwhelmed in the past year given that she has experienced the loss of five family members, most recently her brother from cancer a week ago and her younger sister approximately three months ago.  She states that she was "very attached to her younger sister" and she finds herself "crying sometimes" and "talking to their pictures."  She states that she feels these are "stupid things" but she continues to grieve their losses.  She states that she lives by herself but that her son, extended family and friends come to see her daily.  She states that her son is her home attendant but she would like to have someone in her home at night in case she falls and hurts herself.  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  She states that she does not want that to happen to her and that she often sleeps on the couch rather than her bed because it is closer to the ground and she is worried about falling.  She states that this has also kept her inside the home because she is worried about falling.  She states that she feels lonely and anxious that she is frail and weak.  The patient states that she does not want to die alone in her house for her family to find her.  She adamantly denies SI/HI, intent and plan as well as AVH.  No paranoia or delusions are reported or elicited.  The patient reports that she suffered from depression in the past which responded well to an antidepressant but she cannot remember what medication she was prescribed.  She states that she would like to be connected with a therapist and psychiatrist and is receptive to a referral to [facility].  All questions and concerns addressed.      COLLATERAL:  Charly (Daughter-in-law): [phone]  She reports that both the patient and the family have suffered many losses over the past year and they are all struggling with grief.  She states that, although the patient lives alone, family and friends come to see her daily.  She states that the patient can sometimes be irritable and difficult to engage.  She states that, with the many losses, the patient has become "more aware of her own mortality" and has become more anxious about falls.  She believes the patient could benefit from further support.  However, she has no acute concerns for the patient's safety or that of others.  She states that she believes that the referral to [facility] will be very helpful if the patient consistently engages.  She states that her , the patient's son, will call on Monday to schedule an appointment.  All questions and concerns addressed.

## 2023-01-27 NOTE — ED PROVIDER NOTE - OBJECTIVE STATEMENT
71 y/o female w/ hx current smoker, tracheomalacia, COPD on 3L home O2/CPAP qhs (noncompliant) HTN, HLD, DM2, TIA, hx of admission for oropharyngeal and esophageal candidiasis and chronic hypoxic respiratory failure 2/2 COPD p/w daughter-in-law for eval of multiple complaints.  Pt's primary complaints are dizziness, nbnb vomiting, and decreased po intake x 3 days.  Notes mild associated abd pain and constipation.  Notes has not had BM in 3 days.  Also c/o headaches, cough, chest pain, and shortness of breath, all of which are unchanged from her baseline.  Denies f/c, diarrhea, dysuria, hematuria, focal numbness/tingling/weakness to ext.  Of note, throughout interview, pt tearful and notes feeling depressed.  Has had multiple deaths in family recently.  When asked about SI, states sometimes "doesn't want to be here."  Denies HI, AH, VH.

## 2023-01-27 NOTE — ED BEHAVIORAL HEALTH ASSESSMENT NOTE - RISK ASSESSMENT
Although the patient has a chronically elevated risk for self-harm/suicide given a history of MDD, recent psychosocial stressors and multiple medical co-morbidities, no acute risk factors are identified at this time and protective factors abound including a capacity to advocate for self, future-oriented, identifies reasons for living, Catholic beliefs against suicide, strong social support, eagerness to engage in treatment, fair insight, no signs/symptoms of vaughn or psychosis and no history of SA/NSSIB.  Given that the patient is not an acute risk to self or others, an inpatient psychiatric admission is not warranted at this time.  The patient would benefit from outpatient psychiatric/psychotherapeutic follow-up.  ALLEN/VRA low.

## 2023-01-27 NOTE — ED BEHAVIORAL HEALTH ASSESSMENT NOTE - NSBHPSYCHOLCOGORIENT_PSY_A_CORE
[FreeTextEntry1] : rhytids  [FreeTextEntry2] : micro needle with prp [FreeTextEntry6] : Ms. MARLYN OLVERA is 46 year Non- or  female complaining of skin laxity and rhytids.  She desires micro needling with Platelet Rich Plasma (PRP) for improvement in the skin tone.  The risks, benefits, alternatives, limitations and potential for permanent scarring were outlined with the patient preoperatively.   Under topical anesthetic and aseptic conditions, the patient was treated with blood drawn from a peripheral vein.  The blood was centrifuged with a proprietary tube to separate the red blood cells from the platelet rich plasma.  Approximately 6 cc of PRP was harvested and  prepared.  Micro needling was performed on the face and neck and tolerated well.  The platelet rich plasma was used as a lubricant for and as an addition to permeate the micro holes created by the instrument.  Please see accompanying sheet for depth of needle per facial or neck site.  She tolerated the procedure well and instructions were given  . Aquaphor was applied at the completion and a follow up appointment was made.\par  Oriented to time, place, person, situation

## 2023-01-27 NOTE — ED BEHAVIORAL HEALTH ASSESSMENT NOTE - OTHER PAST PSYCHIATRIC HISTORY (INCLUDE DETAILS REGARDING ONSET, COURSE OF ILLNESS, INPATIENT/OUTPATIENT TREATMENT)
PSYCKES:  MEDICAID ID: [id number]  [place] FOR SI: None  QUALITY FLAGS: None  BEHAVIORAL HEALTH DIAGNOSES: Major Depressive Disorder  BEHAVIORAL HEALTH MEDICATIONS: Nicotine 21 mg/24hr, Nicotine (Nicotine Step 2), 14mg/24hr  OUTPATIENT: No Medicaid claims for this data type in the past 5 years  INPATIENT: Medical x4 most recently at [facility] (09/15/21 – 09/17/21)  ED: Medical x4 most recently at [facility] (02/05/22) for Cervicalgia

## 2023-01-27 NOTE — ED ADULT NURSE REASSESSMENT NOTE - NS ED NURSE REASSESS COMMENT FT1
Patient and family member made aware of lab and imaging results, return precautions reviewed, verbalized understanding. Ambulated well without assistance.

## 2023-01-27 NOTE — ED ADULT NURSE NOTE - OBJECTIVE STATEMENT
Pt current smoker (3-9 cigarettes daily) with pmx of tracheomalacia, COPD on 3L home O2/CPAP qhs (noncompliant) HTN, HLD, DM2, TIA, hx of admission for oropharyngeal and esophageal candidiasis and chronic hypoxic respiratory failure presented to ED with c/o of epigastric chest pain with vomiting and accompanied dizziness since Tuesday. Affirms not taking any of her meds since Tuesday due to being nauseous and vomiting concerns. AOX4. Patient difficulty breathing and any form of distress not noted. Patient oriented to ED area. All needs attended. Rounding in progress. Fall risk precautions maintained.

## 2023-01-27 NOTE — ED ADULT NURSE NOTE - NSIMPLEMENTINTERV_GEN_ALL_ED
No Implemented All Fall with Harm Risk Interventions:  [place] to call system. Call bell, personal items and telephone within reach. Instruct patient to call for assistance. Room bathroom lighting operational. Non-slip footwear when patient is off stretcher. Physically safe environment: no spills, clutter or unnecessary equipment. Stretcher in lowest position, wheels locked, appropriate side rails in place. Provide visual cue, wrist band, yellow gown, etc. Monitor gait and stability. Monitor for mental status changes and reorient to person, place, and time. Review medications for side effects contributing to fall risk. Reinforce activity limits and safety measures with patient and family. Provide visual clues: red socks.

## 2023-01-27 NOTE — ED BEHAVIORAL HEALTH ASSESSMENT NOTE - SAFETY PLAN ADDT'L DETAILS
Education provided regarding environmental safety / lethal means restriction/Provision of National Suicide Prevention Lifeline 9-984-918-TALK (2600)

## 2023-01-27 NOTE — ED BEHAVIORAL HEALTH ASSESSMENT NOTE - REFERRAL / APPOINTMENT DETAILS
[facility] [address] [place] [address]  [phone].  Patient to call on 01/30/23 to make an intake appointment.

## 2023-01-27 NOTE — ED BEHAVIORAL HEALTH ASSESSMENT NOTE - DETAILS
Mother with depression.  Father with depression.  No family history of suicide attempts. SOB Dispo discussed with ED provider. Patient declined to complete a safety plan. See HPI nbnb vomiting, abdominal pain, constipation, and decreased po intake x 3 days

## 2023-01-27 NOTE — ED BEHAVIORAL HEALTH ASSESSMENT NOTE - SUMMARY
72 year old female, , retired (on SSI), domiciled alone with PMH tracheomalacia, COPD on 3L home O2/CPAP qhs (non-compliant) HTN, HLD, DM2, TIA, hx of admission for oropharyngeal and esophageal candidiasis and chronic hypoxic respiratory failure 2/2 COPD and PPH Tobacco Use Disorder, MDD, no prior inpatient psychiatric hospitalizations, not currently engaged in outpatient psychiatric treatment, no history of SA/NSSIB/Violence, no history of trauma, BIB daughter-in-law for dizziness, nbnb vomiting, abdominal pain, constipation, and decreased po intake x 3 days.  Patient also reporting chronic headaches, cough, chest pain, and shortness of breath, all of which are unchanged from her baseline.  Psychiatry consulted for worsening depressive symptoms and passive SI in the setting of multiple medical co-morbidities and the loss of 5 family members in the past year, most recently her brother approximately one week ago.    On evaluation, the patient is calm, cooperative, well-related with good eye contact, euthymic affect, demonstrating good behavioral control and linear thought processes. The patient reports intermittent depressive symptoms including low mood, low energy, poor attention/concentration and insomnia in the setting of the deaths of 5 family members in the past year.  The patient's brother passed away approximately one week ago.  The patient adamantly denies SI/HI, intent and plan.  Collateral from the patient's daughter-in-law indicates no concern for the patient's safety or that of others.  The patient does not subjectively report nor objectively demonstrate signs/symptoms consistent with a decompensated depressive, anxious, manic or psychotic syndrome.  The patient would benefit from outpatient psychiatry and psychotherapy referrals.      Plan:  - Patient psychiatrically cleared for discharge home.  - Patient would benefit from initiation of an SSRI (e.g. Lexapro) as outpatient.  - Provided referral to [facility] which his geared towards an aging population, with the understanding that their clients will need increasingly comprehensive services.  [address]  [address] [phone]  - The patient was also provided with crisis support and referral information including [facility] and instructed to call 911 or return to the nearest emergency room if the symptoms worsen.  - The patient does not demonstrate any high-risk psychosocial factors and collateral has been obtained to support the lack of these high-risk psychosocial factors.  The patient has adequate access to food, medication and safe shelter.

## 2023-01-27 NOTE — ED BEHAVIORAL HEALTH ASSESSMENT NOTE - DESCRIPTION
tracheomalacia, COPD on 3L home O2/CPAP qhs (non-compliant) HTN, HLD, DM2, TIA, hx of admission for oropharyngeal and esophageal candidiasis and chronic hypoxic respiratory failure 2/2 COPD The patient was born in [place] and came to the [place] when she was 12 years old.  Her parents  when she was young and she was primarily raised by her maternal aunt.  She is currently domiciled by herself in a three bedroom apartment.  She has a son and daughter.  She completed an 8th grade education.  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  No history of  service.  She is a practicing [employer]. Per ED provider note writtent by Kristyn Dove MD on 01/27/23:  "· HPI Objective Statement: 71 y/o female w/ hx current smoker, tracheomalacia, COPD on 3L home O2/CPAP qhs (noncompliant) HTN, HLD, DM2, TIA, hx of admission for oropharyngeal and esophageal candidiasis and chronic hypoxic respiratory failure 2/2 COPD p/w daughter-in-law for eval of multiple complaints.  Pt's primary complaints are dizziness, nbnb vomiting, and decreased po intake x 3 days.  Notes mild associated abd pain and constipation.  Notes has not had BM in 3 days.  Also c/o headaches, cough, chest pain, and shortness of breath, all of which are unchanged from her baseline.  Denies f/c, diarrhea, dysuria, hematuria, focal numbness/tingling/weakness to ext.  Of note, throughout interview, pt tearful and notes feeling depressed.  Has had multiple deaths in family recently.  When asked about SI, states sometimes "doesn't want to be here."  Denies HI, AH, VH."    Patient has been calm and cooperative in the ED.  CT abdomen pending.

## 2023-01-27 NOTE — ED PROVIDER NOTE - NS ED ROS FT
CONSTITUTIONAL: Denies fever and chills    HEENT: +cough    RESPIRATORY: +sob    CARDIOVASCULAR: +cp    GASTROINTESTINAL: +abdominal pain, nausea, vomiting.  Denies diarrhea.    GENITOURINARY: Denies dysuria and hematuria.    NEUROLOGICAL: +ha and dizziness

## 2023-01-30 DIAGNOSIS — G40.909 EPILEPSY, UNSPECIFIED, NOT INTRACTABLE, WITHOUT STATUS EPILEPTICUS: ICD-10-CM

## 2023-01-30 DIAGNOSIS — Z79.84 LONG TERM (CURRENT) USE OF ORAL HYPOGLYCEMIC DRUGS: ICD-10-CM

## 2023-01-30 DIAGNOSIS — J43.9 EMPHYSEMA, UNSPECIFIED: ICD-10-CM

## 2023-01-30 DIAGNOSIS — F32.A DEPRESSION, UNSPECIFIED: ICD-10-CM

## 2023-01-30 DIAGNOSIS — R06.02 SHORTNESS OF BREATH: ICD-10-CM

## 2023-01-30 DIAGNOSIS — F43.23 ADJUSTMENT DISORDER WITH MIXED ANXIETY AND DEPRESSED MOOD: ICD-10-CM

## 2023-01-30 DIAGNOSIS — R42 DIZZINESS AND GIDDINESS: ICD-10-CM

## 2023-01-30 DIAGNOSIS — F32.9 MAJOR DEPRESSIVE DISORDER, SINGLE EPISODE, UNSPECIFIED: ICD-10-CM

## 2023-01-30 DIAGNOSIS — F17.200 NICOTINE DEPENDENCE, UNSPECIFIED, UNCOMPLICATED: ICD-10-CM

## 2023-01-30 DIAGNOSIS — R51.9 HEADACHE, UNSPECIFIED: ICD-10-CM

## 2023-01-30 DIAGNOSIS — E78.5 HYPERLIPIDEMIA, UNSPECIFIED: ICD-10-CM

## 2023-01-30 DIAGNOSIS — K59.00 CONSTIPATION, UNSPECIFIED: ICD-10-CM

## 2023-01-30 DIAGNOSIS — Z87.11 PERSONAL HISTORY OF PEPTIC ULCER DISEASE: ICD-10-CM

## 2023-01-30 DIAGNOSIS — Z99.89 DEPENDENCE ON OTHER ENABLING MACHINES AND DEVICES: ICD-10-CM

## 2023-01-30 DIAGNOSIS — Z79.82 LONG TERM (CURRENT) USE OF ASPIRIN: ICD-10-CM

## 2023-01-30 DIAGNOSIS — R10.9 UNSPECIFIED ABDOMINAL PAIN: ICD-10-CM

## 2023-01-30 DIAGNOSIS — R07.9 CHEST PAIN, UNSPECIFIED: ICD-10-CM

## 2023-01-30 DIAGNOSIS — I10 ESSENTIAL (PRIMARY) HYPERTENSION: ICD-10-CM

## 2023-01-30 DIAGNOSIS — Z86.73 PERSONAL HISTORY OF TRANSIENT ISCHEMIC ATTACK (TIA), AND CEREBRAL INFARCTION WITHOUT RESIDUAL DEFICITS: ICD-10-CM

## 2023-01-30 DIAGNOSIS — Z87.19 PERSONAL HISTORY OF OTHER DISEASES OF THE DIGESTIVE SYSTEM: ICD-10-CM

## 2023-01-30 DIAGNOSIS — Z86.010 PERSONAL HISTORY OF COLONIC POLYPS: ICD-10-CM

## 2023-01-30 DIAGNOSIS — E11.9 TYPE 2 DIABETES MELLITUS WITHOUT COMPLICATIONS: ICD-10-CM

## 2023-01-30 DIAGNOSIS — J39.8 OTHER SPECIFIED DISEASES OF UPPER RESPIRATORY TRACT: ICD-10-CM

## 2023-01-30 DIAGNOSIS — Z99.81 DEPENDENCE ON SUPPLEMENTAL OXYGEN: ICD-10-CM

## 2023-01-30 DIAGNOSIS — Z91.14 PATIENT'S OTHER NONCOMPLIANCE WITH MEDICATION REGIMEN: ICD-10-CM

## 2023-01-30 DIAGNOSIS — R05.9 COUGH, UNSPECIFIED: ICD-10-CM

## 2023-02-01 LAB — DRUG SCREEN, SERUM: SIGNIFICANT CHANGE UP

## 2023-02-02 ENCOUNTER — NON-APPOINTMENT (OUTPATIENT)
Age: 73
End: 2023-02-02

## 2023-02-02 ENCOUNTER — APPOINTMENT (OUTPATIENT)
Dept: INTERNAL MEDICINE | Facility: CLINIC | Age: 73
End: 2023-02-02
Payer: MEDICARE

## 2023-02-02 VITALS
TEMPERATURE: 97.8 F | SYSTOLIC BLOOD PRESSURE: 140 MMHG | WEIGHT: 150 LBS | BODY MASS INDEX: 30.24 KG/M2 | HEIGHT: 59 IN | DIASTOLIC BLOOD PRESSURE: 77 MMHG | HEART RATE: 90 BPM | OXYGEN SATURATION: 97 %

## 2023-02-02 DIAGNOSIS — I65.21 OCCLUSION AND STENOSIS OF RIGHT CAROTID ARTERY: ICD-10-CM

## 2023-02-02 DIAGNOSIS — F32.4 MAJOR DEPRESSIVE DISORDER, SINGLE EPISODE, IN PARTIAL REMISSION: Chronic | ICD-10-CM

## 2023-02-02 PROCEDURE — 99213 OFFICE O/P EST LOW 20 MIN: CPT | Mod: 25

## 2023-02-02 PROCEDURE — 99397 PER PM REEVAL EST PAT 65+ YR: CPT | Mod: 25,GY

## 2023-02-02 RX ORDER — METHYLPREDNISOLONE 4 MG/1
4 TABLET ORAL
Qty: 1 | Refills: 0 | Status: COMPLETED | COMMUNITY
Start: 2022-06-22 | End: 2023-02-02

## 2023-02-02 RX ORDER — AZITHROMYCIN 500 MG/1
500 TABLET, FILM COATED ORAL
Qty: 12 | Refills: 11 | Status: COMPLETED | COMMUNITY
Start: 2021-06-14 | End: 2023-02-02

## 2023-02-02 RX ORDER — ALBUTEROL SULFATE 90 UG/1
108 (90 BASE) INHALANT RESPIRATORY (INHALATION)
Qty: 1 | Refills: 11 | Status: COMPLETED | COMMUNITY
Start: 2022-12-17 | End: 2023-02-02

## 2023-02-02 RX ORDER — NICOTINE 4 MG/1
10 INHALANT RESPIRATORY (INHALATION)
Qty: 150 | Refills: 1 | Status: COMPLETED | COMMUNITY
Start: 2022-11-01 | End: 2023-02-02

## 2023-02-02 RX ORDER — LISINOPRIL 20 MG/1
20 TABLET ORAL
Qty: 90 | Refills: 3 | Status: DISCONTINUED | COMMUNITY
Start: 2018-08-15 | End: 2023-02-02

## 2023-02-02 RX ORDER — BLOOD SUGAR DIAGNOSTIC
STRIP MISCELLANEOUS
Qty: 50 | Refills: 0 | Status: COMPLETED | COMMUNITY
Start: 2017-08-25 | End: 2023-02-02

## 2023-02-02 NOTE — PROGRESS NOTE ADULT - PROBLEM SELECTOR PLAN 7
F: none   E: replete prn   N: DASH
No

## 2023-02-02 NOTE — HISTORY OF PRESENT ILLNESS
[Family Member] : family member [FreeTextEntry1] : annual exam [de-identified] : HA\par - right occipital radiates to right temporal\par - described as throbbing, persistent\par - was evaluated by specialist about 1.5yrs and found to have right carotid artery stenosis\par - uses OTC NSAIDs w/ minimal relief. \par \par [facility] ED visit 1/27/23\par - present for evaluation of dizziness that was preceded by nausea and vomiting\par - in ED w/u was negative. tx w/ IVF and meclizine and antiemetic\par - found to have ortal thrush, started on PO clotrimazole\par - since d/c nausea has improved, has been able to tolerate PO\par - has decreased intake over concern that she will vomit\par \par HCM\par - vax up to date\par - needs mammo and DEXA

## 2023-02-02 NOTE — PHYSICAL EXAM
[No Acute Distress] : no acute distress [Normal Sclera/Conjunctiva] : normal sclera/conjunctiva [EOMI] : extraocular movements intact [Normal TMs] : both tympanic membranes were normal [No Edema] : there was no peripheral edema [Normal] : affect was normal and insight and judgment were intact

## 2023-02-03 ENCOUNTER — TRANSCRIPTION ENCOUNTER (OUTPATIENT)
Age: 73
End: 2023-02-03

## 2023-02-03 LAB
25(OH)D3 SERPL-MCNC: 26.9 NG/ML
ALBUMIN SERPL ELPH-MCNC: 4.2 G/DL
ALP BLD-CCNC: 102 U/L
ALT SERPL-CCNC: 15 U/L
ANION GAP SERPL CALC-SCNC: 13 MMOL/L
AST SERPL-CCNC: 18 U/L
BASOPHILS # BLD AUTO: 0.08 K/UL
BASOPHILS NFR BLD AUTO: 0.8 %
BILIRUB SERPL-MCNC: 0.2 MG/DL
BUN SERPL-MCNC: 13 MG/DL
CALCIUM SERPL-MCNC: 9.8 MG/DL
CHLORIDE SERPL-SCNC: 101 MMOL/L
CHOLEST SERPL-MCNC: 180 MG/DL
CO2 SERPL-SCNC: 25 MMOL/L
CREAT SERPL-MCNC: 0.63 MG/DL
EGFR: 94 ML/MIN/1.73M2
EOSINOPHIL # BLD AUTO: 0.3 K/UL
EOSINOPHIL NFR BLD AUTO: 2.9 %
ESTIMATED AVERAGE GLUCOSE: 143 MG/DL
GLUCOSE SERPL-MCNC: 109 MG/DL
HBA1C MFR BLD HPLC: 6.6 %
HCT VFR BLD CALC: 42.1 %
HCV AB SER QL: NONREACTIVE
HCV S/CO RATIO: 0.09 S/CO
HDLC SERPL-MCNC: 47 MG/DL
HGB BLD-MCNC: 12.9 G/DL
IMM GRANULOCYTES NFR BLD AUTO: 0.2 %
LDLC SERPL CALC-MCNC: 85 MG/DL
LYMPHOCYTES # BLD AUTO: 2.05 K/UL
LYMPHOCYTES NFR BLD AUTO: 19.9 %
MAN DIFF?: NORMAL
MCHC RBC-ENTMCNC: 29.5 PG
MCHC RBC-ENTMCNC: 30.6 GM/DL
MCV RBC AUTO: 96.3 FL
MONOCYTES # BLD AUTO: 0.79 K/UL
MONOCYTES NFR BLD AUTO: 7.7 %
NEUTROPHILS # BLD AUTO: 7.05 K/UL
NEUTROPHILS NFR BLD AUTO: 68.5 %
NONHDLC SERPL-MCNC: 132 MG/DL
PLATELET # BLD AUTO: 512 K/UL
POTASSIUM SERPL-SCNC: 4.2 MMOL/L
PROT SERPL-MCNC: 7.1 G/DL
RBC # BLD: 4.37 M/UL
RBC # FLD: 15.3 %
SODIUM SERPL-SCNC: 139 MMOL/L
TRIGL SERPL-MCNC: 239 MG/DL
TSH SERPL-ACNC: 2.86 UIU/ML
WBC # FLD AUTO: 10.29 K/UL

## 2023-02-07 LAB
APPEARANCE: CLEAR
BACTERIA: ABNORMAL
BILIRUBIN URINE: NEGATIVE
BLOOD URINE: NEGATIVE
COLOR: YELLOW
GLUCOSE QUALITATIVE U: NEGATIVE
HYALINE CASTS: 0 /LPF
KETONES URINE: NEGATIVE
LEUKOCYTE ESTERASE URINE: NEGATIVE
MICROSCOPIC-UA: NORMAL
NITRITE URINE: NEGATIVE
PH URINE: 5.5
PROTEIN URINE: NORMAL
RED BLOOD CELLS URINE: 2 /HPF
SPECIFIC GRAVITY URINE: 1.02
SQUAMOUS EPITHELIAL CELLS: 2 /HPF
UROBILINOGEN URINE: NORMAL
WHITE BLOOD CELLS URINE: 2 /HPF

## 2023-03-30 NOTE — DISCHARGE NOTE PROVIDER - NSDCADMDATE_GEN_ALL_CORE_FT
15-Sep-2021 01:25 Mustarde Flap Text: The defect edges were debeveled with a #15 scalpel blade.  Given the size, depth and location of the defect and the proximity to free margins a Mustarde flap was deemed most appropriate.  Using a sterile surgical marker, an appropriate flap was drawn incorporating the defect. The area thus outlined was incised with a #15 scalpel blade.  The skin margins were undermined to an appropriate distance in all directions utilizing iris scissors.

## 2023-04-03 ENCOUNTER — NON-APPOINTMENT (OUTPATIENT)
Age: 73
End: 2023-04-03

## 2023-05-04 NOTE — ED ADULT NURSE NOTE - NSFALLRSKOUTCOME_ED_ALL_ED
[Normal Development] : Normal Development [None] : none [Looks for hidden objects] : looks for hidden objects [Imitates new gestures] : imitates new gestures [Says "Dad" or "Mom" with meaning] : says "Dad" or "Mom" with meaning [Follows a verbal command that] : follows a verbal command that includes a gesture [Takes first independent] : takes first independent steps [Stands without support] : stands without support [Picks up small object with 2 finger] : picks up small object with 2 finger pincer grasp [Picks up food and eats it] : picks up food and eats it [FreeTextEntry1] : 11 words - mama, papa, baba, ball, \par Knows a duck, eyes, teeth\par waves hello/goodbye Fall Risk

## 2023-06-06 NOTE — H&P ADULT - NSHPPOAPULMEMBOLUS_GEN_A_CORE
FOLLOW UP NOTE      PATIENT:  Jose Rivera II    MEDICAL RECORD NUMBER:  [id number]  YOB: 1983  AGE: 39 year old     DATE: 6/7/2023    Adult Virtual: Due to COVID-19 precautions, this visit was performed via live interactive two-way Video visit with patient's verbal consent. Clinician Location:Home. Patient Location: Home.. Patient's identity was verified. The Consent for Treatment, which includes patient rights and the grievance procedure, was reviewed and signed by patient or legal representative as part of the pre check in process for their virtual appointment today. The Consent was reviewed verbally with the patient and/or legal representative by this provider and any concerns or questions were addressed. Information including the Missed Appointment Agreement, After Hours contact information, and Fee Schedule was sent to the patient via their secure patient portal for reference after the appointment.     CHIEF COMPLAINT: \"traumatic brain injury symptoms\"  Accompanied by father.     HISTORY OF PRESENTING ILLNESS:    Appointment visit: # 2   Tx plan (every 6 visits or 90 days which ever is longer) and Tx consent (yearly) signed on: 2/6/23  Jose Rivera II  is a 39 year old Black/  male who presents today for medication management follow up. Jose  reports since our last visit he's been doing good. Work has been wonderful. Watches TV during the day.      Dad: having more issues with balance, falling (backwards). Will be starting PT. Still having some paranoid thoughts about brothers messing with his computers.    Jose Rivera II  lets me know recently mood has been \"good.\" some worry. Worries that won't have enough money on him when goes to rehab to get snack. Worries about how other people see him and think he's overweight.  Appetite has been \"good.\"  Energy has been \"not the most.\"  Sleep has been \"pretty good.\" Quetiapine 50 mg nightly.  Concentration has been \"pretty  good.\"  Motivation has been: \"ok\".  Denies suicidal ideation, intent or plan.  Denies homicidal ideation, intent or plan. As for how the medications have been working Jose  reports doing pretty good. As for side effects none. Misses medication none.      Last four PHQ 2/9 Test Results  0: Not at all  1: Several days  2: More than half the days  3: Nearly every day      Date 3/24/2023 2/10/2022 7/10/2021 2/19/2019   Adult PHQ 2 Score 0 1 0 1   Adult PHQ 2 Interpretation No further screening needed No further screening needed No further screening needed -     Date 2/19/2019   Adult PHQ 9 Score 12   Adult PHQ 9 Interpretation Moderate Depression               Last four GAD7 Assessments       GAD7 2/19/2019   GAD7 Score 8   Feeling nervous, anxious or on edge Several days   Not being able to stop or control worrying More than half the days   Worrying too much about different things More than half the days   Trouble relaxing Several days   Being so restless that it's hard to sit still Not at all   Becoming easily annoyed or irritable More than half the days   Feeling afraid as if something awful might happen Not at all   Ability to handle work, home and other people Not difficult at all       Past Psychiatric Medication Trials:  quetiapine, Lexapro, Adderall, Lamotrigine (for TBI), Abilify, clonazepam, methylphenidate, Vyvanse, diazepam    Patient is currently on:  Current Outpatient Medications   Medication Sig   • amphetamine-dextroamphetamine XR (Adderall XR) 10 MG 24 hr capsule Take 1 capsule by mouth 2 times daily.   • lamoTRIgine (LaMICtal) 100 MG tablet Take 1 tablet in the morning and 1 tablet in the evening   • Cholecalciferol 125 mcg (5,000 units) capsule Take 1 capsule by mouth daily.   • dilTIAZem (CARDIZEM CD) 300 MG 24 hr capsule Take 1 capsule by mouth daily.   • losartan (COZAAR) 50 MG tablet Take 1 tablet by mouth daily.   • sumatriptan (Imitrex) 100 MG tablet Take 1 tab at the first sign of migraine  headache. May repeat in 2 hours x1.   • ammonium lactate (LAC-HYDRIN) 12 % cream Apply 1 application. topically 2 times daily.   • docusate calcium (SURFAK) 240 MG capsule Take 1 capsule by mouth daily.   • metFORMIN (GLUCOPHAGE) 500 MG tablet 1 tab qpm x 3 days, 1 tab BID x 3 days, 1 tab qam and 2 tab qpm x 3 days, and then 2 tabs BID. Take with food.   • escitalopram (LEXAPRO) 20 MG tablet Take 1 tablet by mouth daily.   • QUEtiapine (SEROquel) 50 MG tablet Take 1 tablet by mouth nightly.   • lidocaine (XYLOCAINE) 5 % ointment Apply topically 5 times daily as needed for Pain.   • Menthol, Topical Analgesic, (Biofreeze) 4 % Gel Place on painful area 6x PRN discomfort   • lidocaine (LIDODERM) 5 % patch Place 1 patch onto the skin every 24 hours. Remove patch 12 hours after applying   • acetaminophen (TYLENOL) 500 MG tablet Take 2 tablets by mouth every 6 hours as needed for Pain. Do not exceed 4,000 mg of acetaminophen in 24 hours from all sources.   • albuterol 108 (90 Base) MCG/ACT inhaler Inhale 2 puffs into the lungs every 4 hours as needed for Wheezing.     No current facility-administered medications for this visit.         REVIEW OF SYSTEMS:   Constitutional:  Denies fever.              Integumentary:  Denies rash.   Ears, Nose, Throat:  Denies rhinorrhea and sore throat.                     Respiratory:  Denies cough.  Gastrointestinal:  Denies nausea, vomiting and diarrhea.         Cardiovascular:  Denies chest pain, palpitations and shortness of breath.  Musculoskeletal:  Denies pain.   Neurological:  Denies headache and weakness.       Urological:  Denies painful urination.             ALLERGIES:   Allergen Reactions   • Mold   (Environmental) PRURITUS and Other (See Comments)     Itchy eyes; runny nose   • Pollen Other (See Comments)     Runny nose         Visit Vitals  /76   Pulse 72   Resp 16   Ht 6' 3\" (1.905 m)   Wt 128.8 kg (284 lb)   BMI 35.50 kg/m²            Social History     Social  History Narrative   • Not on file         SUICIDE RISK ASSESMENT  YES NO If yes, describe    x Suicide attempt in last 24 hour?    x Suicidal thoughts?    x Plan or considering various methods? Describe:     x Access to means?  No Specify weapon location     x Indication of substance abuse/dependence?    x Attempts in past?      x Any family members, loved ones, friends who committed suicide?    x Recent deaths, losses, anniversary dates?    x Has made preparations for death?    x Lack of support system?    x Verbal contract for safety?   x  Patient has no current intent,or plan, but agrees to contact provider if Suicidal Ideation arises.   x  Patient aware of emergency 24 hour access information.        MENTAL STATUS EXAM:   Appearance:  Well-groomed, good eye contact, appears stated age.   Behavior:  Calm, pleasant, interactive.   Gait:  Not assessed  Cooperativity:  Cooperative, forthcoming, appears reliable.    Speech:  Normal rate, tone and volume.   Language:  No abnormality noted.    Mood: Euthymic   Affect:  Mood-congruent, stable, normal range.  Thought Process:  Linear, logical, goal-directed.    Thought Content:  No overt delusions or abnormality noted.    Perception:  No hallucinations, not responding to internal stimuli.   Consciousness:  Awake and alert.   Orientation:  Oriented to person, place and time.   Musculoskeletal: No abnormal or involuntary muscle movements observed.  Memory:  Fair, able to demonstrate accurate historical recall for recent and more remote events and discussions.  Attention:  Good, no fluctuation or obvious deficit noted and able to follow the conversation.   Fund of Knowledge:  Consistent with education and experiences as evidenced by vocabulary.   Insight:  Fair, based on appropriate recognition of impact of psychiatric symptoms and need for treatment.   Judgment:  Good, based on recent decisions.  Motivation to pursue treatment:  Good.            ASSESSMENT   1. History of  traumatic brain injury    2. VARINDER (generalized anxiety disorder)    3. Major depressive disorder in full remission, unspecified whether recurrent (CMD)    4. Attention deficit hyperactivity disorder (ADHD), combined type    5. Insomnia due to mental condition         Jose Rivera II comes to the clinic for medication management follow up.  Since we last met patient tells me he has been doing good.  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  When asked about anxiety he tells me that he worries sometimes about what other people think of how he looks.  He would like to lose weight to feel better about himself but does not think he can be more active.  His dad tells me he has been having balance issues in the last few months and falling more.  They will be starting physical therapy to work on this.  He had an appointment with his PCP a few weeks ago and his lamotrigine was decreased to 100 mg b.i.d. due to falls.  His insomnia seems to have gotten a little better and they continue to use quetiapine 50 mg nightly with good results.  Dad says you can definitely notice if he does not have his Adderall so this has been very helpful as well.  Since he is stable we will continue with his current medications of escitalopram 20 mg daily, lamotrigine 100 mg b.i.d. and Adderall XR 10 mg b.i.d..  He is not interested in therapy.        Medication consent signed for new medication: Not needed   Controlled substance contract signed:  02/06/2023  Last urine drug screen: Not needed   Next urine drug screen due: Not needed   Medication labs (lipid panel, TSH, A1c/glucose): Not needed   AIMS: Last Not needed  Due Not needed         PLAN  1. Continue Adderall XR 10 mg b.i.d.  2. Continue escitalopram 20 mg daily  3. Continue lamotrigine 100 mg b.i.d.  4. Continue quetiapine 50 mg 1-2 tabs q.h.s. p.r.n.  5. Follow-up in 3 months      Discussed risks, benefits and alternatives of medications especially:   []   Increased risk of suicide  [] metabolic risks such as increased weight gain, diabetes and hyperlipidemia; extrapyramidal side effects; and tardive dyskinesia  [] increased risk of seizures  [] increased risk of drug dependence []  legal risks  [] increased risk of death when mixed with alcohol or other depressants  [] Alexander-Jorge A syndrome  [] Cardiac risks  [] Other:      Psychiatric Medications:  Lamotrigine 100 mg bid, prescribed by PCP  Escitalopram 20 mg daily  Quetiapine 50 mg 1-2 tabs q.h.s. p.r.n.   Adderall XR 10 mg b.i.d        Orders Placed This Encounter   • amphetamine-dextroamphetamine XR (Adderall XR) 10 MG 24 hr capsule     Sig: Take 1 capsule by mouth 2 times daily.     Dispense:  60 capsule     Refill:  0     F90.9           Please note that all or part of this note was dictated using Last Guide Direct voice recognition software. As such, there may be uncorrected typographical errors. Please consider this while reading this note    Brigitte MUNGUIA, FNP-BC, PMHNP-BC       no

## 2023-06-12 ENCOUNTER — APPOINTMENT (OUTPATIENT)
Dept: CARE COORDINATION | Facility: HOME HEALTH | Age: 73
End: 2023-06-12

## 2023-06-15 ENCOUNTER — APPOINTMENT (OUTPATIENT)
Dept: INTERNAL MEDICINE | Facility: CLINIC | Age: 73
End: 2023-06-15

## 2023-06-20 ENCOUNTER — APPOINTMENT (OUTPATIENT)
Dept: CARE COORDINATION | Facility: HOME HEALTH | Age: 73
End: 2023-06-20

## 2023-06-21 NOTE — ED ADULT TRIAGE NOTE - BP NONINVASIVE DIASTOLIC (MM HG)
Quality 110: Preventive Care And Screening: Influenza Immunization: Influenza Immunization not Administered for Documented Reasons. Detail Level: Detailed Additional Notes: Deferred 58

## 2023-07-21 ENCOUNTER — TRANSCRIPTION ENCOUNTER (OUTPATIENT)
Age: 73
End: 2023-07-21

## 2023-08-14 ENCOUNTER — NON-APPOINTMENT (OUTPATIENT)
Age: 73
End: 2023-08-14

## 2023-08-14 ENCOUNTER — APPOINTMENT (OUTPATIENT)
Dept: OBGYN | Facility: CLINIC | Age: 73
End: 2023-08-14
Payer: MEDICARE

## 2023-08-14 VITALS — SYSTOLIC BLOOD PRESSURE: 120 MMHG | DIASTOLIC BLOOD PRESSURE: 60 MMHG | WEIGHT: 141 LBS | BODY MASS INDEX: 28.48 KG/M2

## 2023-08-14 DIAGNOSIS — Z01.419 ENCOUNTER FOR GYNECOLOGICAL EXAMINATION (GENERAL) (ROUTINE) W/OUT ABNORMAL FINDINGS: ICD-10-CM

## 2023-08-14 PROCEDURE — 99397 PER PM REEVAL EST PAT 65+ YR: CPT

## 2023-08-14 NOTE — REASON FOR VISIT
[Initial] : an initial consultation for [Urinary Complaints] : urinary complaints [Family Member] : family member

## 2023-08-14 NOTE — REVIEW OF SYSTEMS
[Urgency] : no urgency [Frequency] : no frequency [Incontinence] : incontinence [Dysuria] : no dysuria [Urethral Discharge] : no urethral discharge [Abn Vaginal bleeding] : no abnormal vaginal bleeding [Pelvic pain] : no pelvic pain [CVA Pain] : no CVA pain [Genital Rash/Irritation] : no genital rash/irritation [Negative] : Breast

## 2023-08-14 NOTE — PLAN
[FreeTextEntry1] : Pap not indicated as per ACOG guidelines- normal pap history as per patient Discussed Pap guidelines  Discussed breast awareness and mammogram guidelines Mammogram due- referrals for given Urigynecology referral given Vaginal and pelvic precautions reviewed RTC for routine GYN exam in one year or PRN

## 2023-08-14 NOTE — PHYSICAL EXAM
[Chaperone Present] : A chaperone was present in the examining room during all aspects of the physical examination [Appropriately responsive] : appropriately responsive [Alert] : alert [No Acute Distress] : no acute distress [No Lymphadenopathy] : no lymphadenopathy [Soft] : soft [Non-tender] : non-tender [Non-distended] : non-distended [No Mass] : no mass [Oriented x3] : oriented x3 [Vulvar Atrophy] : vulvar atrophy [No Lesions] : no lesions  [Labia Majora] : normal [Labia Minora] : normal [Atrophy] : atrophy [No Bleeding] : There was no active vaginal bleeding [Normal] : normal [Normal Position] : in a normal position [Tenderness] : nontender [Enlarged ___ wks] : not enlarged [Mass ___ cm] : no uterine mass was palpated [Uterine Adnexae] : normal

## 2023-08-15 ENCOUNTER — APPOINTMENT (OUTPATIENT)
Dept: INTERNAL MEDICINE | Facility: CLINIC | Age: 73
End: 2023-08-15

## 2023-08-24 ENCOUNTER — APPOINTMENT (OUTPATIENT)
Dept: UROGYNECOLOGY | Facility: CLINIC | Age: 73
End: 2023-08-24
Payer: MEDICARE

## 2023-08-24 ENCOUNTER — NON-APPOINTMENT (OUTPATIENT)
Age: 73
End: 2023-08-24

## 2023-08-24 VITALS — SYSTOLIC BLOOD PRESSURE: 177 MMHG | OXYGEN SATURATION: 98 % | HEART RATE: 91 BPM | DIASTOLIC BLOOD PRESSURE: 70 MMHG

## 2023-08-24 DIAGNOSIS — R32 UNSPECIFIED URINARY INCONTINENCE: ICD-10-CM

## 2023-08-24 DIAGNOSIS — Z87.898 PERSONAL HISTORY OF OTHER SPECIFIED CONDITIONS: ICD-10-CM

## 2023-08-24 DIAGNOSIS — N39.46 MIXED INCONTINENCE: ICD-10-CM

## 2023-08-24 DIAGNOSIS — N39.0 URINARY TRACT INFECTION, SITE NOT SPECIFIED: ICD-10-CM

## 2023-08-24 DIAGNOSIS — N39.41 URGE INCONTINENCE: ICD-10-CM

## 2023-08-24 DIAGNOSIS — R35.0 FREQUENCY OF MICTURITION: ICD-10-CM

## 2023-08-24 DIAGNOSIS — E11.40 TYPE 2 DIABETES MELLITUS WITH DIABETIC NEUROPATHY, UNSPECIFIED: ICD-10-CM

## 2023-08-24 DIAGNOSIS — N39.3 STRESS INCONTINENCE (FEMALE) (MALE): ICD-10-CM

## 2023-08-24 LAB
BILIRUB UR QL STRIP: NORMAL
GLUCOSE UR-MCNC: NORMAL
HCG UR QL: NORMAL EU/DL
HGB UR QL STRIP.AUTO: NORMAL
KETONES UR-MCNC: NORMAL
LEUKOCYTE ESTERASE UR QL STRIP: NORMAL
NITRITE UR QL STRIP: POSITIVE
PH UR STRIP: 6
PROT UR STRIP-MCNC: NORMAL
SP GR UR STRIP: 1.03

## 2023-08-24 PROCEDURE — 99204 OFFICE O/P NEW MOD 45 MIN: CPT | Mod: 25

## 2023-08-24 PROCEDURE — 51701 INSERT BLADDER CATHETER: CPT

## 2023-08-24 PROCEDURE — 81002 URINALYSIS NONAUTO W/O SCOPE: CPT

## 2023-08-24 NOTE — HISTORY OF PRESENT ILLNESS
[FreeTextEntry1] : The patient is a 72 year P3 with complaints of urinary leakage without urgency 2 y  She stress urinary leakage PPD 5 during the day and 5 over night She feels complete bladder emptying Waking up wet every day  Her liquid intake consists of 2 cups of water, 1/2 cup of coffee, 1 cup of juice, 1 cup of soda She has a BM qdaily She denies gross hematuria, hx of nephrolithiasis, vaginal bulge or recurrent UTIs Her last pap was 5+ years ago; denies hx of abnormal pap Patient is not currently sexually active Hx of umbilical hernia repair Hx of HTN, high cholesterol, pre diabetic, asthma; taking albuterol as needed Current smoker; 10 cigarettes per day. Hx COPD started at age  11

## 2023-08-24 NOTE — PHYSICAL EXAM
[Chaperone Present] : A chaperone was present in the examining room during all aspects of the physical examination [Well developed] : well developed [Well Nourished] : ~L well nourished [Good Hygeine] : demonstrates good hygeine [Oriented x3] : oriented to person, place, and time [Anxiety] : patient is anxious [Warm and Dry] : was warm and dry to touch [Labia Majora] : were normal [Labia Minora] : were normal [Bartholin's Gland] : both Bartholin's glands were normal  [Normal Appearance] : general appearance was normal [Atrophy] : atrophy [Dry Mucosa] : dry mucosa [Post Void Residual ____ml] : post void residual was [unfilled] ml [No Acute Distress] : in acute distress [Normal Memory] : ~T memory was ~L impaired [Normal Mood/Affect] : mood and/or affect are not normal [Tenderness] : ~T no ~M abdominal tenderness observed [Distended] : not distended [H/Smegaly] : no hepatosplenomegaly [Normal] : normal [de-identified] : no pop

## 2023-08-24 NOTE — DISCUSSION/SUMMARY
[FreeTextEntry1] : 72 p3 hx  multiple med prob copd dm  htn active  smoker LUCIA  wears  diapers   UTI  bladder health  Plan: We discussed possible etiologies of her symptoms including overactive bladder. I recommend she start behavioral modifications and bladder training. Written instructions on how to perform bladder training were provided.  She will try this for 4-6 weeks. We reviewed medications for overactive bladder.  If she has no significant improvement in her symptoms she will try adding an anticholinergic medication. Risks and side effects reviewed extensively. She will RTO in 10-12 weeks for follow up or sooner if issues arise.  If she has no improvement in symptoms, will proceed with further workup including urodynamic testing and cystoscopy. We reviewed management options for stress urinary incontinence including: observation, pelvic floor exercises, continence devices, periurethral bulking agents,  and surgical management. We discussed surgical management options and written information on stress urinary incontinence including management options from [facility] was provided to her and reviewed. We reviewed risks and benefits of each procedure. Specifically, we discussed the risks and benefits of mesh and reviewed the FDA notification on transvaginal mesh. start  abx for  presumed  uti  start sanctura for  bladder  control rtc for urd

## 2023-08-24 NOTE — REASON FOR VISIT
[Initial Visit ___] : an initial visit for [unfilled] [Family Member] : family member [Questionnaire Received] : Patient questionnaire received [Intake Form Reviewed] : Patient intake form with past medical history, surgical history, family history and social history reviewed today [Urinary Incontinence] : urinary incontinence [Urine Frequency] : urine frequency [Urinary Urgency] : urinary urgency [Nocturia] : nocturia

## 2023-08-24 NOTE — OB HISTORY
[Total Preg ___] : : [unfilled] [Full Term ___] : [unfilled] (full-term) [Living ___] : [unfilled] (living) [Vaginal ___] : [unfilled] vaginal delivery(s) [Sexually Active] : is not sexually active

## 2023-08-25 LAB
APPEARANCE: ABNORMAL
BACTERIA: ABNORMAL /HPF
BILIRUBIN URINE: NEGATIVE
BLOOD URINE: NEGATIVE
CAST: 1 /LPF
COLOR: NORMAL
EPITHELIAL CELLS: 1 /HPF
GLUCOSE QUALITATIVE U: NEGATIVE MG/DL
KETONES URINE: ABNORMAL MG/DL
LEUKOCYTE ESTERASE URINE: NEGATIVE
MICROSCOPIC-UA: NORMAL
NITRITE URINE: POSITIVE
PH URINE: 5.5
PROTEIN URINE: 100 MG/DL
RED BLOOD CELLS URINE: 2 /HPF
SPECIFIC GRAVITY URINE: 1.03
UROBILINOGEN URINE: 1 MG/DL
WHITE BLOOD CELLS URINE: 3 /HPF

## 2023-08-29 ENCOUNTER — TRANSCRIPTION ENCOUNTER (OUTPATIENT)
Age: 73
End: 2023-08-29

## 2023-08-29 DIAGNOSIS — N76.0 ACUTE VAGINITIS: ICD-10-CM

## 2023-08-29 RX ORDER — SULFAMETHOXAZOLE AND TRIMETHOPRIM 800; 160 MG/1; MG/1
800-160 TABLET ORAL TWICE DAILY
Qty: 6 | Refills: 0 | Status: DISCONTINUED | COMMUNITY
Start: 2023-08-24 | End: 2023-08-29

## 2023-08-29 RX ORDER — SULFAMETHOXAZOLE AND TRIMETHOPRIM 800; 160 MG/1; MG/1
800-160 TABLET ORAL
Qty: 10 | Refills: 0 | Status: ACTIVE | COMMUNITY
Start: 2023-08-29 | End: 1900-01-01

## 2023-09-07 ENCOUNTER — APPOINTMENT (OUTPATIENT)
Dept: UROGYNECOLOGY | Facility: CLINIC | Age: 73
End: 2023-09-07

## 2023-10-25 ENCOUNTER — TRANSCRIPTION ENCOUNTER (OUTPATIENT)
Age: 73
End: 2023-10-25

## 2023-12-30 ENCOUNTER — INPATIENT (INPATIENT)
Facility: HOSPITAL | Age: 73
LOS: 0 days | Discharge: HOME | DRG: 391 | End: 2023-12-31
Attending: STUDENT IN AN ORGANIZED HEALTH CARE EDUCATION/TRAINING PROGRAM | Admitting: STUDENT IN AN ORGANIZED HEALTH CARE EDUCATION/TRAINING PROGRAM
Payer: MEDICARE

## 2023-12-30 VITALS
DIASTOLIC BLOOD PRESSURE: 83 MMHG | WEIGHT: 166.01 LBS | TEMPERATURE: 97 F | SYSTOLIC BLOOD PRESSURE: 200 MMHG | RESPIRATION RATE: 16 BRPM | HEART RATE: 90 BPM | HEIGHT: 60 IN | OXYGEN SATURATION: 98 %

## 2023-12-30 DIAGNOSIS — N32.81 OVERACTIVE BLADDER: ICD-10-CM

## 2023-12-30 DIAGNOSIS — F19.10 OTHER PSYCHOACTIVE SUBSTANCE ABUSE, UNCOMPLICATED: ICD-10-CM

## 2023-12-30 DIAGNOSIS — Z87.09 PERSONAL HISTORY OF OTHER DISEASES OF THE RESPIRATORY SYSTEM: ICD-10-CM

## 2023-12-30 DIAGNOSIS — J69.0 PNEUMONITIS DUE TO INHALATION OF FOOD AND VOMIT: ICD-10-CM

## 2023-12-30 DIAGNOSIS — I10 ESSENTIAL (PRIMARY) HYPERTENSION: ICD-10-CM

## 2023-12-30 DIAGNOSIS — R11.2 NAUSEA WITH VOMITING, UNSPECIFIED: ICD-10-CM

## 2023-12-30 DIAGNOSIS — R82.71 BACTERIURIA: ICD-10-CM

## 2023-12-30 DIAGNOSIS — Z29.9 ENCOUNTER FOR PROPHYLACTIC MEASURES, UNSPECIFIED: ICD-10-CM

## 2023-12-30 DIAGNOSIS — E78.5 HYPERLIPIDEMIA, UNSPECIFIED: ICD-10-CM

## 2023-12-30 DIAGNOSIS — J18.9 PNEUMONIA, UNSPECIFIED ORGANISM: ICD-10-CM

## 2023-12-30 LAB
AMPHET UR-MCNC: NEGATIVE — SIGNIFICANT CHANGE UP
AMPHET UR-MCNC: NEGATIVE — SIGNIFICANT CHANGE UP
ANION GAP SERPL CALC-SCNC: 12 MMOL/L — SIGNIFICANT CHANGE UP (ref 5–17)
ANION GAP SERPL CALC-SCNC: 12 MMOL/L — SIGNIFICANT CHANGE UP (ref 5–17)
APAP SERPL-MCNC: <5 UG/ML — LOW (ref 10–30)
APAP SERPL-MCNC: <5 UG/ML — LOW (ref 10–30)
APPEARANCE UR: ABNORMAL
APPEARANCE UR: ABNORMAL
APTT BLD: 30.4 SEC — SIGNIFICANT CHANGE UP (ref 24.5–35.6)
APTT BLD: 30.4 SEC — SIGNIFICANT CHANGE UP (ref 24.5–35.6)
B-OH-BUTYR SERPL-SCNC: 0.8 MMOL/L — HIGH
B-OH-BUTYR SERPL-SCNC: 0.8 MMOL/L — HIGH
BACTERIA # UR AUTO: ABNORMAL /HPF
BACTERIA # UR AUTO: ABNORMAL /HPF
BARBITURATES UR SCN-MCNC: NEGATIVE — SIGNIFICANT CHANGE UP
BARBITURATES UR SCN-MCNC: NEGATIVE — SIGNIFICANT CHANGE UP
BASE EXCESS BLDV CALC-SCNC: 2.2 MMOL/L — SIGNIFICANT CHANGE UP (ref -2–3)
BASE EXCESS BLDV CALC-SCNC: 2.2 MMOL/L — SIGNIFICANT CHANGE UP (ref -2–3)
BASE EXCESS BLDV CALC-SCNC: 2.5 MMOL/L — SIGNIFICANT CHANGE UP (ref -2–3)
BASE EXCESS BLDV CALC-SCNC: 2.5 MMOL/L — SIGNIFICANT CHANGE UP (ref -2–3)
BASOPHILS # BLD AUTO: 0.06 K/UL — SIGNIFICANT CHANGE UP (ref 0–0.2)
BASOPHILS # BLD AUTO: 0.06 K/UL — SIGNIFICANT CHANGE UP (ref 0–0.2)
BASOPHILS NFR BLD AUTO: 0.5 % — SIGNIFICANT CHANGE UP (ref 0–2)
BASOPHILS NFR BLD AUTO: 0.5 % — SIGNIFICANT CHANGE UP (ref 0–2)
BENZODIAZ UR-MCNC: NEGATIVE — SIGNIFICANT CHANGE UP
BENZODIAZ UR-MCNC: NEGATIVE — SIGNIFICANT CHANGE UP
BILIRUB UR-MCNC: NEGATIVE — SIGNIFICANT CHANGE UP
BILIRUB UR-MCNC: NEGATIVE — SIGNIFICANT CHANGE UP
BUN SERPL-MCNC: 16 MG/DL — SIGNIFICANT CHANGE UP (ref 7–23)
BUN SERPL-MCNC: 16 MG/DL — SIGNIFICANT CHANGE UP (ref 7–23)
CA-I SERPL-SCNC: 1.24 MMOL/L — SIGNIFICANT CHANGE UP (ref 1.15–1.33)
CA-I SERPL-SCNC: 1.24 MMOL/L — SIGNIFICANT CHANGE UP (ref 1.15–1.33)
CA-I SERPL-SCNC: 1.25 MMOL/L — SIGNIFICANT CHANGE UP (ref 1.15–1.33)
CA-I SERPL-SCNC: 1.25 MMOL/L — SIGNIFICANT CHANGE UP (ref 1.15–1.33)
CALCIUM SERPL-MCNC: 9.9 MG/DL — SIGNIFICANT CHANGE UP (ref 8.4–10.5)
CALCIUM SERPL-MCNC: 9.9 MG/DL — SIGNIFICANT CHANGE UP (ref 8.4–10.5)
CAST: 0 /LPF — SIGNIFICANT CHANGE UP (ref 0–4)
CAST: 0 /LPF — SIGNIFICANT CHANGE UP (ref 0–4)
CHLORIDE SERPL-SCNC: 98 MMOL/L — SIGNIFICANT CHANGE UP (ref 96–108)
CHLORIDE SERPL-SCNC: 98 MMOL/L — SIGNIFICANT CHANGE UP (ref 96–108)
CK MB CFR SERPL CALC: 2.6 NG/ML — SIGNIFICANT CHANGE UP (ref 0–6.7)
CK MB CFR SERPL CALC: 2.6 NG/ML — SIGNIFICANT CHANGE UP (ref 0–6.7)
CK SERPL-CCNC: 66 U/L — SIGNIFICANT CHANGE UP (ref 25–170)
CK SERPL-CCNC: 66 U/L — SIGNIFICANT CHANGE UP (ref 25–170)
CO2 BLDV-SCNC: 28.6 MMOL/L — HIGH (ref 22–26)
CO2 BLDV-SCNC: 28.6 MMOL/L — HIGH (ref 22–26)
CO2 BLDV-SCNC: 29.3 MMOL/L — HIGH (ref 22–26)
CO2 BLDV-SCNC: 29.3 MMOL/L — HIGH (ref 22–26)
CO2 SERPL-SCNC: 25 MMOL/L — SIGNIFICANT CHANGE UP (ref 22–31)
CO2 SERPL-SCNC: 25 MMOL/L — SIGNIFICANT CHANGE UP (ref 22–31)
COCAINE METAB.OTHER UR-MCNC: POSITIVE
COCAINE METAB.OTHER UR-MCNC: POSITIVE
COLOR SPEC: YELLOW — SIGNIFICANT CHANGE UP
COLOR SPEC: YELLOW — SIGNIFICANT CHANGE UP
CREAT SERPL-MCNC: 0.55 MG/DL — SIGNIFICANT CHANGE UP (ref 0.5–1.3)
CREAT SERPL-MCNC: 0.55 MG/DL — SIGNIFICANT CHANGE UP (ref 0.5–1.3)
DIFF PNL FLD: ABNORMAL
DIFF PNL FLD: ABNORMAL
EGFR: 96 ML/MIN/1.73M2 — SIGNIFICANT CHANGE UP
EGFR: 96 ML/MIN/1.73M2 — SIGNIFICANT CHANGE UP
EOSINOPHIL # BLD AUTO: 0.09 K/UL — SIGNIFICANT CHANGE UP (ref 0–0.5)
EOSINOPHIL # BLD AUTO: 0.09 K/UL — SIGNIFICANT CHANGE UP (ref 0–0.5)
EOSINOPHIL NFR BLD AUTO: 0.8 % — SIGNIFICANT CHANGE UP (ref 0–6)
EOSINOPHIL NFR BLD AUTO: 0.8 % — SIGNIFICANT CHANGE UP (ref 0–6)
ETHANOL SERPL-MCNC: <10 MG/DL — SIGNIFICANT CHANGE UP (ref 0–10)
ETHANOL SERPL-MCNC: <10 MG/DL — SIGNIFICANT CHANGE UP (ref 0–10)
GAS PNL BLDV: 136 MMOL/L — SIGNIFICANT CHANGE UP (ref 136–145)
GAS PNL BLDV: 136 MMOL/L — SIGNIFICANT CHANGE UP (ref 136–145)
GAS PNL BLDV: 137 MMOL/L — SIGNIFICANT CHANGE UP (ref 136–145)
GAS PNL BLDV: 137 MMOL/L — SIGNIFICANT CHANGE UP (ref 136–145)
GAS PNL BLDV: SIGNIFICANT CHANGE UP
GLUCOSE SERPL-MCNC: 176 MG/DL — HIGH (ref 70–99)
GLUCOSE SERPL-MCNC: 176 MG/DL — HIGH (ref 70–99)
GLUCOSE UR QL: NEGATIVE MG/DL — SIGNIFICANT CHANGE UP
GLUCOSE UR QL: NEGATIVE MG/DL — SIGNIFICANT CHANGE UP
HCO3 BLDV-SCNC: 27 MMOL/L — SIGNIFICANT CHANGE UP (ref 22–29)
HCO3 BLDV-SCNC: 27 MMOL/L — SIGNIFICANT CHANGE UP (ref 22–29)
HCO3 BLDV-SCNC: 28 MMOL/L — SIGNIFICANT CHANGE UP (ref 22–29)
HCO3 BLDV-SCNC: 28 MMOL/L — SIGNIFICANT CHANGE UP (ref 22–29)
HCT VFR BLD CALC: 38.3 % — SIGNIFICANT CHANGE UP (ref 34.5–45)
HCT VFR BLD CALC: 38.3 % — SIGNIFICANT CHANGE UP (ref 34.5–45)
HGB BLD-MCNC: 12.4 G/DL — SIGNIFICANT CHANGE UP (ref 11.5–15.5)
HGB BLD-MCNC: 12.4 G/DL — SIGNIFICANT CHANGE UP (ref 11.5–15.5)
IMM GRANULOCYTES NFR BLD AUTO: 0.3 % — SIGNIFICANT CHANGE UP (ref 0–0.9)
IMM GRANULOCYTES NFR BLD AUTO: 0.3 % — SIGNIFICANT CHANGE UP (ref 0–0.9)
INR BLD: 0.93 — SIGNIFICANT CHANGE UP (ref 0.85–1.18)
INR BLD: 0.93 — SIGNIFICANT CHANGE UP (ref 0.85–1.18)
KETONES UR-MCNC: ABNORMAL MG/DL
KETONES UR-MCNC: ABNORMAL MG/DL
LACTATE SERPL-SCNC: 1.3 MMOL/L — SIGNIFICANT CHANGE UP (ref 0.5–2)
LACTATE SERPL-SCNC: 1.3 MMOL/L — SIGNIFICANT CHANGE UP (ref 0.5–2)
LEUKOCYTE ESTERASE UR-ACNC: ABNORMAL
LEUKOCYTE ESTERASE UR-ACNC: ABNORMAL
LIDOCAIN IGE QN: 19 U/L — SIGNIFICANT CHANGE UP (ref 7–60)
LIDOCAIN IGE QN: 19 U/L — SIGNIFICANT CHANGE UP (ref 7–60)
LYMPHOCYTES # BLD AUTO: 1.65 K/UL — SIGNIFICANT CHANGE UP (ref 1–3.3)
LYMPHOCYTES # BLD AUTO: 1.65 K/UL — SIGNIFICANT CHANGE UP (ref 1–3.3)
LYMPHOCYTES # BLD AUTO: 14.4 % — SIGNIFICANT CHANGE UP (ref 13–44)
LYMPHOCYTES # BLD AUTO: 14.4 % — SIGNIFICANT CHANGE UP (ref 13–44)
MAGNESIUM SERPL-MCNC: 2 MG/DL — SIGNIFICANT CHANGE UP (ref 1.6–2.6)
MAGNESIUM SERPL-MCNC: 2 MG/DL — SIGNIFICANT CHANGE UP (ref 1.6–2.6)
MCHC RBC-ENTMCNC: 29.7 PG — SIGNIFICANT CHANGE UP (ref 27–34)
MCHC RBC-ENTMCNC: 29.7 PG — SIGNIFICANT CHANGE UP (ref 27–34)
MCHC RBC-ENTMCNC: 32.4 GM/DL — SIGNIFICANT CHANGE UP (ref 32–36)
MCHC RBC-ENTMCNC: 32.4 GM/DL — SIGNIFICANT CHANGE UP (ref 32–36)
MCV RBC AUTO: 91.6 FL — SIGNIFICANT CHANGE UP (ref 80–100)
MCV RBC AUTO: 91.6 FL — SIGNIFICANT CHANGE UP (ref 80–100)
METHADONE UR-MCNC: NEGATIVE — SIGNIFICANT CHANGE UP
METHADONE UR-MCNC: NEGATIVE — SIGNIFICANT CHANGE UP
MONOCYTES # BLD AUTO: 0.92 K/UL — HIGH (ref 0–0.9)
MONOCYTES # BLD AUTO: 0.92 K/UL — HIGH (ref 0–0.9)
MONOCYTES NFR BLD AUTO: 8 % — SIGNIFICANT CHANGE UP (ref 2–14)
MONOCYTES NFR BLD AUTO: 8 % — SIGNIFICANT CHANGE UP (ref 2–14)
NEUTROPHILS # BLD AUTO: 8.7 K/UL — HIGH (ref 1.8–7.4)
NEUTROPHILS # BLD AUTO: 8.7 K/UL — HIGH (ref 1.8–7.4)
NEUTROPHILS NFR BLD AUTO: 76 % — SIGNIFICANT CHANGE UP (ref 43–77)
NEUTROPHILS NFR BLD AUTO: 76 % — SIGNIFICANT CHANGE UP (ref 43–77)
NITRITE UR-MCNC: NEGATIVE — SIGNIFICANT CHANGE UP
NITRITE UR-MCNC: NEGATIVE — SIGNIFICANT CHANGE UP
NRBC # BLD: 0 /100 WBCS — SIGNIFICANT CHANGE UP (ref 0–0)
NRBC # BLD: 0 /100 WBCS — SIGNIFICANT CHANGE UP (ref 0–0)
OPIATES UR-MCNC: NEGATIVE — SIGNIFICANT CHANGE UP
OPIATES UR-MCNC: NEGATIVE — SIGNIFICANT CHANGE UP
PCO2 BLDV: 43 MMHG — HIGH (ref 39–42)
PCO2 BLDV: 43 MMHG — HIGH (ref 39–42)
PCO2 BLDV: 45 MMHG — HIGH (ref 39–42)
PCO2 BLDV: 45 MMHG — HIGH (ref 39–42)
PCP SPEC-MCNC: SIGNIFICANT CHANGE UP
PCP SPEC-MCNC: SIGNIFICANT CHANGE UP
PCP UR-MCNC: NEGATIVE — SIGNIFICANT CHANGE UP
PCP UR-MCNC: NEGATIVE — SIGNIFICANT CHANGE UP
PH BLDV: 7.4 — SIGNIFICANT CHANGE UP (ref 7.32–7.43)
PH BLDV: 7.4 — SIGNIFICANT CHANGE UP (ref 7.32–7.43)
PH BLDV: 7.41 — SIGNIFICANT CHANGE UP (ref 7.32–7.43)
PH BLDV: 7.41 — SIGNIFICANT CHANGE UP (ref 7.32–7.43)
PH UR: 7 — SIGNIFICANT CHANGE UP (ref 5–8)
PH UR: 7 — SIGNIFICANT CHANGE UP (ref 5–8)
PHOSPHATE SERPL-MCNC: 3.2 MG/DL — SIGNIFICANT CHANGE UP (ref 2.5–4.5)
PHOSPHATE SERPL-MCNC: 3.2 MG/DL — SIGNIFICANT CHANGE UP (ref 2.5–4.5)
PLATELET # BLD AUTO: 333 K/UL — SIGNIFICANT CHANGE UP (ref 150–400)
PLATELET # BLD AUTO: 333 K/UL — SIGNIFICANT CHANGE UP (ref 150–400)
PO2 BLDV: 66 MMHG — HIGH (ref 25–45)
PO2 BLDV: 66 MMHG — HIGH (ref 25–45)
PO2 BLDV: 97 MMHG — HIGH (ref 25–45)
PO2 BLDV: 97 MMHG — HIGH (ref 25–45)
POTASSIUM BLDV-SCNC: 3.6 MMOL/L — SIGNIFICANT CHANGE UP (ref 3.5–5.1)
POTASSIUM BLDV-SCNC: 3.6 MMOL/L — SIGNIFICANT CHANGE UP (ref 3.5–5.1)
POTASSIUM BLDV-SCNC: 3.8 MMOL/L — SIGNIFICANT CHANGE UP (ref 3.5–5.1)
POTASSIUM BLDV-SCNC: 3.8 MMOL/L — SIGNIFICANT CHANGE UP (ref 3.5–5.1)
POTASSIUM SERPL-MCNC: 3.6 MMOL/L — SIGNIFICANT CHANGE UP (ref 3.5–5.3)
POTASSIUM SERPL-MCNC: 3.6 MMOL/L — SIGNIFICANT CHANGE UP (ref 3.5–5.3)
POTASSIUM SERPL-SCNC: 3.6 MMOL/L — SIGNIFICANT CHANGE UP (ref 3.5–5.3)
POTASSIUM SERPL-SCNC: 3.6 MMOL/L — SIGNIFICANT CHANGE UP (ref 3.5–5.3)
PROT UR-MCNC: 100 MG/DL
PROT UR-MCNC: 100 MG/DL
PROTHROM AB SERPL-ACNC: 10.6 SEC — SIGNIFICANT CHANGE UP (ref 9.5–13)
PROTHROM AB SERPL-ACNC: 10.6 SEC — SIGNIFICANT CHANGE UP (ref 9.5–13)
RAPID RVP RESULT: SIGNIFICANT CHANGE UP
RAPID RVP RESULT: SIGNIFICANT CHANGE UP
RBC # BLD: 4.18 M/UL — SIGNIFICANT CHANGE UP (ref 3.8–5.2)
RBC # BLD: 4.18 M/UL — SIGNIFICANT CHANGE UP (ref 3.8–5.2)
RBC # FLD: 15.8 % — HIGH (ref 10.3–14.5)
RBC # FLD: 15.8 % — HIGH (ref 10.3–14.5)
RBC CASTS # UR COMP ASSIST: 2 /HPF — SIGNIFICANT CHANGE UP (ref 0–4)
RBC CASTS # UR COMP ASSIST: 2 /HPF — SIGNIFICANT CHANGE UP (ref 0–4)
SALICYLATES SERPL-MCNC: <0.3 MG/DL — LOW (ref 2.8–20)
SALICYLATES SERPL-MCNC: <0.3 MG/DL — LOW (ref 2.8–20)
SAO2 % BLDV: 93.7 % — HIGH (ref 67–88)
SAO2 % BLDV: 93.7 % — HIGH (ref 67–88)
SAO2 % BLDV: 98.2 % — HIGH (ref 67–88)
SAO2 % BLDV: 98.2 % — HIGH (ref 67–88)
SARS-COV-2 RNA SPEC QL NAA+PROBE: SIGNIFICANT CHANGE UP
SARS-COV-2 RNA SPEC QL NAA+PROBE: SIGNIFICANT CHANGE UP
SODIUM SERPL-SCNC: 135 MMOL/L — SIGNIFICANT CHANGE UP (ref 135–145)
SODIUM SERPL-SCNC: 135 MMOL/L — SIGNIFICANT CHANGE UP (ref 135–145)
SP GR SPEC: 1.01 — SIGNIFICANT CHANGE UP (ref 1–1.03)
SP GR SPEC: 1.01 — SIGNIFICANT CHANGE UP (ref 1–1.03)
SQUAMOUS # UR AUTO: 1 /HPF — SIGNIFICANT CHANGE UP (ref 0–5)
SQUAMOUS # UR AUTO: 1 /HPF — SIGNIFICANT CHANGE UP (ref 0–5)
THC UR QL: NEGATIVE — SIGNIFICANT CHANGE UP
THC UR QL: NEGATIVE — SIGNIFICANT CHANGE UP
TROPONIN T, HIGH SENSITIVITY RESULT: 9 NG/L — SIGNIFICANT CHANGE UP (ref 0–51)
TROPONIN T, HIGH SENSITIVITY RESULT: 9 NG/L — SIGNIFICANT CHANGE UP (ref 0–51)
UROBILINOGEN FLD QL: 1 MG/DL — SIGNIFICANT CHANGE UP (ref 0.2–1)
UROBILINOGEN FLD QL: 1 MG/DL — SIGNIFICANT CHANGE UP (ref 0.2–1)
WBC # BLD: 11.45 K/UL — HIGH (ref 3.8–10.5)
WBC # BLD: 11.45 K/UL — HIGH (ref 3.8–10.5)
WBC # FLD AUTO: 11.45 K/UL — HIGH (ref 3.8–10.5)
WBC # FLD AUTO: 11.45 K/UL — HIGH (ref 3.8–10.5)
WBC UR QL: 9 /HPF — HIGH (ref 0–5)
WBC UR QL: 9 /HPF — HIGH (ref 0–5)

## 2023-12-30 PROCEDURE — 70450 CT HEAD/BRAIN W/O DYE: CPT | Mod: 26,MA

## 2023-12-30 PROCEDURE — 99223 1ST HOSP IP/OBS HIGH 75: CPT

## 2023-12-30 PROCEDURE — 71045 X-RAY EXAM CHEST 1 VIEW: CPT | Mod: 26

## 2023-12-30 PROCEDURE — 99285 EMERGENCY DEPT VISIT HI MDM: CPT

## 2023-12-30 PROCEDURE — 71250 CT THORAX DX C-: CPT | Mod: 26,MA

## 2023-12-30 RX ORDER — LISINOPRIL 2.5 MG/1
10 TABLET ORAL ONCE
Refills: 0 | Status: COMPLETED | OUTPATIENT
Start: 2023-12-30 | End: 2023-12-30

## 2023-12-30 RX ORDER — GLUCAGON INJECTION, SOLUTION 0.5 MG/.1ML
1 INJECTION, SOLUTION SUBCUTANEOUS ONCE
Refills: 0 | Status: DISCONTINUED | OUTPATIENT
Start: 2023-12-30 | End: 2023-12-31

## 2023-12-30 RX ORDER — DEXTROSE 50 % IN WATER 50 %
15 SYRINGE (ML) INTRAVENOUS ONCE
Refills: 0 | Status: DISCONTINUED | OUTPATIENT
Start: 2023-12-30 | End: 2023-12-31

## 2023-12-30 RX ORDER — LABETALOL HCL 100 MG
20 TABLET ORAL ONCE
Refills: 0 | Status: COMPLETED | OUTPATIENT
Start: 2023-12-30 | End: 2023-12-30

## 2023-12-30 RX ORDER — HYDRALAZINE HCL 50 MG
10 TABLET ORAL ONCE
Refills: 0 | Status: COMPLETED | OUTPATIENT
Start: 2023-12-30 | End: 2023-12-30

## 2023-12-30 RX ORDER — OXYBUTYNIN CHLORIDE 5 MG
5 TABLET ORAL
Refills: 0 | Status: DISCONTINUED | OUTPATIENT
Start: 2023-12-31 | End: 2023-12-31

## 2023-12-30 RX ORDER — AZITHROMYCIN 500 MG/1
500 TABLET, FILM COATED ORAL ONCE
Refills: 0 | Status: COMPLETED | OUTPATIENT
Start: 2023-12-30 | End: 2023-12-30

## 2023-12-30 RX ORDER — MECLIZINE HCL 12.5 MG
25 TABLET ORAL ONCE
Refills: 0 | Status: DISCONTINUED | OUTPATIENT
Start: 2023-12-30 | End: 2023-12-30

## 2023-12-30 RX ORDER — IPRATROPIUM/ALBUTEROL SULFATE 18-103MCG
3 AEROSOL WITH ADAPTER (GRAM) INHALATION EVERY 6 HOURS
Refills: 0 | Status: DISCONTINUED | OUTPATIENT
Start: 2023-12-30 | End: 2023-12-31

## 2023-12-30 RX ORDER — IPRATROPIUM/ALBUTEROL SULFATE 18-103MCG
3 AEROSOL WITH ADAPTER (GRAM) INHALATION ONCE
Refills: 0 | Status: COMPLETED | OUTPATIENT
Start: 2023-12-30 | End: 2023-12-30

## 2023-12-30 RX ORDER — ONDANSETRON 8 MG/1
4 TABLET, FILM COATED ORAL ONCE
Refills: 0 | Status: COMPLETED | OUTPATIENT
Start: 2023-12-30 | End: 2023-12-30

## 2023-12-30 RX ORDER — SODIUM CHLORIDE 9 MG/ML
1000 INJECTION, SOLUTION INTRAVENOUS
Refills: 0 | Status: DISCONTINUED | OUTPATIENT
Start: 2023-12-30 | End: 2023-12-31

## 2023-12-30 RX ORDER — METOCLOPRAMIDE HCL 10 MG
10 TABLET ORAL ONCE
Refills: 0 | Status: COMPLETED | OUTPATIENT
Start: 2023-12-30 | End: 2023-12-30

## 2023-12-30 RX ORDER — DEXTROSE 50 % IN WATER 50 %
25 SYRINGE (ML) INTRAVENOUS ONCE
Refills: 0 | Status: DISCONTINUED | OUTPATIENT
Start: 2023-12-30 | End: 2023-12-31

## 2023-12-30 RX ORDER — HYDRALAZINE HCL 50 MG
25 TABLET ORAL ONCE
Refills: 0 | Status: COMPLETED | OUTPATIENT
Start: 2023-12-30 | End: 2023-12-30

## 2023-12-30 RX ORDER — ACETAMINOPHEN 500 MG
1000 TABLET ORAL ONCE
Refills: 0 | Status: COMPLETED | OUTPATIENT
Start: 2023-12-30 | End: 2023-12-30

## 2023-12-30 RX ORDER — LISINOPRIL 2.5 MG/1
20 TABLET ORAL EVERY 24 HOURS
Refills: 0 | Status: DISCONTINUED | OUTPATIENT
Start: 2023-12-31 | End: 2023-12-31

## 2023-12-30 RX ORDER — BUDESONIDE AND FORMOTEROL FUMARATE DIHYDRATE 160; 4.5 UG/1; UG/1
2 AEROSOL RESPIRATORY (INHALATION)
Refills: 0 | Status: DISCONTINUED | OUTPATIENT
Start: 2023-12-30 | End: 2023-12-31

## 2023-12-30 RX ORDER — SODIUM CHLORIDE 9 MG/ML
1000 INJECTION INTRAMUSCULAR; INTRAVENOUS; SUBCUTANEOUS ONCE
Refills: 0 | Status: COMPLETED | OUTPATIENT
Start: 2023-12-30 | End: 2023-12-30

## 2023-12-30 RX ORDER — LISINOPRIL 2.5 MG/1
5 TABLET ORAL ONCE
Refills: 0 | Status: COMPLETED | OUTPATIENT
Start: 2023-12-30 | End: 2023-12-30

## 2023-12-30 RX ORDER — TIOTROPIUM BROMIDE 18 UG/1
2 CAPSULE ORAL; RESPIRATORY (INHALATION) DAILY
Refills: 0 | Status: DISCONTINUED | OUTPATIENT
Start: 2023-12-30 | End: 2023-12-31

## 2023-12-30 RX ORDER — INSULIN LISPRO 100/ML
VIAL (ML) SUBCUTANEOUS
Refills: 0 | Status: DISCONTINUED | OUTPATIENT
Start: 2023-12-30 | End: 2023-12-31

## 2023-12-30 RX ORDER — CEFTRIAXONE 500 MG/1
1000 INJECTION, POWDER, FOR SOLUTION INTRAMUSCULAR; INTRAVENOUS ONCE
Refills: 0 | Status: DISCONTINUED | OUTPATIENT
Start: 2023-12-30 | End: 2023-12-30

## 2023-12-30 RX ORDER — DEXTROSE 50 % IN WATER 50 %
12.5 SYRINGE (ML) INTRAVENOUS ONCE
Refills: 0 | Status: DISCONTINUED | OUTPATIENT
Start: 2023-12-30 | End: 2023-12-31

## 2023-12-30 RX ORDER — CEFTRIAXONE 500 MG/1
1000 INJECTION, POWDER, FOR SOLUTION INTRAMUSCULAR; INTRAVENOUS ONCE
Refills: 0 | Status: COMPLETED | OUTPATIENT
Start: 2023-12-30 | End: 2023-12-30

## 2023-12-30 RX ORDER — ATORVASTATIN CALCIUM 80 MG/1
40 TABLET, FILM COATED ORAL AT BEDTIME
Refills: 0 | Status: DISCONTINUED | OUTPATIENT
Start: 2023-12-30 | End: 2023-12-31

## 2023-12-30 RX ORDER — ENOXAPARIN SODIUM 100 MG/ML
40 INJECTION SUBCUTANEOUS EVERY 24 HOURS
Refills: 0 | Status: DISCONTINUED | OUTPATIENT
Start: 2023-12-31 | End: 2023-12-31

## 2023-12-30 RX ADMIN — Medication 0.5 MILLIGRAM(S): at 17:33

## 2023-12-30 RX ADMIN — LISINOPRIL 5 MILLIGRAM(S): 2.5 TABLET ORAL at 17:33

## 2023-12-30 RX ADMIN — Medication 20 MILLIGRAM(S): at 14:38

## 2023-12-30 RX ADMIN — Medication 1000 MILLIGRAM(S): at 20:50

## 2023-12-30 RX ADMIN — Medication 400 MILLIGRAM(S): at 18:25

## 2023-12-30 RX ADMIN — Medication 104 MILLIGRAM(S): at 19:22

## 2023-12-30 RX ADMIN — ONDANSETRON 4 MILLIGRAM(S): 8 TABLET, FILM COATED ORAL at 13:53

## 2023-12-30 RX ADMIN — Medication 25 MILLIGRAM(S): at 18:25

## 2023-12-30 RX ADMIN — AZITHROMYCIN 255 MILLIGRAM(S): 500 TABLET, FILM COATED ORAL at 17:15

## 2023-12-30 RX ADMIN — LISINOPRIL 10 MILLIGRAM(S): 2.5 TABLET ORAL at 15:50

## 2023-12-30 RX ADMIN — Medication 3 MILLILITER(S): at 17:33

## 2023-12-30 RX ADMIN — CEFTRIAXONE 100 MILLIGRAM(S): 500 INJECTION, POWDER, FOR SOLUTION INTRAMUSCULAR; INTRAVENOUS at 17:15

## 2023-12-30 RX ADMIN — SODIUM CHLORIDE 1000 MILLILITER(S): 9 INJECTION INTRAMUSCULAR; INTRAVENOUS; SUBCUTANEOUS at 14:38

## 2023-12-30 RX ADMIN — Medication 10 MILLIGRAM(S): at 17:54

## 2023-12-30 RX ADMIN — SODIUM CHLORIDE 1000 MILLILITER(S): 9 INJECTION INTRAMUSCULAR; INTRAVENOUS; SUBCUTANEOUS at 17:33

## 2023-12-30 NOTE — ED PROVIDER NOTE - GASTROINTESTINAL, MLM
Abdomen soft,  obese, large vertical midline incision old, with area approximately 4 to 5 cm indurated at midline, with erythema and tenderness, no discharge.

## 2023-12-30 NOTE — H&P ADULT - PROBLEM SELECTOR PLAN 5
pt denies use, however Utox + for cocaine, negative for other substances  no BB therapy  monitor for signs of withdrawal    #tobacco use  reports 5-10 cigarettes daily  declines nicotine gum or patches at this time not in acute exacerbation; reports using 3L at home, follows with Dr. Trevizo  per lisa, on daily Prednisone 5mg PO qD for many months  c/w Prednisone 5mg PO qD, Trelegy and Duoneb q6h PRN

## 2023-12-30 NOTE — ED PROVIDER NOTE - SKIN, MLM
Skin normal color for race, warm, dry and intact.   Lower legs with evidence of venous stasis and multiple excoriations particularly at right lower extremity.  No discharge.  Mildly tender

## 2023-12-30 NOTE — H&P ADULT - NSHPREVIEWOFSYSTEMS_GEN_ALL_CORE
REVIEW OF SYSTEMS:    CONSTITUTIONAL: No fevers or chills  EYES/ENT: No visual changes;  No vertigo or throat pain   NECK: No pain or stiffness  RESPIRATORY: +cough, No wheezing, hemoptysis; +shortness of breath  CARDIOVASCULAR: No chest pain or palpitations  GASTROINTESTINAL: No abdominal or epigastric pain. + nausea, vomiting, no hematemesis; No diarrhea or constipation. No melena or hematochezia.  GENITOURINARY: No dysuria, frequency or hematuria  NEUROLOGICAL: No numbness or weakness  SKIN: No itching, rashes

## 2023-12-30 NOTE — H&P ADULT - ASSESSMENT
73F with pmhx of HTN, HLD, DM, COPD (home O2 3L), tracheomalacia, vertigo who presents for a 2 day history of nausea, vomiting and dizziness. 73F with pmhx of HTN, HLD, DM, COPD (home O2 3L), tracheomalacia, vertigo who presents for a 2 day history of nausea, vomiting and dizziness, admitted for management of aspiration pneumonia.

## 2023-12-30 NOTE — H&P ADULT - PROBLEM SELECTOR PLAN 1
Pt presenting with generalized fatigue with mild shortness of breath and cough. Afebrile on arrival with no leukocytosis and negative lactate. CT chest shows concern for LLL pneumonia. Suspect to be aspiration in setting of recent vomiting episodes  -currently on 3L NC, which is patient's baseline  -f/u Bcx  -c/w CTX 2g IV q24h x 5 days  -aspiration precautions Pt presenting with generalized fatigue with mild shortness of breath and cough. Afebrile on arrival with no leukocytosis and negative lactate. CT chest shows concern for peripheral airspace consolidation in the basilar segments of lower lobes.   Pt does not meet SIRS criteria and with no increased O2 requirements. suspect CT findings to be secondary to chronic aspiration in setting of tracheomalacia   -monitor off antibiotics at this time  -f/u Bcx  -aspiration precautions  -S&S evaluation

## 2023-12-30 NOTE — H&P ADULT - PROBLEM SELECTOR PLAN 4
not in acute exacerbation; reports using 3L at home, follows with Dr. Trevizo  per lisa, on daily Prednisone 5mg PO qD for many months  c/w Prednisone 5mg PO qD, Trelegy and Duoneb q6h PRN UA: cloudy, negative nitrite, trace leuk esterase, +WBC. Pt denies dysuria, hematuria or increased urinary frequency  f/u Ucx

## 2023-12-30 NOTE — ED PROVIDER NOTE - CLINICAL SUMMARY MEDICAL DECISION MAKING FREE TEXT BOX
Patient with lightheadedness  which started after nausea and vomiting, patient appears extremely dehydrated, has no neurodeficits on exam, consider hypertensive emergency but suspect less likely suspect more likely metabolic process, and suspect patient likely noncompliant with medications as well, consider also drug abuse, consider diabetic emergency, consider infection,  consider rhabdo  Plan CT head, x-ray, UA, labs, blood pressure control, hydration,   dispo pending workup and reevaluation.

## 2023-12-30 NOTE — ED PROVIDER NOTE - CARE PLAN
Principal Discharge DX:	Pneumonia  Secondary Diagnosis:	Urinary tract infection  Secondary Diagnosis:	Hypertension   1

## 2023-12-30 NOTE — H&P ADULT - PROBLEM SELECTOR PLAN 2
Pt presenting after 2 days of nonbloody vomiting episodes; 4-5 daily. Started after eating tacos at a restaurant. Denies associated fevers, chills, abdominal pain, diarrhea. Reports the nausea has now improved  suspect symptoms to be secondary to viral gastroenteritis  symptomatic management with PRN zofran  s/p 2L IVF, appears euvolemic on exam. encourage PO intake

## 2023-12-30 NOTE — H&P ADULT - NSHPLABSRESULTS_GEN_ALL_CORE
.  LABS:                         12.4   11.45 )-----------( 333      ( 30 Dec 2023 13:23 )             38.3         135  |  98  |  16  ----------------------------<  176<H>  3.6   |  25  |  0.55    Ca    9.9      30 Dec 2023 13:23  Phos  3.2       Mg     2.0           PT/INR - ( 30 Dec 2023 13:23 )   PT: 10.6 sec;   INR: 0.93          PTT - ( 30 Dec 2023 13:23 )  PTT:30.4 sec  Urinalysis Basic - ( 30 Dec 2023 13:23 )    Color: Yellow / Appearance: Cloudy / S.014 / pH: x  Gluc: 176 mg/dL / Ketone: Trace mg/dL  / Bili: Negative / Urobili: 1.0 mg/dL   Blood: x / Protein: 100 mg/dL / Nitrite: Negative   Leuk Esterase: Trace / RBC: 2 /HPF / WBC 9 /HPF   Sq Epi: x / Non Sq Epi: 1 /HPF / Bacteria: Too Numerous to count /HPF        Lactate, Blood: 1.3 mmol/L ( @ 13:23)      RADIOLOGY, EKG & ADDITIONAL TESTS: Reviewed.

## 2023-12-30 NOTE — H&P ADULT - PROBLEM SELECTOR PLAN 8
F: s/p 2L in the ED  E: replete K >4 Mg>2  N: consistent carb  D: lovenox  Dispo: [facility] F: s/p 2L in the ED  E: replete K >4 Mg>2  N: consistent carb  D: lovenox  Dispo: [facility] c/w Atorvastatin 40mg PO qD

## 2023-12-30 NOTE — H&P ADULT - PROBLEM SELECTOR PLAN 7
c/w Atorvastatin 40mg PO qD uses trospium 20mg PO qD, c/w therapeutic interchange while inpatient: oxybutynin 5 mg BID

## 2023-12-30 NOTE — H&P ADULT - ATTENDING COMMENTS
74 yo F with PMHx HTN, HLD, DM, COPD (3L NC baseline), TBM, vertigo BIBEMS from home with 2d hx nausea and dizziness with recent emesis admitted for sx management of likely gastroenteritis, now improving.      #Aspiration pneumonitis - Afebrile. WBC 11.45. No bandemia. Lipase negative. RVP/COVID PCR negative. CT Chest NC showing peripheral airspace consolidation in the basilar segments of lower lobes as well as the lingula concerning 2/2 bronchopneumonia vs aspiration. Given lack of SIRS, stable VS, and benign respiratory exam, low suspicion for acute aspiration pneumonia. Likely chronic microaspirations in setting of known TBM. Aspiration precautions. S/S evaluation.     #Gastroenteritis – 2d hx of nausea/vomiting, resolving at time of admission and now able to tolerate some PO. UA with negative nitrites, trace LE, WBC, numerous bacteria however no urinary sx. RVP/COVID PCR negative. Nausea improving. Advance diet as tolerated. Anti-emetic PRN.     #HTN urgency - BP on arrival 200/83, improved s/p Hydralazine IV/PO, Lisinopril. UTox +cocaine. Avoid BB. Continue home anti-HTN.      Agree with remainder of resident plan as above.

## 2023-12-30 NOTE — ED PROVIDER NOTE - CPE EDP MUSC NORM
LOC: The patient is awake, alert and fussy.  APPEARANCE: Patient is fussy and clinging to mom's arms but is in no acute distress, patient is clean and well groomed, patient is clothed in a diaper only.  SKIN: The skin is warm and dry, color consistent with ethnicity, patient has normal skin turgor and moist mucus membranes, skin intact, no breakdown or bruising noted. Denies diaphoresis   MUSCULOSKELETAL: Patient moving all extremities well, no obvious swelling nor deformities noted.   RESPIRATORY: Airway is open and patent, respirations are spontaneous, patient has a normal effort and rate, no accessory muscle use noted. Lung sounds clear throughout all fields. Denies productive cough  CARDIAC: Patient has a normal rate, no periphreal edema noted, capillary refill < 3 seconds. Denies chest pain  ABDOMEN: Soft and non tender to palpation, no distention noted. Bowel sounds present in all quads. Denies diarrhea/constipation, hematuria or dysuria Reports multiple episodes of vomiting since 45 minutes ago.  NEUROLOGIC: PERRL, 2mm bilaterally, eyes open spontaneously, facial expression symmetrical, bilateral hand grasp equal and even, purposeful motor response noted, normal sensation in all extremities when touched with a finger.  
normal...

## 2023-12-30 NOTE — H&P ADULT - PROBLEM SELECTOR PLAN 9
F: s/p 2L in the ED  E: replete K >4 Mg>2  N: consistent carb  D: lovenox  Dispo: [facility] F: s/p 2L in the ED  E: replete K >4 Mg>2  N: consistent carb  D: lovenox  Dispo: [facility]

## 2023-12-30 NOTE — H&P ADULT - NSHPPHYSICALEXAM_GEN_ALL_CORE
.  VITAL SIGNS:  T(C): 36.7 (12-30-23 @ 20:47), Max: 36.7 (12-30-23 @ 20:47)  T(F): 98 (12-30-23 @ 20:47), Max: 98 (12-30-23 @ 20:47)  HR: 88 (12-30-23 @ 20:47) (79 - 90)  BP: 142/63 (12-30-23 @ 20:47) (134/64 - 208/88)  BP(mean): --  RR: 20 (12-30-23 @ 20:47) (16 - 20)  SpO2: 98% (12-30-23 @ 20:47) (98% - 99%)  Wt(kg): --    PHYSICAL EXAM:    Constitutional: WDWN resting comfortably in bed; NAD, speaking in full sentences  Head: NC/AT  Eyes: PERRL, EOMI, anicteric sclera  ENT: no nasal discharge; no oropharyngeal erythema or exudates; MMM  Neck: supple; no JVD   Respiratory: CTA B/L; no W/R/R, no retractions  Cardiac: +S1/S2; RRR; no M/R/G  Gastrointestinal: abdomen soft, NT/ND; no rebound or guarding; +BSx4  Extremities: WWP, no peripheral edema  Vascular: 2+ radial pulses B/L  Dermatologic: skin warm, dry and intact; no rashes, wounds, or scars  Neurologic: AAOx3; no focal deficits  Psychiatric: affect and characteristics of appearance, verbalizations, behaviors are appropriate .  VITAL SIGNS:  T(C): 36.7 (12-30-23 @ 20:47), Max: 36.7 (12-30-23 @ 20:47)  T(F): 98 (12-30-23 @ 20:47), Max: 98 (12-30-23 @ 20:47)  HR: 88 (12-30-23 @ 20:47) (79 - 90)  BP: 142/63 (12-30-23 @ 20:47) (134/64 - 208/88)  BP(mean): --  RR: 20 (12-30-23 @ 20:47) (16 - 20)  SpO2: 98% (12-30-23 @ 20:47) (98% - 99%)  Wt(kg): --    PHYSICAL EXAM:    Constitutional: WDWN resting comfortably in bed; NAD, speaking in full sentences  Head: NC/AT  Eyes: PERRL, EOMI, anicteric sclera  ENT: no nasal discharge; no oropharyngeal erythema or exudates; MM mildly dry  Neck: supple  Respiratory: CTA B/L; no W/R/R, no retractions  Cardiac: +S1/S2; RRR; no M/R/G  Gastrointestinal: abdomen soft, NT/ND; no rebound or guarding; +BSx4  Extremities: WWP, no peripheral edema  Vascular: 2+ radial pulses B/L  Dermatologic: skin warm, dry and intact; no rashes, wounds, or scars  Neurologic: AAOx3; no focal deficits  Psychiatric: affect and characteristics of appearance, verbalizations, behaviors are appropriate

## 2023-12-30 NOTE — H&P ADULT - PROBLEM SELECTOR PLAN 3
pt presenting with BP elevation to 200s systolic. No signs of end organ damage; CTH negative  improved after IV and oral medications in the ED  per surescripts, on Lisinopril 20mg PO qD / HCTZ 25mg PO qD combo pill  pt reports she is typically compliant, but sometimes misses doses  c/w home regimen pt presenting with BP elevation to 200s systolic. No signs of end organ damage; CTH negative  improved after IV and oral medications in the ED  per surescripts, on Lisinopril 20mg PO qD / HCTZ 25mg PO qD combo pill  pt reports she is typically compliant, but sometimes misses doses  c/w home regimen while inpatient

## 2023-12-30 NOTE — ED PROVIDER NOTE - OBJECTIVE STATEMENT
Patient with history of emphysema, hyperlipidemia, hypertension, diabetes, smoker, TIA, epilepsy, depression,  Patient reports vomiting since yesterday, with associated nausea, afterwards developed dizziness which she describes as lightheadedness, and no vertigo, has had vertigo in the past and does not feel similar, feels worse when standing, no diarrhea, no abdominal pain, mild headache that started this morning, reports compliance with her medications, no numbness, no weakness, dry mouth, no fevers, no chest pain or shortness of breath,  As per EMS  this her health aide told them that she occasionally uses cocaine as well.

## 2023-12-30 NOTE — H&P ADULT - HISTORY OF PRESENT ILLNESS
In the ED:  Vitals: T 97.4, HR 90, /83 --> 185/84 --> 134/64, O2 sat 99% on 3L NC  Labs: WBC 11.45, Hgb 12.4, plt 333, K 3.6, Cr 0.55, lactate 1.3  UA: cloudy, negative nitrite, trace leuk esterase, +WBC  Utox: +cocaine  RVP/Covid negative  Imaging: CT head Ischemic white matter disease and atrophy typical for age. No interval change 2021. No acute intracranial hemorrhage is appreciated.  CT Chest: Peripheral airspace consolidation in the basilar segments of lower lobes as well as the lingula. Differential diagnosis includes bronchopneumonia, aspiration.  No lung mass. Small hiatal hernia.  EKG: NSR at 80 bpm, QTc 438  Interventions: Tylenol 1g IV, Azithromycin 500mg IVPB, CTX 1g IV x1, Hydralazine 25mg PO x1, Hydralazine 10mg IV x1, Lisinopril 5mg PO x1, Ativan 0.5mg PO x1, reglan 10mg IVPx1, NS 1L, Duoneb x1   73F with pmhx of HTN, HLD, DM, COPD (home O2 3L), tracheomalacia, vertigo who presents for a 2 day history of nausea, vomiting and dizziness. Pt reports 2 days ago she went out to a restaurant and a few hours after eating her meal, she started to experience dizziness with severe nausea. She then had 4-5 episodes of non-bloody vomiting each day for 2 days. Today due to persistent fatigue she presented to the ED for further evaluation. Denies known fevers, admits to mild shortness of breath and cough     In the ED:  Vitals: T 97.4, HR 90, /83 --> 185/84 --> 134/64, O2 sat 99% on 3L NC  Labs: WBC 11.45, Hgb 12.4, plt 333, K 3.6, Cr 0.55, lactate 1.3  UA: cloudy, negative nitrite, trace leuk esterase, +WBC  Utox: +cocaine  RVP/Covid negative  Imaging: CT head Ischemic white matter disease and atrophy typical for age. No interval change 2021. No acute intracranial hemorrhage is appreciated.  CT Chest: Peripheral airspace consolidation in the basilar segments of lower lobes as well as the lingula. Differential diagnosis includes bronchopneumonia, aspiration.  No lung mass. Small hiatal hernia.  EKG: NSR at 80 bpm, QTc 438  Interventions: Tylenol 1g IV, Azithromycin 500mg IVPB, CTX 1g IV x1, Hydralazine 25mg PO x1, Hydralazine 10mg IV x1, Lisinopril 5mg PO x1, Ativan 0.5mg PO x1, reglan 10mg IVPx1, NS 1L, Duoneb x1   73F with pmhx of HTN, HLD, DM, COPD (home O2 3L), tracheomalacia, vertigo who presents for a 2 day history of nausea, vomiting and dizziness. Pt reports 2 days ago she went out to a restaurant and a few hours after eating her meal, she started to experience dizziness with severe nausea. She then had 4-5 episodes of non-bloody vomiting each day for 2 days. Today due to persistent fatigue she presented to the ED for further evaluation. Denies known fevers, admits to mild shortness of breath and cough. No fevers, abdominal pain, dysuria, hematuria, increased urinary frequency, leg pain or swelling, diarrhea, melena.     In the ED:  Vitals: T 97.4, HR 90, /83 --> 185/84 --> 134/64, O2 sat 99% on 3L NC  Labs: WBC 11.45, Hgb 12.4, plt 333, K 3.6, Cr 0.55, lactate 1.3  UA: cloudy, negative nitrite, trace leuk esterase, +WBC  Utox: +cocaine  RVP/Covid negative  Imaging: CT head Ischemic white matter disease and atrophy typical for age. No interval change 2021. No acute intracranial hemorrhage is appreciated.  CT Chest: Peripheral airspace consolidation in the basilar segments of lower lobes as well as the lingula. Differential diagnosis includes bronchopneumonia, aspiration.  No lung mass. Small hiatal hernia.  EKG: NSR at 80 bpm, QTc 438  Interventions: Tylenol 1g IV, Azithromycin 500mg IVPB, CTX 1g IV x1, Hydralazine 25mg PO x1, Hydralazine 10mg IV x1, Lisinopril 5mg PO x1, Ativan 0.5mg PO x1, reglan 10mg IVPx1, NS 1L, Duoneb x1   73F with pmhx of HTN, HLD, DM, COPD (home O2 3L), tracheomalacia, vertigo who presents for a 2 day history of nausea, vomiting and dizziness. Pt reports 2 days ago she went out to a restaurant and a few hours after eating her meal, she started to experience dizziness with severe nausea. She then had 4-5 episodes of non-bloody vomiting each day for 2 days. Today due to persistent fatigue she presented to the ED for further evaluation. Denies known fevers, admits to mild shortness of breath and cough. No fevers, abdominal pain, dysuria, hematuria, increased urinary frequency, leg pain or swelling, diarrhea, melena.     In the ED:  Vitals: T 97.4, HR 90, /83 --> 185/84 --> 134/64, O2 sat 99% on 3L NC  Labs: WBC 11.45, Hgb 12.4, plt 333, K 3.6, Cr 0.55, lactate 1.3  UA: cloudy, negative nitrite, trace leuk esterase, +WBC  Utox: +cocaine  RVP/Covid negative  Imaging: CT head Ischemic white matter disease and atrophy typical for age. No interval change 2021. No acute intracranial hemorrhage is appreciated.  CT Chest: Peripheral airspace consolidation in the basilar segments of lower lobes as well as the lingula. Differential diagnosis includes bronchopneumonia, aspiration.  No lung mass. Small hiatal hernia.  EKG: NSR at 80 bpm, QTc 438  Interventions: Tylenol 1g IV, Azithromycin 500mg IVPB, CTX 1g IV x1, Hydralazine 25mg PO x1, Hydralazine 10mg IV x1, Lisinopril 5mg PO x1, Ativan 0.5mg PO x1, reglan 10mg IVPx1, NS 1L, Duoneb x1

## 2023-12-30 NOTE — ED PROVIDER NOTE - MUSCULOSKELETAL, MLM
Spine appears normal, range of motion is not limited,  lower extremity edema  greater at right lower extremity.

## 2023-12-30 NOTE — H&P ADULT - PROBLEM SELECTOR PLAN 6
uses trospium 20mg PO qD, c/w therapeutic interchange while inpatient: oxybutynin 5 mg BID pt denies use, however Utox + for cocaine, negative for other substances  no BB therapy  monitor for signs of withdrawal    #tobacco use  reports 5-10 cigarettes daily  declines nicotine gum or patches at this time

## 2023-12-31 ENCOUNTER — TRANSCRIPTION ENCOUNTER (OUTPATIENT)
Age: 73
End: 2023-12-31

## 2023-12-31 VITALS — HEART RATE: 85 BPM | DIASTOLIC BLOOD PRESSURE: 56 MMHG | SYSTOLIC BLOOD PRESSURE: 135 MMHG

## 2023-12-31 LAB
A1C WITH ESTIMATED AVERAGE GLUCOSE RESULT: 6.1 % — HIGH (ref 4–5.6)
A1C WITH ESTIMATED AVERAGE GLUCOSE RESULT: 6.1 % — HIGH (ref 4–5.6)
ALBUMIN SERPL ELPH-MCNC: 3.4 G/DL — SIGNIFICANT CHANGE UP (ref 3.3–5)
ALBUMIN SERPL ELPH-MCNC: 3.4 G/DL — SIGNIFICANT CHANGE UP (ref 3.3–5)
ALP SERPL-CCNC: 72 U/L — SIGNIFICANT CHANGE UP (ref 40–120)
ALP SERPL-CCNC: 72 U/L — SIGNIFICANT CHANGE UP (ref 40–120)
ALT FLD-CCNC: 7 U/L — LOW (ref 10–45)
ALT FLD-CCNC: 7 U/L — LOW (ref 10–45)
ANION GAP SERPL CALC-SCNC: 9 MMOL/L — SIGNIFICANT CHANGE UP (ref 5–17)
ANION GAP SERPL CALC-SCNC: 9 MMOL/L — SIGNIFICANT CHANGE UP (ref 5–17)
AST SERPL-CCNC: 12 U/L — SIGNIFICANT CHANGE UP (ref 10–40)
AST SERPL-CCNC: 12 U/L — SIGNIFICANT CHANGE UP (ref 10–40)
BASOPHILS # BLD AUTO: 0.05 K/UL — SIGNIFICANT CHANGE UP (ref 0–0.2)
BASOPHILS # BLD AUTO: 0.05 K/UL — SIGNIFICANT CHANGE UP (ref 0–0.2)
BASOPHILS NFR BLD AUTO: 0.5 % — SIGNIFICANT CHANGE UP (ref 0–2)
BASOPHILS NFR BLD AUTO: 0.5 % — SIGNIFICANT CHANGE UP (ref 0–2)
BILIRUB SERPL-MCNC: <0.2 MG/DL — SIGNIFICANT CHANGE UP (ref 0.2–1.2)
BILIRUB SERPL-MCNC: <0.2 MG/DL — SIGNIFICANT CHANGE UP (ref 0.2–1.2)
BUN SERPL-MCNC: 12 MG/DL — SIGNIFICANT CHANGE UP (ref 7–23)
BUN SERPL-MCNC: 12 MG/DL — SIGNIFICANT CHANGE UP (ref 7–23)
CALCIUM SERPL-MCNC: 9 MG/DL — SIGNIFICANT CHANGE UP (ref 8.4–10.5)
CALCIUM SERPL-MCNC: 9 MG/DL — SIGNIFICANT CHANGE UP (ref 8.4–10.5)
CHLORIDE SERPL-SCNC: 104 MMOL/L — SIGNIFICANT CHANGE UP (ref 96–108)
CHLORIDE SERPL-SCNC: 104 MMOL/L — SIGNIFICANT CHANGE UP (ref 96–108)
CO2 SERPL-SCNC: 25 MMOL/L — SIGNIFICANT CHANGE UP (ref 22–31)
CO2 SERPL-SCNC: 25 MMOL/L — SIGNIFICANT CHANGE UP (ref 22–31)
CREAT SERPL-MCNC: 0.61 MG/DL — SIGNIFICANT CHANGE UP (ref 0.5–1.3)
CREAT SERPL-MCNC: 0.61 MG/DL — SIGNIFICANT CHANGE UP (ref 0.5–1.3)
CULTURE RESULTS: SIGNIFICANT CHANGE UP
CULTURE RESULTS: SIGNIFICANT CHANGE UP
EGFR: 94 ML/MIN/1.73M2 — SIGNIFICANT CHANGE UP
EGFR: 94 ML/MIN/1.73M2 — SIGNIFICANT CHANGE UP
EOSINOPHIL # BLD AUTO: 0.13 K/UL — SIGNIFICANT CHANGE UP (ref 0–0.5)
EOSINOPHIL # BLD AUTO: 0.13 K/UL — SIGNIFICANT CHANGE UP (ref 0–0.5)
EOSINOPHIL NFR BLD AUTO: 1.3 % — SIGNIFICANT CHANGE UP (ref 0–6)
EOSINOPHIL NFR BLD AUTO: 1.3 % — SIGNIFICANT CHANGE UP (ref 0–6)
ESTIMATED AVERAGE GLUCOSE: 128 MG/DL — HIGH (ref 68–114)
ESTIMATED AVERAGE GLUCOSE: 128 MG/DL — HIGH (ref 68–114)
GLUCOSE BLDC GLUCOMTR-MCNC: 127 MG/DL — HIGH (ref 70–99)
GLUCOSE BLDC GLUCOMTR-MCNC: 127 MG/DL — HIGH (ref 70–99)
GLUCOSE BLDC GLUCOMTR-MCNC: 135 MG/DL — HIGH (ref 70–99)
GLUCOSE BLDC GLUCOMTR-MCNC: 135 MG/DL — HIGH (ref 70–99)
GLUCOSE SERPL-MCNC: 108 MG/DL — HIGH (ref 70–99)
GLUCOSE SERPL-MCNC: 108 MG/DL — HIGH (ref 70–99)
HCT VFR BLD CALC: 34.7 % — SIGNIFICANT CHANGE UP (ref 34.5–45)
HCT VFR BLD CALC: 34.7 % — SIGNIFICANT CHANGE UP (ref 34.5–45)
HGB BLD-MCNC: 10.6 G/DL — LOW (ref 11.5–15.5)
HGB BLD-MCNC: 10.6 G/DL — LOW (ref 11.5–15.5)
IMM GRANULOCYTES NFR BLD AUTO: 0.2 % — SIGNIFICANT CHANGE UP (ref 0–0.9)
IMM GRANULOCYTES NFR BLD AUTO: 0.2 % — SIGNIFICANT CHANGE UP (ref 0–0.9)
LYMPHOCYTES # BLD AUTO: 1.75 K/UL — SIGNIFICANT CHANGE UP (ref 1–3.3)
LYMPHOCYTES # BLD AUTO: 1.75 K/UL — SIGNIFICANT CHANGE UP (ref 1–3.3)
LYMPHOCYTES # BLD AUTO: 17.9 % — SIGNIFICANT CHANGE UP (ref 13–44)
LYMPHOCYTES # BLD AUTO: 17.9 % — SIGNIFICANT CHANGE UP (ref 13–44)
MAGNESIUM SERPL-MCNC: 1.9 MG/DL — SIGNIFICANT CHANGE UP (ref 1.6–2.6)
MAGNESIUM SERPL-MCNC: 1.9 MG/DL — SIGNIFICANT CHANGE UP (ref 1.6–2.6)
MCHC RBC-ENTMCNC: 29.4 PG — SIGNIFICANT CHANGE UP (ref 27–34)
MCHC RBC-ENTMCNC: 29.4 PG — SIGNIFICANT CHANGE UP (ref 27–34)
MCHC RBC-ENTMCNC: 30.5 GM/DL — LOW (ref 32–36)
MCHC RBC-ENTMCNC: 30.5 GM/DL — LOW (ref 32–36)
MCV RBC AUTO: 96.4 FL — SIGNIFICANT CHANGE UP (ref 80–100)
MCV RBC AUTO: 96.4 FL — SIGNIFICANT CHANGE UP (ref 80–100)
MONOCYTES # BLD AUTO: 0.95 K/UL — HIGH (ref 0–0.9)
MONOCYTES # BLD AUTO: 0.95 K/UL — HIGH (ref 0–0.9)
MONOCYTES NFR BLD AUTO: 9.7 % — SIGNIFICANT CHANGE UP (ref 2–14)
MONOCYTES NFR BLD AUTO: 9.7 % — SIGNIFICANT CHANGE UP (ref 2–14)
NEUTROPHILS # BLD AUTO: 6.86 K/UL — SIGNIFICANT CHANGE UP (ref 1.8–7.4)
NEUTROPHILS # BLD AUTO: 6.86 K/UL — SIGNIFICANT CHANGE UP (ref 1.8–7.4)
NEUTROPHILS NFR BLD AUTO: 70.4 % — SIGNIFICANT CHANGE UP (ref 43–77)
NEUTROPHILS NFR BLD AUTO: 70.4 % — SIGNIFICANT CHANGE UP (ref 43–77)
NRBC # BLD: 0 /100 WBCS — SIGNIFICANT CHANGE UP (ref 0–0)
NRBC # BLD: 0 /100 WBCS — SIGNIFICANT CHANGE UP (ref 0–0)
PHOSPHATE SERPL-MCNC: 2.9 MG/DL — SIGNIFICANT CHANGE UP (ref 2.5–4.5)
PHOSPHATE SERPL-MCNC: 2.9 MG/DL — SIGNIFICANT CHANGE UP (ref 2.5–4.5)
PLATELET # BLD AUTO: 298 K/UL — SIGNIFICANT CHANGE UP (ref 150–400)
PLATELET # BLD AUTO: 298 K/UL — SIGNIFICANT CHANGE UP (ref 150–400)
POTASSIUM SERPL-MCNC: 3.7 MMOL/L — SIGNIFICANT CHANGE UP (ref 3.5–5.3)
POTASSIUM SERPL-MCNC: 3.7 MMOL/L — SIGNIFICANT CHANGE UP (ref 3.5–5.3)
POTASSIUM SERPL-SCNC: 3.7 MMOL/L — SIGNIFICANT CHANGE UP (ref 3.5–5.3)
POTASSIUM SERPL-SCNC: 3.7 MMOL/L — SIGNIFICANT CHANGE UP (ref 3.5–5.3)
PROT SERPL-MCNC: 6.4 G/DL — SIGNIFICANT CHANGE UP (ref 6–8.3)
PROT SERPL-MCNC: 6.4 G/DL — SIGNIFICANT CHANGE UP (ref 6–8.3)
RBC # BLD: 3.6 M/UL — LOW (ref 3.8–5.2)
RBC # BLD: 3.6 M/UL — LOW (ref 3.8–5.2)
RBC # FLD: 15.9 % — HIGH (ref 10.3–14.5)
RBC # FLD: 15.9 % — HIGH (ref 10.3–14.5)
SODIUM SERPL-SCNC: 138 MMOL/L — SIGNIFICANT CHANGE UP (ref 135–145)
SODIUM SERPL-SCNC: 138 MMOL/L — SIGNIFICANT CHANGE UP (ref 135–145)
SPECIMEN SOURCE: SIGNIFICANT CHANGE UP
SPECIMEN SOURCE: SIGNIFICANT CHANGE UP
WBC # BLD: 9.76 K/UL — SIGNIFICANT CHANGE UP (ref 3.8–10.5)
WBC # BLD: 9.76 K/UL — SIGNIFICANT CHANGE UP (ref 3.8–10.5)
WBC # FLD AUTO: 9.76 K/UL — SIGNIFICANT CHANGE UP (ref 3.8–10.5)
WBC # FLD AUTO: 9.76 K/UL — SIGNIFICANT CHANGE UP (ref 3.8–10.5)

## 2023-12-31 PROCEDURE — 96365 THER/PROPH/DIAG IV INF INIT: CPT

## 2023-12-31 PROCEDURE — 84100 ASSAY OF PHOSPHORUS: CPT

## 2023-12-31 PROCEDURE — 36415 COLL VENOUS BLD VENIPUNCTURE: CPT

## 2023-12-31 PROCEDURE — 82330 ASSAY OF CALCIUM: CPT

## 2023-12-31 PROCEDURE — 85730 THROMBOPLASTIN TIME PARTIAL: CPT

## 2023-12-31 PROCEDURE — 84484 ASSAY OF TROPONIN QUANT: CPT

## 2023-12-31 PROCEDURE — 87086 URINE CULTURE/COLONY COUNT: CPT

## 2023-12-31 PROCEDURE — 84132 ASSAY OF SERUM POTASSIUM: CPT

## 2023-12-31 PROCEDURE — 82550 ASSAY OF CK (CPK): CPT

## 2023-12-31 PROCEDURE — 85610 PROTHROMBIN TIME: CPT

## 2023-12-31 PROCEDURE — 92610 EVALUATE SWALLOWING FUNCTION: CPT

## 2023-12-31 PROCEDURE — 0225U NFCT DS DNA&RNA 21 SARSCOV2: CPT

## 2023-12-31 PROCEDURE — 85025 COMPLETE CBC W/AUTO DIFF WBC: CPT

## 2023-12-31 PROCEDURE — 71045 X-RAY EXAM CHEST 1 VIEW: CPT

## 2023-12-31 PROCEDURE — 71250 CT THORAX DX C-: CPT | Mod: MA

## 2023-12-31 PROCEDURE — 82803 BLOOD GASES ANY COMBINATION: CPT

## 2023-12-31 PROCEDURE — 70450 CT HEAD/BRAIN W/O DYE: CPT | Mod: MA

## 2023-12-31 PROCEDURE — 82553 CREATINE MB FRACTION: CPT

## 2023-12-31 PROCEDURE — 94640 AIRWAY INHALATION TREATMENT: CPT

## 2023-12-31 PROCEDURE — 84295 ASSAY OF SERUM SODIUM: CPT

## 2023-12-31 PROCEDURE — 82010 KETONE BODYS QUAN: CPT

## 2023-12-31 PROCEDURE — 87040 BLOOD CULTURE FOR BACTERIA: CPT

## 2023-12-31 PROCEDURE — 83735 ASSAY OF MAGNESIUM: CPT

## 2023-12-31 PROCEDURE — 81001 URINALYSIS AUTO W/SCOPE: CPT

## 2023-12-31 PROCEDURE — 99239 HOSP IP/OBS DSCHRG MGMT >30: CPT | Mod: GC

## 2023-12-31 PROCEDURE — 83690 ASSAY OF LIPASE: CPT

## 2023-12-31 PROCEDURE — 83605 ASSAY OF LACTIC ACID: CPT

## 2023-12-31 PROCEDURE — 80048 BASIC METABOLIC PNL TOTAL CA: CPT

## 2023-12-31 PROCEDURE — 99285 EMERGENCY DEPT VISIT HI MDM: CPT | Mod: 25

## 2023-12-31 PROCEDURE — G0378: CPT

## 2023-12-31 PROCEDURE — 83036 HEMOGLOBIN GLYCOSYLATED A1C: CPT

## 2023-12-31 PROCEDURE — 82962 GLUCOSE BLOOD TEST: CPT

## 2023-12-31 PROCEDURE — 80053 COMPREHEN METABOLIC PANEL: CPT

## 2023-12-31 PROCEDURE — 80307 DRUG TEST PRSMV CHEM ANLYZR: CPT

## 2023-12-31 PROCEDURE — 96375 TX/PRO/DX INJ NEW DRUG ADDON: CPT

## 2023-12-31 RX ORDER — NICOTINE POLACRILEX 2 MG
1 GUM BUCCAL DAILY
Refills: 0 | Status: DISCONTINUED | OUTPATIENT
Start: 2023-12-31 | End: 2023-12-31

## 2023-12-31 RX ORDER — LANOLIN ALCOHOL/MO/W.PET/CERES
3 CREAM (GRAM) TOPICAL AT BEDTIME
Refills: 0 | Status: DISCONTINUED | OUTPATIENT
Start: 2023-12-31 | End: 2023-12-31

## 2023-12-31 RX ORDER — NICOTINE POLACRILEX 2 MG
1 GUM BUCCAL
Qty: 14 | Refills: 0
Start: 2023-12-31 | End: 2024-01-13

## 2023-12-31 RX ADMIN — TIOTROPIUM BROMIDE 2 PUFF(S): 18 CAPSULE ORAL; RESPIRATORY (INHALATION) at 11:01

## 2023-12-31 RX ADMIN — Medication 1000 MILLIGRAM(S): at 00:29

## 2023-12-31 RX ADMIN — Medication 100 MILLIGRAM(S): at 05:50

## 2023-12-31 RX ADMIN — Medication 5 MILLIGRAM(S): at 05:51

## 2023-12-31 RX ADMIN — Medication 5 MILLIGRAM(S): at 09:37

## 2023-12-31 RX ADMIN — Medication 1 PATCH: at 05:57

## 2023-12-31 RX ADMIN — BUDESONIDE AND FORMOTEROL FUMARATE DIHYDRATE 2 PUFF(S): 160; 4.5 AEROSOL RESPIRATORY (INHALATION) at 09:36

## 2023-12-31 RX ADMIN — LISINOPRIL 20 MILLIGRAM(S): 2.5 TABLET ORAL at 09:36

## 2023-12-31 RX ADMIN — Medication 3 MILLILITER(S): at 11:01

## 2023-12-31 RX ADMIN — Medication 3 MILLILITER(S): at 05:50

## 2023-12-31 RX ADMIN — Medication 1 PATCH: at 07:44

## 2023-12-31 NOTE — DISCHARGE NOTE NURSING/CASE MANAGEMENT/SOCIAL WORK - PATIENT PORTAL LINK FT
You can access the FollowMyHealth Patient Portal offered by [facility] by registering at the following website: [email or web address]. By joining [employer]’s FollowMyHealth portal, you will also be able to view your health information using other applications (apps) compatible with our system. You can access the FollowMyHealth Patient Portal offered by [facility] by registering at the following website: [email or web address]. By joining [employer]’s FollowMyHealth portal, you will also be able to view your health information using other applications (apps) compatible with our system.

## 2023-12-31 NOTE — DISCHARGE NOTE PROVIDER - CARE PROVIDERS DIRECT ADDRESSES
[email or web address]

## 2023-12-31 NOTE — DISCHARGE NOTE PROVIDER - HOSPITAL COURSE
#Discharge: do not delete    Patient is 74 yo F with a past medical Hx of HTN, DM, COPD (home O2), tracheomalacia, vertigo who presents for a 2 day history of nausea, vomiting and dizziness    #nausea and vomiting REVOLVED  - f/u with PCP    #Hypertension   - c/w HCTZ/Lisinopril    #History of COPD  - c/w triple therapy, Singulair 10 mg PO QD, and Prednisone 5 mg PO QD    #Tobacco use  - c/w nicotine patch          New medications/therapies: none  New lines/hardware: none  Labs to be followed outpatient: none  Exam to be followed outpatient: none    Discharge plan: discharge to home     #Discharge: do not delete    Patient is 72 yo F with a past medical Hx of HTN, DM, COPD (home O2), tracheomalacia, vertigo who presents for a 2 day history of nausea, vomiting and dizziness    #nausea and vomiting REVOLVED  - f/u with PCP    #Hypertension   - c/w HCTZ/Lisinopril    #History of COPD  - c/w triple therapy, Singulair 10 mg PO QD, and Prednisone 5 mg PO QD    #Tobacco use  - c/w nicotine patch          New medications/therapies: none  New lines/hardware: none  Labs to be followed outpatient: none  Exam to be followed outpatient: none    Discharge plan: discharge to home

## 2023-12-31 NOTE — DISCHARGE NOTE PROVIDER - NSDCMRMEDTOKEN_GEN_ALL_CORE_FT
aspirin 81 mg oral delayed release tablet: 1 tab(s) orally once a day  Flovent 220 mcg/inh inhalation aerosol with adapter: 2 puff(s) inhaled 2 times a day  hydroCHLOROthiazide 25 mg oral tablet: 1 tab(s) orally once a day  ipratropium-albuterol 20 mcg-100 mcg/inh inhalation aerosol: 1 puff(s) inhaled 4 times a day  Lipitor 40 mg oral tablet: 1 tab(s) orally once a day  lisinopril 20 mg oral tablet: 1 tab(s) orally once a day  metFORMIN 500 mg oral tablet: 1 tab(s) orally 2 times a day  nicotine 14 mg/24 hr transdermal film, extended release: 1 transdermal once a day  predniSONE 5 mg oral tablet: 1 tab(s) orally every 24 hours  Singulair 10 mg oral tablet: 1 tab(s) orally once a day  Stiolto Respimat 10 ACT 2.5 mcg-2.5 mcg/inh inhalation aerosol: 2 puff(s) inhaled every 24 hours  Zoloft 50 mg oral tablet: 1 tab(s) orally once a day

## 2023-12-31 NOTE — PATIENT PROFILE ADULT - FALL HARM RISK - HARM RISK INTERVENTIONS
Assistance with ambulation/Assistance OOB with selected safe patient handling equipment/Communicate Risk of Fall with Harm to all staff/Discuss with provider need for PT consult/Monitor gait and stability/Provide patient with walking aids - walker, cane, crutches/Reinforce activity limits and safety measures with patient and family/Tailored Fall Risk Interventions/Visual Cue: Yellow wristband and red socks/Bed in lowest position, wheels locked, appropriate side rails in place/Call bell, personal items and telephone in reach/Instruct patient to call for assistance before getting out of bed or chair/Non-slip footwear when patient is out of bed/[place] to call system/Physically safe environment - no spills, clutter or unnecessary equipment/Purposeful Proactive Rounding/Room/bathroom lighting operational, light cord in reach Assistance with ambulation/Assistance OOB with selected safe patient handling equipment/Communicate Risk of Fall with Harm to all staff/Discuss with provider need for PT consult/Monitor gait and stability/Provide patient with walking aids - walker, cane, crutches/Reinforce activity limits and safety measures with patient and family/Tailored Fall Risk Interventions/Visual Cue: Yellow wristband and red socks/Bed in lowest position, wheels locked, appropriate side rails in place/Call bell, personal items and telephone in reach/Instruct patient to call for assistance before getting out of bed or chair/Non-slip footwear when patient is out of bed/[place] to call system/Physically safe environment - no spills, clutter or unnecessary equipment/Purposeful Proactive Rounding/Room/bathroom lighting operational, light cord in reach

## 2023-12-31 NOTE — DISCHARGE NOTE PROVIDER - NSDCFUADDAPPT_GEN_ALL_CORE_FT
Please follow up with your primary care doctor, Dr. Trevizo, within 1 week of discharge from the hospital

## 2023-12-31 NOTE — DISCHARGE NOTE NURSING/CASE MANAGEMENT/SOCIAL WORK - NSDCPEFALRISK_GEN_ALL_CORE
For information on Fall & Injury Prevention, visit: [email or web address] OR  [email or web address] OR  https://www.cdc.gov/steadi/patient.html For information on Fall & Injury Prevention, visit: [email or web address] OR  [email or web address] OR  https://www.cdc.gov/steadi/patient.html

## 2023-12-31 NOTE — DISCHARGE NOTE PROVIDER - NSDCCPCAREPLAN_GEN_ALL_CORE_FT
PRINCIPAL DISCHARGE DIAGNOSIS  Diagnosis: Nausea & vomiting  Assessment and Plan of Treatment: You came into the hospital with nausea and vomiting. You were given intravenous fluids and are now feeling better. Please follow up with your primary care physician upon discharge from the hospital.

## 2023-12-31 NOTE — SWALLOW BEDSIDE ASSESSMENT ADULT - SWALLOW EVAL: DIAGNOSIS
Functional oropharyngeal swallow. No herb clinical indicators for pharyngeal safety or efficiency deficits. Pt is deemed safe to continue an unrestricted diet.

## 2023-12-31 NOTE — PROGRESS NOTE ADULT - SUBJECTIVE AND OBJECTIVE BOX
**INCOMPLETE NOTE    OVERNIGHT EVENTS:    SUBJECTIVE:  Patient seen and examined at bedside.    Vital Signs Last 12 Hrs  T(F): 99.6 (23 @ 04:40), Max: 99.6 (23 @ 04:40)  HR: 89 (23 @ 04:40) (82 - 89)  BP: 93/52 (23 @ 04:40) (93/52 - 142/63)  BP(mean): --  RR: 18 (23 @ 04:40) (18 - 20)  SpO2: 97% (23 @ 04:40) (97% - 98%)  I&O's Summary    30 Dec 2023 07:01  -  31 Dec 2023 07:00  --------------------------------------------------------  IN: 0 mL / OUT: 750 mL / NET: -750 mL        PHYSICAL EXAM:    Constitutional: WDWN resting comfortably in bed; NAD  Head: NC/AT  Eyes: PERRL, EOMI, anicteric sclera  ENT: no nasal discharge; uvula midline, no oropharyngeal erythema or exudates; MMM  Neck: supple; no JVD or thyromegaly  Respiratory: CTA B/L; no W/R/R, no retractions  Cardiac: +S1/S2; RRR; no M/R/G; PMI non-displaced  Gastrointestinal: abdomen soft, NT/ND; no rebound or guarding; +BSx4  Extremities: WWP, no clubbing or cyanosis; no peripheral edema  Musculoskeletal: NROM x4; no joint swelling, tenderness or erythema  Vascular: 2+ radial, DP/PT pulses B/L  Lymphatic: no submandibular or cervical LAD  Neurologic: AAOx3  Psychiatric: affect and characteristics of appearance, verbalizations, behaviors are appropriate    LABS:                        12.4   11.45 )-----------( 333      ( 30 Dec 2023 13:23 )             38.3         135  |  98  |  16  ----------------------------<  176<H>  3.6   |  25  |  0.55    Ca    9.9      30 Dec 2023 13:23  Phos  3.2     12-30  Mg     2.0     12-30      PT/INR - ( 30 Dec 2023 13:23 )   PT: 10.6 sec;   INR: 0.93          PTT - ( 30 Dec 2023 13:23 )  PTT:30.4 sec  Urinalysis Basic - ( 30 Dec 2023 13:23 )    Color: Yellow / Appearance: Cloudy / S.014 / pH: x  Gluc: 176 mg/dL / Ketone: Trace mg/dL  / Bili: Negative / Urobili: 1.0 mg/dL   Blood: x / Protein: 100 mg/dL / Nitrite: Negative   Leuk Esterase: Trace / RBC: 2 /HPF / WBC 9 /HPF   Sq Epi: x / Non Sq Epi: 1 /HPF / Bacteria: Too Numerous to count /HPF          RADIOLOGY & ADDITIONAL TESTS:    MEDICATIONS  (STANDING):  albuterol/ipratropium for Nebulization 3 milliLiter(s) Nebulizer every 6 hours  atorvastatin 40 milliGRAM(s) Oral at bedtime  benzonatate 100 milliGRAM(s) Oral three times a day  budesonide  80 MICROgram(s)/formoterol 4.5 MICROgram(s) Inhaler 2 Puff(s) Inhalation two times a day  dextrose 5%. 1000 milliLiter(s) (100 mL/Hr) IV Continuous <Continuous>  dextrose 5%. 1000 milliLiter(s) (50 mL/Hr) IV Continuous <Continuous>  dextrose 50% Injectable 25 Gram(s) IV Push once  dextrose 50% Injectable 25 Gram(s) IV Push once  dextrose 50% Injectable 12.5 Gram(s) IV Push once  enoxaparin Injectable 40 milliGRAM(s) SubCutaneous every 24 hours  glucagon  Injectable 1 milliGRAM(s) IntraMuscular once  hydrochlorothiazide 25 milliGRAM(s) Oral every 24 hours  insulin lispro (ADMELOG) corrective regimen sliding scale   SubCutaneous three times a day before meals  lisinopril 20 milliGRAM(s) Oral every 24 hours  melatonin 3 milliGRAM(s) Oral at bedtime  nicotine -  14 mG/24Hr(s) Patch 1 Patch Transdermal daily  oxybutynin 5 milliGRAM(s) Oral two times a day  predniSONE   Tablet 5 milliGRAM(s) Oral every 24 hours  tiotropium 2.5 MICROgram(s) Inhaler 2 Puff(s) Inhalation daily    MEDICATIONS  (PRN):  dextrose Oral Gel 15 Gram(s) Oral once PRN Blood Glucose LESS THAN 70 milliGRAM(s)/deciliter

## 2023-12-31 NOTE — DISCHARGE NOTE PROVIDER - CARE PROVIDER_API CALL
Trinity Trevizo  Pulmonary Disease  [address] [place] [address]  Phone: [phone]  Fax: [phone]  Follow Up Time:    Trinity Trevizo  Pulmonary Disease  [address] [place] [id number]  Phone: [phone]  Fax: [phone]  Follow Up Time:

## 2023-12-31 NOTE — SWALLOW BEDSIDE ASSESSMENT ADULT - SLP GENERAL OBSERVATIONS
Pt appreciated awake and alert with 2 L O2 via NC (c/w home O2), speaking to son, who was present at bedside. Pt able to follow multi step commands and participate in conversation. Pt denies difficulty with PO or dysphagia symptoms.

## 2023-12-31 NOTE — DISCHARGE NOTE PROVIDER - ATTENDING DISCHARGE PHYSICAL EXAMINATION:
Constitutional: WDWN resting comfortably in bed; NAD  Head: NC/AT  Eyes: PERRL, EOMI, anicteric sclera  ENT: no nasal discharge; uvula midline, no oropharyngeal erythema or exudates; MMM  Neck: supple; no JVD or thyromegaly  Respiratory: CTA B/L; no W/R/R, no retractions  Cardiac: +S1/S2; RRR; no M/R/G; PMI non-displaced  Gastrointestinal: abdomen soft, NT/ND; no rebound or guarding; +BSx4  Extremities: WWP, no clubbing or cyanosis; no peripheral edema  Musculoskeletal: NROM x4; no joint swelling, tenderness or erythema  Vascular: 2+ radial, DP/PT pulses B/L  Lymphatic: no submandibular or cervical LAD  Neurologic: AAOx3  Psychiatric: affect and characteristics of appearance, verbalizations, behaviors are appropriate    Patient seen eating on examination requesting discharge from hospital. Explained that condition was not treated or evaluated. She stated she would follow up outpatient.

## 2023-12-31 NOTE — SWALLOW BEDSIDE ASSESSMENT ADULT - PHARYNGEAL PHASE
Laryngeal movement brisk per palpation. No cough, throat clear, or change in vocal quality. Of note, silent aspiration cannot be ruled out at bedside.

## 2023-12-31 NOTE — PATIENT PROFILE ADULT - PASTORAL.
----- Message from Neema Riley MD sent at 1/30/2017  1:08 PM CST -----  Lipid panel is much improved. Liver is stable.  Sugar is up, however, near diabetic levels.  Can we add an A1c? MAT   chaplaincy/clergy/

## 2024-01-04 LAB
CULTURE RESULTS: SIGNIFICANT CHANGE UP
SPECIMEN SOURCE: SIGNIFICANT CHANGE UP

## 2024-01-05 DIAGNOSIS — R11.2 NAUSEA WITH VOMITING, UNSPECIFIED: ICD-10-CM

## 2024-01-05 DIAGNOSIS — Z86.73 PERSONAL HISTORY OF TRANSIENT ISCHEMIC ATTACK (TIA), AND CEREBRAL INFARCTION WITHOUT RESIDUAL DEFICITS: ICD-10-CM

## 2024-01-05 DIAGNOSIS — I10 ESSENTIAL (PRIMARY) HYPERTENSION: ICD-10-CM

## 2024-01-05 DIAGNOSIS — Z99.81 DEPENDENCE ON SUPPLEMENTAL OXYGEN: ICD-10-CM

## 2024-01-05 DIAGNOSIS — E11.9 TYPE 2 DIABETES MELLITUS WITHOUT COMPLICATIONS: ICD-10-CM

## 2024-01-05 DIAGNOSIS — J39.8 OTHER SPECIFIED DISEASES OF UPPER RESPIRATORY TRACT: ICD-10-CM

## 2024-01-05 DIAGNOSIS — A08.4 VIRAL INTESTINAL INFECTION, UNSPECIFIED: ICD-10-CM

## 2024-01-05 DIAGNOSIS — I16.0 HYPERTENSIVE URGENCY: ICD-10-CM

## 2024-01-05 DIAGNOSIS — Z79.84 LONG TERM (CURRENT) USE OF ORAL HYPOGLYCEMIC DRUGS: ICD-10-CM

## 2024-01-05 DIAGNOSIS — F14.19 COCAINE ABUSE WITH UNSPECIFIED COCAINE-INDUCED DISORDER: ICD-10-CM

## 2024-01-05 DIAGNOSIS — J69.0 PNEUMONITIS DUE TO INHALATION OF FOOD AND VOMIT: ICD-10-CM

## 2024-01-05 DIAGNOSIS — R82.71 BACTERIURIA: ICD-10-CM

## 2024-01-05 DIAGNOSIS — J43.9 EMPHYSEMA, UNSPECIFIED: ICD-10-CM

## 2024-01-05 DIAGNOSIS — Z79.82 LONG TERM (CURRENT) USE OF ASPIRIN: ICD-10-CM

## 2024-01-05 DIAGNOSIS — F17.210 NICOTINE DEPENDENCE, CIGARETTES, UNCOMPLICATED: ICD-10-CM

## 2024-01-05 DIAGNOSIS — N32.81 OVERACTIVE BLADDER: ICD-10-CM

## 2024-01-08 NOTE — ED PROVIDER NOTE - QRS
Medication: Insulin Pen Needle 32G X 6 MM Misc   Last office visit date: 2023  Medication Refill Protocol Failed.  Failed criteria:  . Sent to clinician to review.    73

## 2024-01-17 ENCOUNTER — APPOINTMENT (OUTPATIENT)
Dept: INTERNAL MEDICINE | Facility: CLINIC | Age: 74
End: 2024-01-17
Payer: MEDICARE

## 2024-01-17 ENCOUNTER — APPOINTMENT (OUTPATIENT)
Dept: MAMMOGRAPHY | Facility: CLINIC | Age: 74
End: 2024-01-17
Payer: MEDICARE

## 2024-01-17 ENCOUNTER — APPOINTMENT (OUTPATIENT)
Dept: RADIOLOGY | Facility: CLINIC | Age: 74
End: 2024-01-17
Payer: MEDICARE

## 2024-01-17 ENCOUNTER — RESULT REVIEW (OUTPATIENT)
Age: 74
End: 2024-01-17

## 2024-01-17 VITALS
OXYGEN SATURATION: 94 % | HEART RATE: 91 BPM | TEMPERATURE: 97.9 F | HEIGHT: 59 IN | WEIGHT: 140.38 LBS | SYSTOLIC BLOOD PRESSURE: 138 MMHG | DIASTOLIC BLOOD PRESSURE: 80 MMHG | BODY MASS INDEX: 28.3 KG/M2

## 2024-01-17 DIAGNOSIS — M79.661 PAIN IN RIGHT LOWER LEG: ICD-10-CM

## 2024-01-17 DIAGNOSIS — R51.9 HEADACHE, UNSPECIFIED: ICD-10-CM

## 2024-01-17 DIAGNOSIS — Z86.19 PERSONAL HISTORY OF OTHER INFECTIOUS AND PARASITIC DISEASES: ICD-10-CM

## 2024-01-17 DIAGNOSIS — M79.662 PAIN IN RIGHT LOWER LEG: ICD-10-CM

## 2024-01-17 DIAGNOSIS — D64.9 ANEMIA, UNSPECIFIED: ICD-10-CM

## 2024-01-17 DIAGNOSIS — Z00.00 ENCOUNTER FOR GENERAL ADULT MEDICAL EXAMINATION W/OUT ABNORMAL FINDINGS: ICD-10-CM

## 2024-01-17 DIAGNOSIS — R42 DIZZINESS AND GIDDINESS: ICD-10-CM

## 2024-01-17 DIAGNOSIS — E78.5 HYPERLIPIDEMIA, UNSPECIFIED: ICD-10-CM

## 2024-01-17 DIAGNOSIS — R60.0 LOCALIZED EDEMA: ICD-10-CM

## 2024-01-17 DIAGNOSIS — Z87.898 PERSONAL HISTORY OF OTHER SPECIFIED CONDITIONS: ICD-10-CM

## 2024-01-17 DIAGNOSIS — D22.9 MELANOCYTIC NEVI, UNSPECIFIED: ICD-10-CM

## 2024-01-17 DIAGNOSIS — R55 SYNCOPE AND COLLAPSE: ICD-10-CM

## 2024-01-17 PROCEDURE — 99397 PER PM REEVAL EST PAT 65+ YR: CPT | Mod: GY

## 2024-01-17 PROCEDURE — G0438: CPT

## 2024-01-17 PROCEDURE — 77080 DXA BONE DENSITY AXIAL: CPT

## 2024-01-17 PROCEDURE — 77063 BREAST TOMOSYNTHESIS BI: CPT

## 2024-01-17 PROCEDURE — 77067 SCR MAMMO BI INCL CAD: CPT

## 2024-01-17 RX ORDER — METFORMIN HYDROCHLORIDE 500 MG/1
500 TABLET, COATED ORAL
Qty: 180 | Refills: 3 | Status: DISCONTINUED | COMMUNITY
Start: 2020-05-11 | End: 2024-01-17

## 2024-01-17 RX ORDER — TROSPIUM CHLORIDE 20 MG/1
20 TABLET, FILM COATED ORAL
Qty: 60 | Refills: 11 | Status: ACTIVE | COMMUNITY
Start: 2023-08-24 | End: 1900-01-01

## 2024-01-18 PROBLEM — R51.9 HEADACHE: Status: ACTIVE | Noted: 2023-02-02

## 2024-01-18 NOTE — HEALTH RISK ASSESSMENT
[de-identified] : dankign sincew 11  [No] : No [1 or 2 (0 pts)] : 1 or 2 (0 points) [Never (0 pts)] : Never (0 points) [Good] : ~his/her~  mood as  good [Current] : Current [20 or more] : 20 or more

## 2024-01-18 NOTE — HISTORY OF PRESENT ILLNESS
[FreeTextEntry1] : 73 F presents for CPE  [de-identified] : 72 y/o F with PMHx of HTN, HLD, DM, COPD, cataracts , JONATHAN presents for CPE.  Pt prediabetic, on metformin but does not always take because bothers stomach.Pt mentions few moles under breast. She also mentions lump on her scalp that she got 2-3 years ago after going to [employer]. Pt mentions hx of vertigo and headaches, was in hospital 12/30 for nausea, vomiting and dizziness that started after eating food at restaurant, suspected viral gastroenteritis. CT head done at time negative

## 2024-01-18 NOTE — PHYSICAL EXAM
[No Edema] : there was no peripheral edema [Coordination Grossly Intact] : coordination grossly intact [Normal] : affect was normal and insight and judgment were intact [de-identified] : several moles under breast

## 2024-01-20 DIAGNOSIS — M85.80 OTHER SPECIFIED DISORDERS OF BONE DENSITY AND STRUCTURE, UNSPECIFIED SITE: ICD-10-CM

## 2024-01-20 LAB
25(OH)D3 SERPL-MCNC: 23.6 NG/ML
BASOPHILS # BLD AUTO: 0.1 K/UL
BASOPHILS NFR BLD AUTO: 0.6 %
CHOLEST SERPL-MCNC: 189 MG/DL
EOSINOPHIL # BLD AUTO: 0.13 K/UL
EOSINOPHIL NFR BLD AUTO: 0.8 %
FOLATE SERPL-MCNC: 15.4 NG/ML
HCT VFR BLD CALC: 39.8 %
HDLC SERPL-MCNC: 51 MG/DL
HGB BLD-MCNC: 12.6 G/DL
IMM GRANULOCYTES NFR BLD AUTO: 0.4 %
LDLC SERPL CALC-MCNC: 95 MG/DL
LYMPHOCYTES # BLD AUTO: 2.03 K/UL
LYMPHOCYTES NFR BLD AUTO: 12.7 %
MAN DIFF?: NORMAL
MCHC RBC-ENTMCNC: 29.7 PG
MCHC RBC-ENTMCNC: 31.7 GM/DL
MCV RBC AUTO: 93.9 FL
MONOCYTES # BLD AUTO: 1 K/UL
MONOCYTES NFR BLD AUTO: 6.2 %
NEUTROPHILS # BLD AUTO: 12.69 K/UL
NEUTROPHILS NFR BLD AUTO: 79.3 %
NONHDLC SERPL-MCNC: 138 MG/DL
PLATELET # BLD AUTO: 403 K/UL
RBC # BLD: 4.24 M/UL
RBC # FLD: 15.6 %
TRIGL SERPL-MCNC: 257 MG/DL
TSH SERPL-ACNC: 1.07 UIU/ML
VIT B12 SERPL-MCNC: 420 PG/ML
WBC # FLD AUTO: 16.02 K/UL

## 2024-01-20 RX ORDER — MELATONIN 10 MG
600-20 TABLET, SUBLINGUAL SUBLINGUAL TWICE DAILY
Qty: 1 | Refills: 0 | Status: ACTIVE | COMMUNITY
Start: 2024-01-20 | End: 1900-01-01

## 2024-02-01 NOTE — ED ADULT NURSE NOTE - HISTORY OF COVID-19 VACCINATION
Orientation to room/Bed in low position, brakes on/Side rails x 2 or 4 up, assess large gaps, such that a patient could get extremity or other body part entrapped, use additional safety procedures/Call light is within reach, educate patient/family on its functionality/Environment clear of unused equipment, furniture's in place, clear of hazards/Patient and family education available to parents and patient/Document fall prevention teaching and include in plan of care Yes

## 2024-02-05 ENCOUNTER — APPOINTMENT (OUTPATIENT)
Dept: DERMATOLOGY | Facility: CLINIC | Age: 74
End: 2024-02-05
Payer: MEDICARE

## 2024-02-05 DIAGNOSIS — D18.01 HEMANGIOMA OF SKIN AND SUBCUTANEOUS TISSUE: ICD-10-CM

## 2024-02-05 DIAGNOSIS — L82.1 OTHER SEBORRHEIC KERATOSIS: ICD-10-CM

## 2024-02-05 DIAGNOSIS — L81.4 OTHER MELANIN HYPERPIGMENTATION: ICD-10-CM

## 2024-02-05 DIAGNOSIS — D22.9 MELANOCYTIC NEVI, UNSPECIFIED: ICD-10-CM

## 2024-02-05 PROCEDURE — 99203 OFFICE O/P NEW LOW 30 MIN: CPT

## 2024-02-05 NOTE — ASSESSMENT
[FreeTextEntry1] : #SKs #Cherry angiomas #Lentigos - discussed benign nature; no therapy indicated at this time -Sun protection was discussed. The proper use of broad spectrum UVB/UVA sunscreen with SPF 30 or greater was reviewed and the need for re-application after swimming or sweating or 2-3 hours was emphasized. We discussed judicious use of clothing and avoidance of peak periods of sun exposure. I made the patient aware of need for year-round protection.  -Counseled pt to monitor for changes  RTC prn

## 2024-02-05 NOTE — HISTORY OF PRESENT ILLNESS
[FreeTextEntry1] : NP spots [de-identified] : ISELA BROWN is a 73 year old F who presents for evaluation of the following  1. Spots on face, dorsal hands/forearms, and under breasts she would like evaluated No personal hx of skin cancer

## 2024-02-05 NOTE — PHYSICAL EXAM
[FreeTextEntry3] : Focused skin exam performed  The relevant portions of the exam were performed today  AAOx3, NAD, well-appearing / pleasant Focused examination within normal limits with the exception of:  Numerous stuck on brown papules under breasts Several pink shiny papules under breasts and over abdomen Uniform brown patches right cheek and forearms/dorsal hands

## 2024-02-22 ENCOUNTER — APPOINTMENT (OUTPATIENT)
Dept: INTERNAL MEDICINE | Facility: CLINIC | Age: 74
End: 2024-02-22
Payer: MEDICARE

## 2024-02-22 VITALS
HEIGHT: 59 IN | WEIGHT: 137 LBS | OXYGEN SATURATION: 95 % | TEMPERATURE: 96.9 F | HEART RATE: 100 BPM | BODY MASS INDEX: 27.62 KG/M2 | DIASTOLIC BLOOD PRESSURE: 66 MMHG | SYSTOLIC BLOOD PRESSURE: 151 MMHG

## 2024-02-22 DIAGNOSIS — R63.0 ANOREXIA: ICD-10-CM

## 2024-02-22 PROCEDURE — 99214 OFFICE O/P EST MOD 30 MIN: CPT

## 2024-02-22 RX ORDER — METFORMIN ER 500 MG 500 MG/1
500 TABLET ORAL DAILY
Qty: 180 | Refills: 1 | Status: ACTIVE | COMMUNITY
Start: 2024-01-17 | End: 1900-01-01

## 2024-02-22 RX ORDER — IPRATROPIUM BROMIDE AND ALBUTEROL SULFATE 2.5; .5 MG/3ML; MG/3ML
0.5-2.5 (3) SOLUTION RESPIRATORY (INHALATION)
Qty: 120 | Refills: 11 | Status: ACTIVE | COMMUNITY
Start: 2018-11-19 | End: 1900-01-01

## 2024-02-22 RX ORDER — FLUTICASONE FUROATE, UMECLIDINIUM BROMIDE AND VILANTEROL TRIFENATATE 200; 62.5; 25 UG/1; UG/1; UG/1
200-62.5-25 POWDER RESPIRATORY (INHALATION) DAILY
Qty: 1 | Refills: 11 | Status: ACTIVE | COMMUNITY
Start: 2021-07-12 | End: 1900-01-01

## 2024-02-22 RX ORDER — LISINOPRIL AND HYDROCHLOROTHIAZIDE TABLETS 20; 25 MG/1; MG/1
20-25 TABLET ORAL DAILY
Qty: 90 | Refills: 3 | Status: ACTIVE | COMMUNITY
Start: 2023-02-02 | End: 1900-01-01

## 2024-02-22 RX ORDER — CYPROHEPTADINE HYDROCHLORIDE 4 MG/1
4 TABLET ORAL 4 TIMES DAILY
Qty: 120 | Refills: 0 | Status: ACTIVE | COMMUNITY
Start: 2024-02-22 | End: 1900-01-01

## 2024-02-22 RX ORDER — PREDNISONE 5 MG/1
5 TABLET ORAL DAILY
Qty: 30 | Refills: 11 | Status: ACTIVE | COMMUNITY
Start: 2019-06-18 | End: 1900-01-01

## 2024-02-22 RX ORDER — MULTIVIT-MIN/IRON/FOLIC ACID/K 18-600-40
50 MCG CAPSULE ORAL
Qty: 90 | Refills: 0 | Status: ACTIVE | COMMUNITY
Start: 2024-02-22 | End: 1900-01-01

## 2024-02-22 NOTE — ASSESSMENT
[FreeTextEntry1] : #HCM -mammo pending -pap smear UTD (2 prior normal pap and stop after 65 y/o) -colon cancer screening, referral provided last visit -DEXA referral provided -UTD vaccines  #T2DM -A1c 6.6 -continue metformin -discussed diet and lifestyle modifications  #Low appetite -discussed to try glucerna however pt has tried in past and does not like -discussed mirtazapine however pt state she has taken cyproheptadine in past for appetitie and tolerated well and would like to restart -discussed side effects including risk of sedations, cognitive impairment, dizziness, hypotension especially in elderly, pt is aware  #HTN -continue current regimen -cardiology referral provided last visit  #HLD -lipid profile reviewed -continue atorvastatin  #COPD -continue trelegy ellpta -prednisone 5 mg -pulm appt pending  #Incontinence -following with urogyn -continue trospium -continue prophylactic bactrim  #Headaches, vertigo -CT head done in hospital recently showed: iichemic white matter disease and atrophy typical for age. No interval change 2021. No acute intracranial hemorrhage is appreciated. -MRI head pending -neurology referral   F/u 3 months

## 2024-02-22 NOTE — HISTORY OF PRESENT ILLNESS
[FreeTextEntry1] : 73 F presents for f/u [de-identified] : 72 y/o F with PMHx of HTN, HLD, DM, COPD, cataracts , JONATHAN presents for CPE. Pt accompanied by daughter. Pt prediabetic, on metformin. At last visit prescribed switched to metformin ER. She states she is trying to take it but sometimes she missed dose. Pt mentions low appetitie, states in past was prescribed peracitin (cyproheptdine) s an appetite stimulant by doctor at [facility] and would like to start it again. She tolerated medication well in the past.

## 2024-03-01 ENCOUNTER — NON-APPOINTMENT (OUTPATIENT)
Age: 74
End: 2024-03-01

## 2024-03-01 ENCOUNTER — APPOINTMENT (OUTPATIENT)
Dept: PULMONOLOGY | Facility: CLINIC | Age: 74
End: 2024-03-01
Payer: MEDICARE

## 2024-03-01 VITALS — WEIGHT: 137 LBS | HEIGHT: 59 IN | BODY MASS INDEX: 27.62 KG/M2

## 2024-03-01 DIAGNOSIS — F17.210 NICOTINE DEPENDENCE, CIGARETTES, UNCOMPLICATED: ICD-10-CM

## 2024-03-01 DIAGNOSIS — Z80.1 FAMILY HISTORY OF MALIGNANT NEOPLASM OF TRACHEA, BRONCHUS AND LUNG: ICD-10-CM

## 2024-03-01 PROCEDURE — G0296 VISIT TO DETERM LDCT ELIG: CPT

## 2024-03-04 PROBLEM — F17.210 CIGARETTE SMOKER: Status: ACTIVE | Noted: 2020-10-22

## 2024-03-04 NOTE — HISTORY OF PRESENT ILLNESS
[TextBox_13] : Referred by Dr. Trevizo originally, now Dr. Lee.  Ms. ISELA BROWN  is a 73 year old woman with a history of COPD and nicotine dependence   Over the telephone today we reviewed and confirmed that the patient meets screening eligibility criteria: -Age: 73 year  Smoking status: -Current smoker -Number of pack(s) per day: <1 -Number of years smoked: 41 -Number of pack years smokin  Pt would like to quit smoking. Interested in the patch and CTC counseling.   Ms. BROWN denies any signs or symptoms of lung cancer including new cough, change in cough, hemoptysis and unintentional weight loss.   Ms. BROWN denies any personal history of lung cancer. There is lung cancer in a relative. History of lung disease: COPD. Denies any history of occupational exposures.   [PacksperYear] : 30

## 2024-03-04 NOTE — PLAN
[FreeTextEntry1] : Plan: -Low Dose CT chest for lung cancer screening -Follow up with patient and her referring provider after her LDCT results have been reviewed by the multi-disciplinary clinical team -Encouraged smoking cessation -Referral to CTC  Engaged in shared decision making with Ms. ISELA BROWN . Answered all questions. She verbalized understanding and agreement. She knows to call back with any questions or concerns

## 2024-03-12 ENCOUNTER — OUTPATIENT (OUTPATIENT)
Dept: OUTPATIENT SERVICES | Facility: HOSPITAL | Age: 74
LOS: 1 days | End: 2024-03-12
Payer: MEDICARE

## 2024-03-12 ENCOUNTER — APPOINTMENT (OUTPATIENT)
Dept: CT IMAGING | Facility: HOSPITAL | Age: 74
End: 2024-03-12

## 2024-03-12 PROCEDURE — 71271 CT THORAX LUNG CANCER SCR C-: CPT

## 2024-03-12 PROCEDURE — 71271 CT THORAX LUNG CANCER SCR C-: CPT | Mod: 26

## 2024-03-18 ENCOUNTER — NON-APPOINTMENT (OUTPATIENT)
Age: 74
End: 2024-03-18

## 2024-03-21 ENCOUNTER — NON-APPOINTMENT (OUTPATIENT)
Age: 74
End: 2024-03-21

## 2024-03-22 NOTE — ASSESSMENT
[FreeTextEntry1] : COPD\par - CAT score 25\par - GOLD category B\par - Repeat PFT revealed moderate OLD\par - Continue on Stiolto and given one month supply\par - Pt is to tamper off steriods next visit\par - Home Pulmonary Rehab Referral. Discussed this in-depth with patient and the various advantages of pulmonary rehab including increasing functional capacity. \par \par Pulmonary micronodules\par - Repeat CT chest, largest nodule 5 mm next month\par \par Cough\par - Stable.\par \par Smoking\par - Patient will begin using Nicotine patch 21 mcg daily and we have prescribed 4 mg of Nicroette gum to used as needed 1-2 hours throughout the day for cravings. Spent 15 minutes on smoking cessation education, including stress management & triggers, short-term and long-term health consequences of smoking, and health benefits of quitting. \par - pt started on chantix and education provided on chantix print out. \par  \par \par JONATHAN\par \par I will repeat the titration study
16

## 2024-03-25 ENCOUNTER — NON-APPOINTMENT (OUTPATIENT)
Age: 74
End: 2024-03-25

## 2024-03-25 ENCOUNTER — APPOINTMENT (OUTPATIENT)
Dept: HEART AND VASCULAR | Facility: CLINIC | Age: 74
End: 2024-03-25
Payer: MEDICARE

## 2024-03-25 VITALS
HEART RATE: 95 BPM | HEIGHT: 59 IN | WEIGHT: 138 LBS | BODY MASS INDEX: 27.82 KG/M2 | TEMPERATURE: 96.9 F | DIASTOLIC BLOOD PRESSURE: 63 MMHG | SYSTOLIC BLOOD PRESSURE: 140 MMHG | OXYGEN SATURATION: 96 %

## 2024-03-25 DIAGNOSIS — R45.89 OTHER SYMPTOMS AND SIGNS INVOLVING EMOTIONAL STATE: ICD-10-CM

## 2024-03-25 DIAGNOSIS — J44.9 CHRONIC OBSTRUCTIVE PULMONARY DISEASE, UNSPECIFIED: ICD-10-CM

## 2024-03-25 DIAGNOSIS — R06.02 SHORTNESS OF BREATH: ICD-10-CM

## 2024-03-25 DIAGNOSIS — E78.5 HYPERLIPIDEMIA, UNSPECIFIED: ICD-10-CM

## 2024-03-25 DIAGNOSIS — E11.51 TYPE 2 DIABETES MELLITUS WITH DIABETIC PERIPHERAL ANGIOPATHY W/OUT GANGRENE: ICD-10-CM

## 2024-03-25 DIAGNOSIS — R93.1 ABNORMAL FINDINGS ON DIAGNOSTIC IMAGING OF HEART AND CORONARY CIRCULATION: ICD-10-CM

## 2024-03-25 DIAGNOSIS — G47.33 OBSTRUCTIVE SLEEP APNEA (ADULT) (PEDIATRIC): ICD-10-CM

## 2024-03-25 DIAGNOSIS — R06.00 DYSPNEA, UNSPECIFIED: ICD-10-CM

## 2024-03-25 DIAGNOSIS — I10 ESSENTIAL (PRIMARY) HYPERTENSION: ICD-10-CM

## 2024-03-25 DIAGNOSIS — E11.9 TYPE 2 DIABETES MELLITUS W/OUT COMPLICATIONS: ICD-10-CM

## 2024-03-25 PROCEDURE — 99204 OFFICE O/P NEW MOD 45 MIN: CPT | Mod: 25

## 2024-03-25 PROCEDURE — 93000 ELECTROCARDIOGRAM COMPLETE: CPT

## 2024-03-25 PROCEDURE — 99406 BEHAV CHNG SMOKING 3-10 MIN: CPT

## 2024-03-25 PROCEDURE — G2211 COMPLEX E/M VISIT ADD ON: CPT | Mod: NC,1L

## 2024-03-25 RX ORDER — ATORVASTATIN CALCIUM 80 MG/1
80 TABLET, FILM COATED ORAL DAILY
Qty: 90 | Refills: 3 | Status: ACTIVE | COMMUNITY
Start: 2024-03-25 | End: 1900-01-01

## 2024-03-25 NOTE — REVIEW OF SYSTEMS
[SOB] : shortness of breath [Dyspnea on exertion] : dyspnea during exertion [Cough] : cough [Depression] : depression [Anxiety] : anxiety [Negative] : Heme/Lymph

## 2024-03-25 NOTE — PHYSICAL EXAM
[Well Developed] : well developed [No Acute Distress] : no acute distress [Well Nourished] : well nourished [Normal Conjunctiva] : normal conjunctiva [Normal Venous Pressure] : normal venous pressure [No Carotid Bruit] : no carotid bruit [Normal S1, S2] : normal S1, S2 [No Murmur] : no murmur [No Gallop] : no gallop [No Rub] : no rub [Good Air Entry] : good air entry [Clear Lung Fields] : clear lung fields [No Respiratory Distress] : no respiratory distress  [Soft] : abdomen soft [Non Tender] : non-tender [No Masses/organomegaly] : no masses/organomegaly [Normal Gait] : normal gait [Normal Bowel Sounds] : normal bowel sounds [No Edema] : no edema [No Cyanosis] : no cyanosis [No Clubbing] : no clubbing [No Varicosities] : no varicosities [No Rash] : no rash [Moves all extremities] : moves all extremities [No Skin Lesions] : no skin lesions [No Focal Deficits] : no focal deficits [Normal Speech] : normal speech [Normal memory] : normal memory [Alert and Oriented] : alert and oriented [de-identified] : diminished breath sounds

## 2024-03-25 NOTE — HISTORY OF PRESENT ILLNESS
[FreeTextEntry1] : 73  M  Examined w daughter, Fransisca, present   ASCVD risk   + HTN, + DM, + hyperlipidemia, + tobacco use (active)  COPD, intermittent home 02  Walking limited around the house, < 1 block out doors due to DUE Denies CP, orthopnea, PND< palpitations, or edema  Often skips BP meds due to depression (doesn't want to take any meds at all, no specific side effects)  Hx carotid stenosis (? prior ' mini ' stroke) at [facility] - no recent f/u  Hyperlipidemia, no myalgias  Main issue now is depression/ apathy

## 2024-03-25 NOTE — ASSESSMENT
[FreeTextEntry1] : EKG NSR Rare PAC   Lung cancer chest CT - 1/24 Mild coronary artery calcification. Severe calcified aortic mural plaque. No evidence of aortic aneurysm.   Lipids 1/24 TChol 189  HDL 51 LDL 95  A/P   1. HTN essential BP > goal  as above, pt not taking meds, ? once / week (did not take today) As above, depression contributes to this For no,w adherence encouraged: no changes to current regimen  Pt has ppt w PND later this os to obtain formal psych eval for depression   2. MUNSON 3. COPD Hx as noted  Exercise tolerance < 4 METS Will obtain echo to assess LVEF Nuc stress to r/o CAD  4. Hyperlipidemia Target LDL < 70 5.Elevated coronary calcium score  HX as above Increase atorva to 80 for now  Myocardial stress as above   6. DM, type  2 , no long term insulin  as per PMD  7. carotid stenosis Was followed at [facility]  Try to obtain old records  Repeat    8. Tobacco use posing hazard to health  Indications for smoking cessation to prevent progression of  ASCVD (known carotid stenosis) discussed  pt will  f/u w PMD after depression evaluation

## 2024-03-25 NOTE — PHYSICAL THERAPY INITIAL EVALUATION ADULT - IMPAIRMENTS CONTRIBUTING TO GAIT DEVIATIONS, PT EVAL
Bed: IK02  Expected date:   Expected time:   Means of arrival:   Comments: aerobic capacity decreased/impaired balance/impaired postural control/decreased strength

## 2024-04-01 ENCOUNTER — APPOINTMENT (OUTPATIENT)
Dept: HEART AND VASCULAR | Facility: CLINIC | Age: 74
End: 2024-04-01
Payer: MEDICARE

## 2024-04-01 PROCEDURE — 93880 EXTRACRANIAL BILAT STUDY: CPT

## 2024-04-01 PROCEDURE — 93306 TTE W/DOPPLER COMPLETE: CPT

## 2024-04-08 ENCOUNTER — LABORATORY RESULT (OUTPATIENT)
Age: 74
End: 2024-04-08

## 2024-04-08 ENCOUNTER — APPOINTMENT (OUTPATIENT)
Dept: PULMONOLOGY | Facility: CLINIC | Age: 74
End: 2024-04-08
Payer: MEDICARE

## 2024-04-08 VITALS
RESPIRATION RATE: 16 BRPM | HEART RATE: 118 BPM | WEIGHT: 140 LBS | TEMPERATURE: 97.5 F | OXYGEN SATURATION: 96 % | BODY MASS INDEX: 28.22 KG/M2 | DIASTOLIC BLOOD PRESSURE: 71 MMHG | SYSTOLIC BLOOD PRESSURE: 125 MMHG | HEIGHT: 59 IN

## 2024-04-08 DIAGNOSIS — Z01.818 ENCOUNTER FOR OTHER PREPROCEDURAL EXAMINATION: ICD-10-CM

## 2024-04-08 PROCEDURE — 99213 OFFICE O/P EST LOW 20 MIN: CPT

## 2024-04-08 PROCEDURE — 99203 OFFICE O/P NEW LOW 30 MIN: CPT

## 2024-04-09 LAB
ALBUMIN SERPL ELPH-MCNC: 4.7 G/DL
ALP BLD-CCNC: 91 U/L
ALT SERPL-CCNC: 12 U/L
ANION GAP SERPL CALC-SCNC: 15 MMOL/L
APTT BLD: 33.3 SEC
AST SERPL-CCNC: 19 U/L
BILIRUB SERPL-MCNC: 0.2 MG/DL
BUN SERPL-MCNC: 30 MG/DL
CALCIUM SERPL-MCNC: 10.4 MG/DL
CHLORIDE SERPL-SCNC: 96 MMOL/L
CO2 SERPL-SCNC: 23 MMOL/L
CREAT SERPL-MCNC: 0.99 MG/DL
EGFR: 60 ML/MIN/1.73M2
GLUCOSE SERPL-MCNC: 151 MG/DL
INR PPP: 0.92 RATIO
POTASSIUM SERPL-SCNC: 4.9 MMOL/L
PROT SERPL-MCNC: 7.5 G/DL
PT BLD: 10.4 SEC
SODIUM SERPL-SCNC: 134 MMOL/L

## 2024-04-10 LAB
BASOPHILS # BLD AUTO: 0.07 K/UL
BASOPHILS NFR BLD AUTO: 0.6 %
EOSINOPHIL # BLD AUTO: 0.08 K/UL
EOSINOPHIL NFR BLD AUTO: 0.7 %
HCT VFR BLD CALC: 39.4 %
HGB BLD-MCNC: 11.6 G/DL
IMM GRANULOCYTES NFR BLD AUTO: 0.3 %
LYMPHOCYTES # BLD AUTO: 1.44 K/UL
LYMPHOCYTES NFR BLD AUTO: 13.1 %
MAN DIFF?: NORMAL
MCHC RBC-ENTMCNC: 29.4 GM/DL
MCHC RBC-ENTMCNC: 30.9 PG
MCV RBC AUTO: 105.1 FL
MONOCYTES # BLD AUTO: 0.5 K/UL
MONOCYTES NFR BLD AUTO: 4.5 %
NEUTROPHILS # BLD AUTO: 8.91 K/UL
NEUTROPHILS NFR BLD AUTO: 80.8 %
PLATELET # BLD AUTO: 390 K/UL
RBC # BLD: 3.75 M/UL
RBC # FLD: 16.3 %
WBC # FLD AUTO: 11.03 K/UL

## 2024-04-10 NOTE — PHYSICAL EXAM
[No Acute Distress] : no acute distress [Normal Oropharynx] : normal oropharynx [Normal Appearance] : normal appearance [Normal Rate/Rhythm] : normal rate/rhythm [No Resp Distress] : no resp distress [No Abnormalities] : no abnormalities [Benign] : benign [Normal Gait] : normal gait [No Clubbing] : no clubbing [Normal Color/ Pigmentation] : normal color/ pigmentation [No Focal Deficits] : no focal deficits [Oriented x3] : oriented x3 [Supple] : supple [Normal S1, S2] : normal s1, s2 [No Cyanosis] : no cyanosis [TextBox_68] : distant breath sounds, no wheezing [TextBox_99] : cane bound

## 2024-04-10 NOTE — REASON FOR VISIT
[Initial] : an initial visit [COPD] : COPD [Other: _____] : [unfilled] [Pulmonary Nodules] : pulmonary nodules [TextBox_13] : Dr. Lee

## 2024-04-10 NOTE — HISTORY OF PRESENT ILLNESS
[Current] : current [Never] : never [>= 20 pack years] : >= 20 pack years [TextBox_4] : 70 y/o F current smoker with PMHx of HTN, HLD, DM, COPD (on trelegy, home o2), carotid stenosis, cataracts, JONATHAN  (not on CPAP) presents today for nodule follow up seen on LDCT as part of LCS and to establish care. She is a former patient of Dr. Trevizo   She is on home 02 (3L) uses intermittently as she gets dyspneic on exertion. She has been chronically on 5 mg of prednisone every day and trelegy 200 and singular. She uses a nebulizer as needed basis. Cough is throughout the day with clear phlegm. Able to ambulate 1 block with a cane at a slow pace. She is noncompliant with the CPAP mask, does not find it comfortable.  Pt denies any SOB at rest, chest pain, recent hospitalizations and urgent care.   CT chest 03/12 Impression: 1. Severe emphysema. 2. 6 mm solid nodule (5:133) which has increased in size when compared to prior imaging dating back to 8/5/2019. 3. New groundglass nodule abutting the right major fissure measuring 4 mm (5:104). 4. Additional stable solid nodules as above.  PFT 12/02/22 FEV1/FVC: 66%, no improvement with bronchodilator  FEV1: 164% (1.43L) DLCO: 62% (7.19ml/min/mmHg) 6mwt: walked 193 meters  Resting: O2: 96% End test: O2: 97%    [TextBox_11] : 8-10 cig/day  [TextBox_13] : 60

## 2024-04-10 NOTE — END OF VISIT
[Time Spent: ___ minutes] : I have spent [unfilled] minutes of time on the encounter. [FreeTextEntry3] : NP participated on patient's encounter.NP participated on patient's encounter.  I, personally performed the evaluation and management (E/M) services for this new patient.  That E/M includes conducting the initial examination, assessing all conditions, and establishing the plan of care.  Today, my ACP was here to observe and participate on evaluation and management services for this patient to be followed going forward.

## 2024-04-10 NOTE — ASSESSMENT
[FreeTextEntry1] :  70 y/o F current smoker with PMHx of HTN, HLD, DM, COPD (on trelegy, home o2), carotid stenosis, cataracts, JONATHAN  (not on CPAP) presents today for nodule follow up seen on LDCT and to establish care. She is a former patient of Dr. Trevizo   Reviewed: CT chest 03/12/2024 Impression: 1. Severe emphysema. 2. 6 mm solid nodule (5:133) which has increased in size when compared to prior imaging dating back to 8/5/2019. 3. New groundglass nodule abutting the right major fissure measuring 4 mm (5:104). 4. Additional stable solid nodules as above.  Jeanmarie([facility]) malignancy risk calculator: 8,5% Reyna([facility])malignancy risk calculator: 1.7%  LCS group recommendation were tissue biopsy. Nodule is not amenable to bronchoscopic biopsy. IR biopsy discussed. Procedure is feasible, but IR team feels it will be low yield due to nodule size and possible higher risk of PTX due to location. CTSx is able to wedge nodule, but we'll need current PFT to evaluate for ability to tolerate lung resection, surgery. If she is able to undergo surgery will d/w patient IR bx vs. surgical biopsy. If she is not, will discuss IR biopsy.  Continue trelegy for COPD. Smoking cessation addressed, patient not ready to quit.  O/V after PFT, sooner if needed.

## 2024-04-10 NOTE — REVIEW OF SYSTEMS
[Cough] : cough [Sputum] : sputum [SOB on Exertion] : sob on exertion [Back Pain] : back pain [Headache] : headache [Negative] : Endocrine [Chest Tightness] : no chest tightness [Wheezing] : no wheezing

## 2024-04-23 ENCOUNTER — APPOINTMENT (OUTPATIENT)
Dept: HEART AND VASCULAR | Facility: CLINIC | Age: 74
End: 2024-04-23
Payer: MEDICARE

## 2024-04-23 PROCEDURE — 93015 CV STRESS TEST SUPVJ I&R: CPT

## 2024-04-23 PROCEDURE — 78452 HT MUSCLE IMAGE SPECT MULT: CPT

## 2024-04-23 PROCEDURE — A9500: CPT

## 2024-05-16 ENCOUNTER — APPOINTMENT (OUTPATIENT)
Dept: INTERNAL MEDICINE | Facility: CLINIC | Age: 74
End: 2024-05-16
Payer: MEDICARE

## 2024-05-16 VITALS
SYSTOLIC BLOOD PRESSURE: 147 MMHG | OXYGEN SATURATION: 96 % | HEIGHT: 59 IN | WEIGHT: 139 LBS | TEMPERATURE: 97.5 F | DIASTOLIC BLOOD PRESSURE: 58 MMHG | BODY MASS INDEX: 28.02 KG/M2 | HEART RATE: 100 BPM

## 2024-05-16 DIAGNOSIS — R91.1 SOLITARY PULMONARY NODULE: ICD-10-CM

## 2024-05-16 DIAGNOSIS — Z72.0 TOBACCO USE: ICD-10-CM

## 2024-05-16 DIAGNOSIS — R26.81 UNSTEADINESS ON FEET: ICD-10-CM

## 2024-05-16 PROCEDURE — 99407 BEHAV CHNG SMOKING > 10 MIN: CPT

## 2024-05-16 PROCEDURE — 99214 OFFICE O/P EST MOD 30 MIN: CPT | Mod: 25

## 2024-05-16 RX ORDER — NICOTINE 21 MG/24HR
21 PATCH, TRANSDERMAL 24 HOURS TRANSDERMAL DAILY
Qty: 42 | Refills: 0 | Status: ACTIVE | COMMUNITY
Start: 2024-05-16 | End: 1900-01-01

## 2024-05-16 RX ORDER — BUPROPION HYDROCHLORIDE 150 MG/1
150 TABLET, EXTENDED RELEASE ORAL DAILY
Qty: 1 | Refills: 0 | Status: ACTIVE | COMMUNITY
Start: 2024-05-16 | End: 1900-01-01

## 2024-05-16 NOTE — COUNSELING
[Yes] : Risk of tobacco use and health benefits of smoking cessation discussed: Yes [Cessation strategies including cessation program discussed] : Cessation strategies including cessation program discussed [Use of nicotine replacement therapies and other medications discussed] : Use of nicotine replacement therapies and other medications discussed [Encouraged to pick a quit date and identify support needed to quit] : Encouraged to pick a quit date and identify support needed to quit [FreeTextEntry3] : 15

## 2024-05-16 NOTE — PLAN
[FreeTextEntry1] : #HCM -mammo UTD -pap smear UTD  -colon cancer screening, referral provided last visit -DEXA UTD -UTD vaccines  #Lung nodule -following with pulm -possible surgical vs IR biopsy  #Tobacco use -smoking cessation therapies discussed -will start nicotine patch -pt also interested in bupropion, will start today #T2DM -A1c 6.6 -continue metformin -discussed diet and lifestyle modifications  #HTN -continue current regimen -cardiology referral provided last visit  #HLD -lipid profile reviewed -continue atorvastatin  #COPD -continue trelegy ellpta -prednisone 5 mg  #Incontinence -following with urogyn -continue trospium -continue prophylactic bactrim  #Headaches, vertigo -CT head done in hospital recently showed: iichemic white matter disease and atrophy typical for age. No interval change 2021. No acute intracranial hemorrhage is appreciated. -MRI head pending -neurology referral  F/u 3 months.

## 2024-05-16 NOTE — HISTORY OF PRESENT ILLNESS
[FreeTextEntry1] : 73 F presents for followup  [de-identified] : 72 y/o F with PMHx of HTN, HLD, DM, COPD, cataracts , JONATHAN presents for f/u. Pt accompanied by daughter. Seen by pulm recently for category 4A suspcious nodule on imaging, awaiting current PFT before possible IR biopsy vs surgical biopsy of nodule. Pt sitll smoking, agreeable to nicotine patch. She amokes almost a pack a day daily. Recent echo and stress test reviewed WNL. Pt needs toilet seat

## 2024-06-25 ENCOUNTER — APPOINTMENT (OUTPATIENT)
Dept: CARE COORDINATION | Facility: HOME HEALTH | Age: 74
End: 2024-06-25

## 2024-06-26 ENCOUNTER — OUTPATIENT (OUTPATIENT)
Dept: OUTPATIENT SERVICES | Facility: HOSPITAL | Age: 74
LOS: 1 days | End: 2024-06-26
Payer: MEDICARE

## 2024-06-26 DIAGNOSIS — J44.9 CHRONIC OBSTRUCTIVE PULMONARY DISEASE, UNSPECIFIED: ICD-10-CM

## 2024-06-26 PROCEDURE — 94729 DIFFUSING CAPACITY: CPT | Mod: 26

## 2024-06-26 PROCEDURE — 94760 N-INVAS EAR/PLS OXIMETRY 1: CPT

## 2024-06-26 PROCEDURE — 94729 DIFFUSING CAPACITY: CPT

## 2024-06-26 PROCEDURE — 94726 PLETHYSMOGRAPHY LUNG VOLUMES: CPT

## 2024-06-26 PROCEDURE — 94727 GAS DIL/WSHOT DETER LNG VOL: CPT | Mod: 26

## 2024-06-26 PROCEDURE — 94010 BREATHING CAPACITY TEST: CPT | Mod: 26

## 2024-06-26 PROCEDURE — 94060 EVALUATION OF WHEEZING: CPT

## 2024-08-12 ENCOUNTER — RESULT REVIEW (OUTPATIENT)
Age: 74
End: 2024-08-12

## 2024-08-12 ENCOUNTER — APPOINTMENT (OUTPATIENT)
Dept: CT IMAGING | Facility: HOSPITAL | Age: 74
End: 2024-08-12
Payer: MEDICARE

## 2024-08-12 PROCEDURE — 71250 CT THORAX DX C-: CPT | Mod: 26

## 2024-08-16 ENCOUNTER — APPOINTMENT (OUTPATIENT)
Dept: INTERNAL MEDICINE | Facility: CLINIC | Age: 74
End: 2024-08-16
Payer: MEDICARE

## 2024-08-16 ENCOUNTER — TRANSCRIPTION ENCOUNTER (OUTPATIENT)
Age: 74
End: 2024-08-16

## 2024-08-16 VITALS
RESPIRATION RATE: 16 BRPM | TEMPERATURE: 98.2 F | DIASTOLIC BLOOD PRESSURE: 70 MMHG | SYSTOLIC BLOOD PRESSURE: 125 MMHG | HEIGHT: 59 IN | HEART RATE: 97 BPM | WEIGHT: 137.79 LBS | OXYGEN SATURATION: 96 % | BODY MASS INDEX: 27.78 KG/M2

## 2024-08-16 DIAGNOSIS — G47.33 OBSTRUCTIVE SLEEP APNEA (ADULT) (PEDIATRIC): ICD-10-CM

## 2024-08-16 DIAGNOSIS — E11.9 TYPE 2 DIABETES MELLITUS W/OUT COMPLICATIONS: ICD-10-CM

## 2024-08-16 DIAGNOSIS — R91.1 SOLITARY PULMONARY NODULE: ICD-10-CM

## 2024-08-16 DIAGNOSIS — I10 ESSENTIAL (PRIMARY) HYPERTENSION: ICD-10-CM

## 2024-08-16 DIAGNOSIS — E78.5 HYPERLIPIDEMIA, UNSPECIFIED: ICD-10-CM

## 2024-08-16 DIAGNOSIS — F32.9 MAJOR DEPRESSIVE DISORDER, SINGLE EPISODE, UNSPECIFIED: ICD-10-CM

## 2024-08-16 DIAGNOSIS — E11.51 TYPE 2 DIABETES MELLITUS WITH DIABETIC PERIPHERAL ANGIOPATHY W/OUT GANGRENE: ICD-10-CM

## 2024-08-16 PROCEDURE — G2211 COMPLEX E/M VISIT ADD ON: CPT | Mod: NC

## 2024-08-16 PROCEDURE — 99214 OFFICE O/P EST MOD 30 MIN: CPT

## 2024-08-17 NOTE — HISTORY OF PRESENT ILLNESS
[FreeTextEntry1] : f/u  [de-identified] : 70 y/o F with PMHx of HTN, HLD, DM, COPD, cataracts , JONATHAN presents for f/u. Pt accompanied by daughter. Seen by pulm recently for category 4A suspicious nodule on imaging. She completed PFT but was not conclusive and was recommended to have repeat CT chest, need to have done before biopsy of nodule. Pt still smoking, she sometimes uses nicotine patch.  She states has been cutting down on smoking, smokes 10-15 cigarettes per day. Recent echo and stress test reviewed WNL. Needs refill of her medications.

## 2024-08-17 NOTE — ASSESSMENT
[FreeTextEntry1] : #HCM -mammo UTD -pap smear UTD -colon cancer screening, referral provided last visit -DEXA UTD -UTD vaccines  #Lung nodule -following with pulm -possible surgical vs IR biopsy, awaiting repeat PFT and CT chest  #Tobacco use -smoking cessation therapies discussed -continue bupropion -continue nicotine patch  #T2DM -A1c 6.6 -continue metformin -discussed diet and lifestyle modifications -f/u A1c  #HTN -continue current regimen -cardiology referral provided last visit  #HLD -lipid profile reviewed -continue atorvastatin  #COPD -continue trelegy ellpta -prednisone 5 mg  #Incontinence -following with urogyn -continue trospium -continue prophylactic bactrim  #Headaches, vertigo -CT head done in hospital recently showed: iichemic white matter disease and atrophy typical for age. No interval change 2021. No acute intracranial hemorrhage is appreciated. -MRI head pending -neurology referral  F/u 6 months.

## 2024-08-17 NOTE — HISTORY OF PRESENT ILLNESS
[FreeTextEntry1] : f/u  [de-identified] : 72 y/o F with PMHx of HTN, HLD, DM, COPD, cataracts , JONATHAN presents for f/u. Pt accompanied by daughter. Seen by pulm recently for category 4A suspicious nodule on imaging. She completed PFT but was not conclusive and was recommended to have repeat CT chest, need to have done before biopsy of nodule. Pt still smoking, she sometimes uses nicotine patch.  She states has been cutting down on smoking, smokes 10-15 cigarettes per day. Recent echo and stress test reviewed WNL. Needs refill of her medications.

## 2024-08-19 ENCOUNTER — TRANSCRIPTION ENCOUNTER (OUTPATIENT)
Age: 74
End: 2024-08-19

## 2024-08-19 LAB
ESTIMATED AVERAGE GLUCOSE: 134 MG/DL
HBA1C MFR BLD HPLC: 6.3 %

## 2024-08-20 ENCOUNTER — OUTPATIENT (OUTPATIENT)
Dept: OUTPATIENT SERVICES | Facility: HOSPITAL | Age: 74
LOS: 1 days | End: 2024-08-20
Payer: MEDICARE

## 2024-08-20 DIAGNOSIS — R06.00 DYSPNEA, UNSPECIFIED: ICD-10-CM

## 2024-08-20 PROCEDURE — 94621 CARDIOPULM EXERCISE TESTING: CPT

## 2024-08-20 PROCEDURE — 94621 CARDIOPULM EXERCISE TESTING: CPT | Mod: 26

## 2024-08-22 ENCOUNTER — NON-APPOINTMENT (OUTPATIENT)
Age: 74
End: 2024-08-22

## 2024-08-28 NOTE — DISCHARGE NOTE PROVIDER - NSDCFUSCHEDAPPT_GEN_ALL_CORE_FT
V2.0  History and Physical      Name:  Clayton Mcmullen /Age/Sex: 1974  (49 y.o. male)   MRN & CSN:  [id number] & [id number] Encounter Date/Time: 2024 3:46 PM EDT   Location:  [address] [place] [address]-A PCP: No primary care provider on file.       Hospital Day: 1    Assessment and Plan:   Clayton Mcmullen is a 49 y.o. male with a pmh of ADHD alcohol use disorder chronic GERD who presents with TIA (transient ischemic attack)    Hospital Problems             Last Modified POA    * (Principal) TIA (transient ischemic attack) 2024 Yes       Left-sided numbness and weakness..  Last known well was 7:30 AM.\"  Currently 0-1 with numbness of the hand.  CT scan and CTA is negative.  Telestroke team was contacted MRI of the brain has been ordered.  My clinical suspicion is more like alcohol use disorder with concerns for weight loss.  To be started on CIWA protocol.  Alcohol use disorder drinks 6-8 beers every day.  Face appears flushed and no other signs of withdrawals right now but the clinical symptoms match more with alcohol.  Will monitor with CIWA protocol  ADHD currently on metoprolol  Chronic GERD on PPI       Medical Decision Making:  The following items were considered in medical decision making:  Discussion of patient care with other providers  Reviewed clinical lab tests if any  Reviewed radiology tests if any  Reviewed other diagnostic tests/interventions  Independent review of radiologic images if any  Microbiology cultures and other micro tests if any    Estimated time spent for medical decision-making encompassing complexity of the case, history taking, medication review, physical examination, communication with family, RN, , discussion with specialists, and ancillary staff members to provide accurate care for the patient was around 25 minutes.    The billing in this case is level 3 due to the combination of above and specifically because of complexity of the case.    Goals status was discussed  ISELA BROWN ; 10/28/2021 ; NPP Puled [address] [place] [address] ISELA BROWN ; 10/15/2021 ; NPP Otolaryng [address]  ISELA BROWN ; 10/28/2021 ; NPP PulmMed [address]

## 2024-08-29 ENCOUNTER — NON-APPOINTMENT (OUTPATIENT)
Age: 74
End: 2024-08-29

## 2024-09-03 ENCOUNTER — NON-APPOINTMENT (OUTPATIENT)
Age: 74
End: 2024-09-03

## 2024-09-10 ENCOUNTER — APPOINTMENT (OUTPATIENT)
Dept: PULMONOLOGY | Facility: CLINIC | Age: 74
End: 2024-09-10
Payer: MEDICARE

## 2024-09-10 PROCEDURE — 99442: CPT | Mod: 93

## 2024-11-04 NOTE — ED ADULT TRIAGE NOTE - BEFAST EYES
11/04/24                            Shaw Esparza  [address] [facility] [address]    To Whom It May Concern:    This is to certify Shaw Esparza was evaluated with Ammy Gonzalez NP on 11/04/24 and can return to school on 11/05/24.     RESTRICTIONS: None                    Ammy Gonzalez NP  [address] [address]  Dept Phone: [phone]      
No

## 2025-01-22 ENCOUNTER — APPOINTMENT (OUTPATIENT)
Dept: INTERNAL MEDICINE | Facility: CLINIC | Age: 75
End: 2025-01-22

## 2025-02-11 ENCOUNTER — APPOINTMENT (OUTPATIENT)
Dept: INTERNAL MEDICINE | Facility: CLINIC | Age: 75
End: 2025-02-11
Payer: MEDICARE

## 2025-02-11 ENCOUNTER — NON-APPOINTMENT (OUTPATIENT)
Age: 75
End: 2025-02-11

## 2025-02-11 VITALS
WEIGHT: 132.28 LBS | HEART RATE: 95 BPM | SYSTOLIC BLOOD PRESSURE: 136 MMHG | DIASTOLIC BLOOD PRESSURE: 80 MMHG | OXYGEN SATURATION: 94 % | TEMPERATURE: 97.6 F | BODY MASS INDEX: 26.67 KG/M2 | HEIGHT: 59 IN

## 2025-02-11 DIAGNOSIS — E11.51 TYPE 2 DIABETES MELLITUS WITH DIABETIC PERIPHERAL ANGIOPATHY W/OUT GANGRENE: ICD-10-CM

## 2025-02-11 DIAGNOSIS — R79.89 OTHER SPECIFIED ABNORMAL FINDINGS OF BLOOD CHEMISTRY: ICD-10-CM

## 2025-02-11 DIAGNOSIS — M54.50 LOW BACK PAIN, UNSPECIFIED: ICD-10-CM

## 2025-02-11 DIAGNOSIS — F17.200 NICOTINE DEPENDENCE, UNSPECIFIED, UNCOMPLICATED: ICD-10-CM

## 2025-02-11 DIAGNOSIS — R91.1 SOLITARY PULMONARY NODULE: ICD-10-CM

## 2025-02-11 PROCEDURE — 99214 OFFICE O/P EST MOD 30 MIN: CPT | Mod: 25

## 2025-02-11 RX ORDER — MIRTAZAPINE 15 MG/1
15 TABLET, FILM COATED ORAL
Qty: 90 | Refills: 1 | Status: ACTIVE | COMMUNITY
Start: 2025-02-11 | End: 1900-01-01

## 2025-02-11 RX ORDER — LIDOCAINE 40 MG/G
4 PATCH TOPICAL
Qty: 1 | Refills: 0 | Status: ACTIVE | COMMUNITY
Start: 2025-02-11 | End: 1900-01-01

## 2025-02-18 ENCOUNTER — NON-APPOINTMENT (OUTPATIENT)
Age: 75
End: 2025-02-18

## 2025-02-18 LAB
25(OH)D3 SERPL-MCNC: 24.1 NG/ML
ALBUMIN SERPL ELPH-MCNC: 4.2 G/DL
ALP BLD-CCNC: 129 U/L
ALT SERPL-CCNC: 8 U/L
ANION GAP SERPL CALC-SCNC: 15 MMOL/L
AST SERPL-CCNC: 22 U/L
BASOPHILS # BLD AUTO: 0.09 K/UL
BASOPHILS NFR BLD AUTO: 0.9 %
BILIRUB SERPL-MCNC: <0.2 MG/DL
BUN SERPL-MCNC: 16 MG/DL
CALCIUM SERPL-MCNC: 9.9 MG/DL
CHLORIDE SERPL-SCNC: 99 MMOL/L
CHOLEST SERPL-MCNC: 185 MG/DL
CO2 SERPL-SCNC: 24 MMOL/L
CREAT SERPL-MCNC: 0.82 MG/DL
EGFR: 75 ML/MIN/1.73M2
EOSINOPHIL # BLD AUTO: 0.24 K/UL
EOSINOPHIL NFR BLD AUTO: 2.3 %
ESTIMATED AVERAGE GLUCOSE: 140 MG/DL
FOLATE SERPL-MCNC: 17.9 NG/ML
GLUCOSE SERPL-MCNC: 94 MG/DL
HBA1C MFR BLD HPLC: 6.5 %
HCT VFR BLD CALC: 36.5 %
HDLC SERPL-MCNC: 46 MG/DL
HGB BLD-MCNC: 10.8 G/DL
IMM GRANULOCYTES NFR BLD AUTO: 0.5 %
LDLC SERPL CALC-MCNC: 115 MG/DL
LYMPHOCYTES # BLD AUTO: 1.75 K/UL
LYMPHOCYTES NFR BLD AUTO: 17.1 %
MAN DIFF?: NORMAL
MCHC RBC-ENTMCNC: 27.9 PG
MCHC RBC-ENTMCNC: 29.6 G/DL
MCV RBC AUTO: 94.3 FL
MONOCYTES # BLD AUTO: 0.98 K/UL
MONOCYTES NFR BLD AUTO: 9.6 %
NEUTROPHILS # BLD AUTO: 7.15 K/UL
NEUTROPHILS NFR BLD AUTO: 69.6 %
NONHDLC SERPL-MCNC: 138 MG/DL
PLATELET # BLD AUTO: 518 K/UL
POTASSIUM SERPL-SCNC: 5.4 MMOL/L
PROT SERPL-MCNC: 7.4 G/DL
RBC # BLD: 3.87 M/UL
RBC # FLD: 15.4 %
SODIUM SERPL-SCNC: 138 MMOL/L
TRIGL SERPL-MCNC: 130 MG/DL
VIT B12 SERPL-MCNC: 622 PG/ML
WBC # FLD AUTO: 10.26 K/UL

## 2025-02-24 NOTE — ASSESSMENT
[FreeTextEntry1] : #HCM -mammo referral provided -pap smear UTD (2 prior normal pap and stop after 63 y/o) -colon cancer screening, colonsocy referral provided -DEXA referral provided -UTD  vaccines  #T2DM -A1c done in hospital 6.1 -pt states metformin causes GI sxs, will send ER version to see if tolerates better  #HTN -continue current regimen  -cardiologyu referral provided  #HLD -f/u lipid profile -continue atorvastatin   #COPD -continue trelebienvenido gore -prednisone 5 mg  -referral pulmonology   #Numerous moles -derm referral  #Incontinence -following with urogyn -continue trospium -continue prophylactic bactrim  #Headaches, vertigo -CT head done in hospital recently showed: iichemic white matter disease and atrophy typical for age. No interval change 2021. No acute intracranial hemorrhage is appreciated. -MRI head ordered -neurology referral Render Risk Assessment In Note?: no Detail Level: Zone Additional Notes: Pt states that the site is showing signs of healing. A shave biopsy was briefly discussed; however, the pt opts to monitor for the time being. The pt will c/b if the site does not fully heal within 4 weeks - or begins to exhibit more concerning signs / symptoms.

## 2025-03-05 ENCOUNTER — APPOINTMENT (OUTPATIENT)
Dept: PULMONOLOGY | Facility: CLINIC | Age: 75
End: 2025-03-05

## 2025-03-06 ENCOUNTER — APPOINTMENT (OUTPATIENT)
Dept: PULMONOLOGY | Facility: CLINIC | Age: 75
End: 2025-03-06
Payer: MEDICARE

## 2025-03-06 VITALS
RESPIRATION RATE: 14 BRPM | HEART RATE: 104 BPM | SYSTOLIC BLOOD PRESSURE: 144 MMHG | BODY MASS INDEX: 26.61 KG/M2 | HEIGHT: 59 IN | OXYGEN SATURATION: 93 % | WEIGHT: 132 LBS | TEMPERATURE: 98.4 F | DIASTOLIC BLOOD PRESSURE: 77 MMHG

## 2025-03-06 PROCEDURE — 99214 OFFICE O/P EST MOD 30 MIN: CPT

## 2025-03-06 RX ORDER — IPRATROPIUM BROMIDE AND ALBUTEROL 20; 100 UG/1; UG/1
20-100 SPRAY, METERED RESPIRATORY (INHALATION)
Qty: 1 | Refills: 2 | Status: ACTIVE | COMMUNITY
Start: 2025-03-06 | End: 1900-01-01

## 2025-03-19 ENCOUNTER — OUTPATIENT (OUTPATIENT)
Dept: OUTPATIENT SERVICES | Facility: HOSPITAL | Age: 75
LOS: 1 days | End: 2025-03-19
Payer: MEDICARE

## 2025-03-19 ENCOUNTER — NON-APPOINTMENT (OUTPATIENT)
Age: 75
End: 2025-03-19

## 2025-03-19 DIAGNOSIS — J44.9 CHRONIC OBSTRUCTIVE PULMONARY DISEASE, UNSPECIFIED: ICD-10-CM

## 2025-03-19 PROCEDURE — 94060 EVALUATION OF WHEEZING: CPT

## 2025-03-19 PROCEDURE — 94010 BREATHING CAPACITY TEST: CPT | Mod: 26

## 2025-03-19 PROCEDURE — 94729 DIFFUSING CAPACITY: CPT

## 2025-03-19 PROCEDURE — 94618 PULMONARY STRESS TESTING: CPT

## 2025-03-19 PROCEDURE — 94726 PLETHYSMOGRAPHY LUNG VOLUMES: CPT

## 2025-03-19 PROCEDURE — 94729 DIFFUSING CAPACITY: CPT | Mod: 26

## 2025-03-19 PROCEDURE — 94760 N-INVAS EAR/PLS OXIMETRY 1: CPT

## 2025-03-19 PROCEDURE — 94726 PLETHYSMOGRAPHY LUNG VOLUMES: CPT | Mod: 26

## 2025-03-19 PROCEDURE — 94618 PULMONARY STRESS TESTING: CPT | Mod: 26

## 2025-03-26 ENCOUNTER — NON-APPOINTMENT (OUTPATIENT)
Age: 75
End: 2025-03-26

## 2025-03-26 ENCOUNTER — APPOINTMENT (OUTPATIENT)
Dept: HEART AND VASCULAR | Facility: CLINIC | Age: 75
End: 2025-03-26
Payer: MEDICARE

## 2025-03-26 VITALS
BODY MASS INDEX: 26.61 KG/M2 | OXYGEN SATURATION: 96 % | WEIGHT: 131.99 LBS | HEART RATE: 91 BPM | TEMPERATURE: 98.7 F | HEIGHT: 59 IN | SYSTOLIC BLOOD PRESSURE: 132 MMHG | DIASTOLIC BLOOD PRESSURE: 68 MMHG

## 2025-03-26 DIAGNOSIS — E11.51 TYPE 2 DIABETES MELLITUS WITH DIABETIC PERIPHERAL ANGIOPATHY W/OUT GANGRENE: ICD-10-CM

## 2025-03-26 DIAGNOSIS — I65.29 OCCLUSION AND STENOSIS OF UNSPECIFIED CAROTID ARTERY: ICD-10-CM

## 2025-03-26 DIAGNOSIS — E78.5 HYPERLIPIDEMIA, UNSPECIFIED: ICD-10-CM

## 2025-03-26 PROCEDURE — 99214 OFFICE O/P EST MOD 30 MIN: CPT | Mod: 25

## 2025-03-26 PROCEDURE — 93000 ELECTROCARDIOGRAM COMPLETE: CPT

## 2025-04-08 ENCOUNTER — NON-APPOINTMENT (OUTPATIENT)
Age: 75
End: 2025-04-08

## 2025-04-10 ENCOUNTER — APPOINTMENT (OUTPATIENT)
Dept: PULMONOLOGY | Facility: CLINIC | Age: 75
End: 2025-04-10
Payer: MEDICARE

## 2025-04-10 VITALS
WEIGHT: 133 LBS | RESPIRATION RATE: 14 BRPM | DIASTOLIC BLOOD PRESSURE: 73 MMHG | OXYGEN SATURATION: 96 % | BODY MASS INDEX: 26.81 KG/M2 | HEIGHT: 59 IN | SYSTOLIC BLOOD PRESSURE: 134 MMHG | TEMPERATURE: 97.5 F | HEART RATE: 98 BPM

## 2025-04-10 PROCEDURE — G0009: CPT

## 2025-04-10 PROCEDURE — 90677 PCV20 VACCINE IM: CPT

## 2025-04-10 PROCEDURE — 99214 OFFICE O/P EST MOD 30 MIN: CPT | Mod: 25

## 2025-04-15 NOTE — H&P ADULT - ASSESSMENT
COPD  - CAT score 33  - GOLD category B  - Repeat PFT  - Continue Advair BID and Ventolin/Albuterol prn. Educated patient on proper use of Advair with rinsing mouth after inhalation.  - Continue Prednisone 5 mg bid  - Home Pulmonary Rehab Referral. Discussed this in-depth with patient and the various advantages of pulmonary rehab including increasing functional capacity.     Pulmonary micronodules  - Repeat CT chest, largest nodule 5 mm    Cough  - Continue Mucinex up to twice a day (1200 mg max dose)    Smoking  - Patient will begin using Nicotine patch 21 mcg daily and we have prescribed 4 mg of Nicroette gum to used as needed 1-2 hours throughout the day for cravings. Spent 15 minutes on smoking cessation education, including stress management & triggers, short-term and long-term health consequences of smoking, and health benefits of quitting. COPD  - CAT score 33  - GOLD category B  - Repeat PFT  - Continue Advair BID and Ventolin/Albuterol prn. Educated patient on proper use of Advair with rinsing mouth after inhalation.  - Continue Prednisone 5 mg bid  - Home Pulmonary Rehab Referral. Discussed this in-depth with patient and the various advantages of pulmonary rehab including increasing functional capacity.     Pulmonary micronodules  - Repeat CT chest, largest nodule 5 mm    Cough  - Continue Mucinex up to twice a day (1200 mg max dose)    Smoking  - Patient will begin using Nicotine patch 21 mcg daily and we have prescribed 4 mg of Nicroette gum to used as needed 1-2 hours throughout the day for cravings. Spent 15 minutes on smoking cessation education, including stress management & triggers, short-term and long-term health consequences of smoking, and health benefits of quitting.       69 y/o female with h/o COPD on home O2 2L NC, DM, HTN, Depression for cough presenting with continued increasing sputum production for the past 8 days. intact COPD  - CAT score 33  - GOLD category B  - Repeat PFT  - Continue Advair BID and Ventolin/Albuterol prn. Educated patient on proper use of Advair with rinsing mouth after inhalation.  - Continue Prednisone 5 mg bid  - Home Pulmonary Rehab Referral. Discussed this in-depth with patient and the various advantages of pulmonary rehab including increasing functional capacity.     Pulmonary micronodules  - Repeat CT chest, largest nodule 5 mm    Cough  - Continue Mucinex up to twice a day (1200 mg max dose)    Smoking  - Patient will begin using Nicotine patch 21 mcg daily and we have prescribed 4 mg of Nicroette gum to used as needed 1-2 hours throughout the day for cravings. Spent 15 minutes on smoking cessation education, including stress management & triggers, short-term and long-term health consequences of smoking, and health benefits of quitting.     (outpatient collateral info pasted above from [employer])    67 y/o female with h/o COPD on home O2 2L NC, DM, HTN, Depression for cough presenting with continued increasing sputum production for the past 8 days.

## 2025-06-03 ENCOUNTER — APPOINTMENT (OUTPATIENT)
Dept: INTERNAL MEDICINE | Facility: CLINIC | Age: 75
End: 2025-06-03

## 2025-06-12 NOTE — ED ADULT NURSE NOTE - HOW OFTEN DO YOU HAVE A DRINK CONTAINING ALCOHOL?
Oz Atkinson's chief complaint for this visit includes:  Chief Complaint   Patient presents with    Consult     Nasal vestibulitis, nasal deformity, nasal obstruction, nasal valve collapse, hypertrophy of both inferior nasal turbinates - referred by Dr. Mukesh Cabral MD.     PCP: Carlos Ferreira    Referring Provider:  Mukesh Cabral MD  [facility]  [address] [facility] [address] [address]    There were no vitals taken for this visit.    Gaudencio Burt, EMT  June 12, 2025       
Never

## 2025-08-18 ENCOUNTER — APPOINTMENT (OUTPATIENT)
Dept: INTERNAL MEDICINE | Facility: CLINIC | Age: 75
End: 2025-08-18
Payer: MEDICARE

## 2025-08-18 VITALS
OXYGEN SATURATION: 98 % | HEART RATE: 101 BPM | WEIGHT: 125 LBS | TEMPERATURE: 98.4 F | BODY MASS INDEX: 25.2 KG/M2 | SYSTOLIC BLOOD PRESSURE: 141 MMHG | DIASTOLIC BLOOD PRESSURE: 64 MMHG | HEIGHT: 59 IN

## 2025-08-18 DIAGNOSIS — R91.1 SOLITARY PULMONARY NODULE: ICD-10-CM

## 2025-08-18 DIAGNOSIS — M25.561 PAIN IN RIGHT KNEE: ICD-10-CM

## 2025-08-18 PROCEDURE — 99214 OFFICE O/P EST MOD 30 MIN: CPT

## 2025-08-18 PROCEDURE — G2211 COMPLEX E/M VISIT ADD ON: CPT

## 2025-08-19 RX ORDER — NAPROXEN 500 MG/1
500 TABLET ORAL
Qty: 14 | Refills: 3 | Status: ACTIVE | COMMUNITY
Start: 2025-08-19 | End: 1900-01-01

## 2025-09-01 ENCOUNTER — NON-APPOINTMENT (OUTPATIENT)
Age: 75
End: 2025-09-01

## 2025-09-03 ENCOUNTER — APPOINTMENT (OUTPATIENT)
Dept: INTERNAL MEDICINE | Facility: CLINIC | Age: 75
End: 2025-09-03
Payer: MEDICARE

## 2025-09-03 VITALS
HEART RATE: 99 BPM | OXYGEN SATURATION: 96 % | TEMPERATURE: 98.2 F | SYSTOLIC BLOOD PRESSURE: 116 MMHG | DIASTOLIC BLOOD PRESSURE: 78 MMHG | BODY MASS INDEX: 25.2 KG/M2 | WEIGHT: 125 LBS | HEIGHT: 59 IN

## 2025-09-03 DIAGNOSIS — Z00.00 ENCOUNTER FOR GENERAL ADULT MEDICAL EXAMINATION W/OUT ABNORMAL FINDINGS: ICD-10-CM

## 2025-09-03 PROCEDURE — G0439: CPT

## 2025-09-03 RX ORDER — WALKER
EACH MISCELLANEOUS
Qty: 1 | Refills: 0 | Status: ACTIVE | COMMUNITY
Start: 2025-09-03

## 2025-09-07 LAB
25(OH)D3 SERPL-MCNC: 25 NG/ML
ALBUMIN SERPL ELPH-MCNC: 4.1 G/DL
ALP BLD-CCNC: 104 U/L
ALT SERPL-CCNC: 13 U/L
ANION GAP SERPL CALC-SCNC: 14 MMOL/L
AST SERPL-CCNC: 20 U/L
BASOPHILS # BLD AUTO: 0.07 K/UL
BASOPHILS NFR BLD AUTO: 0.7 %
BILIRUB SERPL-MCNC: 0.2 MG/DL
BUN SERPL-MCNC: 16 MG/DL
CALCIUM SERPL-MCNC: 10.1 MG/DL
CHLORIDE SERPL-SCNC: 103 MMOL/L
CHOLEST SERPL-MCNC: 196 MG/DL
CO2 SERPL-SCNC: 23 MMOL/L
CREAT SERPL-MCNC: 0.59 MG/DL
EGFRCR SERPLBLD CKD-EPI 2021: 94 ML/MIN/1.73M2
EOSINOPHIL # BLD AUTO: 0.23 K/UL
EOSINOPHIL NFR BLD AUTO: 2.5 %
ESTIMATED AVERAGE GLUCOSE: 131 MG/DL
GLUCOSE SERPL-MCNC: 120 MG/DL
HBA1C MFR BLD HPLC: 6.2 %
HCT VFR BLD CALC: 38 %
HDLC SERPL-MCNC: 40 MG/DL
HGB BLD-MCNC: 11.4 G/DL
IMM GRANULOCYTES NFR BLD AUTO: 0.3 %
LDLC SERPL-MCNC: 124 MG/DL
LYMPHOCYTES # BLD AUTO: 2 K/UL
LYMPHOCYTES NFR BLD AUTO: 21.4 %
MAN DIFF?: NORMAL
MCHC RBC-ENTMCNC: 28.9 PG
MCHC RBC-ENTMCNC: 30 G/DL
MCV RBC AUTO: 96.4 FL
MONOCYTES # BLD AUTO: 0.69 K/UL
MONOCYTES NFR BLD AUTO: 7.4 %
NEUTROPHILS # BLD AUTO: 6.33 K/UL
NEUTROPHILS NFR BLD AUTO: 67.7 %
NONHDLC SERPL-MCNC: 155 MG/DL
PLATELET # BLD AUTO: 481 K/UL
POTASSIUM SERPL-SCNC: 4.3 MMOL/L
PROT SERPL-MCNC: 7.4 G/DL
RBC # BLD: 3.94 M/UL
RBC # FLD: 15.7 %
SODIUM SERPL-SCNC: 140 MMOL/L
TRIGL SERPL-MCNC: 176 MG/DL
TSH SERPL-ACNC: 1.27 UIU/ML
WBC # FLD AUTO: 9.35 K/UL